# Patient Record
Sex: FEMALE | Race: WHITE | NOT HISPANIC OR LATINO | Employment: PART TIME | URBAN - METROPOLITAN AREA
[De-identification: names, ages, dates, MRNs, and addresses within clinical notes are randomized per-mention and may not be internally consistent; named-entity substitution may affect disease eponyms.]

---

## 2017-01-11 ENCOUNTER — TRANSCRIBE ORDERS (OUTPATIENT)
Dept: ADMINISTRATIVE | Facility: HOSPITAL | Age: 37
End: 2017-01-11

## 2017-01-11 ENCOUNTER — ALLSCRIPTS OFFICE VISIT (OUTPATIENT)
Dept: OTHER | Facility: OTHER | Age: 37
End: 2017-01-11

## 2017-01-11 DIAGNOSIS — R10.31 ABDOMINAL PAIN, RIGHT LOWER QUADRANT: Primary | ICD-10-CM

## 2017-01-11 DIAGNOSIS — R10.31 RIGHT LOWER QUADRANT PAIN: ICD-10-CM

## 2017-01-11 LAB
BILIRUB UR QL STRIP: NORMAL
CLARITY UR: NORMAL
COLOR UR: YELLOW
GLUCOSE (HISTORICAL): NORMAL
HGB UR QL STRIP.AUTO: NORMAL
KETONES UR STRIP-MCNC: NORMAL MG/DL
LEUKOCYTE ESTERASE UR QL STRIP: NORMAL
NITRITE UR QL STRIP: NORMAL
PH UR STRIP.AUTO: 6.5 [PH]
PROT UR STRIP-MCNC: NORMAL MG/DL
SP GR UR STRIP.AUTO: 1
UROBILINOGEN UR QL STRIP.AUTO: 0.2

## 2017-01-13 ENCOUNTER — HOSPITAL ENCOUNTER (OUTPATIENT)
Dept: CT IMAGING | Facility: HOSPITAL | Age: 37
Discharge: HOME | End: 2017-01-13
Payer: COMMERCIAL

## 2017-01-13 DIAGNOSIS — R10.31 ABDOMINAL PAIN, RIGHT LOWER QUADRANT: ICD-10-CM

## 2017-01-13 PROCEDURE — 74177 CT ABD & PELVIS W/CONTRAST: CPT

## 2017-01-13 RX ADMIN — IOHEXOL 100 ML: 350 INJECTION, SOLUTION INTRAVENOUS at 18:44

## 2017-01-13 RX ADMIN — IOHEXOL 50 ML: 240 INJECTION, SOLUTION INTRATHECAL; INTRAVASCULAR; INTRAVENOUS; ORAL at 18:44

## 2017-01-18 ENCOUNTER — ALLSCRIPTS OFFICE VISIT (OUTPATIENT)
Dept: OTHER | Facility: OTHER | Age: 37
End: 2017-01-18

## 2017-03-20 ENCOUNTER — TRANSCRIBE ORDERS (OUTPATIENT)
Dept: URGENT CARE | Facility: CLINIC | Age: 37
End: 2017-03-20

## 2017-03-20 ENCOUNTER — HOSPITAL ENCOUNTER (OUTPATIENT)
Dept: RADIOLOGY | Facility: CLINIC | Age: 37
Discharge: HOME | End: 2017-03-20
Payer: COMMERCIAL

## 2017-03-20 DIAGNOSIS — M79.671 PAIN OF RIGHT FOOT: ICD-10-CM

## 2017-03-20 PROCEDURE — 73630 X-RAY EXAM OF FOOT: CPT

## 2017-04-26 ENCOUNTER — ALLSCRIPTS OFFICE VISIT (OUTPATIENT)
Dept: OTHER | Facility: OTHER | Age: 37
End: 2017-04-26

## 2017-05-11 ENCOUNTER — APPOINTMENT (OUTPATIENT)
Dept: URGENT CARE | Facility: CLINIC | Age: 37
End: 2017-05-11
Payer: COMMERCIAL

## 2017-05-11 ENCOUNTER — HOSPITAL ENCOUNTER (OUTPATIENT)
Dept: RADIOLOGY | Facility: CLINIC | Age: 37
Discharge: HOME | End: 2017-05-11
Admitting: EMERGENCY MEDICINE
Payer: COMMERCIAL

## 2017-05-11 DIAGNOSIS — M79.643 PAIN OF HAND: ICD-10-CM

## 2017-05-11 DIAGNOSIS — R25.2 CRAMP AND SPASM: ICD-10-CM

## 2017-05-11 DIAGNOSIS — R29.898 OTHER SYMPTOMS AND SIGNS INVOLVING THE MUSCULOSKELETAL SYSTEM: ICD-10-CM

## 2017-05-11 DIAGNOSIS — S69.91XA UNSPECIFIED INJURY OF RIGHT WRIST, HAND AND FINGER(S), INITIAL ENCOUNTER: ICD-10-CM

## 2017-05-11 DIAGNOSIS — M54.9 DORSALGIA: ICD-10-CM

## 2017-05-11 DIAGNOSIS — J01.00 ACUTE MAXILLARY SINUSITIS: ICD-10-CM

## 2017-05-11 PROCEDURE — 73090 X-RAY EXAM OF FOREARM: CPT

## 2017-05-11 PROCEDURE — 99214 OFFICE O/P EST MOD 30 MIN: CPT

## 2017-05-11 PROCEDURE — S9088 SERVICES PROVIDED IN URGENT: HCPCS

## 2017-05-11 PROCEDURE — 29125 APPL SHORT ARM SPLINT STATIC: CPT

## 2017-05-11 PROCEDURE — 73130 X-RAY EXAM OF HAND: CPT

## 2017-05-18 ENCOUNTER — APPOINTMENT (OUTPATIENT)
Dept: LAB | Facility: CLINIC | Age: 37
End: 2017-05-18
Payer: COMMERCIAL

## 2017-05-18 ENCOUNTER — TRANSCRIBE ORDERS (OUTPATIENT)
Dept: LAB | Facility: CLINIC | Age: 37
End: 2017-05-18

## 2017-05-18 ENCOUNTER — ALLSCRIPTS OFFICE VISIT (OUTPATIENT)
Dept: OTHER | Facility: OTHER | Age: 37
End: 2017-05-18

## 2017-05-18 DIAGNOSIS — M54.9 DORSALGIA: ICD-10-CM

## 2017-05-18 DIAGNOSIS — R25.2 CRAMP AND SPASM: ICD-10-CM

## 2017-05-18 DIAGNOSIS — M79.643 PAIN OF HAND: ICD-10-CM

## 2017-05-18 DIAGNOSIS — J01.00 ACUTE MAXILLARY SINUSITIS: ICD-10-CM

## 2017-05-18 LAB
ALBUMIN SERPL BCP-MCNC: 4 G/DL (ref 3.5–5)
ALP SERPL-CCNC: 81 U/L (ref 46–116)
ALT SERPL W P-5'-P-CCNC: 22 U/L (ref 12–78)
ANION GAP SERPL CALCULATED.3IONS-SCNC: 7 MMOL/L (ref 4–13)
AST SERPL W P-5'-P-CCNC: 13 U/L (ref 5–45)
BILIRUB SERPL-MCNC: 0.38 MG/DL (ref 0.2–1)
BUN SERPL-MCNC: 18 MG/DL (ref 5–25)
CALCIUM SERPL-MCNC: 9.3 MG/DL (ref 8.3–10.1)
CHLORIDE SERPL-SCNC: 106 MMOL/L (ref 100–108)
CO2 SERPL-SCNC: 27 MMOL/L (ref 21–32)
CREAT SERPL-MCNC: 0.68 MG/DL (ref 0.6–1.3)
CRP SERPL QL: <3 MG/L
ERYTHROCYTE [SEDIMENTATION RATE] IN BLOOD: 34 MM/HOUR (ref 0–20)
GFR SERPL CREATININE-BSD FRML MDRD: >60 ML/MIN/1.73SQ M
GLUCOSE SERPL-MCNC: 85 MG/DL (ref 65–140)
POTASSIUM SERPL-SCNC: 4.2 MMOL/L (ref 3.5–5.3)
PROT SERPL-MCNC: 7.5 G/DL (ref 6.4–8.2)
SODIUM SERPL-SCNC: 140 MMOL/L (ref 136–145)

## 2017-05-18 PROCEDURE — 80053 COMPREHEN METABOLIC PANEL: CPT

## 2017-05-18 PROCEDURE — 86225 DNA ANTIBODY NATIVE: CPT

## 2017-05-18 PROCEDURE — 86618 LYME DISEASE ANTIBODY: CPT

## 2017-05-18 PROCEDURE — 86038 ANTINUCLEAR ANTIBODIES: CPT

## 2017-05-18 PROCEDURE — 86430 RHEUMATOID FACTOR TEST QUAL: CPT

## 2017-05-18 PROCEDURE — 85652 RBC SED RATE AUTOMATED: CPT

## 2017-05-18 PROCEDURE — 36415 COLL VENOUS BLD VENIPUNCTURE: CPT

## 2017-05-18 PROCEDURE — 86140 C-REACTIVE PROTEIN: CPT

## 2017-05-19 LAB
B BURGDOR IGG SER IA-ACNC: 0.08
B BURGDOR IGM SER IA-ACNC: 0.21
DSDNA AB SER-ACNC: <1 IU/ML (ref 0–9)
RHEUMATOID FACT SER QL LA: NEGATIVE
RYE IGE QN: NEGATIVE

## 2017-05-23 ENCOUNTER — ALLSCRIPTS OFFICE VISIT (OUTPATIENT)
Dept: OTHER | Facility: OTHER | Age: 37
End: 2017-05-23

## 2017-05-23 ENCOUNTER — TRANSCRIBE ORDERS (OUTPATIENT)
Dept: ADMINISTRATIVE | Facility: HOSPITAL | Age: 37
End: 2017-05-23

## 2017-05-23 DIAGNOSIS — R29.898 OTHER SYMPTOMS REFERABLE TO JOINT, SITE UNSPECIFIED: Primary | ICD-10-CM

## 2017-05-27 ENCOUNTER — HOSPITAL ENCOUNTER (OUTPATIENT)
Dept: MRI IMAGING | Facility: HOSPITAL | Age: 37
Discharge: HOME | End: 2017-05-27
Payer: COMMERCIAL

## 2017-05-27 DIAGNOSIS — R29.898 OTHER SYMPTOMS REFERABLE TO JOINT, SITE UNSPECIFIED: ICD-10-CM

## 2017-05-27 PROCEDURE — 70553 MRI BRAIN STEM W/O & W/DYE: CPT

## 2017-05-27 PROCEDURE — A9585 GADOBUTROL INJECTION: HCPCS | Performed by: RADIOLOGY

## 2017-05-27 RX ADMIN — GADOBUTROL 9 ML: 604.72 INJECTION INTRAVENOUS at 14:15

## 2017-05-31 ENCOUNTER — ALLSCRIPTS OFFICE VISIT (OUTPATIENT)
Dept: OTHER | Facility: OTHER | Age: 37
End: 2017-05-31

## 2017-06-13 ENCOUNTER — TRANSCRIBE ORDERS (OUTPATIENT)
Dept: ADMINISTRATIVE | Facility: HOSPITAL | Age: 37
End: 2017-06-13

## 2017-06-13 ENCOUNTER — ALLSCRIPTS OFFICE VISIT (OUTPATIENT)
Dept: OTHER | Facility: OTHER | Age: 37
End: 2017-06-13

## 2017-06-13 DIAGNOSIS — R25.2 CRAMP AND SPASM: ICD-10-CM

## 2017-06-13 DIAGNOSIS — R20.2 PARESTHESIA OF SKIN: ICD-10-CM

## 2017-06-13 DIAGNOSIS — R20.2 PARESTHESIA: Primary | ICD-10-CM

## 2017-06-14 ENCOUNTER — APPOINTMENT (OUTPATIENT)
Dept: LAB | Facility: CLINIC | Age: 37
End: 2017-06-14
Payer: COMMERCIAL

## 2017-06-14 ENCOUNTER — TRANSCRIBE ORDERS (OUTPATIENT)
Dept: URGENT CARE | Facility: CLINIC | Age: 37
End: 2017-06-14

## 2017-06-14 ENCOUNTER — APPOINTMENT (OUTPATIENT)
Dept: RADIOLOGY | Facility: CLINIC | Age: 37
End: 2017-06-14
Payer: COMMERCIAL

## 2017-06-14 DIAGNOSIS — R20.2 PARESTHESIA OF SKIN: ICD-10-CM

## 2017-06-14 DIAGNOSIS — R25.2 CRAMP AND SPASM: ICD-10-CM

## 2017-06-14 LAB
25(OH)D3 SERPL-MCNC: 25.6 NG/ML (ref 30–100)
CK SERPL-CCNC: 76 U/L (ref 26–192)
FOLATE SERPL-MCNC: 19.2 NG/ML (ref 3.1–17.5)
VIT B12 SERPL-MCNC: 596 PG/ML (ref 100–900)

## 2017-06-14 PROCEDURE — 82746 ASSAY OF FOLIC ACID SERUM: CPT

## 2017-06-14 PROCEDURE — 82607 VITAMIN B-12: CPT

## 2017-06-14 PROCEDURE — 83519 RIA NONANTIBODY: CPT

## 2017-06-14 PROCEDURE — 72110 X-RAY EXAM L-2 SPINE 4/>VWS: CPT

## 2017-06-14 PROCEDURE — 72050 X-RAY EXAM NECK SPINE 4/5VWS: CPT

## 2017-06-14 PROCEDURE — 84238 ASSAY NONENDOCRINE RECEPTOR: CPT

## 2017-06-14 PROCEDURE — 84165 PROTEIN E-PHORESIS SERUM: CPT

## 2017-06-14 PROCEDURE — 36415 COLL VENOUS BLD VENIPUNCTURE: CPT

## 2017-06-14 PROCEDURE — 82550 ASSAY OF CK (CPK): CPT

## 2017-06-14 PROCEDURE — 82306 VITAMIN D 25 HYDROXY: CPT

## 2017-06-16 LAB
ACHR BIND AB SER-SCNC: <0.03 NMOL/L (ref 0–0.24)
ALBUMIN SERPL ELPH-MCNC: 4.13 G/DL (ref 3.5–5)
ALBUMIN SERPL ELPH-MCNC: 58.2 % (ref 52–65)
ALPHA1 GLOB SERPL ELPH-MCNC: 0.32 G/DL (ref 0.1–0.4)
ALPHA1 GLOB SERPL ELPH-MCNC: 4.5 % (ref 2.5–5)
ALPHA2 GLOB SERPL ELPH-MCNC: 0.8 G/DL (ref 0.4–1.2)
ALPHA2 GLOB SERPL ELPH-MCNC: 11.2 % (ref 7–13)
BETA GLOB ABNORMAL SERPL ELPH-MCNC: 0.43 G/DL (ref 0.4–0.8)
BETA1 GLOB SERPL ELPH-MCNC: 6.1 % (ref 5–13)
BETA2 GLOB SERPL ELPH-MCNC: 5.4 % (ref 2–8)
BETA2+GAMMA GLOB SERPL ELPH-MCNC: 0.38 G/DL (ref 0.2–0.5)
GAMMA GLOB ABNORMAL SERPL ELPH-MCNC: 1.04 G/DL (ref 0.5–1.6)
GAMMA GLOB SERPL ELPH-MCNC: 14.6 % (ref 12–22)
IGG/ALB SER: 1.39 {RATIO} (ref 1.1–1.8)
PROT PATTERN SERPL ELPH-IMP: NORMAL
PROT SERPL-MCNC: 7.1 G/DL (ref 6.4–8.2)

## 2017-06-20 LAB — ACHR BLOCK AB/ACHR TOTAL SFR SER: 18 % (ref 0–25)

## 2017-06-30 ENCOUNTER — GENERIC CONVERSION - ENCOUNTER (OUTPATIENT)
Dept: OTHER | Facility: OTHER | Age: 37
End: 2017-06-30

## 2017-07-06 ENCOUNTER — ALLSCRIPTS OFFICE VISIT (OUTPATIENT)
Dept: OTHER | Facility: OTHER | Age: 37
End: 2017-07-06

## 2017-07-06 ENCOUNTER — GENERIC CONVERSION - ENCOUNTER (OUTPATIENT)
Dept: OTHER | Facility: OTHER | Age: 37
End: 2017-07-06

## 2017-07-06 LAB — ACHR MOD AB/ACHR TOTAL SFR SER: <12 % (ref 0–20)

## 2017-07-13 ENCOUNTER — TRANSCRIBE ORDERS (OUTPATIENT)
Dept: SLEEP CENTER | Facility: CLINIC | Age: 37
End: 2017-07-13

## 2017-07-14 ENCOUNTER — ALLSCRIPTS OFFICE VISIT (OUTPATIENT)
Dept: OTHER | Facility: OTHER | Age: 37
End: 2017-07-14

## 2017-09-19 ENCOUNTER — ALLSCRIPTS OFFICE VISIT (OUTPATIENT)
Dept: OTHER | Facility: OTHER | Age: 37
End: 2017-09-19

## 2017-09-19 ENCOUNTER — TRANSCRIBE ORDERS (OUTPATIENT)
Dept: ADMINISTRATIVE | Facility: HOSPITAL | Age: 37
End: 2017-09-19

## 2017-09-19 DIAGNOSIS — R20.2 PARESTHESIA OF SKIN: ICD-10-CM

## 2017-09-19 DIAGNOSIS — R20.2 PARESTHESIA: Primary | ICD-10-CM

## 2017-09-19 DIAGNOSIS — S46.912A STRAIN OF UNSPECIFIED MUSCLE, FASCIA AND TENDON AT SHOULDER AND UPPER ARM LEVEL, LEFT ARM, INITIAL ENCOUNTER: ICD-10-CM

## 2017-09-19 DIAGNOSIS — E55.9 VITAMIN D DEFICIENCY: ICD-10-CM

## 2017-09-19 DIAGNOSIS — R73.9 HYPERGLYCEMIA: ICD-10-CM

## 2017-09-20 ENCOUNTER — TRANSCRIBE ORDERS (OUTPATIENT)
Dept: LAB | Facility: CLINIC | Age: 37
End: 2017-09-20

## 2017-09-20 ENCOUNTER — APPOINTMENT (OUTPATIENT)
Dept: LAB | Facility: CLINIC | Age: 37
End: 2017-09-20
Payer: COMMERCIAL

## 2017-09-20 DIAGNOSIS — E55.9 UNSPECIFIED VITAMIN D DEFICIENCY: ICD-10-CM

## 2017-09-20 DIAGNOSIS — E55.9 UNSPECIFIED VITAMIN D DEFICIENCY: Primary | ICD-10-CM

## 2017-09-20 LAB
TSH SERPL DL<=0.05 MIU/L-ACNC: 0.83 UIU/ML (ref 0.36–3.74)
VIT B12 SERPL-MCNC: 751 PG/ML (ref 100–900)

## 2017-09-20 PROCEDURE — 82652 VIT D 1 25-DIHYDROXY: CPT

## 2017-09-20 PROCEDURE — 84443 ASSAY THYROID STIM HORMONE: CPT

## 2017-09-20 PROCEDURE — 82607 VITAMIN B-12: CPT

## 2017-09-20 PROCEDURE — 36415 COLL VENOUS BLD VENIPUNCTURE: CPT

## 2017-09-22 LAB — 1,25(OH)2D3 SERPL-MCNC: 68.9 PG/ML (ref 19.9–79.3)

## 2017-10-06 ENCOUNTER — HOSPITAL ENCOUNTER (OUTPATIENT)
Dept: NEUROLOGY | Facility: CLINIC | Age: 37
Discharge: HOME | End: 2017-10-06
Payer: COMMERCIAL

## 2017-10-06 DIAGNOSIS — R20.2 PARESTHESIA: ICD-10-CM

## 2017-10-06 PROCEDURE — 95886 MUSC TEST DONE W/N TEST COMP: CPT

## 2017-10-06 PROCEDURE — 95912 NRV CNDJ TEST 11-12 STUDIES: CPT

## 2017-10-18 ENCOUNTER — OFFICE VISIT (OUTPATIENT)
Dept: URGENT CARE | Facility: CLINIC | Age: 37
End: 2017-10-18
Payer: COMMERCIAL

## 2017-10-18 PROCEDURE — S9088 SERVICES PROVIDED IN URGENT: HCPCS

## 2017-10-18 PROCEDURE — 99213 OFFICE O/P EST LOW 20 MIN: CPT

## 2017-10-19 ENCOUNTER — ALLSCRIPTS OFFICE VISIT (OUTPATIENT)
Dept: OTHER | Facility: OTHER | Age: 37
End: 2017-10-19

## 2017-10-20 ENCOUNTER — APPOINTMENT (OUTPATIENT)
Dept: RADIOLOGY | Facility: CLINIC | Age: 37
End: 2017-10-20
Payer: COMMERCIAL

## 2017-10-20 ENCOUNTER — APPOINTMENT (OUTPATIENT)
Dept: LAB | Facility: CLINIC | Age: 37
End: 2017-10-20
Payer: COMMERCIAL

## 2017-10-20 ENCOUNTER — TRANSCRIBE ORDERS (OUTPATIENT)
Dept: LAB | Facility: CLINIC | Age: 37
End: 2017-10-20

## 2017-10-20 DIAGNOSIS — S46.912A STRAIN OF UNSPECIFIED MUSCLE, FASCIA AND TENDON AT SHOULDER AND UPPER ARM LEVEL, LEFT ARM, INITIAL ENCOUNTER: ICD-10-CM

## 2017-10-20 DIAGNOSIS — R73.9 HYPERGLYCEMIA: ICD-10-CM

## 2017-10-20 LAB
EST. AVERAGE GLUCOSE BLD GHB EST-MCNC: 114 MG/DL
GLUCOSE P FAST SERPL-MCNC: 110 MG/DL (ref 65–99)
HBA1C MFR BLD: 5.6 % (ref 4.2–6.3)

## 2017-10-20 PROCEDURE — 84681 ASSAY OF C-PEPTIDE: CPT

## 2017-10-20 PROCEDURE — 36415 COLL VENOUS BLD VENIPUNCTURE: CPT

## 2017-10-20 PROCEDURE — 83036 HEMOGLOBIN GLYCOSYLATED A1C: CPT

## 2017-10-20 PROCEDURE — 82947 ASSAY GLUCOSE BLOOD QUANT: CPT

## 2017-10-20 PROCEDURE — 73030 X-RAY EXAM OF SHOULDER: CPT

## 2017-10-20 NOTE — PROGRESS NOTES
Assessment  1  Hyperglycemia (790 29) (R73 9)   2  Left shoulder strain (840 9) (X80 563Z)    Plan  Left shoulder strain    · * XR SHOULDER 2+ VIEW LEFT; Status:Active; Requested MAX:[id number];     Discussion/Summary  The patient was counseled regarding diagnostic results,-- instructions for management,-- risk factor reductions,-- prognosis,-- patient and family education,-- impressions,-- risks and benefits of treatment options,-- importance of compliance with treatment  The treatment plan was reviewed with the patient/guardian  The patient/guardian understands and agrees with the treatment plan      Chief Complaint  concerns with blood sugar      History of Present Illness  HPI: pt complains of hyperglycemia, she is checking sugars at home fasting and seeing an average of 115-130, pt has no dx of diabetes, pt is watching her diet and exercising and her weight is going down      Review of Systems    Constitutional: no fever-- and-- no chills  Cardiovascular: no chest pain-- and-- no palpitations  Genitourinary: negative for nocturia or polyuria  Other Symptoms: pt denies polydipsia  Active Problems  1  Acute back pain (724 5) (M54 9)   2  Acute maxillary sinusitis (461 0) (J01 00)   3  Ankle pain (719 47) (M25 579)   4  Benign mole (216 9) (D22 9)   5  Bipolar disorder, unspecified (296 80) (F31 9)   6  Blurred vision (368 8) (H53 8)   7  Classic migraine (346 00) (G43 109)   8  Contusion of right ankle, initial encounter (924 21) (S90 01XA)   9  COPD (chronic obstructive pulmonary disease) (496) (J44 9)   10  Dizziness (780 4) (R42)   11  Generalized anxiety disorder (300 02) (F41 1)   12  Hypercholesterolemia (272 0) (E78 00)   13  Injury of right hand, initial encounter (959 4) (S69 91XA)   14  Left shoulder strain (840 9) (S46 912A)   15  Leg cramps (729 82) (R25 2)   16  Leg pain (729 5) (M79 606)   17  Leukocytosis, unspecified type (288 60) (D72 829)   18  Lower back pain (724 2) (M54 5)   19  Lung nodule (793 11) (R91 1)   20  Lymphadenitis, acute (683) (L04 9)   21  Major depression (296 20) (F32 9)   22  Need for influenza vaccination (V04 81) (Z23)   23  Pain, hand (729 5) (M79 643)   24  Paresthesia (782 0) (R20 2)   25  Pelvic pain in female (625 9) (R10 2)   26  Right foot pain (729 5) (M79 671)   27  Right lower quadrant abdominal pain (789 03) (R10 31)   28  Shingles (053 9) (B02 9)   29  Shortness of breath (786 05) (R06 02)   30  Skin rash (782 1) (R21)   31  Sprain of right hand, initial encounter (842 10) (S63 91XA)   32  Sprain of right wrist, initial encounter (842 00) (S63 501A)   33  Unspecified ovarian cyst, unspecified side (620 2) (N83 209)   34  Upper limb weakness (729 89) (R29 898)   35  Vitamin D deficiency (268 9) (E55 9)   36  Weakness of both lower extremities (729 89) (R29 898)   37  Yeast infection of the vagina (112 1) (B37 3)    Past Medical History  1  History of endometriosis (V13 29) (Z87 42)   2  History of hyperlipidemia (V12 29) (Z86 39)   3  History of low back pain (V13 59) (Z87 39)   4  History of urinary frequency (V13 09) (Z87 898)   5  History of Prolapsed bladder   6  History of Urinary urgency (788 63) (R39 15)    Family History  Mother    1  Family history of chronic obstructive pulmonary disease (V17 6) (Z82 5)   2  Family history of rheumatic heart disease (V17 49) (Z82 49)   3  Family history of type 2 diabetes mellitus (V18 0) (Z83 3)  Father    4  Family history of    5  Family history of lung cancer (V16 1) (Z80 1)  Maternal Grandmother    6  Family history of    7  Family history of cerebrovascular accident (CVA) (V17 1) (Z82 3)   8  Family history of type 2 diabetes mellitus (V18 0) (Z83 3)  Paternal Grandmother    5  Family history of type 2 diabetes mellitus (V18 0) (Z83 3)  Maternal Grandfather    10  Family history of Esophageal abnormality  Paternal Grandfather    6   Family history of type 2 diabetes mellitus (V18 0) (Z83 3)  Family History Reviewed: The family history was reviewed and updated today  Social History   · Disabled   · Former smoker (T06 54) (E70 263)   ·    · One child   · Social alcohol use (Z78 9)  The social history was reviewed and is unchanged  Surgical History  1  History of Bladder Surgery   2  History of Hysterectomy   3  History of Ovarian Cystectomy   4  History of Tonsillectomy With Adenoidectomy    Current Meds   1  Cyclobenzaprine HCl - 10 MG Oral Tablet; TAKE 1 TABLET 3 TIMES DAILY AS NEEDED; Therapy: 58YSQ9108 to ([address])  Requested for: 49KAB8980; Last   Rx:18Oct2017 Ordered   2  Daily Multivitamin TABS; Therapy: (Recorded:16Wkh8347) to Recorded   3  LaMICtal 200 MG Oral Tablet; TAKE 1 TABLET TWICE DAILY; Therapy: (Recorded:13Jun2017) to Recorded   4  LORazepam 0 5 MG Oral Tablet; TAKE 1 TABLET EVERY 12 HOURS AS NEEDED; Therapy: (Recorded:13Jun2017) to Recorded   5  PredniSONE 10 MG Oral Tablet; 3 tabs BID X 2 days, 2 Tabs BID X 2 days, 1 Tab BID   X 2 Days, then 1 Tab daily X 2 days; Therapy: 17ZJU7740 to (Last Rx:18Oct2017)  Requested for: 20AMV3817 Ordered   6  Simvastatin 40 MG Oral Tablet; take 1 tablet by mouth once daily; Therapy: 42FKY9575 to (Evaluate:16Nov2017)  Requested for: 21Nov2016; Last   Rx:21Nov2016 Ordered   7  Vitamin D 2000 UNIT Oral Capsule; Therapy: (Recorded:12Jij7125) to Recorded    The medication list was reviewed and updated today  Allergies  1  No Known Drug Allergies    Vitals   Recorded: 16FFV3528 02:14PM   Temperature 98 5 F, Tympanic   Heart Rate 87   Systolic 537   Diastolic 78   Height 5 ft 5 in   Weight 199 lb    BMI Calculated 33 12   BSA Calculated 1 97   O2 Saturation 94     Physical Exam    Constitutional   General appearance: No acute distress, well appearing and well nourished  well developed,-- appears healthy,-- well nourished,-- overweight-- and-- well hydrated     Pulmonary   Respiratory effort: No increased work of breathing or signs of respiratory distress  Respiratory rate: normal  Assessment of respiratory effort revealed normal rhythm and effort  Auscultation of lungs: Clear to auscultation  no rales or crackles were heard bilaterally  no rhonchi  no friction rub  no wheezing  Cardiovascular   Auscultation of heart: Normal rate and rhythm, normal S1 and S2, without murmurs  The heart rate was normal  The rhythm was regular  Heart sounds: normal S1-- and-- normal S2  no murmurs were heard  Abdomen   Abdomen: Non-tender, no masses  Liver and spleen: No hepatomegaly or splenomegaly  Skin   Skin and subcutaneous tissue: Normal without rashes or lesions      Psychiatric   Mood and affect: Normal          Future Appointments    Date/Time Provider Specialty Site   01/23/2018 11:30 AM Flaco Echeverria MD Neurology [facility] [address] [facility]     Signatures   Electronically signed by : Renetta Monte DO; Oct 19 2017  2:33PM EST                       (Author)

## 2017-10-21 LAB — C PEPTIDE SERPL-MCNC: 6.3 NG/ML (ref 1.1–4.4)

## 2017-10-21 NOTE — PROGRESS NOTES
Assessment  1  Left shoulder strain (840 9) (B55 800T)    Plan  Left shoulder strain    · Cyclobenzaprine HCl - 10 MG Oral Tablet; TAKE 1 TABLET 3 TIMES DAILY AS  NEEDED   · PredniSONE 10 MG Oral Tablet; 3 tabs BID X 2 days, 2 Tabs BID X 2 days, 1 Tab  BID X 2 Days, then 1 Tab daily X 2 days    Discussion/Summary  Discussion Summary:   Pain seems muscular in nature  We'll try a muscle relaxer and prednisone 60 physical, down  If not improving over the next week follow-up with PCP  advised to use heat or ice to help with symptoms  Medication Side Effects Reviewed: Possible side effects of new medications were reviewed with the patient/guardian today  Understands and agrees with treatment plan: The treatment plan was reviewed with the patient/guardian  The patient/guardian understands and agrees with the treatment plan   Follow Up Instructions: Follow Up with your Primary Care Provider in 7 days  If your symptoms worsen, go to the nearest [place] [address] Emergency Department  Chief Complaint  1  Shoulder Pain  Chief Complaint Free Text Note Form: Pt c/o left shoulder pain for a week  History of Present Illness  HPI: 42year old female here with left shoulder pain for the past week  Patient felt a sudden pull and sharp pain a week ago after trying to remove her jacket  It had improved a little until this morning  Woke up today with increased pain in the posterior aspect of her shoulder  Hospital Based Practices Required Assessment:   Abuse And Domestic Violence Screen    Yes, the patient is safe at home  -- The patient states no one is hurting them  Depression And Suicide Screen  No, the patient has not had thoughts of hurting themself  No, the patient has not felt depressed in the past 7 days  Prefered Language is  [place]  Primary Language is  English  Shoulder Pain: CITLALLI SALEH presents with complaints of shoulder pain     Associated symptoms include decreased range of motion, but-- no swelling,-- no clicking,-- no shoulder bruising,-- no instability,-- no redness,-- no warmth,-- no numbness in the arm,-- no weakness in the arm,-- no pain in the arm,-- no paresthesias,-- no pain in the neck,-- no chest pain,-- no pain in other joints,-- no fever,-- no localized rash-- and-- no generalized rash  Review of Systems  Focused-Female:   Constitutional: No fever, no chills, feels well, no tiredness, no recent weight gain or loss  ENT: no ear ache, no loss of hearing, no nosebleeds or nasal discharge, no sore throat or hoarseness  Musculoskeletal: as noted in HPI  Neurological: no complaints of headache, no confusion, no numbness or tingling, no dizziness or fainting  ROS Reviewed:   ROS reviewed  Active Problems  1  Acute back pain (724 5) (M54 9)   2  Acute maxillary sinusitis (461 0) (J01 00)   3  Ankle pain (719 47) (M25 579)   4  Benign mole (216 9) (D22 9)   5  Bipolar disorder, unspecified (296 80) (F31 9)   6  Blurred vision (368 8) (H53 8)   7  Classic migraine (346 00) (G43 109)   8  Contusion of right ankle, initial encounter (924 21) (S90 01XA)   9  COPD (chronic obstructive pulmonary disease) (496) (J44 9)   10  Dizziness (780 4) (R42)   11  Generalized anxiety disorder (300 02) (F41 1)   12  Hypercholesterolemia (272 0) (E78 00)   13  Injury of right hand, initial encounter (959 4) (S69 91XA)   14  Leg cramps (729 82) (R25 2)   15  Leg pain (729 5) (M79 606)   16  Leukocytosis, unspecified type (288 60) (D72 829)   17  Lower back pain (724 2) (M54 5)   18  Lung nodule (793 11) (R91 1)   19  Lymphadenitis, acute (683) (L04 9)   20  Major depression (296 20) (F32 9)   21  Need for influenza vaccination (V04 81) (Z23)   22  Pain, hand (729 5) (M79 643)   23  Paresthesia (782 0) (R20 2)   24  Pelvic pain in female (625 9) (R10 2)   25  Right foot pain (729 5) (M79 671)   26  Right lower quadrant abdominal pain (789 03) (R10 31)   27  Shingles (053 9) (B02 9)   28   Shortness of breath (786 05) (R06 02)   29  Skin rash (782 1) (R21)   30  Sprain of right hand, initial encounter (842 10) (S63 91XA)   31  Sprain of right wrist, initial encounter (842 00) (S63 501A)   32  Unspecified ovarian cyst, unspecified side (620 2) (N83 209)   33  Upper limb weakness (729 89) (R29 898)   34  Vitamin D deficiency (268 9) (E55 9)   35  Weakness of both lower extremities (729 89) (R29 898)   36  Yeast infection of the vagina (112 1) (B37 3)    Past Medical History  1  History of endometriosis (V13 29) (Z87 42)   2  History of hyperlipidemia (V12 29) (Z86 39)   3  History of low back pain (V13 59) (Z87 39)   4  History of urinary frequency (V13 09) (Z87 898)   5  History of Prolapsed bladder   6  History of Urinary urgency (788 63) (R39 15)  Active Problems And Past Medical History Reviewed: The active problems and past medical history were reviewed and updated today  Family History  Mother    1  Family history of chronic obstructive pulmonary disease (V17 6) (Z82 5)   2  Family history of rheumatic heart disease (V17 49) (Z82 49)   3  Family history of type 2 diabetes mellitus (V18 0) (Z83 3)  Father    4  Family history of    5  Family history of lung cancer (V16 1) (Z80 1)  Maternal Grandmother    6  Family history of    7  Family history of cerebrovascular accident (CVA) (V17 1) (Z82 3)   8  Family history of type 2 diabetes mellitus (V18 0) (Z83 3)  Paternal Grandmother    5  Family history of type 2 diabetes mellitus (V18 0) (Z83 3)  Maternal Grandfather    10  Family history of Esophageal abnormality  Paternal Grandfather    6  Family history of type 2 diabetes mellitus (V18 0) (Z83 3)  Family History Reviewed: The family history was reviewed and updated today  Social History   · Disabled   · Former smoker (W08 92) (T10 736)   ·    · One child   · Social alcohol use (Z78 9)  Social History Reviewed: The social history was reviewed and updated today   The social history was reviewed and is unchanged  Surgical History  1  History of Bladder Surgery   2  History of Hysterectomy   3  History of Ovarian Cystectomy   4  History of Tonsillectomy With Adenoidectomy  Surgical History Reviewed: The surgical history was reviewed and updated today  Current Meds   1  Daily Multivitamin TABS; Therapy: (Recorded:25Skn3942) to Recorded   2  LaMICtal 200 MG Oral Tablet; TAKE 1 TABLET TWICE DAILY; Therapy: (Recorded:13Jun2017) to Recorded   3  LORazepam 0 5 MG Oral Tablet; TAKE 1 TABLET EVERY 12 HOURS AS NEEDED; Therapy: (Recorded:13Jun2017) to Recorded   4  Simvastatin 40 MG Oral Tablet; take 1 tablet by mouth once daily; Therapy: 21DHP6754 to (Evaluate:16Nov2017)  Requested for: 21Nov2016; Last   Rx:21Nov2016 Ordered   5  Vitamin D 2000 UNIT Oral Capsule; Therapy: (Recorded:26Odo6215) to Recorded  Medication List Reviewed: The medication list was reviewed and updated today  Allergies  1  No Known Drug Allergies    Vitals  Signs   Recorded: 05NXF3523 10:37AM   Temperature: 97 3 F  Heart Rate: 69  Respiration: 18  Systolic: 515  Diastolic: 81  Height: 5 ft 5 in  Weight: 200 lb 13 41 oz  BMI Calculated: 33 42  BSA Calculated: 1 98  O2 Saturation: 98    Physical Exam    Constitutional   General appearance: No acute distress, well appearing and well nourished  Musculoskeletal   Gait and station: Normal     Inspection/palpation of joints, bones, and muscles: Abnormal  -- Able to fully abduct and adduct left shoulder  Decreased ROM with extension of left shoulder and pain to palpation over left scapula  No pain over superior or anterior aspect of shoulder  No deformity noted        Future Appointments    Date/Time Provider Specialty Site   01/23/2018 11:30 AM Hodan Goldman MD Neurology [address]     Signatures   Electronically signed by : Hoda Marroquin, [facility]; Oct 18 2017 10:53AM EST                       (Author)    Electronically signed by : SENG Lamb ; Oct 19 2017  7:52PM EST                       (Co-author)

## 2017-10-27 NOTE — PROGRESS NOTES
Assessment  1  Paresthesia (782 0) (R20 2)   2  Leg cramps (729 82) (R25 2)   3  Vitamin D deficiency (268 9) (E55 9)    Plan  Classic migraine, Dizziness, Leg cramps, Paresthesia    · (LC) T4 and TSH; Status:Active; Requested HRI:[id number];    Perform:[facility]; Due:88Azy7725; Ordered; For:Classic migraine, Dizziness, Leg cramps, Paresthesia; Ordered By:Lois Lopez;  Paresthesia    · Gabapentin 300 MG Oral Capsule; take 1 capsule at bedtime   Rx By: Linda Dumont; Dispense: 30 Days ; #:30 Capsule; Refill: 0;For: Paresthesia; BON = N; Verified Transmission to [address] ; Last Updated By: System, [employer]; 9/19/2017 11:36:43 AM   · (1) VITAMIN B12; Status:Active; Requested IOY:[id number];    Perform:[facility]; Due:19Sep2018; Ordered; Aishwarya Rock; Ordered By:Amita Lopez;   · EMG TWO EXTREMITIES WITH OR W/O RELATED PARASPINAL AREAS; Status:Active; Requested RQQ:[id number];    Perform:[facility]; Order Comments:Eval for neuropathy, myopathy or radiculopathy ( cramps and paresthesias); Due:44Umc7109; Last Updated By: Nicole Wild; 9/19/2017 11:43:56 AM;Ordered; Aishwarya Feathers; Ordered By:Catarina Issa;  TWO : RLE  ONE : RUE   · Follow-up visit in 3 months Evaluation and Treatment  Follow-up  Status: Complete   Done: 95YHL8941   Ordered; For: Paresthesia; Ordered By: Linda Dumont Performed:  Due: 57TUM9020; Last Updated By: Nicole Wild; 9/19/2017 11:44:11 AM  Vitamin D deficiency    · (1) VITAMIN D 25-HYDROXY; Status:Active; Requested JAQ:[id number];    Perform:[facility]; Due:34Xau0120; Ordered; For:Vitamin D deficiency; Ordered By:Lois Lopez; Discussion/Summary  Discussion Summary:   Patient is a 77-year-old lady complaining of paresthesias, muscle cramps and stiffness/weakness throughout the body  Patient had an MRI of the brain that was unremarkable and there was no evidence of any demyelinating disease   Blood work obtained by PCP was unremarkable so far  Neurological examination today in the office appears to be unremarkable  Recommend obtaining EMG of one upper and lower extremity preferably the right side to evaluate for any myopathy or radiculopathy or neuropathy  additional blood work with low vitD, on replacement all other labs normal, pt has not had B12, Thyroid testing, pt with FH of DM, HgA1c not coveredx-ray of the cervical and lumbar spine without any significant arthritis or disc disease  For the cramps patient has been instructed to take daily MultivitaminHeadaches appear to be very tolerable and patient has been instructed to maintain headache diary for headache and avoid headache triggers  will try Gabapentin 300mg HS  pt to let psych know of medicaionIn regards to bipolar disorder patient is following up with psychiatrist    Counseling Documentation With Imm: The patient was counseled regarding diagnostic results,-- instructions for management,-- risk factor reductions,-- prognosis,-- impressions,-- risks and benefits of treatment options,-- importance of compliance with treatment  total time of encounter was 25 minutes-- and-- >50% minutes was spent counseling  Patient's Capacity to Self-Care: Patient is able to Self-Care  Medication SE Review and Pt Understands Tx: Possible side effects of new medications were reviewed with the patient/guardian today  The treatment plan was reviewed with the patient/guardian  The patient/guardian understands and agrees with the treatment plan   Patient Guardian understands agrees: The treatment plan was reviewed with the patient/guardian  The patient/guardian understands and agrees with the treatment plan   Headache [facility]: There are no changes in medication management  Patient education was completed today and we also discussed precautions for rebound headaches  --    When patient has a moderate to severe headache, they should seek rest, initiate relaxation and apply cold compresses to the head     Also recommended to the patient :  1  Maintain regular sleep schedule -- 2  Limit over the counter medications  (No more than 3 times a week)  -- 3  Maintain headache diary  -- 4  Limit caffeine to 1-2 cups a day or less  -- 5  Avoid dietary trigger  (list given to the patient and reviewed with them)  -- 6  Patient is to have regular frequent meals to prevent headache onset  Chief Complaint  Chief Complaint Free Text Note Form: Patient presents for neurology follow-up for paresthesia      History of Present Illness  HPI: Patient is a 68-year-old lady coming in for follow up in regards to paresthesias and cramps  has h/o hypercholesterolemia and bipolar disorder and depression and states that she has symptoms in the last 2 months and she initially notices she had cramps in both lower extremities and right hand  It was really bad per the patient  She describes cramps as tightness  She also has tingling in both the arms as well and her PCP ordered MRI brain which was normal  She also feels like bugs crawling sensation for few months  She feels her had is constantly cramping ( however I could not see any cramp) and she describes it as stiffness  She tried Magnesium and it did not help    has occasional migraine like headaches but they are tolerable  SHe did not have one in few months  Excedrin migraine helps her  Headache is all over the head, pounding headache, lasts few hours  Headache is worse with lights and sounds  Excedrin migraine helps her  She does not want any preventive medication as they are tolerable  less than 3, ESR 34, rheumatoid factor negative, PK screen negative, Lyme negative, ESR 7,MRI brain with and without contrast on 5/27/17 showed no acute intracranial abnormality or pathologic enhancement, no significant white matter changes  X-ray of the hand on 5/11/17 showed no acute osseous abnormality  X-ray of the forearm right showed no acute osseous abnormality   X-ray foot on the right showed no acute osseous abnormality, prominent plantar calcaneal spur  history: 9/19/17this she continues to describe ongoing paresthesias  Primarily, she describes numbness and tingling occurring in her thighs bilateral with radiation into her lower extremities  She notes some on growing cramping sensations particularly in her hands  She denies any interim falls, nor does she have difficulty with fine dexterity  She did try some magnesium which was ineffective  Her headaches at this time are fairly well controlled, 1 present, she will utilize Excedrin with benefit    She denies any new, or focal weakness, no complaints of significant cervical or low back pain, bowel and bladder are intact  It was suggested she undergo any EMG however this was not carried through  She did undergo further testing to include;labs; Vitamin D = 25 6, CPK = 76, vitamin B12 = 596, folate = 19 2, PEP normal, acetylcholine receptor antibody within normal limitsx-ray lumbar spine was normal,, x-ray cervical spine with reversal of the normal cervical lordosis apex at C5  fracture or traumatic malalignment noted  Review of Systems  Neurological ROS:   Constitutional: no fever, no chills, no recent weight gain, no recent weight loss, no complaints of feeling tired, no changes in appetite  HEENT:  no sinus problems, not feeling congested, no blurred vision, no dryness of the eyes, no eye pain, no hearing loss, no tinnitus, no mouth sores, no sore throat, no hoarseness, no dysphagia, no masses, no bleeding  Cardiovascular:  no chest pain or pressure, no palpitations present, the heart rate was not rapid or irregular, no swelling in the arms or legs, no poor circulation  Respiratory:  no unusual or persistant cough, no shortness of breath with or without exertion  Gastrointestinal:  no nausea, no vomiting, no diarrhea, no abdominal pain, no changes in bowel habits, no melena, no loss of bowel control     Genitourinary:  no incontinence, no feelings of urinary urgency, no increase in frequency, no urinary hesitancy, no dysuria, no hematuria  Musculoskeletal:  no arthralgias, no myalgias, no immobility or loss of function, no head/neck/back pain, no pain while walking  Integumentary  no masses, no rash, no skin lesions, no livedo reticularis  Psychiatric: anxiety  Endocrine  no unusual weight loss or gain, no excessive urination, no excessive thirst, no hair loss or gain, no hot or cold intolerance, no menstrual period change or irregularity, no loss of sexual ability or drive, no erection difficulty, no nipple discharge  Hematologic/Lymphatic:  no unusual bleeding, no tendency for easy bruising, no clotting skin or lumps  Neurological General: waking up at night  Neurological Mental Status:  no confusion, no mood swings, no alteration or loss of consciousness, no difficulty expressing/understanding speech, no memory problems  Neurological Cranial Nerves:  no blurry or double vision, no loss of vision, no face drooping, no facial numbness or weakness, no taste or smell loss/changes, no hearing loss or ringing, no vertigo or dizziness, no dysphagia, no slurred speech  Neurological Motor findings include: cramping(pre/post exercise)  Neurological Coordination:  no unsteadiness, no vertigo or dizziness, no clumsiness, no problems reaching for objects  Neurological Sensory: numbness-- and-- tingling  Neurological Gait:  no difficulty walking, not falling to one side, no sensation of being pushed, has not had falls  Active Problems  1  Acute back pain (724 5) (M54 9)   2  Acute maxillary sinusitis (461 0) (J01 00)   3  Ankle pain (719 47) (M25 579)   4  Benign mole (216 9) (D22 9)   5  Bipolar disorder, unspecified (296 80) (F31 9)   6  Blurred vision (368 8) (H53 8)   7  Classic migraine (346 00) (G43 109)   8  Contusion of right ankle, initial encounter (924 21) (S90 01XA)   9   COPD (chronic obstructive pulmonary disease) (496) (J44 9)   10  Dizziness (780 4) (R42)   11  Generalized anxiety disorder (300 02) (F41 1)   12  Hypercholesterolemia (272 0) (E78 00)   13  Injury of right hand, initial encounter (959 4) (S69 91XA)   14  Leg cramps (729 82) (R25 2)   15  Leg pain (729 5) (M79 606)   16  Leukocytosis, unspecified type (288 60) (D72 829)   17  Lower back pain (724 2) (M54 5)   18  Lung nodule (793 11) (R91 1)   19  Lymphadenitis, acute (683) (L04 9)   20  Major depression (296 20) (F32 9)   21  Need for influenza vaccination (V04 81) (Z23)   22  Pain, hand (729 5) (M79 643)   23  Paresthesia (782 0) (R20 2)   24  Pelvic pain in female (625 9) (R10 2)   25  Right foot pain (729 5) (M79 671)   26  Right lower quadrant abdominal pain (789 03) (R10 31)   27  Shingles (053 9) (B02 9)   28  Shortness of breath (786 05) (R06 02)   29  Skin rash (782 1) (R21)   30  Sprain of right hand, initial encounter (842 10) (S63 91XA)   31  Sprain of right wrist, initial encounter (842 00) (S63 501A)   32  Unspecified ovarian cyst, unspecified side (620 2) (N83 209)   33  Upper limb weakness (729 89) (R29 898)   34  Weakness of both lower extremities (729 89) (R29 898)   35  Yeast infection of the vagina (112 1) (B37 3)    Past Medical History  1  History of endometriosis (V13 29) (Z87 42)   2  History of hyperlipidemia (V12 29) (Z86 39)   3  History of low back pain (V13 59) (Z87 39)   4  History of urinary frequency (V13 09) (Z87 898)   5  History of Prolapsed bladder   6  History of Urinary urgency (788 63) (R39 15)    Surgical History  1  History of Bladder Surgery   2  History of Hysterectomy   3  History of Ovarian Cystectomy   4  History of Tonsillectomy With Adenoidectomy    Family History  Mother    1  Family history of chronic obstructive pulmonary disease (V17 6) (Z82 5)   2  Family history of rheumatic heart disease (V17 49) (Z82 49)   3  Family history of type 2 diabetes mellitus (V18 0) (Z83 3)  Father    4   Family history of  5  Family history of lung cancer (V16 1) (Z80 1)  Maternal Grandmother    6  Family history of    7  Family history of cerebrovascular accident (CVA) (V17 1) (Z82 3)   8  Family history of type 2 diabetes mellitus (V18 0) (Z83 3)  Paternal Grandmother    5  Family history of type 2 diabetes mellitus (V18 0) (Z83 3)  Maternal Grandfather    10  Family history of Esophageal abnormality  Paternal Grandfather    6  Family history of type 2 diabetes mellitus (V18 0) (Z83 3)    Social History   · Disabled   · Former smoker (K60 58) (R28 164)   ·    · One child   · Social alcohol use (Z78 9)    Current Meds   1  BuPROPion HCl - 100 MG Oral Tablet; TAKE 1 TABLET DAILY; Therapy: (Recorded:2017) to Recorded   2  Daily Multivitamin TABS; Therapy: (Recorded:26Zai1784) to Recorded   3  LaMICtal 200 MG Oral Tablet; TAKE 1 TABLET TWICE DAILY; Therapy: (Recorded:2017) to Recorded   4  LORazepam 0 5 MG Oral Tablet; TAKE 1 TABLET EVERY 12 HOURS AS NEEDED; Therapy: (Recorded:2017) to Recorded   5  Simvastatin 40 MG Oral Tablet; take 1 tablet by mouth once daily; Therapy: 68MDF4294 to (Evaluate:2017)  Requested for: 2016; Last   Rx:2016 Ordered   6  Vitamin D 2000 UNIT Oral Capsule; Therapy: (Recorded:81Vgb3302) to Recorded    Allergies  1  No Known Drug Allergies    Vitals  Signs   Recorded: 2017 10:45AM   Heart Rate: 77  Respiration: 16  Systolic: 369  Diastolic: 72  Height: 5 ft 5 in  Weight: 201 lb   BMI Calculated: 33 45  BSA Calculated: 1 98  O2 Saturation: 97    Physical Exam    Constitutional   General appearance: No acute distress, well appearing and well nourished  Eyes   Ophthalmoscopic examination: Vision is grossly normal  Gross visual field testing by confrontation shows no abnormalities  EOMI in both eyes  Conjunctivae clear  Eyelids normal palpebral fissures equal  Orbits exhibit normal position  No discharge from the eyes  PERRL      Musculoskeletal Gait and station: Normal gait, stance and balance  Muscle strength: Normal strength throughout  Muscle tone: No atrophy, abnormal movements, flaccidity, cogwheeling or spasticity  Neurologic   Orientation to person, place, and time: Normal     Attention span and concentration: Normal thought process and attention span  Language: Names objects, able to repeat phrases and speaks spontaneously  2nd cranial nerve: Normal     3rd, 4th, and 6th cranial nerves: Normal     5th cranial nerve: Normal     7th cranial nerve: Normal     8th cranial nerve: Normal     11th cranial nerve: Normal     Sensation: Normal  -- (Mild decreased distal vibratory perception lower extremities maximal strike 8 seconds on the right, 12 seconds on the left)   Reflexes: Normal     Coordination: Normal     Mood and affect: Normal        Results/Data  Diagnostic Studies Reviewed: I personally reviewed the films/images/results in the office today  My interpretation follows  (1) ACETYLCHOLINE RECEPTOR ANTIBODY 14Jun2017 09:35AM Nicolasa Simmonds Order Number: [id number]     Test Name Result Flag Reference   ACTLCHOL RCP AB <0 03 nmol/L  0 00 - 0 24   Negative:   0 00 - 0 24                                 Borderline: 0 25 - 0 40                                 Positive:        > 0 40    Performed at:  [address]  [id number]  : Alba Goldberg MD, Phone:  [phone]     (1) [place], [place] [id number] 09:35AM Marilyn OVERTON Order Number: [id number]     Test Name Result Flag Reference   [facility] BLOCKING ABS, SERUM 18 %  0 - 25   Negative:      0 - 25                                 Borderline:   26 - 30                                 Positive:         >30  Results for this test are for research purposes  only by the assay's   The performance  characteristics of this product have not been  established    Results should not be used as a  diagnostic procedure without confirmation of the  diagnosis by another medically established  diagnostic product or procedure  Performed at:  [address]  [id number]  : Jeanette Cohen MD, Phone:  [phone]     (1) [address], MODULATING [id number] 09:35AM Gregorio Cheadle Order Number: [id number]     Test Name Result Flag Reference   ACHR MODULATING AB <12 %  0 - 20   Negative:          <21                                Equivocal:     21 - 25                                Positive:          >25   The assay is linear between values of 12 and 64  Those <12 and >64 are reported as such  No single   value for ACR-modulating antibody should be used as   a sole basis for diagnosis or response to therapy  Performed at:  [address]  [id number]  : Jeanette Cohen MD, Phone:  [phone]     (1) CK (CPK) 87KGY8583 09:35AM [facility]     Test Name Result Flag Reference   CK (CPK) 76 U/L       (1) VITAMIN B12 76CPQ2717 09:35AM Zenia Mustache     Test Name Result Flag Reference   VITAMIN B12 596 pg/mL  100-900     (1) VITAMIN D 25-HYDROXY 51LLW0523 09:35AM Catarina Arteaga     Test Name Result Flag Reference   VIT D 25-HYDROX 25 6 ng/mL L 30 0-100 0   This assay is a certified procedure of the CDC Vitamin D Standardization Certification Program (VDSCP)     Deficiency <20ng/ml   Insufficiency 20-30ng/ml   Sufficient  ng/ml     *Patients undergoing fluorescein dye angiography may retain small amounts of fluorescein in the body for 48-72 hours post procedure  Samples containing fluorescein can produce falsely elevated Vitamin D values  If the patient had this procedure, a specimen should be resubmitted post fluorescein clearance       (1) PROTEIN ELECTRO, SERUM 14Jun2017 09:35AM Gregorio Cheadle Order Number: [id number]     Test Name Result Flag Reference   A/G RATIO 1 39  1 10-1 80   Albumin 58 2 %  52 0-65 0   Albumin Conc  4 13 g/dl  3 50-5 00   Alpha 1 Conc  0 32 g/dL  0 10-0 40   ALPHA 1 4 5 %  2 5-5 0   Alpha 2 Conc  0 80 g/dL  0 40-1 20   ALPHA 2 11 2 %  7 0-13 0   Beta 1 Conc  0 43 g/dL  0 40-0 80   BETA-1 6 1 %  5 0-13 0   Beta 2 Conc 0 38 g/dL  0 20-0 50   BETA-2 5 4 %  2 0-8 0   Gamma Conc 1 04 g/dL  0 50-1 60   GAMMA GLOBULIN 14 6 %  12 0-22 0   Interpretation      The serum total protein, albumin and electrophoresis are within normal limits  No monoclonal bands noted  Reviewed by: Abi Rosado DO ([id number]) **Electronic Signature**   TOTAL PROTEIN  7 1 g/dL  6 4-8 2     (1) FOLATE [id number] 09:35AM HalieCatarina Nevarez     Test Name Result Flag Reference   FOLATE 19 2 ng/mL H 3 1-17 5     * XR SPINE CERVICAL COMPLETE 4 OR 5 VW NON INJURY 14Jun2017 09:33AM [employer] Order Number: [id number]     Test Name Result Flag Reference   XR SPINE CERVICAL COMPLETE 4 OR 5 VW (Report)     CERVICAL SPINE     INDICATION: Numbness and tingling with weakness and cramps in arms and legs  Low back pain  COMPARISON: None     VIEWS: 5     IMAGES: 6     FINDINGS:     There is a reversal the normal cervical lordosis with its apex C5  Minimal anterolisthesis C2-3 and C3-4  The intervertebral disc spaces are preserved  The neural foramina are patent  The prevertebral soft tissues are within normal limits  The lung apices are intact  IMPRESSION:     Reversal the normal cervical lordosis apex C5  No fracture or traumatic malalignment  Workstation performed: Gabriela Roblesh by:   Sam 6, DO   6/15/17     * XR SPINE LUMBAR MINIMUM 4 VIEWS NON INJURY 14Jun2017 09:33AM [employer] Order Number: [id number]     Test Name Result Flag Reference   XR SPINE LUMBAR MINIMUM 4 VIEWS (Report)     LUMBAR SPINE     INDICATION: for 2 month pt has had numbness and tingling , weakness and cramps in her arms and legs   some low back pain     COMPARISON: Abdomen and pelvic CT from 1/13/2017  VIEWS: AP, lateral, bilateral oblique and coned down projections     IMAGES: 5     FINDINGS:     Alignment is unremarkable  There is no radiographic evidence of acute fracture or destructive osseous lesion  No significant lumbar degenerative change noted  Visualized soft tissues appear unremarkable  IMPRESSION:     Normal examination  Workstation performed: [id number]     Signed by:   Nelia Suárez MD   6/15/17     Attending Note  Collaborating Physician Note: Collaborating Note: I agree with the Advanced Practitioner note        Future Appointments    Date/Time Provider Specialty Site   01/23/2018 11:30 AM Yany Qureshi MD Neurology [place] [address]     Signatures   Electronically signed by : Dino Roth; Oct  1 2017  6:52PM EST                       (Author)    Electronically signed by : Margi Vázquez MD; Oct  2 2017  8:19AM EST                       (Author)

## 2017-10-30 ENCOUNTER — GENERIC CONVERSION - ENCOUNTER (OUTPATIENT)
Dept: OTHER | Facility: OTHER | Age: 37
End: 2017-10-30

## 2017-11-15 ENCOUNTER — GENERIC CONVERSION - ENCOUNTER (OUTPATIENT)
Dept: OTHER | Facility: OTHER | Age: 37
End: 2017-11-15

## 2017-11-28 ENCOUNTER — ALLSCRIPTS OFFICE VISIT (OUTPATIENT)
Dept: OTHER | Facility: OTHER | Age: 37
End: 2017-11-28

## 2017-11-28 DIAGNOSIS — M75.42 IMPINGEMENT SYNDROME OF LEFT SHOULDER: ICD-10-CM

## 2017-11-29 NOTE — PROGRESS NOTES
Assessment   1  Impingement syndrome of left shoulder (726 2) (P60 42)    Plan   Acute maxillary sinusitis    · Stop: Amoxicillin-Pot Clavulanate 875-125 MG Oral Tablet  Impingement syndrome of left shoulder    · Administered: Betamethasone Sod Phos & Acet 6 (3-3) MG/ML Injection Suspension   · Follow-up visit in 6 weeks Evaluation and Treatment  Follow-up  Status: Hold For -    Scheduling  Requested for: 57OPA5776   · *1 - [facility]  *  Status: Active  Requested for: 26GXL3590  () Care Summary provided  : Yes    Discussion/Summary      Left shoulder pain due to impingement and possible cuff tear  Discussed treatment with the patient  Left subacromial bursa injected with steroid  Patient will start a physical therapy program  Follow up in 6 weeks  If the patient is no better we will obtain an MRI  Chief Complaint   1  Shoulder Problem  Left shoulder pain 6 weeks      History of Present Illness   42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 with left shoulder pain for the last 6 weeks  Initially happened when she was trying to get reach for something in the back of the car  Since then pain present in the left shoulder  Aggravated by overhead activities  Decreased with rest  Now interferes with activities daily living like care wearing a seatbelt and any activities behind her back  Gradually getting worse  Patient presents for evaluation with a radiograph  Ibuprofen and anti-inflammatories her made little difference to her symptoms      Review of Systems        Constitutional: No fever, no chills, feels well, no tiredness, no recent weight gain or loss  Eyes: No complaints of eyesight problems, no red eyes  ENT: no loss of hearing, no nosebleeds, no sore throat  Cardiovascular: No complaints of chest pain, no palpitations, no leg claudication or lower extremity edema  Respiratory: no compliants of shortness of breath, no wheezing, no cough  Gastrointestinal: no complaints of abdominal pain, no constipation, no nausea or diarrhea, no vomiting, no bloody stools  Genitourinary: no complaints of dysuria, no incontinence  Musculoskeletal: as noted in HPI  Integumentary: no complaints of skin rash or lesion, no itching or dry skin, no skin wounds  Neurological: no complaints of headache, no confusion, no numbness or tingling, no dizziness  Endocrine: No complaints of muscle weakness, no feelings of weakness, no frequent urination, no excessive thirst       Psychiatric: No suicidal thoughts, no anxiety, no feelings of depression  Active Problems   1  Acute back pain (724 5) (M54 9)  2  Acute maxillary sinusitis (461 0) (J01 00)  3  Ankle pain (719 47) (M25 579)  4  Benign mole (216 9) (D22 9)  5  Bipolar disorder, unspecified (296 80) (F31 9)  6  Blurred vision (368 8) (H53 8)  7  Classic migraine (346 00) (G43 109)  8  Contusion of right ankle, initial encounter (924 21) (S90 01XA)  9  COPD (chronic obstructive pulmonary disease) (496) (J44 9)  10  Dizziness (780 4) (R42)  11  Generalized anxiety disorder (300 02) (F41 1)  12  Hypercholesterolemia (272 0) (E78 00)  13  Hyperglycemia (790 29) (R73 9)  14  Injury of right hand, initial encounter (959 4) (S69 91XA)  15  Left shoulder strain (840 9) (S46 912A)  16  Leg cramps (729 82) (R25 2)  17  Leg pain (729 5) (M79 606)  18  Leukocytosis, unspecified type (288 60) (D72 829)  19  Lower back pain (724 2) (M54 5)  20  Lung nodule (793 11) (R91 1)  21  Lymphadenitis, acute (683) (L04 9)  22  Major depression (296 20) (F32 9)  23  Need for influenza vaccination (V04 81) (Z23)  24  Pain, hand (729 5) (M79 643)  25  Paresthesia (782 0) (R20 2)  26  Pelvic pain in female (625 9) (R10 2)  27  Right foot pain (729 5) (M79 671)  28  Right lower quadrant abdominal pain (789 03) (R10 31)  29  Shingles (053 9) (B02 9)  30  Shortness of breath (786 05) (R06 02)  31   Skin rash (782 1) (R21)  32  Sore throat (462) (J02 9)  33  Sprain of right hand, initial encounter (842 10) (S63 91XA)  34  Sprain of right wrist, initial encounter (842 00) (S63 501A)  35  Unspecified ovarian cyst, unspecified side (620 2) (N83 209)  36  Upper limb weakness (729 89) (R29 898)  37  Vitamin D deficiency (268 9) (E55 9)  38  Weakness of both lower extremities (729 89) (R29 898)  39   Yeast infection of the vagina (112 1) (B37 3)    Past Medical History    · History of endometriosis (V13 29) (Z87 42)   · History of hyperlipidemia (V12 29) (Z86 39)   · History of low back pain (V13 59) (Z87 39)   · History of urinary frequency (V13 09) (D49 385)   · History of Prolapsed bladder   · History of Urinary urgency (788 63) (R39 15)    Surgical History    · History of Bladder Surgery   · History of Hernia Repair   · History of Hysterectomy   · History of Hysterectomy (V88 01)   · History of Ovarian Cystectomy   · History of Tonsillectomy    Family History   Mother    · Family history of chronic obstructive pulmonary disease (V17 6) (Z82 5)   · Family history of rheumatic heart disease (V17 49) (Z82 49)   · Family history of type 2 diabetes mellitus (V18 0) (Z83 3)  Father    · Family history of    · Family history of lung cancer (V16 1) (Z80 1)  Maternal Grandmother    · Family history of    · Family history of cerebrovascular accident (CVA) (V17 1) (Z82 3)   · Family history of type 2 diabetes mellitus (V18 0) (Z83 3)  Paternal Grandmother    · Family history of type 2 diabetes mellitus (V18 0) (Z83 3)  Maternal Grandfather    · Family history of Esophageal abnormality  Paternal Grandfather    · Family history of type 2 diabetes mellitus (V18 0) (Z83 3)  Family History    · Family history of Depression (311) (F32 9)   · Family history of Esophageal carcinoma (150 9) (C15 9)   · Family history of Lung cancer (162 9) (C34 90)   · Family history of Stomach cancer (151 9) (C16 9)    Social History    · Consumes alcohol occasionally (V49 89) (Z78 9)   · Disabled   · Drinks coffee   · Former smoker (A40 61) (W58 679)   ·    · Never a smoker   · One child   · Social alcohol use (Z78 9)    Current Meds   1  Amoxicillin-Pot Clavulanate 875-125 MG Oral Tablet; TAKE 1 TABLET EVERY 12     HOURS DAILY; Therapy: 29DEL7766 to (Evaluate:25Nov2017)  Requested for: 44HDC8780; Last     Rx:15Nov2017 Ordered  2  BuPROPion HCl - 100 MG Oral Tablet; TAKE 1 TABLET DAILY Recorded  3  CeleXA 10 MG Oral Tablet (Citalopram Hydrobromide); TAKE 1 TABLET DAILY; Therapy: (Recorded:30Oct2017) to Recorded  4  Daily Multivitamin TABS; Therapy: (Recorded:03Czs9077) to Recorded  5  LaMICtal 200 MG Oral Tablet (LamoTRIgine); TAKE 1 TABLET TWICE DAILY; Therapy: (Recorded:13Jun2017) to Recorded  6  LORazepam 0 5 MG Oral Tablet; TAKE 1 TABLET EVERY 12 HOURS AS NEEDED; Therapy: (Recorded:13Jun2017) to Recorded  7  Simvastatin 40 MG Oral Tablet (Zocor); take 1 tablet by mouth once daily; Therapy: 21WZO2041 to (Evaluate:16Nov2017)  Requested for: 21Nov2016; Last     Rx:21Nov2016 Ordered  8  Vitamin D 2000 UNIT Oral Capsule; Therapy: (Recorded:06Rsh4881) to Recorded    Allergies   1  No Known Drug Allergies    Vitals    Recorded: 08YSD4223 08:25AM   Heart Rate 82   Respiration 16   Systolic 890   Diastolic 78   Height 5 ft 6 in   Weight 200 lb 0 64 oz   BMI Calculated 32 29   BSA Calculated 2 01     Physical Exam      ROM: Full except as noted: Motor: Normal except as noted: 4/5 abduction  Special Tests: Negative except positive Painful Arc-- and-- positive Leung test  Left Shoulder Appearance[de-identified] Normal except  Tenderness: None except the greater tuberosity        Constitutional - General appearance: Normal       Musculoskeletal - Gait and station: Normal -- Digits and nails: Normal -- Muscle strength/tone: Normal       Cardiovascular - Pulses: Normal -- Examination of extremities for edema and/or varicosities: Normal       Skin - Skin and subcutaneous tissue: Normal       Neurologic - Sensation: Normal       Psychiatric - Orientation to person, place, and time: Normal -- Mood and affect: Normal       Eyes      Conjunctiva and lids: Normal        Pupils and irises: Normal        Procedure      Procedure: of the left subacromial bursa  Risk, benefits and alternatives were discussed with the patient  Verbal consent was obtained prior to the procedure  Betadine was used to prep the area  ethyl chloride spray was used as a topical anesthetic  A 22-gauge was used to inject 4cc of 1% Lidocaine-- and-- 1mL of 4 mg/mL dexamethasone  A bandage was applied  the patient tolerated the procedure well  Complications: none        Future Appointments      Date/Time Provider Specialty Site   01/23/2018 11:30 AM Safia Nowak MD [facility] [id number]     Signatures    Electronically signed by : LADY Sahu ; Dec 26 2017  5:00PM EST                       (Author)

## 2017-12-13 ENCOUNTER — GENERIC CONVERSION - ENCOUNTER (OUTPATIENT)
Dept: FAMILY MEDICINE CLINIC | Facility: CLINIC | Age: 37
End: 2017-12-13

## 2017-12-13 ENCOUNTER — APPOINTMENT (OUTPATIENT)
Dept: PHYSICAL THERAPY | Facility: CLINIC | Age: 37
End: 2017-12-13
Payer: COMMERCIAL

## 2017-12-13 PROCEDURE — 97014 ELECTRIC STIMULATION THERAPY: CPT

## 2017-12-13 PROCEDURE — G8990 OTHER PT/OT CURRENT STATUS: HCPCS

## 2017-12-13 PROCEDURE — 97110 THERAPEUTIC EXERCISES: CPT

## 2017-12-13 PROCEDURE — G8991 OTHER PT/OT GOAL STATUS: HCPCS

## 2017-12-13 PROCEDURE — 97162 PT EVAL MOD COMPLEX 30 MIN: CPT

## 2017-12-18 ENCOUNTER — APPOINTMENT (OUTPATIENT)
Dept: PHYSICAL THERAPY | Facility: CLINIC | Age: 37
End: 2017-12-18
Payer: COMMERCIAL

## 2017-12-18 PROCEDURE — 97110 THERAPEUTIC EXERCISES: CPT

## 2017-12-18 PROCEDURE — 97140 MANUAL THERAPY 1/> REGIONS: CPT

## 2017-12-18 PROCEDURE — 97014 ELECTRIC STIMULATION THERAPY: CPT

## 2017-12-19 ENCOUNTER — TRANSCRIBE ORDERS (OUTPATIENT)
Dept: ADMINISTRATIVE | Facility: HOSPITAL | Age: 37
End: 2017-12-19

## 2017-12-19 DIAGNOSIS — M75.42 IMPINGEMENT SYNDROME OF LEFT SHOULDER: Primary | ICD-10-CM

## 2017-12-19 DIAGNOSIS — S43.439A SUPERIOR GLENOID LABRUM LESION OF SHOULDER, UNSPECIFIED LATERALITY, INITIAL ENCOUNTER: Primary | ICD-10-CM

## 2017-12-20 ENCOUNTER — APPOINTMENT (OUTPATIENT)
Dept: PHYSICAL THERAPY | Facility: CLINIC | Age: 37
End: 2017-12-20
Payer: COMMERCIAL

## 2017-12-20 PROCEDURE — 97014 ELECTRIC STIMULATION THERAPY: CPT

## 2017-12-20 PROCEDURE — 97140 MANUAL THERAPY 1/> REGIONS: CPT

## 2017-12-20 PROCEDURE — 97110 THERAPEUTIC EXERCISES: CPT

## 2017-12-26 ENCOUNTER — GENERIC CONVERSION - ENCOUNTER (OUTPATIENT)
Dept: OTHER | Facility: OTHER | Age: 37
End: 2017-12-26

## 2017-12-26 ENCOUNTER — HOSPITAL ENCOUNTER (OUTPATIENT)
Dept: MRI IMAGING | Facility: HOSPITAL | Age: 37
Discharge: HOME | End: 2017-12-26
Attending: ORTHOPAEDIC SURGERY
Payer: COMMERCIAL

## 2017-12-26 DIAGNOSIS — M75.42 IMPINGEMENT SYNDROME OF LEFT SHOULDER: ICD-10-CM

## 2017-12-26 PROCEDURE — 73221 MRI JOINT UPR EXTREM W/O DYE: CPT

## 2017-12-29 ENCOUNTER — ALLSCRIPTS OFFICE VISIT (OUTPATIENT)
Dept: OTHER | Facility: OTHER | Age: 37
End: 2017-12-29

## 2017-12-29 DIAGNOSIS — M75.42 IMPINGEMENT SYNDROME OF LEFT SHOULDER: ICD-10-CM

## 2018-01-04 ENCOUNTER — APPOINTMENT (OUTPATIENT)
Dept: PHYSICAL THERAPY | Facility: CLINIC | Age: 38
End: 2018-01-04
Payer: COMMERCIAL

## 2018-01-04 PROCEDURE — 97014 ELECTRIC STIMULATION THERAPY: CPT

## 2018-01-04 PROCEDURE — 97110 THERAPEUTIC EXERCISES: CPT

## 2018-01-04 PROCEDURE — 97140 MANUAL THERAPY 1/> REGIONS: CPT

## 2018-01-09 ENCOUNTER — APPOINTMENT (OUTPATIENT)
Dept: PHYSICAL THERAPY | Facility: CLINIC | Age: 38
End: 2018-01-09
Payer: COMMERCIAL

## 2018-01-09 PROCEDURE — 97014 ELECTRIC STIMULATION THERAPY: CPT

## 2018-01-09 PROCEDURE — 97140 MANUAL THERAPY 1/> REGIONS: CPT

## 2018-01-09 PROCEDURE — 97110 THERAPEUTIC EXERCISES: CPT

## 2018-01-11 ENCOUNTER — APPOINTMENT (OUTPATIENT)
Dept: PHYSICAL THERAPY | Facility: CLINIC | Age: 38
End: 2018-01-11
Payer: COMMERCIAL

## 2018-01-11 ENCOUNTER — GENERIC CONVERSION - ENCOUNTER (OUTPATIENT)
Dept: FAMILY MEDICINE CLINIC | Facility: CLINIC | Age: 38
End: 2018-01-11

## 2018-01-11 PROCEDURE — 97110 THERAPEUTIC EXERCISES: CPT

## 2018-01-11 PROCEDURE — G8991 OTHER PT/OT GOAL STATUS: HCPCS

## 2018-01-11 PROCEDURE — 97112 NEUROMUSCULAR REEDUCATION: CPT

## 2018-01-11 PROCEDURE — 97140 MANUAL THERAPY 1/> REGIONS: CPT

## 2018-01-11 PROCEDURE — G8990 OTHER PT/OT CURRENT STATUS: HCPCS

## 2018-01-12 VITALS
HEIGHT: 65 IN | DIASTOLIC BLOOD PRESSURE: 70 MMHG | TEMPERATURE: 97.8 F | SYSTOLIC BLOOD PRESSURE: 110 MMHG | WEIGHT: 202.5 LBS | BODY MASS INDEX: 33.74 KG/M2

## 2018-01-13 VITALS
DIASTOLIC BLOOD PRESSURE: 68 MMHG | OXYGEN SATURATION: 99 % | HEART RATE: 77 BPM | WEIGHT: 205 LBS | RESPIRATION RATE: 16 BRPM | HEIGHT: 65 IN | SYSTOLIC BLOOD PRESSURE: 110 MMHG | BODY MASS INDEX: 34.16 KG/M2 | TEMPERATURE: 98.1 F

## 2018-01-13 VITALS
HEIGHT: 65 IN | SYSTOLIC BLOOD PRESSURE: 110 MMHG | BODY MASS INDEX: 34.99 KG/M2 | WEIGHT: 210 LBS | DIASTOLIC BLOOD PRESSURE: 70 MMHG

## 2018-01-13 VITALS
DIASTOLIC BLOOD PRESSURE: 70 MMHG | TEMPERATURE: 98.7 F | SYSTOLIC BLOOD PRESSURE: 102 MMHG | WEIGHT: 203 LBS | HEIGHT: 65 IN | BODY MASS INDEX: 33.82 KG/M2

## 2018-01-13 VITALS
HEART RATE: 77 BPM | WEIGHT: 207 LBS | RESPIRATION RATE: 18 BRPM | HEIGHT: 65 IN | BODY MASS INDEX: 34.49 KG/M2 | SYSTOLIC BLOOD PRESSURE: 121 MMHG | DIASTOLIC BLOOD PRESSURE: 76 MMHG

## 2018-01-13 VITALS
HEART RATE: 68 BPM | TEMPERATURE: 98.4 F | RESPIRATION RATE: 18 BRPM | SYSTOLIC BLOOD PRESSURE: 110 MMHG | HEIGHT: 65 IN | BODY MASS INDEX: 33.99 KG/M2 | DIASTOLIC BLOOD PRESSURE: 70 MMHG | WEIGHT: 204 LBS

## 2018-01-13 VITALS
HEIGHT: 65 IN | BODY MASS INDEX: 35.32 KG/M2 | DIASTOLIC BLOOD PRESSURE: 70 MMHG | SYSTOLIC BLOOD PRESSURE: 112 MMHG | WEIGHT: 212 LBS

## 2018-01-13 VITALS
WEIGHT: 212 LBS | DIASTOLIC BLOOD PRESSURE: 84 MMHG | HEIGHT: 65 IN | SYSTOLIC BLOOD PRESSURE: 128 MMHG | BODY MASS INDEX: 35.32 KG/M2

## 2018-01-14 VITALS
HEIGHT: 66 IN | WEIGHT: 200.04 LBS | RESPIRATION RATE: 16 BRPM | BODY MASS INDEX: 32.15 KG/M2 | DIASTOLIC BLOOD PRESSURE: 78 MMHG | SYSTOLIC BLOOD PRESSURE: 118 MMHG | HEART RATE: 82 BPM

## 2018-01-14 VITALS
HEIGHT: 65 IN | SYSTOLIC BLOOD PRESSURE: 142 MMHG | WEIGHT: 210 LBS | DIASTOLIC BLOOD PRESSURE: 80 MMHG | BODY MASS INDEX: 34.99 KG/M2 | TEMPERATURE: 98.1 F

## 2018-01-14 VITALS
TEMPERATURE: 98.5 F | OXYGEN SATURATION: 94 % | BODY MASS INDEX: 33.15 KG/M2 | DIASTOLIC BLOOD PRESSURE: 78 MMHG | SYSTOLIC BLOOD PRESSURE: 120 MMHG | HEIGHT: 65 IN | WEIGHT: 199 LBS | HEART RATE: 87 BPM

## 2018-01-15 VITALS
HEART RATE: 77 BPM | DIASTOLIC BLOOD PRESSURE: 72 MMHG | HEIGHT: 65 IN | WEIGHT: 201 LBS | BODY MASS INDEX: 33.49 KG/M2 | SYSTOLIC BLOOD PRESSURE: 104 MMHG | RESPIRATION RATE: 16 BRPM | OXYGEN SATURATION: 97 %

## 2018-01-16 ENCOUNTER — APPOINTMENT (OUTPATIENT)
Dept: PHYSICAL THERAPY | Facility: CLINIC | Age: 38
End: 2018-01-16
Payer: COMMERCIAL

## 2018-01-16 NOTE — RESULT NOTES
PFT Results v2:   Diagnosis/Reason For Study: Shortness of breath   Referring Provider: Dr Rachid Garcia   Spirometry: Forced vital capacity: 3 60L and 95% Predicted Values  Forced expiratory volume in one second: 2 91L and 93% Predicted Value  FEV1/FVC ratio is 97% Predicted Values  Post Bronchodilator Spirometry: Forced vital capacity : 3 57L and 94% Predicted Values  Forced expiratory volume in one second : 3 04L and 97% Predicted Value  FEV1/FVC ratio is 102% Predicted Values  Lung Volumes: Total lung capacity : 5 05L and 98% Predicted Values  RV: 96% Predicted Values  RV/T% Predicted Values  DLCO:   DLCO 89% Predicted Values  PFT Interpretation:   Patient had a full lung function testing with spirometry lung volumes and DLCO  Patient gave a good effort  Results meet the ATS standards for acceptability and repeat ability  There is no obstructive or restrictive ventilatory limitation  The lung volumes and DLCO are normal   Clinical correlation is required        Future Appointments    Date/Time Provider Specialty Site   2016 09:30 AM Freddie Alvarez, [address] [facility] [address]      Electronically signed by : LADY Jamil ; May  6 2016  6:38PM EST                       (Author)

## 2018-01-18 ENCOUNTER — APPOINTMENT (OUTPATIENT)
Dept: PHYSICAL THERAPY | Facility: CLINIC | Age: 38
End: 2018-01-18
Payer: COMMERCIAL

## 2018-01-18 PROCEDURE — 97110 THERAPEUTIC EXERCISES: CPT

## 2018-01-18 PROCEDURE — 97140 MANUAL THERAPY 1/> REGIONS: CPT

## 2018-01-22 VITALS
HEIGHT: 65 IN | HEART RATE: 85 BPM | DIASTOLIC BLOOD PRESSURE: 78 MMHG | SYSTOLIC BLOOD PRESSURE: 110 MMHG | OXYGEN SATURATION: 98 % | WEIGHT: 200.6 LBS | BODY MASS INDEX: 33.42 KG/M2 | TEMPERATURE: 98.6 F

## 2018-01-22 VITALS
DIASTOLIC BLOOD PRESSURE: 80 MMHG | HEIGHT: 65 IN | HEART RATE: 89 BPM | BODY MASS INDEX: 33.32 KG/M2 | SYSTOLIC BLOOD PRESSURE: 102 MMHG | WEIGHT: 200 LBS | OXYGEN SATURATION: 98 % | TEMPERATURE: 98.7 F

## 2018-01-22 VITALS
BODY MASS INDEX: 32.15 KG/M2 | RESPIRATION RATE: 14 BRPM | SYSTOLIC BLOOD PRESSURE: 122 MMHG | HEIGHT: 66 IN | HEART RATE: 81 BPM | DIASTOLIC BLOOD PRESSURE: 81 MMHG | WEIGHT: 200.06 LBS

## 2018-01-23 ENCOUNTER — TRANSCRIBE ORDERS (OUTPATIENT)
Dept: ADMINISTRATIVE | Facility: HOSPITAL | Age: 38
End: 2018-01-23

## 2018-01-23 ENCOUNTER — APPOINTMENT (OUTPATIENT)
Dept: PHYSICAL THERAPY | Facility: CLINIC | Age: 38
End: 2018-01-23
Payer: COMMERCIAL

## 2018-01-23 ENCOUNTER — ALLSCRIPTS OFFICE VISIT (OUTPATIENT)
Dept: OTHER | Facility: OTHER | Age: 38
End: 2018-01-23

## 2018-01-23 DIAGNOSIS — M54.2 CERVICODYNIA: Primary | ICD-10-CM

## 2018-01-23 PROCEDURE — 97110 THERAPEUTIC EXERCISES: CPT

## 2018-01-23 PROCEDURE — 97140 MANUAL THERAPY 1/> REGIONS: CPT

## 2018-01-24 NOTE — PROGRESS NOTES
Assessment    1  Leg cramps (729 82) (R25 2)   2  Muscle cramps (729 82) (R25 2)   3  Chronic neck pain (723 1,338 29) (M54 2,G89 29)    Plan  Chronic neck pain    · * MRI CERVICAL SPINE WO CONTRAST; Status:Need Information - Financial  Authorization; Requested [facility]:[id number]; Perform:[facility]; CCR:[id number];FLFZLQC; For:Chronic neck pain; Ordered By:Catarina Issa;  Leg cramps    · Gabapentin 300 MG Oral Capsule; Take one tablet at bed time  If needed increase  it to two or three times daily   Rx By: Bridger Acosta; Dispense: 0 Days ; #:90 Capsule; Refill: 3; For: Leg cramps; BON = N; Verified Transmission to [place] [address] ; Last Updated By: [employer]; 1/23/2018 12:04:57 PM   · Follow-up visit in 4 Months Evaluation and Treatment  Follow-up  Status: Hold For -  Scheduling  Requested for: 80JAL7952   Ordered; For: Leg cramps; Ordered By: Bridger Acosta Performed:  Due: 20ZCA4626    Discussion/Summary  Discussion Summary:   Patient is a 69-year-old lady complaining of paresthesias, muscle cramps and stiffness/weakness throughout the body  Patient states that her symptoms are unchanged from prior visits  Neurological examination is normal  Workup including MRI brain, EMG were unremarkable  X-ray of the cervical spine shows loss of lordosis  X-ray of the lumbar spine was unremarkable  The patient states that she had tried multivitamins and tonic water but did not help for the cramps  She is currently going to physical therapy for left shoulder pain  Patient denies any headaches at this time  Recommend obtaining MRI of the cervical spine as the patient has neck pain radiating to the left shoulder and abnormal x-ray of the cervical spine  The patient going for physical therapy for her neck and left shoulder and has done up to 4-5 weeks of therapy so far discontinuing it    Patient recommended to continue with multivitamins daily and try gabapentin 300 mg twice daily or up to 3 times daily for cramps  Patient is following up with orthopedics in regards to shoulder pain  Counseling Documentation With Imm: The patient was counseled regarding instructions for management, patient and family education  total time of encounter was 25 minutes and 15 minutes was spent counseling  Greater than 50% of the time was spent in counseling and coordination of care  Chief Complaint  Chief Complaint Free Text Note Form: Patient presents for f/u for Paresthesia  History of Present Illness  HPI: Patient is a 42-year-old lady coming in for follow-up in regards to paresthesias and cramps  Patient was initially evaluated in June 2017 by me and followed up by Janette Dublin Kit in our practice in September 2017  Today's history, 01/23/2018:  Patient states that she still has cramps in her hands and thighs  She feels it is still the same but did not help  She tried multivitamins and is taking it but did not help  Patient was started on gabapentin 300 mg at bedtime in the last visit but did not help so she stopped it  She has some neck pain, mostly 6/10 in intensity, heat helps and she is doing PT, radiates to the left shoulder  She has some tingling and numbness in her hands as well  She had left shoulder pain, saw orthopedics and had MRI shoulder that was normal  She is going to PT and was told she might have it from her neck  X-ray of her neck in June showed some loss of lordosis in the neck  HbA1c in October 2017 was 5 6, vitamin B12 751, TSH 0 827, vitamin-D 68  9  Ach receptor antibodies were normal  CPK 76  MRI of the left shoulder without contrast on 12/26/2017 was normal  The MRI brain was unremarkable  EMG performed in our lab on 10/06/2017 of right upper and lower extremity was normal in the was no evidence of peripheral neuropathy or myopathy or right cervical or lumbar radiculopathy or entrapment neuropathy       Brief history from initial consultation in June 2017:  Patient has h/o hypercholesterolemia and bipolar disorder and depression and states that she has symptoms in the last 2 months and she initially notices she had cramps in both lower extremities and right hand  It was really bad per the patient  She describes cramps as tightness  She also has tingling in both the arms as well and her PCP ordered MRI brain which was normal  She also feels like bugs crawling sensation for few months  She feels her had is constantly cramping ( however I could not see any cramp) and she describes it as stiffness  She tried Magnesium and it did not help  She has occasional migraine like headaches but they are tolerable  SHe did not have one in few months  Excedrin migraine helps her  Headache is all over the head, pounding headache, lasts few hours  Headache is worse with lights and sounds  Excedrin migraine helps her  She does not want any preventive medication as they are tolerable  CRP less than 3, ESR 34, rheumatoid factor negative, PK screen negative, Lyme negative, ESR 7, MRI brain with and without contrast on 5/27/17 showed no acute intracranial abnormality or pathologic enhancement, no significant white matter changes  X-ray of the hand on 5/11/17 showed no acute osseous abnormality  X-ray of the forearm right showed no acute osseous abnormality  X-ray foot on the right showed no acute osseous abnormality, prominent plantar calcaneal spur  Review of Systems  Neurological ROS:   Constitutional: no fever, no chills, no recent weight gain, no recent weight loss, no complaints of feeling tired, no changes in appetite  HEENT:  no sinus problems, not feeling congested, no blurred vision, no dryness of the eyes, no eye pain, no hearing loss, no tinnitus, no mouth sores, no sore throat, no hoarseness, no dysphagia, no masses, no bleeding     Cardiovascular:  no chest pain or pressure, no palpitations present, the heart rate was not rapid or irregular, no swelling in the arms or legs, no poor circulation  Respiratory:  no unusual or persistant cough, no shortness of breath with or without exertion  Gastrointestinal:  no nausea, no vomiting, no diarrhea, no abdominal pain, no changes in bowel habits, no melena, no loss of bowel control  Genitourinary:  no incontinence, no feelings of urinary urgency, no increase in frequency, no urinary hesitancy, no dysuria, no hematuria  Musculoskeletal:  no arthralgias, no myalgias, no immobility or loss of function, no head/neck/back pain, no pain while walking  Integumentary  no masses, no rash, no skin lesions, no livedo reticularis  Psychiatric:  no anxiety, no depression, no mood swings, no psychiatric hospitalizations, no sleep problems  Endocrine  no unusual weight loss or gain, no excessive urination, no excessive thirst, no hair loss or gain, no hot or cold intolerance, no menstrual period change or irregularity, no loss of sexual ability or drive, no erection difficulty, no nipple discharge  Hematologic/Lymphatic:  no unusual bleeding, no tendency for easy bruising, no clotting skin or lumps  Neurological General:  no headache, no nausea or vomiting, no lightheadedness, no convulsions, no blackouts, no syncope, no trauma, no photopsia, no increased sleepiness, no trouble falling asleep, no snoring, no awakening at night  Neurological Mental Status:  no confusion, no mood swings, no alteration or loss of consciousness, no difficulty expressing/understanding speech, no memory problems  Neurological Cranial Nerves:  no blurry or double vision, no loss of vision, no face drooping, no facial numbness or weakness, no taste or smell loss/changes, no hearing loss or ringing, no vertigo or dizziness, no dysphagia, no slurred speech  Neurological Motor findings include: cramping(pre/post exercise)  Neurological Coordination:  no unsteadiness, no vertigo or dizziness, no clumsiness, no problems reaching for objects     Neurological Sensory: numbness and tingling  Neurological Gait:  no difficulty walking, not falling to one side, no sensation of being pushed, has not had falls  ROS Reviewed:   ROS reviewed  Active Problems    1  Acute back pain (724 5) (M54 9)   2  Acute maxillary sinusitis (461 0) (J01 00)   3  Ankle pain (719 47) (M25 579)   4  Benign mole (216 9) (D22 9)   5  Bipolar disorder, unspecified (296 80) (F31 9)   6  Blurred vision (368 8) (H53 8)   7  Classic migraine (346 00) (G43 109)   8  Contusion of right ankle, initial encounter (924 21) (S90 01XA)   9  COPD (chronic obstructive pulmonary disease) (496) (J44 9)   10  Dizziness (780 4) (R42)   11  Generalized anxiety disorder (300 02) (F41 1)   12  Hypercholesterolemia (272 0) (E78 00)   13  Hyperglycemia (790 29) (R73 9)   14  Impingement syndrome of left shoulder (726 2) (M75 42)   15  Injury of right hand, initial encounter (959 4) (S69 91XA)   16  Left shoulder strain (840 9) (S46 912A)   17  Leg cramps (729 82) (R25 2)   18  Leg pain (729 5) (M79 606)   19  Leukocytosis, unspecified type (288 60) (D72 829)   20  Lower back pain (724 2) (M54 5)   21  Lung nodule (793 11) (R91 1)   22  Lymphadenitis, acute (683) (L04 9)   23  Major depression (296 20) (F32 9)   24  Need for influenza vaccination (V04 81) (Z23)   25  Pain, hand (729 5) (M79 643)   26  Paresthesia (782 0) (R20 2)   27  Pelvic pain in female (625 9) (R10 2)   28  Right foot pain (729 5) (M79 671)   29  Right lower quadrant abdominal pain (789 03) (R10 31)   30  Shingles (053 9) (B02 9)   31  Shortness of breath (786 05) (R06 02)   32  Skin rash (782 1) (R21)   33  Sore throat (462) (J02 9)   34  Sprain of right hand, initial encounter (842 10) (S63 91XA)   35  Sprain of right wrist, initial encounter (842 00) (S63 501A)   36  Unspecified ovarian cyst, unspecified side (620 2) (N83 209)   37  Upper limb weakness (729 89) (R29 898)   38  Vitamin D deficiency (268 9) (E55 9)   39   Weakness of both lower extremities (729 89) (R29 898)   40  Yeast infection of the vagina (112 1) (B37 3)    Past Medical History    1  History of endometriosis (V13 29) (Z87 42)   2  History of hyperlipidemia (V12 29) (Z86 39)   3  History of low back pain (V13 59) (Z87 39)   4  History of urinary frequency (V13 09) (Z87 898)   5  History of Prolapsed bladder   6  History of Urinary urgency (788 63) (R39 15)  Active Problems And Past Medical History Reviewed: The active problems and past medical history were reviewed and updated today  Surgical History    1  History of Bladder Surgery   2  History of Hernia Repair   3  History of Hysterectomy (V88 01)   4  History of Hysterectomy   5  History of Ovarian Cystectomy   6  History of Tonsillectomy  Surgical History Reviewed: The surgical history was reviewed and updated today  Family History  Mother    1  Family history of chronic obstructive pulmonary disease (V17 6) (Z82 5)   2  Family history of rheumatic heart disease (V17 49) (Z82 49)   3  Family history of type 2 diabetes mellitus (V18 0) (Z83 3)  Father    4  Family history of    5  Family history of lung cancer (V16 1) (Z80 1)  Maternal Grandmother    6  Family history of    7  Family history of cerebrovascular accident (CVA) (V17 1) (Z82 3)   8  Family history of type 2 diabetes mellitus (V18 0) (Z83 3)  Paternal Grandmother    5  Family history of type 2 diabetes mellitus (V18 0) (Z83 3)  Maternal Grandfather    10  Family history of Esophageal abnormality  Paternal Grandfather    6  Family history of type 2 diabetes mellitus (V18 0) (Z83 3)  Family History    12  Family history of Depression (311) (F32 9)   15  Family history of Esophageal carcinoma (150 9) (C15 9)   14  Family history of Lung cancer (162 9) (C34 90)   15  Family history of Stomach cancer (151 9) (C16 9)  Family History Reviewed: The family history was reviewed and updated today         Social History    · Consumes alcohol occasionally (V49 89) (Z78 9)   · Disabled   · Drinks coffee   · Former smoker (N12 74) (L78 280)   ·    · Never a smoker   · One child   · Social alcohol use (Z78 9)  Social History Reviewed: The social history was reviewed and updated today  Current Meds   1  BuPROPion HCl - 100 MG Oral Tablet; TAKE 1 TABLET DAILY Recorded   2  CeleXA 10 MG Oral Tablet; TAKE 1 TABLET DAILY; Therapy: (Recorded:30Oct2017) to Recorded   3  Daily Multivitamin TABS; Therapy: (Recorded:30Kak4930) to Recorded   4  LaMICtal 200 MG Oral Tablet; TAKE 1 TABLET TWICE DAILY; Therapy: (Recorded:13Jun2017) to Recorded   5  LORazepam 0 5 MG Oral Tablet; TAKE 1 TABLET EVERY 12 HOURS AS NEEDED; Therapy: (Recorded:13Jun2017) to Recorded   6  Simvastatin 40 MG Oral Tablet; take 1 tablet by mouth once daily; Therapy: 45KOZ4326 to (Flory Garcia)  Requested for: 41KEQ6267; Last   Rx:28Ovm5330 Ordered   7  Vitamin D 2000 UNIT Oral Capsule; Therapy: (Recorded:77Ahx8217) to Recorded  Medication List Reviewed: The medication list was reviewed and updated today  Allergies    1  No Known Drug Allergies    Vitals  Signs   Recorded: 16WCU0300 11:44AM   Heart Rate: 86  Systolic: 835, LUE, Sitting  Diastolic: 60, LUE, Sitting  Height: 5 ft 6 in  Weight: 202 lb 8 oz  BMI Calculated: 32 68  BSA Calculated: 2 01  O2 Saturation: 96    Physical Exam      General examination:  Patient is awake and alert  Eyes: Conjunctiva and sclera are clear  HEENT: External examination is normal  Neck: Supple  Lungs: Clear to auscultation bilaterally  CVS: S1, S2 heard  Abdomen: Soft, nontender  Extremities: No clubbing, edema, cyanosis  Skin: No rashes  Neurological examination:   Mental status: Patient is awake, alert, oriented to time place and person  Attention, concentration, fund of knowledge is intact  Language: No evidence of aphasia or dysarthria  Memory: Repetition 3/3 and recall 3/3      Cranial nerves: Pupils equal, reacting to light and accommodation  Extraocular movements intact  Visual fields are full  Fundi are difficult to visualize bilaterally  Facial sensation is intact  No facial weakness is noted  Finger rub test is intact bilaterally  Tongue and uvula are in midline  Gag is intact  Shoulder shrug is 5/5 bilaterally  Motor examination: Tone is normal  No atrophy noted  Strength is 5/5 throughout  Sensory examination: Light touch is intact  Pinprick, temperature are intact  Vibration is intact  Proprioception is intact  Deep tendon reflexes: 2 throughout  Plantars are downgoing bilaterally  Coordination: Finger-nose test and finger tapping intact bilaterally  Heel-to-shin test is intact bilaterally  Gait: Patient has a normal base and stride  Results/Data  * MRI SHOULDER LEFT WO CONTRAST 03LWQ5289 11:02AM Sherial Alberto     Test Name Result Flag Reference   MRI SHOULDER LEFT WO CONTRAST (Report)     MRI LEFT SHOULDER     INDICATION: M75 42: Impingement syndrome of left shoulder  History taken directly from the electronic ordering system  Patient has left shoulder pain and limited range of motion  COMPARISON: Left shoulder plain films from 10/20/2017  TECHNIQUE:  The following MR sequences were obtained of the left shoulder: Localizer, axial GRE/PD fat sat, oblique coronal T2 fat sat, oblique sagittal T1/T2 fat sat  Images were acquired on a 1 5 Lainey unit  Gadolinium was not used  FINDINGS:     SUBCUTANEOUS TISSUES: Normal     JOINT EFFUSION: None  ACROMION PROCESS: Normal      ROTATOR CUFF: Intact  SUBACROMIAL/SUBDELTOID BURSA: Normal       LONG HEAD OF BICEPS TENDON: Normal      GLENOID LABRUM: Intact  GLENOHUMERAL JOINT: Intact  ACROMIOCLAVICULAR JOINT: Normal      BONE MARROW SIGNAL: Normal        IMPRESSION:     Normal MRI of the left shoulder         Workstation performed: [id number]     Signed by:   Franco Fleming MD   12/26/17     (1) C-PEPTIDE 56Nin2552 07:52AM Louann Denisse Mondragon Order Number: [id number]     Test Name Result Flag Reference   C PEPTIDE 6 3 ng/mL H 1 1 - 4 4   C-Peptide reference interval is for fasting patients  Performed at:  [address] [place] [address]  [id number]  : Vivienne Seals MD, Phone:  [phone]     (1) GLUCOSE,  FASTING [id number] 07:52AM Arthuro Jason Order Number: [id number]     Test Name Result Flag Reference   GLUCOSE FASTING 110 mg/dL H 65-99   Specimen collection should occur prior to Sulfasalazine administration due to the potential for falsely depressed results  Specimen collection should occur prior to Sulfapyridine administration due to the potential for falsely elevated results  (1) HEMOGLOBIN A1C 20Oct2017 07:52AM Arthpaz Gomez Order Number: [id number]     Test Name Result Flag Reference   HEMOGLOBIN A1C 5 6 %  4 2-6 3   EST  AVG  GLUCOSE 114 mg/dl       * XR SHOULDER 2+ VIEW LEFT 20Oct2017 07:41AM Arthpaz Gomez Order Number: [id number]     Test Name Result Flag Reference   XR SHOULDER 2+ VW LEFT (Report)     LEFT SHOULDER     INDICATION: Left shoulder pain  COMPARISON: None     VIEWS: 3     IMAGES: 3     FINDINGS:     There is no acute fracture or dislocation  No degenerative changes  No lytic or blastic lesions are seen  Soft tissues are unremarkable  IMPRESSION:     No acute osseous abnormality  Workstation performed: [id number]     Signed by:   Pauline Boyd MD   10/22/17     (1) VITAMIN B12 20Sep2017 10:37AM Verlee Snellen     Test Name Result Flag Reference   VITAMIN B12 751 pg/mL  100-900     (1) TSH WITH FT4 REFLEX 20Sep2017 10:37AM Verlee Snellen     Test Name Result Flag Reference   TSH 0 827 uIU/mL  0 358-3 740   Patients undergoing fluorescein dye angiography may retain small amounts of fluorescein in the body for 48-72 hours post procedure   Samples containing fluorescein can produce falsely depressed TSH values  If the patient had this procedure,a specimen should be resubmitted post fluorescein clearance  The recommended reference ranges for TSH during pregnancy are as follows:  First trimester 0 1 to 2 5 uIU/mL  Second trimester  0 2 to 3 0 uIU/mL  Third trimester 0 3 to 3 0 uIU/m     (1) VITAMIN D 125-DIHYDROXY (CALCITRIOL) 20Sep2017 10:37AM Anitha Balling     Test Name Result Flag Reference   LDMX876-SVPUDNH 68 9 pg/mL  19 9 - 79 3   Performed at:  [address]  [id number]  : Yeimy Jimenez MD, Phone:  [phone]     (1) ACETYLCHOLINE RECEPTOR ANTIBODY [id number] 09:35AM Richerd Pepito Order Number: [id number]     Test Name Result Flag Reference   ACTLCHOL RCP AB <0 03 nmol/L  0 00 - 0 24   Negative:   0 00 - 0 24                                 Borderline: 0 25 - 0 40                                 Positive:        > 0 40    Performed at:  [address]  [id number]  : Yeimy Jimenez MD, Phone:  [phone]     (1) [place] [id number] 09:35AM Richerd Pepito Order Number: [id number]     Test Name Result Flag Reference   [facility] BLOCKING ABS, SERUM 18 %  0 - 25   Negative:      0 - 25                                 Borderline:   26 - 30                                 Positive:         >30  Results for this test are for research purposes  only by the assay's   The performance  characteristics of this product have not been  established  Results should not be used as a  diagnostic procedure without confirmation of the  diagnosis by another medically established  diagnostic product or procedure      Performed at:  [address]  [id number]  : Yeimy Jimenez MD, Phone:  [phone]     (1) SERENITY VEE [id number] 09:35AM Richerd Pepito Order Number: [id number]     Test Name Result Flag Reference   ACHR MODULATING AB <12 %  0 - 20   Negative:          <21                                Equivocal:     21 - 25                                Positive:          >25   The assay is linear between values of 12 and 64  Those <12 and >64 are reported as such  No single   value for ACR-modulating antibody should be used as   a sole basis for diagnosis or response to therapy  Performed at:  [address]  [id number]  : Silvano Cho MD, Phone:  [phone]     (1) CK (CPK) 29KUA3620 09:35AM Alleghany Helms     Test Name Result Flag Reference   CK (CPK) 76 U/L       (1) VITAMIN B12 20FYT9768 09:35AM Alleghany Helms     Test Name Result Flag Reference   VITAMIN B12 596 pg/mL  100-900     (1) VITAMIN D 25-HYDROXY 89IZV8405 09:35AM Catarina Rabago     Test Name Result Flag Reference   VIT D 25-HYDROX 25 6 ng/mL L 30 0-100 0   This assay is a certified procedure of the CDC Vitamin D Standardization Certification Program (VDSCP)     Deficiency <20ng/ml   Insufficiency 20-30ng/ml   Sufficient  ng/ml     *Patients undergoing fluorescein dye angiography may retain small amounts of fluorescein in the body for 48-72 hours post procedure  Samples containing fluorescein can produce falsely elevated Vitamin D values  If the patient had this procedure, a specimen should be resubmitted post fluorescein clearance  (1) PROTEIN ELECTRO, SERUM 14Jun2017 09:35AM Melissa Uribe Order Number: [id number]     Test Name Result Flag Reference   A/G RATIO 1 39  1 10-1 80   Albumin 58 2 %  52 0-65 0   Albumin Conc  4 13 g/dl  3 50-5 00   Alpha 1 Conc  0 32 g/dL  0 10-0 40   ALPHA 1 4 5 %  2 5-5 0   Alpha 2 Conc  0 80 g/dL  0 40-1 20   ALPHA 2 11 2 %  7 0-13 0   Beta 1 Conc   0 43 g/dL  0 40-0 80   BETA-1 6 1 %  5 0-13 0   Beta 2 Conc 0 38 g/dL  0 20-0 50   BETA-2 5 4 %  2 0-8 0   Gamma Conc 1 04 g/dL  0 50-1 60   GAMMA GLOBULIN 14 6 %  12 0-22 0   Interpretation      The serum total protein, albumin and electrophoresis are within normal limits  No monoclonal bands noted  Reviewed by: Gilles Fregoso DO ([id number]) **Electronic Signature**   TOTAL PROTEIN  7 1 g/dL  6 4-8 2     (1) FOLATE [id number] 09:35AM Catarina Granados     Test Name Result Flag Reference   FOLATE 19 2 ng/mL H 3 1-17 5     * XR SPINE CERVICAL COMPLETE 4 OR 5 VW NON INJURY 14Jun2017 09:33AM Charmayne Gaudy Order Number: [id number]     Test Name Result Flag Reference   XR SPINE CERVICAL COMPLETE 4 OR 5 VW (Report)     CERVICAL SPINE     INDICATION: Numbness and tingling with weakness and cramps in arms and legs  Low back pain  COMPARISON: None     VIEWS: 5     IMAGES: 6     FINDINGS:     There is a reversal the normal cervical lordosis with its apex C5  Minimal anterolisthesis C2-3 and C3-4  The intervertebral disc spaces are preserved  The neural foramina are patent  The prevertebral soft tissues are within normal limits  The lung apices are intact  IMPRESSION:     Reversal the normal cervical lordosis apex C5  No fracture or traumatic malalignment  Workstation performed: Juan Peppers by:   Nirav Campos DO   6/15/17     * XR SPINE LUMBAR MINIMUM 4 VIEWS NON INJURY 14Jun2017 09:33AM Charmayne Gaudy Order Number: [id number]     Test Name Result Flag Reference   XR SPINE LUMBAR MINIMUM 4 VIEWS (Report)     LUMBAR SPINE     INDICATION: for 2 month pt has had numbness and tingling , weakness and cramps in her arms and legs  some low back pain     COMPARISON: Abdomen and pelvic CT from 1/13/2017  VIEWS: AP, lateral, bilateral oblique and coned down projections     IMAGES: 5     FINDINGS:     Alignment is unremarkable  There is no radiographic evidence of acute fracture or destructive osseous lesion  No significant lumbar degenerative change noted       Visualized soft tissues appear unremarkable  IMPRESSION:     Normal examination  Workstation performed: [id number]     Signed by:   Carina Cevallos MD   6/15/17     * MRI BRAIN MS WO AND W CONTRAST [id number] 01:02PM Jarett De Los Santos     Test Name Result Flag Reference   MRI BRAIN MS WO AND W CONTRAST (Report)     This is a summary report  The complete report is available in the patient's medical record  If you cannot access the medical record, please contact the sending organization for a detailed fax or copy  MRI BRAIN WITH AND WITHOUT CONTRAST     INDICATION: R29 898: Other symptoms and signs involving the musculoskeletal system  History taken directly from the electronic ordering system  COMPARISON: CT head performed on 2/8/2016     TECHNIQUE: Sagittal T1, axial T2, axial FLAIR, axial T1  [place], Sagittal FLAIR CUBE   Axial and sagittal [id number]            IV Contrast: 9 mL of Gadobutrol injection (SINGLE-DOSE)      IMAGE QUALITY: Diagnostic  FINDINGS:     BRAIN PARENCHYMA:    There are no areas of restricted diffusion  No midline shift, mass effect, or extra-axial collection is identified  No areas of gradient susceptibility to suggest blood product deposition  No substantial white matter changes are seen  Cerebral volume is within normal limits for age  VENTRICLES: The ventricles are normal in size and contour  VASCULATURE: Major arterial and dural venous flow voids are preserved by spin echo criteria  ORBITS: Normal      PARANASAL SINUSES: Trace polypoid mucosal thickening in the right maxillary sinus  EXTRACRANIAL SOFT TISSUES: Normal      SELLA AND PITUITARY GLAND: Hypothalamic and pituitary region are grossly normal       CALVARIUM AND SKULL BASE: Craniocervical junction is within normal limits  Marrow signal is within normal limits without signs of an infiltrative process  IMPRESSION:     No acute intracranial abnormality or pathologic enhancement       No significant white matter changes  Workstation performed: [id number]     Signed by:   Mauro Sinclair MD   5/30/17     (1) SED RATE [id number] 11:17AM [facility] Order Number: [id number]     Test Name Result Flag Reference   SED RATE 34 mm/hour H 0-20     (1) LYME ANTIBODY PROFILE W/REFLEX TO WESTERN BLOT [id number] 11:17AM [facility] Order Number: [id number]     Test Name Result Flag Reference   LYME IGG 0 08  0 00-0 79   NEGATIVE(0 00-0 79)-Absence of detectable Borrelia IgG Antibodies  A negative result does not exclude the possibility of Borrelia infection  If early Lyme disease is suspected,a second sample should be collected & tested 4 weeks after initial testing  LYME IGM 0 21  0 00-0 79   NEGATIVE (0 00-0 79)-Absence of detectable Borrelia IgM antibodies  A negative result does not exclude the possibility of Borrelia infection   If early lyme disease is suspected, a second sample should be collected & tested 4 weeks after initial testing      (1) RHEUMATOID FACTOR SCREEN [id number] 11:17AM [facility] Order Number: [id number]     Test Name Result Flag Reference   RHEUMATOID FACTOR Negative  Negative     (1) C-REACTIVE PROTEIN [id number] 11:17AM [facility] Order Number: [id number]     Test Name Result Flag Reference   C-REACT PROTEIN <3 0 mg/L  <3 0     (1) [id number] [facility]: [id number]     Test Name Result Flag Reference   ANTI DNA DOUBLE STRANDED <1 IU/mL  0 - 9   Negative      <5                                     Equivocal  5 - 9                                     Positive      >9    Performed at:  [address] [place] [address]  [id number]  : Silke Bosch MD, Phone:  [phone]     (1) PK SCREEN W/REFLEX TO TITER/PATTERN [id number] 11:17AM [facility] Order Number: [id number]     Test Name Result Flag Reference   PK SCREEN  Negative  Negative     (1) COMPREHENSIVE METABOLIC PANEL [id number] 19:03[facility] Order Number: [id number]     Test Name Result Flag Reference   GLUCOSE,RANDM 85 mg/dL     If the patient is fasting, the ADA then defines impaired fasting glucose as > 100 mg/dL and diabetes as > or equal to 123 mg/dL  SODIUM 140 mmol/L  136-145   POTASSIUM 4 2 mmol/L  3 5-5 3   CHLORIDE 106 mmol/L  100-108   CARBON DIOXIDE 27 mmol/L  21-32   ANION GAP (CALC) 7 mmol/L  4-13   BLOOD UREA NITROGEN 18 mg/dL  5-25   CREATININE 0 68 mg/dL  0 60-1 30   Standardized to IDMS reference method   CALCIUM 9 3 mg/dL  8 3-10 1   BILI, TOTAL 0 38 mg/dL  0 20-1 00   ALK PHOSPHATAS 81 U/L     ALT (SGPT) 22 U/L  12-78   AST(SGOT) 13 U/L  5-45   ALBUMIN 4 0 g/dL  3 5-5 0   TOTAL PROTEIN 7 5 g/dL  6 4-8 2   eGFR Non-African American      >60 0 ml/min/1 73sq [facility] Disease Education Program recommendations are as follows:  GFR calculation is accurate only with a steady state creatinine  Chronic Kidney disease less than 60 ml/min/1 73 sq  meters  Kidney failure less than 15 ml/min/1 73 sq  meters  * XR HAND 3+ VIEW RIGHT [id number] 01:00PM [facility] Order Number: [id number]     Test Name Result Flag Reference   XR HAND 3+ VW RIGHT (Report)     RIGHT HAND     INDICATION: Right hand pain  COMPARISON: 6/14/2016  VIEWS: 3     IMAGES: 3     For the purposes of institution wide universal language the following terms will apply: (thumb=1st digit/finger, index finger=2nd digit/finger, long finger=3rd digit/finger, ring=4th digit/finger and small finger=5th digit/finger)     FINDINGS:     There is no acute fracture or dislocation  No degenerative changes  No lytic or blastic lesions are seen  Soft tissues are unremarkable  IMPRESSION:     No acute osseous abnormality       Findings concur with the preliminary report by the referring clinician already in PACS and/or our electronic record EPIC  Workstation performed: [id number]     Signed by:   Bisi Goldberg MD   5/11/17     Future Appointments    Date/Time Provider Specialty Site   02/28/2018 10:40 AM LADY Ponce   Orthopedic [facility]     Signatures   Electronically signed by : Amalia Walker MD; Jan 23 2018 12:10PM EST                       (Author)

## 2018-01-25 ENCOUNTER — OFFICE VISIT (OUTPATIENT)
Dept: PHYSICAL THERAPY | Facility: CLINIC | Age: 38
End: 2018-01-25
Payer: COMMERCIAL

## 2018-01-25 DIAGNOSIS — M75.42 IMPINGEMENT SYNDROME OF LEFT SHOULDER: Primary | ICD-10-CM

## 2018-01-25 PROCEDURE — 97140 MANUAL THERAPY 1/> REGIONS: CPT | Performed by: PHYSICAL THERAPIST

## 2018-01-25 PROCEDURE — 97014 ELECTRIC STIMULATION THERAPY: CPT | Performed by: PHYSICAL THERAPIST

## 2018-01-25 PROCEDURE — 97110 THERAPEUTIC EXERCISES: CPT | Performed by: PHYSICAL THERAPIST

## 2018-01-25 NOTE — PROGRESS NOTES
Daily Note     Today's date: 2018  Patient name: Jeanne Penn  : 1980  MRN: [id number]  Referring provider: Lindsay Acevedo MD  Dx:   Encounter Diagnosis   Name Primary? 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      Objective: See treatment diary below  Speciality Daily Treatment Diary  Re-eval Date: 2/10/18    Date 18       Visit Count 5       FOTO    due    Pain In 4/10       Pain Out 3/10       Time In/Out 2950-6761               MANUAL 15 mins                 Manual Therapy:   PROM to left GHJ all planes, Grade III mobs Inferior and posterior glides, STM to left UT and deltoid  Pendulums            UT stretch 4x30"           Levator scap stretch 4x30"           Pec stretch (45-) 2x30"           Scap retraction 5" x 10           Gentle shoulder isometrics            Bicep curls  5#          Tricep extension  Yellow          MTP/LTP Yellow  2 x 10           Tband IR/ER Yellow  2 x 10           SL ER 5#  1x10           Prone shoulder flex, abd, ext 0#  2 x 10 ea           Bent over row  0#          Arm raises flex/scap                  Assessment: Tolerated treatment well  Patient exhibited good technqiue with therapeutic exercises, but has ongoing s/s of impingement at end range  In need of ongoing PT to maximize return to PLOF  Plan: Continue per plan of care

## 2018-01-30 ENCOUNTER — OFFICE VISIT (OUTPATIENT)
Dept: PHYSICAL THERAPY | Facility: CLINIC | Age: 38
End: 2018-01-30
Payer: COMMERCIAL

## 2018-01-30 DIAGNOSIS — M75.42 IMPINGEMENT SYNDROME OF LEFT SHOULDER: Primary | ICD-10-CM

## 2018-01-30 PROCEDURE — 97014 ELECTRIC STIMULATION THERAPY: CPT | Performed by: PHYSICAL THERAPIST

## 2018-01-30 PROCEDURE — 97110 THERAPEUTIC EXERCISES: CPT | Performed by: PHYSICAL THERAPIST

## 2018-01-30 PROCEDURE — 97140 MANUAL THERAPY 1/> REGIONS: CPT | Performed by: PHYSICAL THERAPIST

## 2018-01-30 NOTE — PROGRESS NOTES
Daily Note     Today's date: 2018  Patient name: Favian Haile  : 1980  MRN: [id number]  Referring provider: Crystal Carnes MD  Dx:   Encounter Diagnosis   Name Primary?  Impingement syndrome of left shoulder Yes        Subjective: Patient reports doing well, still gets pinch in shoulder with reaching back to unhook bra  Objective: See treatment diary below  Speciality Daily Treatment Diary  Re-eval Date: 2/10/18    Date 18      Visit Count 5 6      FOTO    due    Pain In 4/10 2/10      Pain Out 3/10 0/10      Time In/Out [phone]              MANUAL 15 mins 15 mins                Manual Therapy:   PROM to left GHJ all planes, Grade III mobs Inferior and posterior glides, STM to left UT and deltoid, gentle SOR  Pendulums  D/C          UT stretch 4x30" D/C          Levator scap stretch 4x30" D/C          Pec stretch (45-) 2x30" 2x30"          Scap retraction 5" x 10 D/C          Bicep curls  5# 2x10          Tricep extension  5#  10x          MTP/LTP Yellow  2 x 10 Orange  2 x 10          Tband IR/ER Yellow  2 x 10 Orange  2 x 10          SL ER 5#  1x10 5#  1x10          Prone shoulder flex, abd, ext 0#  2 x 10 ea 0#  2 x 10 ea          Bent over row  0#  2 x 10 ea          Arm raises flex/scap   0#                 Assessment: Patient tolerated session well  She does have ongoing limitations in IR ROM and ER strength  She does require increased cues for postural correction  Plan: Continue per plan of care

## 2018-02-01 ENCOUNTER — OFFICE VISIT (OUTPATIENT)
Dept: PHYSICAL THERAPY | Facility: CLINIC | Age: 38
End: 2018-02-01
Payer: COMMERCIAL

## 2018-02-01 DIAGNOSIS — M75.42 IMPINGEMENT SYNDROME OF LEFT SHOULDER: Primary | ICD-10-CM

## 2018-02-01 PROCEDURE — 97140 MANUAL THERAPY 1/> REGIONS: CPT

## 2018-02-01 PROCEDURE — 97110 THERAPEUTIC EXERCISES: CPT

## 2018-02-01 NOTE — PROGRESS NOTES
Daily Note     Today's date: 2018  Patient name: Andrey Mahoney  : 1980  MRN: [id number]  Referring provider: Zoltan England MD  Dx:   Encounter Diagnosis   Name Primary?  Impingement syndrome of left shoulder Yes     Re-eval Date: 2/10/18    Subjective: Patient reports overall feeling better, getting more movement with L shoulder  I can hook my bra, still pinches, but able to do it now  Objective: See treatment diary below      Date 18     Visit Count 5 6 7     FOTO    due    Pain In 4/10 2/10 3/10     Pain Out 3/10 0/10 1/10     Time In/Out 3784-1337 2060-1117 [phone]             MANUAL 15 mins 15 mins 15 min       Manual Therapy:   PROM to left GHJ all planes  Grade III mobs Inferior and posterior glides  STM to left UT and deltoid  gentle SOR -NP    Pendulums  D/C      UT stretch 4x30" D/C      Levator scap stretch 4x30" D/C      Pec stretch (45-) 2x30" 2x30" 2x30"     Scap retraction 5" x 10 D/C      Bicep curls  5# 2x10 5# 3x10     Tricep extension  5#  10x 1# 2x10     MTP/LTP Yellow  2 x 10 Orange  2 x 10 Orange 2x10     Tband IR/ER Yellow  2 x 10 Orange  2 x 10 Converse 2x10     SL ER 5#  1x10 5#  1x10 5# 1x10     Prone shoulder flex, abd, ext 0#  2 x 10 ea 0#  2 x 10 ea 1# 2x10   L only     Bent over row  0#  2 x 10 ea 5# 2x10 ea     Arm raises flex/scap   0# 2x10         Modalities:  MH to L Ut/scap seated 10' end rx session  (no adverse affects noted post rx)    Assessment: Patient tolerated session well, denied any increase sx's/pain left shoulder  Demon WNL L shoulder flex/scap/ER with end range tightness into IR  Displays soft tissue restrictions in L pec,UT,scalenes, levator, rhomboid mm with palpation  Good tolerance indicated with MT completed this date with reduced pain/mm tightness indicated  Plan: Continue per plan of care

## 2018-02-06 ENCOUNTER — HOSPITAL ENCOUNTER (OUTPATIENT)
Dept: MRI IMAGING | Facility: HOSPITAL | Age: 38
Discharge: HOME | End: 2018-02-06
Attending: PSYCHIATRY & NEUROLOGY

## 2018-02-06 ENCOUNTER — OFFICE VISIT (OUTPATIENT)
Dept: PHYSICAL THERAPY | Facility: CLINIC | Age: 38
End: 2018-02-06
Payer: COMMERCIAL

## 2018-02-06 DIAGNOSIS — M54.2 CERVICODYNIA: ICD-10-CM

## 2018-02-06 DIAGNOSIS — M75.42 IMPINGEMENT SYNDROME OF LEFT SHOULDER: Primary | ICD-10-CM

## 2018-02-06 PROCEDURE — 97110 THERAPEUTIC EXERCISES: CPT

## 2018-02-06 PROCEDURE — 97140 MANUAL THERAPY 1/> REGIONS: CPT

## 2018-02-06 NOTE — PROGRESS NOTES
Daily Note     Today's date: 2018  Patient name: Stefan Lamas  : 1980  MRN: [id number]  Referring provider: Real Alvarenga MD  Dx:   Encounter Diagnosis   Name Primary? 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, increase left shoulder pain      Objective: See treatment diary below    MANUAL 15 mins 15 mins 15 min 15 min      Manual Therapy:   PROM to left GHJ all planes  Grade III mobs Inferior and posterior glides  STM to left UT and deltoid  gentle SOR -NP    Exercise Diary        Pendulums  D/C      UT stretch 4x30" D/C      Levator scap stretch 4x30" D/C      Pec stretch (45-) 2x30" 2x30" 2x30" 2x 30"    Scap retraction 5" x 10 D/C      Bicep curls  5# 2x10 5# 3x10 5# 3x10    Tricep extension  5#  10x 1# 2x10 Yellow 2x10  TB tricep pull down    MTP/LTP Yellow  2 x 10 Orange  2 x 10 Orange 2x10 Orange 3x10    Tband IR/ER Yellow  2 x 10 Orange  2 x 10 Orange 2x10 Orange 3x10    SL ER 5#  1x10 5#  1x10 5# 1x10 5# 2x10    Prone shoulder flex, abd, ext 0#  2 x 10 ea 0#  2 x 10 ea 1# 2x10   L only Resume    Bent over row  0#  2 x 10 ea 5# 2x10 ea 5# 2x10    Arm raises flex/scap   0# 2x10   1# 2x10      Modalities 15 mins 15 mins 15 min 10 min      MH to L Ut/scap seated 10' end rx session  (no adverse affects noted post rx)    Assessment: Tolerated treatment well, denied any increase pain L shoulder  Noted improvement with PROM L shoulder and reduced mm tightness/restrictions  Patient demonstrated fatigue post treatment  Plan: Continue per plan of care

## 2018-02-08 ENCOUNTER — APPOINTMENT (OUTPATIENT)
Dept: PHYSICAL THERAPY | Facility: CLINIC | Age: 38
End: 2018-02-08
Payer: COMMERCIAL

## 2018-02-13 ENCOUNTER — OFFICE VISIT (OUTPATIENT)
Dept: PHYSICAL THERAPY | Facility: CLINIC | Age: 38
End: 2018-02-13
Payer: COMMERCIAL

## 2018-02-13 DIAGNOSIS — M75.42 IMPINGEMENT SYNDROME OF LEFT SHOULDER: Primary | ICD-10-CM

## 2018-02-13 PROCEDURE — 97140 MANUAL THERAPY 1/> REGIONS: CPT

## 2018-02-13 PROCEDURE — 97110 THERAPEUTIC EXERCISES: CPT

## 2018-02-13 NOTE — PROGRESS NOTES
Daily Note     Today's date: 2018  Patient name: Grayson Weinstein  : 1980  MRN: [id number]  Referring provider: Sharmaine Guevara MD  Dx:   Encounter Diagnosis   Name Primary?  Impingement syndrome of left shoulder Yes                  Precautions    Re-eval Date: 18     Date 18   Visit Count 5 6 7 8 9   FOTO    completed    Pain In 4/10 2/10 3/10 5/10 5/10   Pain Out 3/10 0/10 1/10 3/10 3/10   Time In/Out 8787-2789 [phone] [phone]     Subjective: I felt like I slept on my left shoulder wrong, increase pain today  Objective: See treatment diary below    MANUAL 15 mins 15 mins 15 min 15 min 15     Manual Therapy:   PROM to left GHJ all planes - motion check  Grade III mobs Inferior and posterior glides - np  STM to left Ut, scap mm and deltoid  gentle SOR -NP    Exercise Diary        Pendulums  D/C      UT stretch 4x30" D/C      Levator scap stretch 4x30" D/C      Pec stretch (45-) 2x30" 2x30" 2x30" 2x 30" NP   Scap retraction 5" x 10 D/C   NP   Bicep curls  5# 2x10 5# 3x10 5# 3x10 5# 3x10   Tricep extension  5#  10x 1# 2x10 Yellow 2x10  TB tricep pull down Orange 3x10   MTP/LTP Yellow  2 x 10 Orange  2 x 10 Orange 2x10 Orange 3x10 Orange 3x10 ea   Tband IR/ER Yellow  2 x 10 Orange  2 x 10 Orange 2x10 Orange 3x10 Sawyer 3x10 ea   SL ER 5#  1x10 5#  1x10 5# 1x10 5# 2x10 5# 2x10   Prone shoulder flex, abd, ext 0#  2 x 10 ea 0#  2 x 10 ea 1# 2x10   L only Resume 0# 1x10, 5"   Bent over row  0#  2 x 10 ea 5# 2x10 ea 5# 2x10 5# 2x10   Arm raises flex/scap   0# 2x10   1# 2x10 1# 2x10   Pullies (warm up)     3' flex/scap ea     Modalities 15 mins 15 mins 15 min 10 min      MH to L Ut/scap seated 10' end rx session  (no adverse affects noted post rx)    Assessment: Tolerated treatment well, denied any increase pain left shoulder  Pt demon mm spasms L  Rhomboid mm and mm tightness L UT region  Pt indicated decrease pain after Mt/modalities    ROM L Shoulder WNL  Patient would benefit from continued PT      Plan: Continue per plan of care

## 2018-02-15 ENCOUNTER — OFFICE VISIT (OUTPATIENT)
Dept: PHYSICAL THERAPY | Facility: CLINIC | Age: 38
End: 2018-02-15
Payer: COMMERCIAL

## 2018-02-15 DIAGNOSIS — M75.42 IMPINGEMENT SYNDROME OF LEFT SHOULDER: Primary | ICD-10-CM

## 2018-02-15 PROCEDURE — 97110 THERAPEUTIC EXERCISES: CPT | Performed by: PHYSICAL THERAPIST

## 2018-02-15 PROCEDURE — 97014 ELECTRIC STIMULATION THERAPY: CPT | Performed by: PHYSICAL THERAPIST

## 2018-02-15 PROCEDURE — G8991 OTHER PT/OT GOAL STATUS: HCPCS | Performed by: PHYSICAL THERAPIST

## 2018-02-15 PROCEDURE — 97140 MANUAL THERAPY 1/> REGIONS: CPT | Performed by: PHYSICAL THERAPIST

## 2018-02-15 PROCEDURE — 97164 PT RE-EVAL EST PLAN CARE: CPT | Performed by: PHYSICAL THERAPIST

## 2018-02-15 PROCEDURE — G8990 OTHER PT/OT CURRENT STATUS: HCPCS | Performed by: PHYSICAL THERAPIST

## 2018-02-15 NOTE — PROGRESS NOTES
PT Re-Evaluation     Today's date: 2/15/2018  Patient name: Jessie Sam  : 1980  MRN: [id number]  Referring provider: Eri Flores MD  Dx:   Encounter Diagnosis   Name Primary?  Impingement syndrome of left shoulder Yes                  Assessment  Impairments: abnormal muscle firing, abnormal or restricted ROM, activity intolerance, impaired physical strength, pain with function and scapular dyskinesis    Assessment details: Jessie Sam is a 34 y o  female presenting to outpatient physical therapy with diagnosis of Impingement syndrome of left shoulder  Patient has had an MRI and reports normal findings as well as EMG  She did see a neurologist and was recommended an MRI of her cervical spine  She has made progress with ROM and strength  She does have difficulty lifting from waist to shoulder height with any resistance  Notes with improved lifting/carrying if item is able to be held waist or lower  Patient presents with pain, reduced range of motion, reduced strength, reduced postural awareness, and reduced functional activity tolerance  Patient to benefit from skilled outpatient physical therapy to reduce pain, maximize pain free range of motion, increase strength and stability, and improve functional mobility/functional activity in order to maximize return to prior level of function with reduced limitations  Thank you for your referral     Understanding of Dx/Px/POC: good   Prognosis: good    Goals  In 4 weeks, patient will    Demonstrate full and non-painful Range of Motion overhead - partially met  Scap strength to at least 3+/5 bilaterally tested prone - met  Demonstrate independent HEP - met  Demonstrate increased GHJ strength to at least by at least 1/2 MMT - met  Full and painfree Range of Motion all planes - partially met    In 6 weeks, patient will      Demonstrate full return to Instrumental activities of daily living unrestricted - partially met  Demonstrate full return to activities of daily living unrestricted, specifically dressing, washing hair, etc - met  Reduce deficits as noted by improved outcome measures - partially met  Decreased pain at rest and with activity - met    Plan  Patient would benefit from: skilled PT  Planned modality interventions: ultrasound and unattended electrical stimulation  Planned therapy interventions: manual therapy, Bhatia taping, neuromuscular re-education, patient education, therapeutic exercise and home exercise program  Frequency: 2x week  Duration in weeks: 4  Treatment plan discussed with: patient        Subjective Evaluation    History of Present Illness  Mechanism of injury: See redoc for last re-assessment  Presently, patient with ongoing left neck/UT/upper shoulder pain  Radiating pain into left LE decreased, does get numbness/tingling  Unable to sleep on left side at this time, fitful sleep at times  Sleeping approximately 3-4 hours per night (typically 5 is normal)  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)  Not a recurrent problem Quality of life: good    Pain  Current pain ratin  At best pain ratin  At worst pain ratin  Location: left UT and GHJ  Quality: dull ache, pressure and tight (Knife like when she tries to lift )  Relieving factors: change in position, heat, ice, medications and relaxation  Aggravating factors: lifting  Progression: improved      Diagnostic Tests  MRI studies: normal  EMG: normal  Treatments  Current treatment: medication and physical therapy  Patient Goals  Patient goals for therapy: decreased pain, increased motion, increased strength, independence with ADLs/IADLs, return to sport/leisure activities and return to work          Objective     Postural Observations  Seated posture: fair  Standing posture: fair        Observations   Left Shoulder   Positive for spasms  Right Shoulder  Positive for spasms       Tenderness     Left Shoulder   Tenderness in the biceps tendon (proximal), lateral scapula and medial scapula  Right Shoulder  Tenderness in the medial scapula  Cervical/Thoracic Screen   Cervical range of motion within normal limits with the following exceptions: AROM  Flexion - 32 degrees  Extn - 39 degrees  R SB - 31 degrees  L SB - 30 degrees    Neurological Testing     Sensation     Shoulder   Left Shoulder   Intact: light touch, hot/cold discrimination and proprioception    Right Shoulder   Intact: light touch, hot/cold discrimination and proprioception    Additional Neurological Details  Notes intermittent cramping in hands and thighs  Muscle cramps that are sometimes difficult to relieve  Active Range of Motion   Left Shoulder   Flexion: 140 degrees   Extension: WFL  Abduction: 103 degrees with pain  Adduction: WFL  External rotation BTH: C7   Internal rotation BTB: sacrum with pain  Horizontal abduction: WFL  Horizontal adduction: WFL    Scapular Mobility   Left Shoulder   Scapular mobility: fair    Strength/Myotome Testing     Right Shoulder     Planes of Motion   Flexion: 4   Extension: 4+   Abduction: 4-   Adduction: 4   External rotation at 0°: 4-   Internal rotation at 0°: 4   Horizontal abduction: 3+   Horizontal adduction: 3+     General Comments     Shoulder Comments   Elbow flexion and extension: 4+/5    Precautions:  Neck and GHJ discomfort    Re-eval Date: 3/14/18     Date 2/15/18       Visit Count 10       FOTO completed       Pain In See RE       Pain Out See RE       Time In/Out 9-10:15         Subjective: See RE    Objective: See treatment diary below    MANUAL 15 mins         Manual Therapy:   PROM to left GHJ all planes - motion check  Grade III mobs Inferior and posterior glides  STM to left Ut, scap mm and deltoid - NP  gentle SOR   PROM with gentle overpressure UTs    Exercise Diary        Pec stretch (45-) 2x30"       Bicep curls 5#       Tricep extension Orange  2 x 10       MTP/LTP Orange  2 x 10       Tband IR/ER Orange  2 x 10       SL ER 5#  NP       Prone shoulder flex, abd, ext 0#  NP       Bent over row 0#  NP       Arm raises flex/scap 1#  NP       Pullies (warm up) 3 mins ea         Modalities 15 mins         IFC to left GHJ and upper trap region seated with UE supported on pillow  CP to GHJ and MH to upper trap    (no adverse affects noted post rx)

## 2018-02-17 ENCOUNTER — HOSPITAL ENCOUNTER (OUTPATIENT)
Dept: RADIOLOGY | Facility: HOSPITAL | Age: 38
Discharge: HOME | End: 2018-02-17
Attending: PSYCHIATRY & NEUROLOGY
Payer: COMMERCIAL

## 2018-02-17 PROCEDURE — 72141 MRI NECK SPINE W/O DYE: CPT

## 2018-02-19 ENCOUNTER — APPOINTMENT (OUTPATIENT)
Dept: PHYSICAL THERAPY | Facility: CLINIC | Age: 38
End: 2018-02-19
Payer: COMMERCIAL

## 2018-02-21 ENCOUNTER — OFFICE VISIT (OUTPATIENT)
Dept: PHYSICAL THERAPY | Facility: CLINIC | Age: 38
End: 2018-02-21
Payer: COMMERCIAL

## 2018-02-21 DIAGNOSIS — M75.42 IMPINGEMENT SYNDROME OF LEFT SHOULDER: Primary | ICD-10-CM

## 2018-02-21 PROCEDURE — 97014 ELECTRIC STIMULATION THERAPY: CPT

## 2018-02-21 PROCEDURE — 97110 THERAPEUTIC EXERCISES: CPT

## 2018-02-21 PROCEDURE — 97140 MANUAL THERAPY 1/> REGIONS: CPT

## 2018-02-21 NOTE — PROGRESS NOTES
Daily Note     Today's date: 2018  Patient name: Nikolas Shaw  : 1980  MRN: [id number]  Referring provider: Sherren Rede, MD  Dx:   Encounter Diagnosis     ICD-10-CM    1  Impingement syndrome of left shoulder M75 [id number]                 Precautions:  Neck and GHJ discomfort  Re-eval Date: 3/14/18     Date 2/15/18 2/21/18      Visit Count 11 12      FOTO completed       Pain In See RE 10      Pain Out See RE       Time In/Out 9-10:15 8-9:15        Subjective: I saw my results of my xray that was done a while ago and concerned about the findings  I may call dr to discuss my results as they relate to sx's BL arms  I have numbness and cramping R>L  Neurologist aware  Pain in my Left shoulder only  Objective: See treatment diary below    MANUAL 15 mins 15 min        Manual Therapy:   PROM to left GHJ all planes - IR only this date  Grade III mobs Inferior and posterior glides - NP  STM to left Ut, scap mm and deltoid - NP  gentle SOR - resume  PROM with gentle overpressure Uts- NP  GISTM #4 to L scap/UT/RTC mm, sweeping/fanning to pt tolerance - prone     Exercise Diary        Pec stretch (45-) 2x30" Pt review in supine all planes      Bicep curls 5# resume      Tricep extension Orange  2 x 10       MTP/LTP Orange  2 x 10 Blue  2x10, 5"      Tband IR/ER Orange  2 x 10 Grey  2x10      SL ER 5#  NP 5# 2x10, 5"      Prone shoulder flex, abd, ext 0#  NP resume      Bent over row 0#  NP resume      Arm raises flex/scap 1#  NP resume      Pullies (warm up) 3 mins ea       Nustep  L3 10'      ZIAT with cervical retractions w/TB  Red 2x10, 5"      ZITA  3x 30" Review/DC HEP NV       Modalities 15 mins 15'        IFC & MH to left GHJ and upper trap region seated with UE supported on pillow  (no adverse affects noted post rx)    Assessment: Tolerated treatment well, denied any increase pain with exercise  Demonstrates soft tissue restrictions/tenderness over L UT/RTC/scapula/pectoral mm    Reduced pain/mm tightness after MT completed today, resume MT to CS NV  Patient would benefit from continued PT to reduce soft tissue impairments, improve scap/RTC strengthen to maximize overall LOF  Plan: Continue per plan of care

## 2018-02-22 ENCOUNTER — OFFICE VISIT (OUTPATIENT)
Dept: PHYSICAL THERAPY | Facility: CLINIC | Age: 38
End: 2018-02-22
Payer: COMMERCIAL

## 2018-02-22 DIAGNOSIS — M75.42 IMPINGEMENT SYNDROME OF LEFT SHOULDER: Primary | ICD-10-CM

## 2018-02-22 PROCEDURE — 97110 THERAPEUTIC EXERCISES: CPT

## 2018-02-22 PROCEDURE — 97140 MANUAL THERAPY 1/> REGIONS: CPT

## 2018-02-22 PROCEDURE — 97014 ELECTRIC STIMULATION THERAPY: CPT

## 2018-02-22 NOTE — PROGRESS NOTES
Daily Note     Today's date: 2018  Patient name: Andrey Mahoney  : 1980  MRN: [id number]  Referring provider: Zoltan England MD  Dx:   Encounter Diagnosis     ICD-10-CM    1  Impingement syndrome of left shoulder M75 42                 Precautions:  Neck and GHJ discomfort  Re-eval Date: 3/14/18     Date 2/15/18 2/21/18 2/22/18     Visit Count 11 12 13     FOTO completed       Pain In See RE 1/10 4/10     Pain Out See RE  2/10     Time In/Out 9-10:15 8-9:15 5-       Subjective: I am sore in my left shoulder today  Objective: See treatment diary below    MANUAL 15 mins 15 min 15 min       Manual Therapy:   PROM to left GHJ all planes - IR only this date  Grade III mobs Inferior and posterior glides - NP  STM to left Ut, scap mm and deltoid - NP  gentle SOR   PROM with gentle overpressure Uts  GISTM #4 to BL UT mm/ CS gentle sweeping/fanning to pt tolerance - seated  BL Scalenes STM to pt tolerance    Exercise Diary        Pec stretch (45-) 2x30" Pt review in supine all planes      Bicep curls 5# resume      Tricep extension Orange  2 x 10       MTP/LTP Orange  2 x 10 Blue  2x10, 5" Orange  2x10, 5"     Tband IR/ER Orange  2 x 10 Grey  2x10 Orange  2x10     SL ER 5#  NP 5# 2x10, 5" 3# 2x10,5"     Prone shoulder flex, abd, ext 0#  NP resume      Bent over row 0#  NP resume      Arm raises flex/scap 1#  NP resume      Pullies (warm up) 3 mins ea       Nustep  L3 10' L3 10'     ZITA with cervical retractions w/TB  Red 2x10, 5" Red 2x10,5"     ZITA  3x 30" Review/DC HEP NV     Cervical isometric w/ball @ wall   10x 5" flex only       Modalities 15 mins 15' 15'       IFC & MH to Cervical /t-spine in supine roll under knees -end rx session  (no adverse affects noted post rx)    Assessment: Tolerated treatment fairly well, with pt indicating > difficulty with SL ER activity  Demonstrates soft tissue restriction and hypersensitivity BL scalenes mm    PROM CS WFL in all planes  Conts with mm tightness BL Ut/scap and left pec mm  Good tolerance indicated with MT today with increase vasomotor response with GISTM  Patient would benefit from continued PT to reduce impairments as tolerated      Plan: Continue per plan of care

## 2018-02-27 ENCOUNTER — OFFICE VISIT (OUTPATIENT)
Dept: PHYSICAL THERAPY | Facility: CLINIC | Age: 38
End: 2018-02-27
Payer: COMMERCIAL

## 2018-02-27 DIAGNOSIS — M75.42 IMPINGEMENT SYNDROME OF LEFT SHOULDER: Primary | ICD-10-CM

## 2018-02-27 PROCEDURE — 97140 MANUAL THERAPY 1/> REGIONS: CPT

## 2018-02-27 PROCEDURE — 97014 ELECTRIC STIMULATION THERAPY: CPT

## 2018-02-27 PROCEDURE — 97110 THERAPEUTIC EXERCISES: CPT

## 2018-02-27 NOTE — PROGRESS NOTES
Daily Note     Today's date: 2018  Patient name: Grayson Weinstein  : 1980  MRN: [id number]  Referring provider: Sharmaine Guevara MD  Dx:   Encounter Diagnosis     ICD-10-CM    1  Impingement syndrome of left shoulder M75 42                 Precautions:  Neck and GHJ discomfort    Re-eval Date: 3/14/18     Date 2/15/18 2/21/18 2/22/18 2/27/18    Visit Count 11 12 13 14    FOTO completed       Pain In See RE 1/10 4/10 4/10    Pain Out See RE  2/10 1/10    Time In/Out 9-10:15 8-9:15 4-453 3-002      Subjective: I am sore today in my L side of my neck today and not my shoulder unless I go to reach behind my back  RTD 18    Objective: See treatment diary below    MANUAL 15 mins 15 min 15 min 15      Manual Therapy:   PROM to left GHJ all planes - IR only this date - NP  Grade III mobs Inferior and posterior glides - NP  STM to left Ut, scap mm and deltoid   gentle SOR - NP  PROM with gentle overpressure Uts - Pt self BL Ut/scalenes stretch after GISTM 2x30" ea  GISTM #4 to BL UT mm/ CS gentle sweeping/fanning to pt tolerance - seated  BL Scalenes STM to pt tolerance    Exercise Diary        UBE    10' alt ea dir @1'    Pec stretch (2282-919) 2x30" Pt review in supine all planes      Bicep curls 5# resume      Tricep extension Orange  2 x 10       MTP/LTP Orange  2 x 10 Blue  2x10, 5" Orange  2x10, 5" Orange  2x10, 5"    Tband IR/ER Orange  2 x 10 Grey  2x10 Orange  2x10 Blue  3x10    SL ER 5#  NP 5# 2x10, 5" 3# 2x10,5" BL ER Green  2x10, 5"    Prone shoulder flex, abd, ext 0#  NP resume      Bent over row 0#  NP resume      Arm raises flex/scap 1#  NP resume      Pullies (warm up) 3 mins ea       Nustep  L3 10' L3 10' No available    ZITA with cervical retractions w/TB  Red 2x10, 5" Red 2x10,5" Green 2x10, 5"    ZITA  3x 30" Review/DC HEP NV     Cervical isometric w/ball @ wall   10x 5" flex only 20x 5" flex only      Modalities 15 mins 15' 15'       IFC & MH to Cervical /t-spine in supine roll under knees -end rx session  (no adverse affects noted post rx)    Assessment: Tolerated treatment well, denied any increase Cervical pain  Pt demonstrates soft tissue restrictions BL UT/CS mm, L scalenes region noted with palpation  Good tolerance with MT/GISTM completed this date with reduce pain /mm rightness repoted  Patient would benefit from continued PT to maximize overall function L shoulder / reduce soft tissue impairments as able    Plan: Continue per plan of care    RTD 2/28/18

## 2018-02-28 ENCOUNTER — OFFICE VISIT (OUTPATIENT)
Dept: OBGYN CLINIC | Facility: CLINIC | Age: 38
End: 2018-02-28
Payer: COMMERCIAL

## 2018-02-28 VITALS
HEART RATE: 76 BPM | DIASTOLIC BLOOD PRESSURE: 78 MMHG | BODY MASS INDEX: 33.27 KG/M2 | SYSTOLIC BLOOD PRESSURE: 115 MMHG | WEIGHT: 207 LBS | HEIGHT: 66 IN

## 2018-02-28 DIAGNOSIS — M47.812 SPONDYLOSIS OF CERVICAL REGION WITHOUT MYELOPATHY OR RADICULOPATHY: ICD-10-CM

## 2018-02-28 DIAGNOSIS — M25.512 ACUTE PAIN OF LEFT SHOULDER: Primary | ICD-10-CM

## 2018-02-28 PROCEDURE — 99213 OFFICE O/P EST LOW 20 MIN: CPT | Performed by: ORTHOPAEDIC SURGERY

## 2018-02-28 RX ORDER — ERGOCALCIFEROL (VITAMIN D2) 10 MCG
TABLET ORAL
COMMUNITY

## 2018-02-28 RX ORDER — CITALOPRAM 10 MG/1
20 TABLET ORAL DAILY
COMMUNITY
End: 2019-01-03

## 2018-02-28 RX ORDER — GABAPENTIN 300 MG/1
CAPSULE ORAL
COMMUNITY
Start: 2018-01-23 | End: 2018-03-13

## 2018-02-28 RX ORDER — LAMOTRIGINE 200 MG/1
1 TABLET ORAL 2 TIMES DAILY
COMMUNITY

## 2018-02-28 RX ORDER — MULTIVIT-MIN/IRON/FOLIC ACID/K 18-600-40
CAPSULE ORAL
COMMUNITY
End: 2019-01-03

## 2018-02-28 RX ORDER — LORAZEPAM 0.5 MG/1
1 TABLET ORAL
COMMUNITY

## 2018-02-28 RX ORDER — BUPROPION HYDROCHLORIDE 100 MG/1
1 TABLET ORAL DAILY
COMMUNITY
End: 2018-08-08

## 2018-02-28 NOTE — PATIENT INSTRUCTIONS
We will continue some shoulder therapy and also include some cervical spine therapy  Patient to continue follow-up appointments with neurologist as we do not treat spine in this office  Recheck in 2 months to note her progress  If not improved would contemplate arthroscopic evaluation of the left shoulder  May use anti-inflammatories or Tylenol as needed for pain along with icing to left shoulder  Continue gabapentin

## 2018-02-28 NOTE — PROGRESS NOTES
Assessment:       1  Acute pain of left shoulder    2  Spondylosis of cervical region without myelopathy or radiculopathy          Plan: We will continue some shoulder therapy and also include some cervical spine therapy  Patient to continue follow-up appointments with neurologist as we do not treat spine in this office  Recheck in 2 months to note her progress  If not improved would contemplate arthroscopic evaluation of the left shoulder  May use anti-inflammatories or Tylenol as needed for pain along with icing to left shoulder  Continue gabapentin  Chief Complaint:  Improving left shoulder pain    HPI  Jessie Sam is a 34 y o  female with complaint of left shoulder pain  She has been going to physical therapy  There is some thought by therapy that this may be cervical spine related as well  She also saw a neurologist who ordered cervical x-rays and MRI which notes noncompressive degenerative changes and loss of lordotic curvature of the cervical spine  She occasionally has some numbness and tingling in her forearm  She states the main shoulder motion she has trouble with is going behind her back  Otherwise she feels her shoulder motion is improved and less painful and is not want to proceed with arthroscopic evaluation at this point time  Past Medical History:   Diagnosis Date    Anxiety     Bipolar disorder ([facility] [address] )     Hyperlipidemia      Past Surgical History:   Procedure Laterality Date    HYSTERECTOMY      TONSILLECTOMY       No Known Allergies    Current Outpatient Prescriptions:     gabapentin (NEURONTIN) 300 mg capsule, Take by mouth, Disp: , Rfl:     simvastatin (ZOCOR) 40 mg tablet, Take 40 mg by mouth daily at bedtime  , Disp: , Rfl:     buPROPion (WELLBUTRIN) 100 mg tablet, Take 1 tablet by mouth daily, Disp: , Rfl:     Cholecalciferol (VITAMIN D) 2000 units CAPS, Take by mouth, Disp: , Rfl:     citalopram (CELEXA) 10 mg tablet, Take 1 tablet by mouth daily, Disp: , Rfl:    lamoTRIgine (LAMICTAL) 200 MG tablet, Take 1 tablet by mouth 2 (two) times a day, Disp: , Rfl:     lithium carbonate 300 mg capsule, Take 300 mg by mouth daily at bedtime  , Disp: , Rfl:     LORazepam (ATIVAN) 0 5 mg tablet, Take 1 tablet by mouth every 12 (twelve) hours as needed, Disp: , Rfl:     Multiple Vitamin (DAILY VALUE MULTIVITAMIN) TABS, Take by mouth, Disp: , Rfl:     Social History     Occupational History    Not on file  Social History Main Topics    Smoking status: Former Smoker     Packs/day: 1 50     Types: Cigarettes    Smokeless tobacco: Never Used    Alcohol use No    Drug use: No    Sexual activity: Not on file       Review of Systems  As per HPI only  Objective:  Blood pressure 115/78, pulse 76, height 5' 6" (1 676 m), weight 93 9 kg (207 lb)  Body mass index is 33 41 kg/m²  Physical Exam   Constitutional: She is oriented to person, place, and time  She appears well-developed and well-nourished  No distress  HENT:   Head: Normocephalic  Eyes: Conjunctivae are normal  Right eye exhibits no discharge  Left eye exhibits no discharge  No scleral icterus  Neck: Normal range of motion  Neck supple  Outwardly  Slight loss of lordotic curvature   Cardiovascular: Normal rate  Pulmonary/Chest: Effort normal    Neurological: She is alert and oriented to person, place, and time  Skin: Skin is warm and dry  No rash noted  She is not diaphoretic  No erythema  No pallor  Psychiatric: She has a normal mood and affect  Her behavior is normal  Judgment and thought content normal      Ortho Exam  Left shoulders without any cutaneous lesions  She has negative impingement sign  Negative apprehension sign  Negative cross chest impingement sign  Forward flexion to approximately 160°  Abduction to approximately 160°  External rotation 85 degrees with abduction to 90°, Equal  strength   Internal rotation to level of L5 with mild 4/5 weakness with subscap lift-off test     I have personally reviewed pertinent films in PACS and my interpretation is Agreement with the radiologist with normal MRI evaluation of the left shoulder and very minimal changes cervical spine  Cleaster Speak, PA-C  Portions of the record may have been created with voice recognition software   Occasional wrong word or "sound a like" substitutions may have occurred due to the inherent limitations of voice recognition software

## 2018-03-01 ENCOUNTER — OFFICE VISIT (OUTPATIENT)
Dept: PHYSICAL THERAPY | Facility: CLINIC | Age: 38
End: 2018-03-01
Payer: COMMERCIAL

## 2018-03-01 DIAGNOSIS — M75.42 IMPINGEMENT SYNDROME OF LEFT SHOULDER: Primary | ICD-10-CM

## 2018-03-01 DIAGNOSIS — M47.812 CERVICAL SPONDYLOSIS WITHOUT MYELOPATHY: ICD-10-CM

## 2018-03-01 PROCEDURE — 97110 THERAPEUTIC EXERCISES: CPT | Performed by: PHYSICAL THERAPIST

## 2018-03-01 PROCEDURE — 97014 ELECTRIC STIMULATION THERAPY: CPT | Performed by: PHYSICAL THERAPIST

## 2018-03-01 PROCEDURE — 97140 MANUAL THERAPY 1/> REGIONS: CPT | Performed by: PHYSICAL THERAPIST

## 2018-03-06 ENCOUNTER — OFFICE VISIT (OUTPATIENT)
Dept: PHYSICAL THERAPY | Facility: CLINIC | Age: 38
End: 2018-03-06
Payer: COMMERCIAL

## 2018-03-06 DIAGNOSIS — M75.42 IMPINGEMENT SYNDROME OF LEFT SHOULDER: Primary | ICD-10-CM

## 2018-03-06 PROCEDURE — 97110 THERAPEUTIC EXERCISES: CPT

## 2018-03-06 PROCEDURE — 97140 MANUAL THERAPY 1/> REGIONS: CPT

## 2018-03-06 PROCEDURE — 97014 ELECTRIC STIMULATION THERAPY: CPT

## 2018-03-06 NOTE — PROGRESS NOTES
Daily Note     Today's date: 3/6/2018  Patient name: Jame Hicks  : 1980  MRN: [id number]  Referring provider: Dahiana Sierra MD  Dx:   Encounter Diagnosis     ICD-10-CM    1  Impingement syndrome of left shoulder M75 42                     Precautions:  Neck and GHJ discomfort  Re-eval Date: 3/14/18     Date 3/6/18       Visit Count 16       FOTO completed       Pain In 2/10       Pain Out 1/10       Time In/Out 750-915         Subjective:  My left shoulder is stiff and my neck is achy on both sides    Objective: See treatment diary below    MANUAL 15 mins         Manual Therapy:   PROM to left GHJ all planes - IR and ER only  Grade III mobs Inferior and posterior glides - NP  STM to left Ut, scap mm and deltoid - NP  Gentle SOR with AP mobs to C-spine in supine  PROM with gentle overpressure UTs  GISTM #4 to BL UT mm/ CS gentle sweeping/fanning to pt tolerance - seated   BL Scalenes STM to pt tolerance     Exercise Diary        Cardio Nustep L3 11'       Pec stretch (45-) DC HEP       Bicep curls 5# 3x10       Tricep extension Orange 3x10       MTP/LTP Orange 3x10,5"       Tband IR/ER Orange 3x10,5"       SL ER resume       Prone shoulder flex, abd, ext resume       Bent over row resume       Arm raises flex/scap 2# 2x10       ZITA with cervical retractions w/TB Red 2x10,5"  Against wall       ZITA        Cervical isometric w/ball @ wall Flexion only  2x10,5"       Ball @ 90* all planes 30x ea dir         Modalities 15 mins         IFC & MH to Cervical and left GHJ in semi-reclined, roll under knees -end rx session  No adverse affects noted post rx  Assessment: Tolerated treatment well, denied any increase pain in CS/L shoulder  Demonstrates mm tightness and spasms BL CS/UT mm today  Pt indicated good tolerance with MT/GISTM with reduced mm tightness/discomfort  Pt indicates mm fatigue with exercises   Patient would benefit from continued PT to improve shoulder / RTC strengthening/stability and reduce soft impairments as able      Plan: Continue per plan of care

## 2018-03-08 ENCOUNTER — APPOINTMENT (OUTPATIENT)
Dept: PHYSICAL THERAPY | Facility: CLINIC | Age: 38
End: 2018-03-08
Payer: COMMERCIAL

## 2018-03-13 ENCOUNTER — APPOINTMENT (OUTPATIENT)
Dept: RADIOLOGY | Facility: CLINIC | Age: 38
End: 2018-03-13
Payer: COMMERCIAL

## 2018-03-13 ENCOUNTER — EVALUATION (OUTPATIENT)
Dept: PHYSICAL THERAPY | Facility: CLINIC | Age: 38
End: 2018-03-13
Payer: COMMERCIAL

## 2018-03-13 ENCOUNTER — OFFICE VISIT (OUTPATIENT)
Dept: URGENT CARE | Facility: CLINIC | Age: 38
End: 2018-03-13
Payer: COMMERCIAL

## 2018-03-13 VITALS
RESPIRATION RATE: 18 BRPM | HEART RATE: 74 BPM | TEMPERATURE: 97.3 F | OXYGEN SATURATION: 98 % | SYSTOLIC BLOOD PRESSURE: 128 MMHG | DIASTOLIC BLOOD PRESSURE: 72 MMHG

## 2018-03-13 DIAGNOSIS — M75.42 IMPINGEMENT SYNDROME OF LEFT SHOULDER: Primary | ICD-10-CM

## 2018-03-13 DIAGNOSIS — S63.601A SPRAIN OF RIGHT THUMB, UNSPECIFIED SITE OF FINGER, INITIAL ENCOUNTER: ICD-10-CM

## 2018-03-13 DIAGNOSIS — M79.644 PAIN OF RIGHT THUMB: Primary | ICD-10-CM

## 2018-03-13 DIAGNOSIS — M79.644 PAIN OF RIGHT THUMB: ICD-10-CM

## 2018-03-13 PROCEDURE — 97140 MANUAL THERAPY 1/> REGIONS: CPT

## 2018-03-13 PROCEDURE — 97014 ELECTRIC STIMULATION THERAPY: CPT

## 2018-03-13 PROCEDURE — S9088 SERVICES PROVIDED IN URGENT: HCPCS | Performed by: NURSE PRACTITIONER

## 2018-03-13 PROCEDURE — G8985 CARRY GOAL STATUS: HCPCS | Performed by: PHYSICAL THERAPIST

## 2018-03-13 PROCEDURE — 73140 X-RAY EXAM OF FINGER(S): CPT

## 2018-03-13 PROCEDURE — 97164 PT RE-EVAL EST PLAN CARE: CPT | Performed by: PHYSICAL THERAPIST

## 2018-03-13 PROCEDURE — 99213 OFFICE O/P EST LOW 20 MIN: CPT | Performed by: NURSE PRACTITIONER

## 2018-03-13 PROCEDURE — G8984 CARRY CURRENT STATUS: HCPCS | Performed by: PHYSICAL THERAPIST

## 2018-03-13 PROCEDURE — 97110 THERAPEUTIC EXERCISES: CPT

## 2018-03-13 RX ORDER — IBUPROFEN 800 MG/1
800 TABLET ORAL EVERY 8 HOURS PRN
Qty: 21 TABLET | Refills: 0 | Status: SHIPPED | OUTPATIENT
Start: 2018-03-13 | End: 2018-03-23 | Stop reason: ALTCHOICE

## 2018-03-13 NOTE — PROGRESS NOTES
[facility] [facility]        NAME: Marty Pineda is a 34 y o  female  : 1980    MRN: [id number]  DATE: 2018  TIME: 10:34 AM    Assessment and Plan   Pain of right thumb [M79 644]  1  Pain of right thumb  XR thumb right first digit-min 2v         Patient Instructions       Follow up with PCP in 3-5 days  Proceed to  ER if symptoms worsen  Chief Complaint     Chief Complaint   Patient presents with    Thumb Injury     Pt c/o right thumb pain after getting it caught in a car door yesterday  History of Present Illness       47-year-old female at urgent care with chief complaint of right thumb swelling pain limited movement since showed inguinal cord or yesterday has not used any ice or Motrin reports no improvement in symptoms        Review of Systems   Review of Systems   Constitutional: Negative  HENT: Negative  Eyes: Negative  Respiratory: Negative  Cardiovascular: Negative  Gastrointestinal: Negative  Endocrine: Negative  Genitourinary: Negative  Musculoskeletal: Positive for joint swelling (right tumb pain )  Allergic/Immunologic: Negative  Neurological: Negative  Hematological: Negative  Psychiatric/Behavioral: Negative  Current Medications       Current Outpatient Prescriptions:     buPROPion (WELLBUTRIN) 100 mg tablet, Take 1 tablet by mouth daily, Disp: , Rfl:     Cholecalciferol (VITAMIN D) 2000 units CAPS, Take by mouth, Disp: , Rfl:     citalopram (CELEXA) 10 mg tablet, Take 1 tablet by mouth daily, Disp: , Rfl:     lamoTRIgine (LAMICTAL) 200 MG tablet, Take 1 tablet by mouth 2 (two) times a day, Disp: , Rfl:     LORazepam (ATIVAN) 0 5 mg tablet, Take 1 tablet by mouth every 12 (twelve) hours as needed, Disp: , Rfl:     Multiple Vitamin (DAILY VALUE MULTIVITAMIN) TABS, Take by mouth, Disp: , Rfl:     simvastatin (ZOCOR) 40 mg tablet, Take 40 mg by mouth daily at bedtime  , Disp: , Rfl:     Current Allergies     Allergies as of 03/13/2018    (No Known Allergies)            The following portions of the patient's history were reviewed and updated as appropriate: allergies, current medications, past family history, past medical history, past social history, past surgical history and problem list      Past Medical History:   Diagnosis Date    Anxiety     Bipolar disorder ([address] )     Hyperlipidemia        Past Surgical History:   Procedure Laterality Date    HYSTERECTOMY      TONSILLECTOMY         Family History   Problem Relation Age of Onset    Diabetes Mother     Heart disease Mother     Neuropathy Mother     Cancer Father          Medications have been verified  Objective   /72   Pulse 74   Temp (!) 97 3 °F (36 3 °C)   Resp 18   SpO2 98%        Physical Exam     Physical Exam   Constitutional: She is oriented to person, place, and time  Vital signs are normal  She appears well-developed and well-nourished  She is active and cooperative  HENT:   Head: Normocephalic and atraumatic  Right Ear: Hearing normal    Left Ear: Hearing normal    Nose: Nose normal    Mouth/Throat: Uvula is midline, oropharynx is clear and moist and mucous membranes are normal    Eyes: EOM are normal  Pupils are equal, round, and reactive to light  Neck: Normal range of motion  Cardiovascular: Normal rate, regular rhythm and normal heart sounds  Pulmonary/Chest: Effort normal and breath sounds normal    Musculoskeletal:        Right hand: She exhibits decreased range of motion  Decreased strength noted  She exhibits finger abduction  Hands:  Lymphadenopathy:     She has no cervical adenopathy  Neurological: She is alert and oriented to person, place, and time  Skin: Ecchymosis noted  Psychiatric: She has a normal mood and affect   Her speech is normal and behavior is normal  Judgment and thought content normal  Cognition and memory are normal

## 2018-03-13 NOTE — PROGRESS NOTES
Daily Note     Today's date: 3/13/2018  Patient name: Stefan Lamas  : 1980  MRN: [id number]  Referring provider: Real Alvarenga MD  Dx: No diagnosis found  Precautions:  Neck and GHJ discomfort  Re-eval Date: 4/10/18    Date 3/6/18 3/13      Visit Count 16 17      FOTO completed       Pain In 2/10 1/10      Pain Out 1/10 0/10      Time In/Out [phone]        Subjective:  My left shoulder isnt too bad today  I feel therapy is helping  I keep watching my posture throughout the day  Do not RTD for another 2 months  Objective: See treatment diary below    MANUAL 15 mins 15 min        Manual Therapy:   PROM to left GHJ all planes - IR and ER only - NP  Grade III mobs Inferior and posterior glides - NP  STM to left Ut, scap mm and deltoid - NP  Gentle SOR with AP mobs to C-spine in supine - NP  PROM with gentle overpressure UTs  GISTM #4 to BL UT mm/ CS gentle sweeping/fanning to pt tolerance - seated   BL Scalenes STM to pt tolerance - NP    Exercise Diary        Cardio Nustep L3 11' UBE 10' alt      Pec stretch (45-) DC HEP       Bicep curls 5# 3x10 6# 3x10      Tricep extension Orange 3x10 Orange 3x10      MTP/LTP Orange 3x10,5" Orange 3x10,5"  W/ AH  mini squats  Semi tandem      Tband IR/ER Orange 3x10,5" Orange 3x10,5" IR only      SL ER resume BL ER Green  3x10 w/cerv retrac      Prone shoulder flex, abd, ext resume resume      Bent over row resume resume      Arm raises flex/scap 2# 2x10 2# 2x15 Flex only with cerv retrac      ZITA with cervical retractions w/TB Red 2x10,5"  Against wall       ZITA        Cervical isometric w/ball @ wall Flexion only  2x10,5" Flexion only with scap AROM against wall      Ball @ 90* all planes 30x ea dir Review @ home        Modalities 15 mins 15'        IFC & MH to Cervical and left GHJ in semi-reclined, roll under knees -end rx session  No adverse affects noted post rx  Assessment: Tolerated treatment well, denied any increase pain  Patient would benefit from continued PT, see RA completed for additional Findings      Plan: Continue per plan of care   Trial Cervical traction NV

## 2018-03-13 NOTE — PATIENT INSTRUCTIONS
Finger Sprain, Ambulatory Care   GENERAL INFORMATION:   A finger sprain  happens when ligaments in your finger or thumb are stretched or torn  Ligaments are the tough tissues that connect bones  Ligaments allow your hands to grasp and pinch  Common symptoms include the following:   · Bruising or changes in skin color    · Pain and stiffness     · Swelling and tenderness  Seek immediate care for the following symptoms:   · Bluish or pale skin on your injured finger    · Increased pain, even after taking pain medicine    · New or increased trouble moving and using your finger or thumb  Treatment for a finger sprain  may include medicine to decrease pain  Care for a finger sprain:   · Rest  your finger for at least 48 hours  Avoid activities that cause pain  Return to normal activities as directed  · Apply ice  on your finger for 15 to 20 minutes every hour or as directed  Use an ice pack, or put crushed ice in a plastic bag  Cover it with a towel  Ice helps prevent tissue damage and decreases swelling and pain  · Compression  helps support your finger as it heals  Your injured finger may be taped to the finger beside it  Severe sprains may be treated with a splint  Ask how long you must wear the splint or tape, and how to apply them  · Elevate  your finger above the level of your heart as often as you can  This will help decrease swelling and pain  Prop your hand on pillows or blankets to keep it elevated comfortably  · Exercise your finger  to help decrease stiffness, swelling, and pain  You may be given gentle exercises to begin in a few days  Exercises also help improve finger movement  Check with your healthcare provider before you return to your normal activities or sports  Follow up with your healthcare provider as directed:  Write down your questions so you remember to ask them during your visits  CARE AGREEMENT:   You have the right to help plan your care   Learn about your health condition and how it may be treated  Discuss treatment options with your caregivers to decide what care you want to receive  You always have the right to refuse treatment  The above information is an  only  It is not intended as medical advice for individual conditions or treatments  Talk to your doctor, nurse or pharmacist before following any medical regimen to see if it is safe and effective for you  © 2014 [address] is for End User's use only and may not be sold, redistributed or otherwise used for commercial purposes  All illustrations and images included in CareNotes® are the copyrighted property of [employer]  or [employer]

## 2018-03-14 NOTE — PROGRESS NOTES
PT Re-Evaluation     Today's date: 3/13/2018  Patient name: Jeanne Penn  : 1980  MRN: [id number]  Referring provider: Lindsay Acevedo MD  Dx:   Encounter Diagnosis     ICD-10-CM    1  Impingement syndrome of left shoulder M75 42        Assessment  Impairments: abnormal muscle tone, abnormal or restricted ROM, impaired physical strength, pain with function and scapular dyskinesis    Assessment details: Jeanne Penn is a 34 y o  female presenting to outpatient physical therapy with diagnosis of Impingement syndrome of left shoulder  Patient presents with reduced pain, reduced range of motion, especially into IR  Reduced strength proximal > distal, scapular weakness  Reduced, but improving  postural awareness and reduced functional activity tolerance  Patient to benefit from skilled outpatient physical therapy to reduce pain, maximize pain free range of motion, increase strength and stability, and improve functional mobility/functional activity in order to maximize return to prior level of function with reduced limitations  Thank you for your referral     Understanding of Dx/Px/POC: good   Prognosis: good  Prognosis details: Cervical dysfunction    Goals  Goals  In 4 weeks, patient will    Demonstrate full and non-painful Range of Motion overhead -  met  Scap strength to at least 3+/5 bilaterally tested prone - met  Demonstrate independent HEP - met  Demonstrate increased GHJ strength to at least by at least 1/2 MMT - met  Full and painfree Range of Motion all planes - met    In 6 weeks, patient will      Demonstrate full return to Instrumental activities of daily living unrestricted - partially met  Demonstrate full return to activities of daily living unrestricted, specifically dressing, washing hair, etc - met  Reduce deficits as noted by improved outcome measures - partially met  Decreased pain at rest and with activity - met    Plan  Patient would benefit from: skilled PT  Planned modality interventions: TENS and ultrasound  Planned therapy interventions: manual therapy, Bhatia taping, neuromuscular re-education, therapeutic exercise and home exercise program  Frequency: 2x week  Duration in weeks: 4  Treatment plan discussed with: patient  Plan details: Trial of cervical mechanical traction  Trial of TENS for home use        Subjective Evaluation    History of Present Illness  Mechanism of injury: See last re-evaluation from 2/15/18  Update:  Patient has seen neurology regarding neck/shoulder pain  Had MRI, possible referral to spine and pain management  Quality of life: good    Pain  Current pain ratin  At best pain ratin  At worst pain ratin  Location: Neck and left GHJ  Quality: burning, pressure, sharp and radiating  Relieving factors: heat, ice, relaxation, medications and change in position          Objective     Special Questions  Positive for disturbed sleep and headaches  Negative for night pain, dizziness, faints, nausea/motion sickness, tinnitus, trouble swallowing, difficulty breathing, shortness of breath, respiratory pain and visual change    Postural Observations  Seated posture: fair  Standing posture: fair    Additional Postural Observation Details  Forward head, rounded shoulders  Does note increased awareness and self correction  Palpation   Left   Hypertonic in the cervical paraspinals, teres major and teres minor  Muscle spasm in the cervical paraspinals, levator scapulae, lower trapezius, middle trapezius, pectoralis major, pectoralis minor, teres major, teres minor, triceps and upper trapezius  Right   Hypertonic in the levator scapulae, teres major and teres minor  Additional Palpation Details  Improved from [facility], however, ongoing restrictions in pecs, triceps, teres group      Neurological Testing     Sensation   Cervical/Thoracic   Left   Intact: light touch    Right   Intact: light touch    Additional Neurological Details  Intermittent paresthesia, numbness/tingling into left and right UE as well as intermittently into LEs      Active Range of Motion   Cervical/Thoracic Spine   Cervical    Flexion: 50 degrees   Extension: 40 degrees   Left lateral flexion: 40 degrees   Right lateral flexion: 40 degrees   Left rotation: WFL  Right rotation: WFL  Left Shoulder   Flexion: WFL  Extension: WFL  Abduction: WFL  Adduction: WFL  External rotation BTH: C7   Internal rotation BTB: L2 with pain    Scapular Mobility   Left Shoulder   Scapular mobility: fair    Strength/Myotome Testing   Cervical Spine   Neck extension: 4-  Neck flexion: 4    Left   Neck lateral flexion (C3): 4    Right etr  Neck lateral flexion (C3): 4    Left Shoulder     Planes of Motion   Flexion: 4   Extension: 4+   Abduction: 4   Adduction: 4+   External rotation at 0°: 4   Internal rotation at 0°: 4+     Isolated Muscles   Upper trapezius: 4     Right Shoulder     Planes of Motion   Flexion: 4+   Extension: 4+   Abduction: 4+   Adduction: 4+   External rotation at 0°: 4+   Internal rotation at 0°: 4+     Isolated Muscles   Upper trapezius: 4     Left Elbow   Flexion: 4+  Extension: 4+    Right Elbow   Flexion: 5  Extension: 5      Flowsheet Rows    Flowsheet Row Most Recent Value   PT/OT G-Codes   FOTO information reviewed  Yes   Assessment Type  Re-evaluation   G code set  Carrying, Moving & Handling Objects   Carrying, Moving and Handling Objects Current Status ()  CJ   Carrying, Moving and Handling Objects Goal Status ()  [place]

## 2018-03-15 ENCOUNTER — TELEPHONE (OUTPATIENT)
Dept: OBGYN CLINIC | Facility: HOSPITAL | Age: 38
End: 2018-03-15

## 2018-03-15 ENCOUNTER — OFFICE VISIT (OUTPATIENT)
Dept: PHYSICAL THERAPY | Facility: CLINIC | Age: 38
End: 2018-03-15
Payer: COMMERCIAL

## 2018-03-15 DIAGNOSIS — M75.42 IMPINGEMENT SYNDROME OF LEFT SHOULDER: Primary | ICD-10-CM

## 2018-03-15 PROCEDURE — 97140 MANUAL THERAPY 1/> REGIONS: CPT

## 2018-03-15 PROCEDURE — 97012 MECHANICAL TRACTION THERAPY: CPT

## 2018-03-15 PROCEDURE — 97110 THERAPEUTIC EXERCISES: CPT

## 2018-03-15 PROCEDURE — 97014 ELECTRIC STIMULATION THERAPY: CPT

## 2018-03-15 NOTE — PROGRESS NOTES
Daily Note     Today's date: 3/15/2018  Patient name: Jessie Sam  : 1980  MRN: [id number]  Referring provider: Eri Flores MD  Dx: No diagnosis found  Precautions:  Neck and GHJ discomfort  Re-eval Date: 4/10/18    Date 3/6/18 3/13 3/15     Visit Count 16 17 18     FOTO completed       Pain In 2/10 1/10 1/10     Pain Out 1/10 0/10 0/10     Time In/Out [phone]        Subjective:  Overall I am feeling better discomfort/not really painful today left shoulder    Objective: See treatment diary below    MANUAL 15 mins 15 min 15 min       Manual Therapy:   PROM to left GHJ all planes - IR and ER only - NP  Grade III mobs Inferior and posterior glides - NP  STM to left Ut, scap mm and deltoid - NP  Gentle SOR with AP mobs to C-spine in supine - NP  PROM with gentle overpressure UTs  GISTM #4&5 to BL Trapezius mm/ CS gentle sweeping/fanning to pt tolerance - seated   BL Scalenes STM to pt tolerance - NP    Exercise Diary        Cardio Nustep L3 11' UBE 10' alt UBE 10' alt     Pec stretch (45-) DC HEP       Bicep curls 5# 3x10 6# 3x10 NP     Tricep extension Orange 3x10 Orange 3x10 Orange 3x10     MTP/LTP Orange 3x10,5" Orange 3x10,5"  W/ AH  mini squats  Semi tandem Orange 3x10" w/ AH mini squats semi tandem     Tband IR/ER Orange 3x10,5" Orange 3x10,5" IR only Orange 30x 5" ea dir     SL ER resume BL ER Green  3x10 w/cerv retrac NP     Prone shoulder flex, abd, ext resume resume      Bent over row resume resume      Arm raises flex/scap 2# 2x10 2# 2x15 Flex only with cerv retrac      ZITA with cervical retractions w/TB Red 2x10,5"  Against wall       ZITA        Cervical isometric w/ball @ wall Flexion only  2x10,5" Flexion only with scap AROM against wall      Ball @ 90* all planes 30x ea dir Review @ home      BF with cerv retrac   2x10, 5"       Modalities 15 mins 15' 15'       IFC & MH to Cervical and left GHJ in semi-reclined, roll under knees -end rx session    No adverse affects noted post rx  Cervical Traction : 10' 15#, static 10*, 30% rope    Assessment: Tolerated treatment well with decrease pain indicated  Good tolerance with initial trial of cervical traction, cont upcoming to reduce cervical discomfort  Patient would benefit from continued PT      Plan: Continue per plan of care

## 2018-03-15 NOTE — TELEPHONE ENCOUNTER
Call from patient   Call back # [phone]  Dr Artemio Yanez     Patient is requesting a tens unit, would like a script sent to [facility]

## 2018-03-16 DIAGNOSIS — M54.2 NECK PAIN ON LEFT SIDE: Primary | ICD-10-CM

## 2018-03-19 ENCOUNTER — TRANSCRIBE ORDERS (OUTPATIENT)
Dept: OBGYN CLINIC | Facility: CLINIC | Age: 38
End: 2018-03-19

## 2018-03-19 DIAGNOSIS — M54.2 NECK PAIN ON LEFT SIDE: ICD-10-CM

## 2018-03-20 ENCOUNTER — OFFICE VISIT (OUTPATIENT)
Dept: PHYSICAL THERAPY | Facility: CLINIC | Age: 38
End: 2018-03-20
Payer: COMMERCIAL

## 2018-03-20 DIAGNOSIS — M54.2 ACUTE NECK PAIN: Primary | ICD-10-CM

## 2018-03-20 DIAGNOSIS — M75.42 IMPINGEMENT SYNDROME OF LEFT SHOULDER: Primary | ICD-10-CM

## 2018-03-20 PROCEDURE — 97110 THERAPEUTIC EXERCISES: CPT

## 2018-03-20 PROCEDURE — 97140 MANUAL THERAPY 1/> REGIONS: CPT

## 2018-03-20 NOTE — PROGRESS NOTES
Daily Note     Today's date: 3/20/2018  Patient name: Andrey Mahoney  : 1980  MRN: [id number]  Referring provider: Zoltan England MD  Dx: No diagnosis found  Precautions:  Neck and GHJ discomfort    Re-eval Date: 4/10/18    Date 3/6/18 3/13 3/15 3/20    Visit Count 16 17 18 19    FOTO completed       Pain In 2/10 1/10 1/10 3/10    Pain Out 1/10 0/10 0/10 1/10    Time In/Out [phone]        Subjective:  My left arm is bothering me, when I go to reach back  Increase achy pain outside of the L arm    Objective: See treatment diary below    MANUAL 15 mins 15 min 15 min 15 min      Manual Therapy:   PROM to left GHJ all planes - IR and ER only   Grade III mobs Inferior and posterior glides - NP  STM to left Ut, scap mm and deltoid   Gentle SOR with AP mobs to C-spine in supine - NP  PROM with gentle overpressure UTs  GISTM #4&5 to L (only) Trapezius mm/ CS / RTC gentle sweeping/fanning to pt tolerance - seated   BL Scalenes STM to pt tolerance - NP    Exercise Diary        Cardio Nustep L3 11' UBE 10' alt UBE 10' alt 10' alt    Pec stretch (45-) DC HEP       Bicep curls 5# 3x10 6# 3x10 NP NP    Tricep extension Orange 3x10 Orange 3x10 Orange 3x10 Orange 3x10    MTP/LTP Orange 3x10,5" Orange 3x10,5"  W/ AH  mini squats  Semi tandem Orange 3x10" w/ AH mini squats semi tandem Orange 3x10" w/ AH mini squats semi tandem    Tband IR/ER Orange 3x10,5" Orange 3x10,5" IR only Orange 30x 5" ea dir Orange 30x 5" ea dir    SL ER resume BL ER Green  3x10 w/cerv retrac NP NP    Prone shoulder flex, abd, ext resume resume      Bent over row resume resume      Arm raises flex/scap 2# 2x10 2# 2x15 Flex only with cerv retrac  2# 2x15 ea dir with cerv retrac    ZITA with cervical retractions w/TB Red 2x10,5"  Against wall   RESUME    ZITA        Cervical isometric w/ball @ wall Flexion only  2x10,5" Flexion only with scap AROM against wall      Ball @ 90* all planes 30x ea dir Review @ home  30x ea    BF with cerv retrac   2x10, 5" resume      Modalities 15 mins 15' 15' 15'      IFC & MH to Cervical and left GHJ in semi-reclined, roll under knees -end rx session  No adverse affects noted post rx  Cervical Traction : 10' 15#, static 10*, 30% rope - resume      Assessment: Tolerated treatment well, denied increase L shoulder / UE pain/sx's  Pt demonstrated limited IR PROM with flex/scap/IR WNL  Noted improvement with overall strengthening LUE/RTC  Patient exhibited good technique with therapeutic exercises with minimum cuing      Plan: Continue per plan of care    Progress as tolerated, monitor L RTC and resume Cervical traction per PT/MW

## 2018-03-21 PROCEDURE — 97014 ELECTRIC STIMULATION THERAPY: CPT

## 2018-03-21 NOTE — PROGRESS NOTES
Daily Note     Today's date: 3/22/2018  Patient name: Shane Llanos  : 1980  MRN: [id number]  Referring provider: Su Oneal MD  Dx:   Encounter Diagnosis     ICD-10-CM    1  Impingement syndrome of left shoulder M75 42        Precautions:  Neck and GHJ discomfort  Re-eval Date: 4/10/18    Date 3/6/18 3/13 3/15 3/20 3/22   Visit Count 16 17 18 19 20   FOTO completed       Pain In 2/10 1/10 1/10 3/10 5/10   Pain Out 1/10 0/10 0/10 1/10 2/10     Subjective: I woke up with increase pain under my left shoulder blade  Don't know what caused increase pain/sx's       Objective: See treatment diary below    Assessment: Tolerated treatment well with TE modified with > focus on MT with elevated pain L scapula  Noted good tolerance with MT / GISTM with reduce pain reported  Patient exhibited good technique with therapeutic exercises, resume full program as dayne  Reduce cervical tension with Tx today    Plan: Continue per plan of care   Progress as dayne    MANUAL 15 mins 15 min 15 min 15 min 20 min     Manual Therapy:   PROM to left GHJ all planes - IR and ER only   Grade III mobs Inferior and posterior glides - NP  STM to left Ut, scap mm and deltoid   Gentle SOR with AP mobs to C-spine in supine - NP  PROM with gentle overpressure UTs  GISTM #4&5 to L (only) Trapezius mm/ CS / RTC gentle sweeping/fanning to pt tolerance - seated / prone  BL Scalenes STM to pt tolerance    Exercise Diary        Cardio Nustep L3 11' UBE 10' alt UBE 10' alt 10' alt 10' alt   Pec stretch (45-) DC HEP       Bicep curls 5# 3x10 6# 3x10 NP NP resume   Tricep extension Orange 3x10 Orange 3x10 Orange 3x10 Orange 3x10 resume   MTP/LTP Orange 3x10,5" Orange 3x10,5"  W/ AH  mini squats  Semi tandem Orange 3x10" w/ AH mini squats semi tandem Orange 3x10" w/ AH mini squats semi tandem Orange 3x10 ea   Tband IR/ER Orange 3x10,5" Orange 3x10,5" IR only Orange 30x 5" ea dir Orange 30x 5" ea dir resume   SL ER resume BL ER Green  3x10 w/cerv retrac NP NP BL ER  Green 2x10   Prone shoulder flex, abd, ext resume resume      Bent over row resume resume      Arm raises flex/scap 2# 2x10 2# 2x15 Flex only with cerv retrac  2# 2x15 ea dir with cerv retrac resume   ZITA with cervical retractions w/TB Red 2x10,5"  Against wall   RESUME    ZITA        Cervical isometric w/ball @ wall Flexion only  2x10,5" Flexion only with scap AROM against wall      Ball @ 90* all planes 30x ea dir Review @ home  30x ea    BF with cerv retrac   2x10, 5" resume      Modalities        IFC and MHP     10'   Cervical Traction     10'     IFC & MH to Cervical and left GHJ in semi-reclined, roll under knees -end rx session  No adverse affects noted post rx      Cervical Traction:  20#, static 10*, 30% rope speed

## 2018-03-22 ENCOUNTER — OFFICE VISIT (OUTPATIENT)
Dept: PHYSICAL THERAPY | Facility: CLINIC | Age: 38
End: 2018-03-22
Payer: COMMERCIAL

## 2018-03-22 DIAGNOSIS — M75.42 IMPINGEMENT SYNDROME OF LEFT SHOULDER: Primary | ICD-10-CM

## 2018-03-22 PROCEDURE — 97140 MANUAL THERAPY 1/> REGIONS: CPT

## 2018-03-22 PROCEDURE — 97110 THERAPEUTIC EXERCISES: CPT

## 2018-03-22 PROCEDURE — 97012 MECHANICAL TRACTION THERAPY: CPT

## 2018-03-22 PROCEDURE — 97014 ELECTRIC STIMULATION THERAPY: CPT

## 2018-03-23 ENCOUNTER — OFFICE VISIT (OUTPATIENT)
Dept: FAMILY MEDICINE CLINIC | Facility: CLINIC | Age: 38
End: 2018-03-23
Payer: COMMERCIAL

## 2018-03-23 VITALS
OXYGEN SATURATION: 90 % | SYSTOLIC BLOOD PRESSURE: 112 MMHG | HEART RATE: 97 BPM | WEIGHT: 208 LBS | DIASTOLIC BLOOD PRESSURE: 64 MMHG | HEIGHT: 66 IN | BODY MASS INDEX: 33.43 KG/M2 | TEMPERATURE: 97.9 F

## 2018-03-23 DIAGNOSIS — N34.2 INFECTIVE URETHRITIS: Primary | ICD-10-CM

## 2018-03-23 LAB
SL AMB  POCT GLUCOSE, UA: ABNORMAL
SL AMB LEUKOCYTE ESTERASE,UA: ABNORMAL
SL AMB POCT BILIRUBIN,UA: ABNORMAL
SL AMB POCT BLOOD,UA: ABNORMAL
SL AMB POCT CLARITY,UA: ABNORMAL
SL AMB POCT COLOR,UA: ABNORMAL
SL AMB POCT KETONES,UA: ABNORMAL
SL AMB POCT NITRITE,UA: ABNORMAL
SL AMB POCT PH,UA: 8
SL AMB POCT SPECIFIC GRAVITY,UA: 1.01
SL AMB POCT URINE PROTEIN: ABNORMAL
SL AMB POCT UROBILINOGEN: 0.2

## 2018-03-23 PROCEDURE — 81002 URINALYSIS NONAUTO W/O SCOPE: CPT | Performed by: FAMILY MEDICINE

## 2018-03-23 PROCEDURE — 99213 OFFICE O/P EST LOW 20 MIN: CPT | Performed by: FAMILY MEDICINE

## 2018-03-23 RX ORDER — GABAPENTIN 300 MG/1
100 CAPSULE ORAL 3 TIMES DAILY
COMMUNITY
End: 2018-10-09 | Stop reason: SDUPTHER

## 2018-03-23 RX ORDER — SULFAMETHOXAZOLE AND TRIMETHOPRIM 800; 160 MG/1; MG/1
1 TABLET ORAL EVERY 12 HOURS SCHEDULED
Qty: 10 TABLET | Refills: 0 | Status: SHIPPED | OUTPATIENT
Start: 2018-03-23 | End: 2018-03-28

## 2018-03-23 NOTE — PROGRESS NOTES
Assessment/Plan:    No problem-specific Assessment & Plan notes found for this encounter  Diagnoses and all orders for this visit:    Infective urethritis  -     sulfamethoxazole-trimethoprim (BACTRIM DS) 800-160 mg per tablet; Take 1 tablet by mouth every 12 (twelve) hours for 5 days    Other orders  -     gabapentin (NEURONTIN) 300 mg capsule; Take 100 mg by mouth 3 (three) times a day 1-3 caps          Subjective:      Patient ID: Cristy Homans is a 34 y o  female  Urinary Tract Infection    This is a new problem  The current episode started in the past 7 days  The problem occurs every urination  The problem has been unchanged  The quality of the pain is described as burning  The pain is mild  There has been no fever  Pertinent negatives include no chills, nausea or vomiting  She has tried nothing for the symptoms  The following portions of the patient's history were reviewed and updated as appropriate: allergies, current medications, past family history, past medical history, past social history, past surgical history and problem list     Review of Systems   Constitutional: Negative for chills and fever  Gastrointestinal: Negative for abdominal pain, nausea and vomiting  Objective:      /64 (BP Location: Right arm, Patient Position: Sitting, Cuff Size: Large)   Pulse 97   Temp 97 9 °F (36 6 °C) (Tympanic)   Ht 5' 6" (1 676 m)   Wt 94 3 kg (208 lb)   SpO2 90%   BMI 33 57 kg/m²          Physical Exam   Constitutional: She is oriented to person, place, and time  She appears well-developed and well-nourished  No distress  Eyes: No scleral icterus  Neck: Normal range of motion  Neck supple  Cardiovascular: Normal rate, regular rhythm and normal heart sounds  No murmur heard  Pulmonary/Chest: Effort normal and breath sounds normal  No respiratory distress  She has no wheezes  She has no rales  Abdominal: Soft   Bowel sounds are normal  She exhibits no distension and no mass  There is no tenderness  There is no rebound and no guarding  Lymphadenopathy:     She has no cervical adenopathy  Neurological: She is alert and oriented to person, place, and time  Skin: Skin is warm and dry  No rash noted  She is not diaphoretic  No erythema  No pallor  Psychiatric: She has a normal mood and affect  Her behavior is normal  Judgment and thought content normal    Nursing note and vitals reviewed

## 2018-03-26 ENCOUNTER — APPOINTMENT (OUTPATIENT)
Dept: PHYSICAL THERAPY | Facility: CLINIC | Age: 38
End: 2018-03-26
Payer: COMMERCIAL

## 2018-03-27 ENCOUNTER — OFFICE VISIT (OUTPATIENT)
Dept: PHYSICAL THERAPY | Facility: CLINIC | Age: 38
End: 2018-03-27
Payer: COMMERCIAL

## 2018-03-27 DIAGNOSIS — M75.42 IMPINGEMENT SYNDROME OF LEFT SHOULDER: Primary | ICD-10-CM

## 2018-03-27 PROCEDURE — 97140 MANUAL THERAPY 1/> REGIONS: CPT

## 2018-03-27 PROCEDURE — 97110 THERAPEUTIC EXERCISES: CPT

## 2018-03-27 PROCEDURE — 64550 HB APPLY NEUROSTIMULATOR: CPT

## 2018-03-27 PROCEDURE — 97012 MECHANICAL TRACTION THERAPY: CPT

## 2018-03-27 NOTE — PROGRESS NOTES
Daily Note     Today's date: 3/27/2018  Patient name: Jeanne Penn  : 1980  MRN: [id number]  Referring provider: Lindsay Acevedo MD  Dx: No diagnosis found  Precautions:  Neck and GHJ discomfort  Re-eval Date: 4/10/18    Date 3/27       Visit Count 21       FOTO Complete       Pain In 2/10       Pain Out 0/10         Subjective: My neck is worse than my shoulder  Reaching behind my back is better but still have pain as 3-4/10  RTD May? See neurologist in April ? Objective: See treatment diary below      Assessment: Tolerated treatment well with minimum discomfort in L shoulder with RTC TB  Good tolerance indicated with Tx and MT completed this date with reduce pain indicated  Patient would benefit from continued PT to improve RTC strength/stabil and reduce soft tissue impairments as able  Plan: Continue per plan of care         MANUAL 15 mins         Manual Therapy:   PROM to left GHJ all planes - IR and ER only   Grade III mobs Inferior and posterior glides - NP  STM to left Ut, scap mm and deltoid   Gentle SOR with AP mobs to C-spine in supine - NP  PROM with gentle overpressure UTs  GISTM #4&5 to L (only) Trapezius mm/ CS / RTC gentle sweeping/fanning to pt tolerance - seated  BL Scalenes STM to pt tolerance    Exercise Diary        Cardio Nustep L3 10'       Pec stretch (45-) DC HEP       Bicep curls 5# 3x10       Tricep extension Orange 3x10       MTP/LTP Orange 3x10,5"       Tband IR/ER Orange 3x10,5"       SL ER resume       Prone shoulder flex, abd, ext resume       Bent over row resume       Arm raises flex/scap 2# 3x10       ZITA with cervical retractions w/TB Green 3x10,5"  Against wall       ZITA        Cervical isometric w/ball @ wall Flexion only  2x10,5"       Ball @ 90* all planes 30x ea dir  Red wt ball       BF with cerv retrac Upcoming         Modalities        MHP  15'       Cervical Traction 15'         [facility] Home TENS to Cervical and left GHJ in semi-reclined, roll under knees -end rx session  No adverse affects noted post rx      Cervical Traction:  20#, static 10*, 30% rope speed    *pt educated proper setup and application of home TENS as pt provided pt with Rx for Tens  For L shoulder and c-spine 10'

## 2018-03-28 ENCOUNTER — APPOINTMENT (OUTPATIENT)
Dept: PHYSICAL THERAPY | Facility: CLINIC | Age: 38
End: 2018-03-28
Payer: COMMERCIAL

## 2018-03-29 ENCOUNTER — OFFICE VISIT (OUTPATIENT)
Dept: PHYSICAL THERAPY | Facility: CLINIC | Age: 38
End: 2018-03-29
Payer: COMMERCIAL

## 2018-03-29 DIAGNOSIS — M75.42 IMPINGEMENT SYNDROME OF LEFT SHOULDER: Primary | ICD-10-CM

## 2018-03-29 PROCEDURE — 97012 MECHANICAL TRACTION THERAPY: CPT

## 2018-03-29 PROCEDURE — 97110 THERAPEUTIC EXERCISES: CPT

## 2018-03-29 PROCEDURE — 97140 MANUAL THERAPY 1/> REGIONS: CPT

## 2018-03-29 NOTE — PROGRESS NOTES
Daily Note     Today's date: 3/29/2018  Patient name: Nikolas Shaw  : 1980  MRN: [id number]  Referring provider: Sherren Rede, MD  Dx:   Encounter Diagnosis     ICD-10-CM    1  Impingement syndrome of left shoulder M75 42        Start Time: 0800  Stop Time: 935  Total time in clinic (min): 95 minutes  Precautions:  Neck and GHJ discomfort  Re-eval Date: 4/10/18    Date 3/27 3/28      Visit Count 21 22      FOTO Complete       Pain In 2/10 2/10      Pain Out 0/10 0/10        Subjective: "I am ok  It's popping and cracking "      Objective: See treatment diary below      Assessment: Tolerated treatment well  Patient strength deficits and fatigue noted with scapular strengthening at 90*  Plan: Continue per plan of care  Progress treatment as tolerated        MANUAL 15 mins 15'        Manual Therapy:   PROM to left GHJ all planes - IR and ER only   Grade III mobs Inferior and posterior glides - NP  STM to left Ut, scap mm and deltoid   Gentle SOR with AP mobs to C-spine in supine - NP  PROM with gentle overpressure UTs  GISTM #4&5 to L (only) Trapezius mm/ CS / RTC gentle sweeping/fanning to pt tolerance - seated  BL Scalenes STM to pt tolerance    Exercise Diary        Cardio Nustep L3 10' Nustep L3 10'      Pec stretch (45-) DC HEP       Bicep curls 5# 3x10       Tricep extension Orange 3x10 Orange 3x10      MTP/LTP Orange 3x10,5" Orange 3x10      Tband IR/ER Orange 3x10,5" Orange 3x10      SL ER resume       Prone shoulder flex, abd, ext resume       Bent over row resume       Arm raises flex/scap 2# 3x10 2# 3x10      ZITA with cervical retractions w/TB Green 3x10,5"  Against wall Green 3x10,5"  Against wall      ZITA        Cervical isometric w/ball @ wall Flexion only  2x10,5" Flexion only  2x10,5"      Ball @ 90* all planes 30x ea dir  Red wt ball 30x ea dir  Red wt ball      BF with cerv retrac Upcoming         Modalities        MHP  15' 15'      Cervical Traction 15' 15'        [facility] TENS to Cervical and left GHJ in semi-reclined, roll under knees -end rx session  No adverse affects noted post rx      Cervical Traction:  20#, static 10*, 30% rope speed    *pt educated proper setup and application of home TENS as pt provided pt with Rx for Tens  For L shoulder and c-spine 10'

## 2018-03-30 ENCOUNTER — APPOINTMENT (OUTPATIENT)
Dept: PHYSICAL THERAPY | Facility: CLINIC | Age: 38
End: 2018-03-30
Payer: COMMERCIAL

## 2018-04-03 ENCOUNTER — OFFICE VISIT (OUTPATIENT)
Dept: PHYSICAL THERAPY | Facility: CLINIC | Age: 38
End: 2018-04-03
Payer: COMMERCIAL

## 2018-04-03 DIAGNOSIS — M75.42 IMPINGEMENT SYNDROME OF LEFT SHOULDER: Primary | ICD-10-CM

## 2018-04-03 PROCEDURE — 97110 THERAPEUTIC EXERCISES: CPT

## 2018-04-03 PROCEDURE — 97140 MANUAL THERAPY 1/> REGIONS: CPT

## 2018-04-03 NOTE — PROGRESS NOTES
Daily Note     Today's date: 4/3/2018  Patient name: Liz Jimenez  : 1980  MRN: [id number]  Referring provider: Lisa Pugh MD  Dx:   Encounter Diagnosis     ICD-10-CM    1  Impingement syndrome of left shoulder M75 42                 Precautions:  Neck and GHJ discomfort  Re-eval Date: 4/10/18    Date 3/27 3/28 4/3     Visit Count 21 22 23     FOTO Complete       Pain In 2/10 2/10 3/10     Pain Out 0/10 0/10 1/10       Subjective: My back of neck is hurting worse than my shoulder  Objective: See treatment diary below      Assessment: Tolerated treatment well denied any increase pain L shoulder  Demonstrates tenderness with palp suboccipital region and posterior/lateral Cspine soft tissue restrictions  Noted mm tightness in BL trap/lat  Muscles with pt review rhomboid seated self stretch  Patient exhibited good technique with therapeutic exercises with minimum cues  Plan: Continue per plan of care       MANUAL 15 mins 15' 25'       Manual Therapy:   PROM to left GHJ all planes - IR and ER only   Grade III mobs Inferior and posterior glides - NP  STM to left Ut, scap mm and deltoid   Gentle SOR with AP mobs to C-spine in supine  PROM with gentle overpressure UTs  GISTM #4&5 to L (only) Trapezius mm/ CS / RTC gentle sweeping/fanning to pt tolerance - seated  BL Scalenes STM to pt tolerance    Exercise Diary        Cardio Nustep L3 10' Nustep L3 10' UBE 10'     Pec stretch (45-) DC HEP       Bicep curls 5# 3x10       Tricep extension Orange 3x10 Orange 3x10 Orange 3x10     MTP/LTP Orange 3x10,5" Orange 3x10 Orange 3x10     Tband IR/ER Orange 3x10,5" Orange 3x10 [place] 3x10     SL ER resume       Prone shoulder flex, abd, ext resume   resume    Bent over row resume   resume    Arm raises flex/scap 2# 3x10 2# 3x10 2# 3x10     ZITA with cervical retractions w/TB Green 3x10,5"  Against wall Green 3x10,5"  Against wall Green 3x10,5"  Against wall     ZITA    Resume    Cervical isometric w/ball @ wall Flexion only  2x10,5" Flexion only  2x10,5" Flexion only  2x10,5"     Ball @ 90* all planes 30x ea dir  Red wt ball 30x ea dir  Red wt ball 30x ea dir  Red wt ball     BF with cerv retrac Upcoming         Modalities        MHP  15' 15' 15'     Cervical Traction 15' 15' Not available       IFC [facility] Home TENS to Cervical and left GHJ in semi-reclined, roll under knees -end rx session  No adverse affects noted post rx      Cervical Traction:  20#, static 10*, 30% rope speed- nP    *pt educated proper setup and application of home TENS as pt provided pt with Rx for Tens  For L shoulder and c-spine 10'

## 2018-04-05 ENCOUNTER — OFFICE VISIT (OUTPATIENT)
Dept: PHYSICAL THERAPY | Facility: CLINIC | Age: 38
End: 2018-04-05
Payer: COMMERCIAL

## 2018-04-05 DIAGNOSIS — M75.42 IMPINGEMENT SYNDROME OF LEFT SHOULDER: Primary | ICD-10-CM

## 2018-04-05 PROCEDURE — 97012 MECHANICAL TRACTION THERAPY: CPT

## 2018-04-05 PROCEDURE — 97110 THERAPEUTIC EXERCISES: CPT

## 2018-04-05 PROCEDURE — 97140 MANUAL THERAPY 1/> REGIONS: CPT

## 2018-04-05 NOTE — PROGRESS NOTES
Daily Note     Today's date: 2018  Patient name: Liz Jimenez  : 1980  MRN: [id number]  Referring provider: Lisa Pugh MD  Dx:   Encounter Diagnosis     ICD-10-CM    1  Impingement syndrome of left shoulder M75 42                 Precautions:  Neck and GHJ discomfort  Re-eval Date: 4/10/18    Date 3/27 3/28 4/3 4/5    Visit Count 21 22 23 24    FOTO Complete       Pain In 2/10 2/10 3/10 4/10    Pain Out 0/10 0/10 1/10 1/10      Subjective: My left UT mm is sore today    Objective: See treatment diary below    Assessment: Tolerated treatment fairly well with > difficulty / minimum increase discomfort with ER exercises  Noted BL C3-5 tenderness with palpation  Good tolerance indicated with MT/GISTM today  Pt compliant with carryover with HEP  Patient exhibited good technique with therapeutic exercises , requires minimum cuing  Cervical ROM WNL all planes    Plan: Continue per plan of care       MANUAL 15 mins 15' 25' 15'      Manual Therapy:   PROM to left GHJ all planes - IR and ER only -resume  Grade III mobs Inferior and posterior glides - NP  STM to left Ut, scap mm and deltoid   Gentle SOR with AP mobs to C-spine in supine - np  PROM with gentle overpressure Uts - Pt self stretch 3x30" ea  GISTM # 5 to L (only) Trapezius mm/ CS / RTC gentle sweeping/fanning to pt tolerance - seated  BL Scalenes STM to pt tolerance    Exercise Diary        Cardio Nustep L3 10' Nustep L3 10' UBE 10' Pulleys 5'  Nustep L3 5'    Pec stretch (25-) DC HEP       Bicep curls 5# 3x10       Tricep extension Orange 3x10 Orange 3x10 Orange 3x10 resume    MTP/LTP Orange 3x10,5" Orange 3x10 Orange 3x10 Janny  10# 3x10, 5" ea    Tband IR/ER Orange 3x10,5" Orange 3x10 Orange 3x10 ER 6# 3x10, 5"  IR 8# 3x10, 5"    SL ER resume       Prone shoulder flex, abd, ext resume   Resume NV    Bent over row resume   Resume NV    Arm raises flex/scap 2# 3x10 2# 3x10 2# 3x10 2# 3x10    ZITA with cervical retractions w/TB Camacho Rubina 3x10,5"  Against wall Green 3x10,5"  Against wall Green 3x10,5"  Against wall Green  2x10, 5"  Against wall    ZITA    Resume NV    Cervical isometric w/ball @ wall Flexion only  2x10,5" Flexion only  2x10,5" Flexion only  2x10,5"     Ball @ 90* all planes 30x ea dir  Red wt ball 30x ea dir  Red wt ball 30x ea dir  Red wt ball     BF with cerv retrac Upcoming         Modalities        MHP  15' 15' 15' 15'    Cervical Traction 15' 15' Not available 15'      IFC [employer] Home TENS to Cervical and left GHJ in semi-reclined, roll under knees -end rx session  No adverse affects noted post rx      Cervical Traction:  20#, static 10*, 30% rope speed

## 2018-04-08 LAB
ALBUMIN SERPL BCP-MCNC: 4.7 G/DL (ref 3.5–5.7)
ALP SERPL-CCNC: 61 IU/L (ref 40–150)
ALT SERPL W P-5'-P-CCNC: 13 IU/L (ref 0–50)
ANION GAP SERPL CALCULATED.3IONS-SCNC: 12 MM/L
APTT PPP: 30.2 SEC (ref 24.4–37.6)
AST SERPL W P-5'-P-CCNC: 14 U/L (ref 7–26)
BACTERIA UR QL AUTO: ABNORMAL
BASOPHILS # BLD AUTO: 0 X3/UL (ref 0–0.3)
BASOPHILS # BLD AUTO: 0.5 % (ref 0–2)
BILIRUB SERPL-MCNC: 0.2 MG/DL (ref 0.3–1)
BILIRUB UR QL STRIP: NEGATIVE
BUN SERPL-MCNC: 12 MG/DL (ref 7–25)
CALCIUM SERPL-MCNC: 10.4 MG/DL (ref 8.6–10.5)
CHLORIDE SERPL-SCNC: 103 MM/L (ref 98–107)
CLARITY UR: ABNORMAL
CO2 SERPL-SCNC: 28 MM/L (ref 21–31)
COLOR UR: YELLOW
CREAT SERPL-MCNC: 0.73 MG/DL (ref 0.6–1.2)
DEPRECATED RDW RBC AUTO: 13.1 % (ref 11.5–14.5)
EGFR (HISTORICAL): > 60 GFR
EGFR AFRICAN AMERICAN (HISTORICAL): > 60 GFR
EOSINOPHIL # BLD AUTO: 0.1 X3/UL (ref 0–0.5)
EOSINOPHIL NFR BLD AUTO: 1.1 % (ref 0–5)
GLUCOSE (HISTORICAL): 86 MG/DL (ref 65–99)
GLUCOSE UR STRIP-MCNC: NEGATIVE MG/DL
HCT VFR BLD AUTO: 35.5 % (ref 37–47)
HGB BLD-MCNC: 11.9 G/DL (ref 12–16)
HGB UR QL STRIP.AUTO: ABNORMAL
INR PPP: 0.93 (ref 0.9–1.5)
KETONES UR STRIP-MCNC: NEGATIVE MG/DL
LEUKOCYTE ESTERASE UR QL STRIP: ABNORMAL
LIPASE SERPL-CCNC: 32 U/L (ref 11–82)
LYMPHOCYTES # BLD AUTO: 3.8 X3/UL (ref 1.2–4.2)
LYMPHOCYTES NFR BLD AUTO: 41.4 % (ref 20.5–51.1)
MCH RBC QN AUTO: 30.4 PG (ref 26–34)
MCHC RBC AUTO-ENTMCNC: 33.6 G/DL (ref 31–36)
MCV RBC AUTO: 90.3 FL (ref 81–99)
MONOCYTES # BLD AUTO: 0.5 X3/UL (ref 0–1)
MONOCYTES NFR BLD AUTO: 5 % (ref 1.7–12)
MUCUS THREADS (HISTORICAL): ABNORMAL /HPF
NEUTROPHILS # BLD AUTO: 4.8 X3/UL (ref 1.4–6.5)
NEUTS SEG NFR BLD AUTO: 52 % (ref 42.2–75.2)
NITRITE UR QL STRIP: NEGATIVE
NON-SQ EPI CELLS URNS QL MICRO: ABNORMAL /HPF
OSMOLALITY, SERUM (HISTORICAL): 277 MOSM (ref 262–291)
PH UR STRIP.AUTO: 6.5 [PH] (ref 4.5–8)
PLATELET # BLD AUTO: 345 X3/UL (ref 130–400)
PMV BLD AUTO: 7 FL (ref 8.6–11.7)
POTASSIUM SERPL-SCNC: 4 MM/L (ref 3.5–5.5)
PREGNANCY TEST URINE (HISTORICAL): NEGATIVE
PROT UR STRIP-MCNC: NEGATIVE MG/DL
PROTHROMBIN TIME (HISTORICAL): 10.8 SEC (ref 10.1–12.9)
RBC # BLD AUTO: 3.93 X6/UL (ref 3.9–5.2)
RBC #/AREA URNS AUTO: ABNORMAL /HPF
SODIUM SERPL-SCNC: 139 MM/L (ref 134–143)
SP GR UR STRIP.AUTO: 1.01 (ref 1–1.03)
SP GR UR STRIP.AUTO: 1.01 (ref 1–1.03)
TOTAL PROTEIN (HISTORICAL): 7.2 G/DL (ref 6.4–8.9)
UROBILINOGEN UR QL STRIP.AUTO: 0.2 EU/DL (ref 0.2–8)
WBC # BLD AUTO: 9.3 X3/UL (ref 4.8–10.8)
WBC #/AREA URNS AUTO: ABNORMAL /HPF

## 2018-04-09 ENCOUNTER — OFFICE VISIT (OUTPATIENT)
Dept: FAMILY MEDICINE CLINIC | Facility: CLINIC | Age: 38
End: 2018-04-09
Payer: COMMERCIAL

## 2018-04-09 VITALS
HEIGHT: 66 IN | BODY MASS INDEX: 33.75 KG/M2 | DIASTOLIC BLOOD PRESSURE: 78 MMHG | SYSTOLIC BLOOD PRESSURE: 110 MMHG | TEMPERATURE: 98.6 F | HEART RATE: 69 BPM | WEIGHT: 210 LBS | OXYGEN SATURATION: 99 %

## 2018-04-09 DIAGNOSIS — R10.11 RIGHT UPPER QUADRANT ABDOMINAL PAIN: Primary | ICD-10-CM

## 2018-04-09 DIAGNOSIS — K82.4 GALLBLADDER POLYP: ICD-10-CM

## 2018-04-09 DIAGNOSIS — N39.0 URINARY TRACT INFECTION WITHOUT HEMATURIA, SITE UNSPECIFIED: ICD-10-CM

## 2018-04-09 PROCEDURE — 99214 OFFICE O/P EST MOD 30 MIN: CPT | Performed by: FAMILY MEDICINE

## 2018-04-09 RX ORDER — SULFAMETHOXAZOLE AND TRIMETHOPRIM 800; 160 MG/1; MG/1
1 TABLET ORAL EVERY 12 HOURS SCHEDULED
Qty: 10 TABLET | Refills: 0 | Status: SHIPPED | OUTPATIENT
Start: 2018-04-09 | End: 2018-04-14

## 2018-04-09 RX ORDER — HYDROCODONE BITARTRATE AND ACETAMINOPHEN 5; 325 MG/1; MG/1
1 TABLET ORAL EVERY 6 HOURS PRN
Qty: 20 TABLET | Refills: 0 | Status: SHIPPED | OUTPATIENT
Start: 2018-04-09 | End: 2018-04-25 | Stop reason: SDUPTHER

## 2018-04-09 NOTE — PROGRESS NOTES
Assessment/Plan:    No problem-specific Assessment & Plan notes found for this encounter  Diagnoses and all orders for this visit:    Right upper quadrant abdominal pain  -     Ambulatory referral to General Surgery; Future  -     HYDROcodone-acetaminophen (NORCO) 5-325 mg per tablet; Take 1 tablet by mouth every 6 (six) hours as needed for pain Max Daily Amount: 4 tablets    Gallbladder polyp  -     Cancel: Ambulatory referral to General Surgery; Future  -     Ambulatory referral to General Surgery; Future    Urinary tract infection without hematuria, site unspecified  -     sulfamethoxazole-trimethoprim (BACTRIM DS) 800-160 mg per tablet; Take 1 tablet by mouth every 12 (twelve) hours for 5 days          Subjective:      Patient ID: Nirmala Hodges is a 34 y o  female  ER follow up for RUQ pain, pt had an ultrasound and a CT which found a gallbladder polyp, pt still has RUQ pain and was referred to a colorectal surgeon, pt was not given anything for pain, the pt has not been eating secondary to nausea, pt was also dx with a UTI and         The following portions of the patient's history were reviewed and updated as appropriate: allergies, current medications, past family history, past medical history, past social history, past surgical history and problem list     Review of Systems   Constitutional: Negative for chills and fever  Respiratory: Negative for shortness of breath and wheezing  Cardiovascular: Negative for chest pain and palpitations  Gastrointestinal: Negative for abdominal distention, blood in stool, constipation and diarrhea  Objective:      /78 (BP Location: Right arm, Patient Position: Sitting, Cuff Size: Large)   Pulse 69   Temp 98 6 °F (37 °C) (Tympanic)   Ht 5' 6" (1 676 m)   Wt 95 3 kg (210 lb)   SpO2 99%   BMI 33 89 kg/m²          Physical Exam   Constitutional: She is oriented to person, place, and time  She appears well-developed and well-nourished   No distress  HENT:   Head: Normocephalic and atraumatic  Eyes: No scleral icterus  Neck: Normal range of motion  Neck supple  Cardiovascular: Normal rate, regular rhythm and normal heart sounds  No murmur heard  Pulmonary/Chest: Effort normal and breath sounds normal  No respiratory distress  She has no wheezes  She has no rales  Abdominal: Soft  Bowel sounds are normal  She exhibits no distension and no mass  There is no tenderness  There is no rebound and no guarding  Lymphadenopathy:     She has no cervical adenopathy  Neurological: She is alert and oriented to person, place, and time  She exhibits normal muscle tone  Skin: Skin is warm and dry  No rash noted  She is not diaphoretic  No erythema  No pallor  Psychiatric: She has a normal mood and affect  Her behavior is normal  Judgment and thought content normal    Nursing note and vitals reviewed

## 2018-04-10 ENCOUNTER — EVALUATION (OUTPATIENT)
Dept: PHYSICAL THERAPY | Facility: CLINIC | Age: 38
End: 2018-04-10
Payer: COMMERCIAL

## 2018-04-10 DIAGNOSIS — M75.42 IMPINGEMENT SYNDROME OF LEFT SHOULDER: Primary | ICD-10-CM

## 2018-04-10 PROCEDURE — G8981 BODY POS CURRENT STATUS: HCPCS | Performed by: PHYSICAL THERAPIST

## 2018-04-10 PROCEDURE — 97164 PT RE-EVAL EST PLAN CARE: CPT | Performed by: PHYSICAL THERAPIST

## 2018-04-10 PROCEDURE — G8982 BODY POS GOAL STATUS: HCPCS | Performed by: PHYSICAL THERAPIST

## 2018-04-10 PROCEDURE — 97140 MANUAL THERAPY 1/> REGIONS: CPT | Performed by: PHYSICAL THERAPIST

## 2018-04-10 PROCEDURE — 97110 THERAPEUTIC EXERCISES: CPT | Performed by: PHYSICAL THERAPIST

## 2018-04-10 NOTE — PROGRESS NOTES
PT Re-Evaluation     Today's date: 4/10/2018  Patient name: Philly Saldivar  : 1980  MRN: [id number]  Referring provider: Russell Turcios MD  Dx:   Encounter Diagnosis     ICD-10-CM    1  Impingement syndrome of left shoulder M75 42        Assessment  Impairments: abnormal muscle tone, abnormal or restricted ROM, impaired physical strength, pain with function and scapular dyskinesis    Assessment details: Philly Saldivar is a 34 y o  female presenting to outpatient physical therapy with diagnosis of Impingement syndrome of left shoulder  Patient presents with reduced pain, reduced range of motion  IR ROM has improved, now able to reach to thoracic spine  Reduced strength proximal > distal, scapular weakness  Postural awareness improved with more self correction  She does have ongoing difficulty with and reduced functional activity tolerance  Patient is to follow up with pain management, neurology, and ortho in upcoming weeks  Likely discharge to [facility] in 1-2 weeks  Thank you for your referral     Understanding of Dx/Px/POC: good   Prognosis: good  Prognosis details: Cervical dysfunction    Goals  Goals  In 4 weeks, patient will    Demonstrate full and non-painful Range of Motion overhead -  met  Scap strength to at least 3+/5 bilaterally tested prone - met  Demonstrate independent HEP - met  Demonstrate increased GHJ strength to at least by at least 1/2 MMT - met  Full and painfree Range of Motion all planes - met    In 6 weeks, patient will      Demonstrate full return to Instrumental activities of daily living unrestricted - partially met, ongoing difficulty lifting coal buckets  Demonstrate full return to activities of daily living unrestricted, specifically dressing, washing hair, etc - met  Reduce deficits as noted by improved outcome measures - partially met  Decreased pain at rest and with activity - met    Plan  Patient would benefit from: skilled PT  Planned modality interventions: TENS and ultrasound  Planned therapy interventions: manual therapy, Bhatia taping, neuromuscular re-education, therapeutic exercise and home exercise program  Frequency: 2x week  Duration in weeks: 4  Treatment plan discussed with: patient  Plan details: Patient has home TENS unit and uses regularly        Subjective Evaluation    History of Present Illness  Mechanism of injury: Update:  Patient reports she has not seen pain management at this point yet  Ortho PA referred to neurology  Shoulder is feeling better, neck still a problem  Stiff in a m  Numbness/tingling in hands 2-3 times per month  Not as bad as it was  Ortho - Late April  Neuro - May  Pending schedule with pain/spine as per ortho PA recommendation  Quality of life: good    Pain  Current pain ratin  At best pain ratin  At worst pain ratin (Max pain occurring less frequently)  Location: Neck and left GHJ  Quality: burning, pressure, sharp and radiating  Relieving factors: heat, ice, relaxation, medications and change in position  Aggravating factors: lifting          Objective     Special Questions  Positive for disturbed sleep and headaches  Negative for night pain, dizziness, faints, nausea/motion sickness, tinnitus, trouble swallowing, difficulty breathing, shortness of breath, respiratory pain and visual change    Postural Observations  Seated posture: fair  Standing posture: fair    Additional Postural Observation Details  Forward head, rounded shoulders  Does note increased awareness and self correction  Palpation   Left   Hypertonic in the cervical paraspinals, teres major and teres minor  Muscle spasm in the cervical paraspinals, levator scapulae, lower trapezius, middle trapezius, pectoralis major, pectoralis minor, rhomboids, teres major, teres minor, triceps and upper trapezius  Tenderness of the latissimus, scalenes and suboccipitals  Right   Hypertonic in the levator scapulae, teres major and teres minor  Additional Palpation Details  Improved from [facility], however, ongoing restrictions in pecs, triceps, teres group  Neurological Testing     Sensation   Cervical/Thoracic   Left   Intact: light touch    Right   Intact: light touch    Additional Neurological Details  Intermittent paresthesia, numbness/tingling into left and right UE as well as intermittently into LEs  Active Range of Motion   Cervical/Thoracic Spine   Cervical    Flexion: 60 degrees   Extension: 40 degrees   Left lateral flexion: 45 degrees   Right lateral flexion: 45 degrees   Left rotation: WFL  Right rotation: WFL  Left Shoulder   Flexion: WFL  Extension: WFL  Abduction: WFL  Adduction: WFL  External rotation BTH: C7   Internal rotation BTB: L2 with pain    Scapular Mobility   Left Shoulder   Scapular mobility: fair    Joint Play Hypomobile: C3, C4 and C5 Pain: C5     Strength/Myotome Testing   Cervical Spine   Neck extension: 4-  Neck flexion: 4    Left   Neck lateral flexion (C3): 4    Right etr  Neck lateral flexion (C3): 4    Left Shoulder     Planes of Motion   Flexion: 4   Extension: 4+   Abduction: 4   Adduction: 4+   External rotation at 0°: 4   Internal rotation at 0°: 4+     Isolated Muscles   Upper trapezius: 4     Right Shoulder     Planes of Motion   Flexion: 4+   Extension: 4+   Abduction: 4+   Adduction: 4+   External rotation at 0°: 4+   Internal rotation at 0°: 4+     Isolated Muscles   Upper trapezius: 4     Left Elbow   Flexion: 4+  Extension: 4+    Right Elbow   Flexion: 5  Extension: 5    Left Wrist/Hand   Wrist extension: 4+  Wrist flexion: 4+  Radial deviation: 4+  Ulnar deviation: 4+    Right Wrist/Hand   Wrist extension: 4+  Wrist flexion: 4+  Radial deviation: 4+  Ulnar deviation: 4+    Precautions:  Neck and GHJ discomfort    Re-eval Date: 5/9/18    Date 3/27 3/28 4/3 4/5 4/10/18   Visit Count 21 22 23 24 25   FOTO Complete    RE completed   Pain In 2/10 2/10 3/10 4/10 See RE   Pain Out 0/10 0/10 1/10 1/10 See RE MANUAL 15 mins 15' 25' 15' 15'     Manual Therapy: PTA/CV 4/10/18  PROM to left GHJ all planes - IR and ER only -resume  Grade III mobs Inferior and posterior glides - NP  STM to left Ut, scap mm and deltoid - np  Gentle SOR with AP mobs to C-spine in supine - SOR only  PROM with gentle overpressure Uts - Pt self stretch 3x30" ea - np  GISTM # 5 to L (only) Trapezius mm/ CS / RTC gentle sweeping/fanning to pt tolerance - seated - np  BL Scalenes STM to pt tolerance - np  Manual Distraction to pt tolerance  STM to BL Cspine paraspinals    Exercise Diary        Cardio Nustep L3 10' Nustep L3 10' UBE 10' Pulleys 5'  Nustep L3 5' UBE 5 mins x 2   Pec stretch (45-) DC HEP       Bicep curls 5# 3x10       Tricep extension Orange 3x10 Orange 3x10 Orange 3x10 resume Orange 3x10   MTP/LTP Orange 3x10,5" Orange 3x10 Orange 3x10 Janny  10# 3x10, 5" ea Janny  10# 3x10, 5" ea   Tband IR/ER Orange 3x10,5" Orange 3x10 Orange 3x10 ER 6# 3x10, 5"  IR 8# 3x10, 5" ER 6# 3x10, 5"  IR 8# 3x10, 5"   SL ER resume       Prone shoulder flex, abd, ext resume   Resume NV    Bent over row resume   Resume NV    Arm raises flex/scap 2# 3x10 2# 3x10 2# 3x10 2# 3x10 2# 3x10   ZITA with cervical retractions w/TB Green 3x10,5"  Against wall Green 3x10,5"  Against wall Green 3x10,5"  Against wall Green  2x10, 5"  Against wall Green  2x10, 5"  Against wall   ZITA    Resume NV    Cervical isometric w/ball @ wall Flexion only  2x10,5" Flexion only  2x10,5" Flexion only  2x10,5"     Ball @ 90* all planes 30x ea dir  Red wt ball 30x ea dir  Red wt ball 30x ea dir  Red wt ball     BF with cerv retrac Upcoming         Modalities        MHP  15' 15' 15' 15' 15   Cervical Traction 15' 15' Not available 15' 15     IFC [employer] Home TENS to Cervical and left GHJ in semi-reclined, roll under knees -end rx session  No adverse affects noted post rx

## 2018-04-11 ENCOUNTER — OFFICE VISIT (OUTPATIENT)
Dept: SURGERY | Facility: HOSPITAL | Age: 38
End: 2018-04-11
Payer: COMMERCIAL

## 2018-04-11 VITALS
WEIGHT: 207 LBS | HEART RATE: 78 BPM | DIASTOLIC BLOOD PRESSURE: 78 MMHG | BODY MASS INDEX: 33.27 KG/M2 | SYSTOLIC BLOOD PRESSURE: 135 MMHG | HEIGHT: 66 IN | TEMPERATURE: 98 F

## 2018-04-11 DIAGNOSIS — K82.4 GALLBLADDER POLYP: Primary | ICD-10-CM

## 2018-04-11 PROCEDURE — 99204 OFFICE O/P NEW MOD 45 MIN: CPT | Performed by: SURGERY

## 2018-04-11 NOTE — LETTER
April 15, 2018     Maria G Amor, [address] [address] [place] [facility] [address]    Patient: Corey Davenport   YOB: 1980   Date of Visit: 4/11/2018       Dear Dr Vidal Oneal:    Thank you for referring Corey Davenport to me for evaluation  Below are my notes for this consultation  If you have questions, please do not hesitate to call me  I look forward to following your patient along with you  Sincerely,        Kindra Jay DO        CC: No Recipients  Kindra Jay DO  4/15/2018 10:36 PM  Sign at close encounter  Assessment/Plan:    Gallbladder polyp  Gallbladder polyp better and measuring 0 5 cm in likely symptomatic and because of the patient's right upper quadrant pain  - preop consent for laparoscopic cholecystectomy  The procedure self including all associated risks and benefits were discussed the patient  Patient verbalized understands risks is willing to proceed  In addition the patient aware that while gallbladder is likely the source of this right upper quadrant pain that there is a possibility that with gallbladder may not completely solve her problem  Patient is understanding of this is would proceed  Consent was signed  - no additional preop workup is needed at this time  Diagnoses and all orders for this visit:    Gallbladder polyp          Subjective:      Patient ID: Corey Davenport is a 34 y o  female  61-year-old female who presents to the office today discuss intermittent right upper quadrant pain  Patient seen by her PCP and also recently and also hospital for intermittent right upper quadrant pain associated with eating  Also noted to have nausea nausea  Pain is mostly right upper quadrant does tender radiates around towards her back  Denies any as reflux symptoms  She has been eating very little due to the fear of worsening pain and nausea  Patient underwent CT scan which was unremarkable  and ultrasound which showed a 0 5 cm gallbladder polyp    No fevers or chills  No changes in bowel bladder habits  No chest pain shortness of breath  The following portions of the patient's history were reviewed and updated as appropriate:   She  has a past medical history of Anxiety; Bipolar disorder ([facility] [address] ); and Hyperlipidemia    She   Patient Active Problem List    Diagnosis Date Noted    Gallbladder polyp 04/15/2018    Chronic neck pain 01/23/2018    Muscle cramps 01/23/2018    Impingement syndrome of left shoulder 11/28/2017    Sore throat 11/15/2017    Hyperglycemia 10/19/2017    Left shoulder strain 10/18/2017    Vitamin D deficiency 09/19/2017    Yeast infection of the vagina 07/20/2017    Skin rash 07/14/2017    Shingles 07/06/2017    Benign mole 05/31/2017    Upper limb weakness 05/23/2017    Weakness of both lower extremities 05/23/2017    Injury of right hand 05/11/2017    Sprain of right hand 05/11/2017    Acute back pain 04/26/2017    Leg cramps 04/26/2017    Right foot pain 03/20/2017    Other ovarian cyst, right side 01/27/2017    Pelvic pain in female 01/18/2017    Right lower quadrant abdominal pain 01/11/2017    Contact with and suspected exposure to communicable disease 12/01/2016    Vaginal discharge 12/01/2016    Ankle pain 11/16/2016    Contusion of right ankle 11/16/2016    Acute maxillary sinusitis 09/23/2016    Sprain of right wrist 06/14/2016    Lymphadenitis, acute 06/10/2016    Shortness of breath 05/05/2016    Blurred vision 04/11/2016    Paresthesia 04/11/2016    Bipolar disorder ([facility] [address] ) 03/31/2016    Classic migraine 03/31/2016    Dizziness 03/31/2016    COPD (chronic obstructive pulmonary disease) ([facility]) 03/31/2016    Generalized anxiety disorder 03/31/2016    Hypercholesterolemia 03/31/2016    Leg pain 03/31/2016    Leukocytosis 03/31/2016    Lower back pain 03/31/2016    Lung nodule 03/31/2016    Major depression 03/31/2016    Pain, hand 03/31/2016    Unspecified ovarian cyst, unspecified side 03/31/2016    Encounter for gynecological examination with abnormal finding 07/05/2015    Breast lump on right side at 10 o'clock position 05/19/2015    Tobacco use disorder 05/14/2013    Hyperlipidemia 02/14/2011     She  has a past surgical history that includes Hysterectomy and Tonsillectomy  Her family history includes Cancer in her father; Diabetes in her mother; Heart disease in her mother; Neuropathy in her mother  She  reports that she has quit smoking  Her smoking use included Cigarettes  She smoked 1 50 packs per day  She has never used smokeless tobacco  She reports that she does not drink alcohol or use drugs  Current Outpatient Prescriptions   Medication Sig Dispense Refill    buPROPion (WELLBUTRIN) 100 mg tablet Take 1 tablet by mouth daily      Cholecalciferol (VITAMIN D) 2000 units CAPS Take by mouth      citalopram (CELEXA) 10 mg tablet Take 1 tablet by mouth daily      gabapentin (NEURONTIN) 300 mg capsule Take 100 mg by mouth 3 (three) times a day 1-3 caps      HYDROcodone-acetaminophen (NORCO) 5-325 mg per tablet Take 1 tablet by mouth every 6 (six) hours as needed for pain Max Daily Amount: 4 tablets 20 tablet 0    lamoTRIgine (LAMICTAL) 200 MG tablet Take 1 tablet by mouth 2 (two) times a day      LORazepam (ATIVAN) 0 5 mg tablet Take 1 tablet by mouth daily at bedtime        Multiple Vitamin (DAILY VALUE MULTIVITAMIN) TABS Take by mouth      Nerve Stimulator (STANDARD TENS) ISABELLA by Does not apply route 3 (three) times a day as needed (pain) 1 Device 0    simvastatin (ZOCOR) 40 mg tablet Take 40 mg by mouth daily at bedtime  No current facility-administered medications for this visit  She has No Known Allergies       Review of Systems      A 10 point review of systems was conducted, all negative except as noted above HPI      Objective:      /78   Pulse 78   Temp 98 °F (36 7 °C)   Ht 5' 6" (1 676 m)   Wt 93 9 kg (207 lb)   BMI 33 41 kg/m²           Physical Exam   Constitutional: She is oriented to person, place, and time  She appears well-developed and well-nourished  No distress  HENT:   Head: Normocephalic and atraumatic  Eyes: No scleral icterus  Neck: Normal range of motion  No tracheal deviation present  Cardiovascular: Normal rate, regular rhythm and intact distal pulses  Exam reveals friction rub  Exam reveals no gallop  No murmur heard  Pulmonary/Chest: Effort normal and breath sounds normal  No respiratory distress  She has no wheezes  She has no rales  She exhibits no tenderness  Abdominal: She exhibits no distension and no mass  There is tenderness (Right upper quadrant)  There is no rebound and no guarding  Musculoskeletal: Normal range of motion  She exhibits no edema or deformity  Lymphadenopathy:     She has no cervical adenopathy  Neurological: She is oriented to person, place, and time  No cranial nerve deficit  Skin: Skin is warm  No rash noted  She is not diaphoretic  No erythema  No pallor  Psychiatric: She has a normal mood and affect  Her behavior is normal    Vitals reviewed

## 2018-04-12 ENCOUNTER — OFFICE VISIT (OUTPATIENT)
Dept: PHYSICAL THERAPY | Facility: CLINIC | Age: 38
End: 2018-04-12
Payer: COMMERCIAL

## 2018-04-15 PROBLEM — B37.31 YEAST INFECTION OF THE VAGINA: Status: ACTIVE | Noted: 2017-07-20

## 2018-04-15 PROBLEM — R10.31 RIGHT LOWER QUADRANT ABDOMINAL PAIN: Status: ACTIVE | Noted: 2017-01-11

## 2018-04-15 PROBLEM — S46.912A LEFT SHOULDER STRAIN: Status: ACTIVE | Noted: 2017-10-18

## 2018-04-15 PROBLEM — S63.91XA SPRAIN OF RIGHT HAND: Status: ACTIVE | Noted: 2017-05-11

## 2018-04-15 PROBLEM — B02.9 SHINGLES: Status: ACTIVE | Noted: 2017-07-06

## 2018-04-15 PROBLEM — M79.671 RIGHT FOOT PAIN: Status: ACTIVE | Noted: 2017-03-20

## 2018-04-15 PROBLEM — N83.291 OTHER OVARIAN CYST, RIGHT SIDE: Status: ACTIVE | Noted: 2017-01-27

## 2018-04-15 PROBLEM — R10.2 PELVIC PAIN IN FEMALE: Status: ACTIVE | Noted: 2017-01-18

## 2018-04-15 PROBLEM — D22.9 BENIGN MOLE: Status: ACTIVE | Noted: 2017-05-31

## 2018-04-15 PROBLEM — M75.42 IMPINGEMENT SYNDROME OF LEFT SHOULDER: Status: ACTIVE | Noted: 2017-11-28

## 2018-04-15 PROBLEM — M54.2 CHRONIC NECK PAIN: Status: ACTIVE | Noted: 2018-01-23

## 2018-04-15 PROBLEM — S69.91XA INJURY OF RIGHT HAND: Status: ACTIVE | Noted: 2017-05-11

## 2018-04-15 PROBLEM — R25.2 MUSCLE CRAMPS: Status: ACTIVE | Noted: 2018-01-23

## 2018-04-15 PROBLEM — R25.2 LEG CRAMPS: Status: ACTIVE | Noted: 2017-04-26

## 2018-04-15 PROBLEM — R73.9 HYPERGLYCEMIA: Status: ACTIVE | Noted: 2017-10-19

## 2018-04-15 PROBLEM — B37.3 YEAST INFECTION OF THE VAGINA: Status: ACTIVE | Noted: 2017-07-20

## 2018-04-15 PROBLEM — G89.29 CHRONIC NECK PAIN: Status: ACTIVE | Noted: 2018-01-23

## 2018-04-15 PROBLEM — R21 SKIN RASH: Status: ACTIVE | Noted: 2017-07-14

## 2018-04-15 PROBLEM — M54.9 ACUTE BACK PAIN: Status: ACTIVE | Noted: 2017-04-26

## 2018-04-15 PROBLEM — J02.9 SORE THROAT: Status: ACTIVE | Noted: 2017-11-15

## 2018-04-15 PROBLEM — K82.4 GALLBLADDER POLYP: Status: ACTIVE | Noted: 2018-04-15

## 2018-04-15 PROBLEM — E55.9 VITAMIN D DEFICIENCY: Status: ACTIVE | Noted: 2017-09-19

## 2018-04-15 PROBLEM — R29.898 UPPER LIMB WEAKNESS: Status: ACTIVE | Noted: 2017-05-23

## 2018-04-15 PROBLEM — R29.898 WEAKNESS OF BOTH LOWER EXTREMITIES: Status: ACTIVE | Noted: 2017-05-23

## 2018-04-15 RX ORDER — SODIUM CHLORIDE, SODIUM LACTATE, POTASSIUM CHLORIDE, CALCIUM CHLORIDE 600; 310; 30; 20 MG/100ML; MG/100ML; MG/100ML; MG/100ML
125 INJECTION, SOLUTION INTRAVENOUS CONTINUOUS
Status: CANCELLED | OUTPATIENT
Start: 2018-04-30

## 2018-04-16 NOTE — H&P
Gallbladder consult     Assessment/Plan:    Gallbladder polyp  Gallbladder polyp better and measuring 0 5 cm in likely symptomatic and because of the patient's right upper quadrant pain  - preop consent for laparoscopic cholecystectomy  The procedure self including all associated risks and benefits were discussed the patient  Patient verbalized understands risks is willing to proceed  In addition the patient aware that while gallbladder is likely the source of this right upper quadrant pain that there is a possibility that with gallbladder may not completely solve her problem  Patient is understanding of this is would proceed  Consent was signed  - no additional preop workup is needed at this time  Diagnoses and all orders for this visit:    Gallbladder polyp          Subjective:      Patient ID: Jovana Andrews is a 34 y o  female  43-year-old female who presents to the office today discuss intermittent right upper quadrant pain  Patient seen by her PCP and also recently and also hospital for intermittent right upper quadrant pain associated with eating  Also noted to have nausea nausea  Pain is mostly right upper quadrant does tender radiates around towards her back  Denies any as reflux symptoms  She has been eating very little due to the fear of worsening pain and nausea  Patient underwent CT scan which was unremarkable  and ultrasound which showed a 0 5 cm gallbladder polyp  No fevers or chills  No changes in bowel bladder habits  No chest pain shortness of breath  The following portions of the patient's history were reviewed and updated as appropriate:   She  has a past medical history of Anxiety; Bipolar disorder ([address] ); and Hyperlipidemia    She   Patient Active Problem List    Diagnosis Date Noted    Gallbladder polyp 04/15/2018    Chronic neck pain 01/23/2018    Muscle cramps 01/23/2018    Impingement syndrome of left shoulder 11/28/2017    Sore throat 11/15/2017    Hyperglycemia 10/19/2017    Left shoulder strain 10/18/2017    Vitamin D deficiency 09/19/2017    Yeast infection of the vagina 07/20/2017    Skin rash 07/14/2017    Shingles 07/06/2017    Benign mole 05/31/2017    Upper limb weakness 05/23/2017    Weakness of both lower extremities 05/23/2017    Injury of right hand 05/11/2017    Sprain of right hand 05/11/2017    Acute back pain 04/26/2017    Leg cramps 04/26/2017    Right foot pain 03/20/2017    Other ovarian cyst, right side 01/27/2017    Pelvic pain in female 01/18/2017    Right lower quadrant abdominal pain 01/11/2017    Contact with and suspected exposure to communicable disease 12/01/2016    Vaginal discharge 12/01/2016    Ankle pain 11/16/2016    Contusion of right ankle 11/16/2016    Acute maxillary sinusitis 09/23/2016    Sprain of right wrist 06/14/2016    Lymphadenitis, acute 06/10/2016    Shortness of breath 05/05/2016    Blurred vision 04/11/2016    Paresthesia 04/11/2016    Bipolar disorder ([address] ) 03/31/2016    Classic migraine 03/31/2016    Dizziness 03/31/2016    COPD [facility]chronic obstructive [facility] disease) [facility] 03/31/2016    Generalized anxiety disorder 03/31/2016    Hypercholesterolemia 03/31/2016    Leg pain 03/31/2016    Leukocytosis 03/31/2016    Lower back pain 03/31/2016    Lung nodule 03/31/2016    Major depression 03/31/2016    Pain, hand 03/31/2016    Unspecified ovarian cyst, unspecified side 03/31/2016    Encounter for gynecological examination with abnormal finding 07/05/2015    Breast lump on right side at 10 o'clock position 05/19/2015    Tobacco use disorder 05/14/2013    Hyperlipidemia 02/14/2011     She  has a past surgical history that includes Hysterectomy and Tonsillectomy  Her family history includes Cancer in her father; Diabetes in her mother; Heart disease in her mother; Neuropathy in her mother  She  reports that she has quit smoking  Her smoking use included Cigarettes   She smoked 1 50 packs per day  She has never used smokeless tobacco  She reports that she does not drink alcohol or use drugs  Current Outpatient Prescriptions   Medication Sig Dispense Refill    buPROPion (WELLBUTRIN) 100 mg tablet Take 1 tablet by mouth daily      Cholecalciferol (VITAMIN D) 2000 units CAPS Take by mouth      citalopram (CELEXA) 10 mg tablet Take 1 tablet by mouth daily      gabapentin (NEURONTIN) 300 mg capsule Take 100 mg by mouth 3 (three) times a day 1-3 caps      HYDROcodone-acetaminophen (NORCO) 5-325 mg per tablet Take 1 tablet by mouth every 6 (six) hours as needed for pain Max Daily Amount: 4 tablets 20 tablet 0    lamoTRIgine (LAMICTAL) 200 MG tablet Take 1 tablet by mouth 2 (two) times a day      LORazepam (ATIVAN) 0 5 mg tablet Take 1 tablet by mouth daily at bedtime        Multiple Vitamin (DAILY VALUE MULTIVITAMIN) TABS Take by mouth      Nerve Stimulator (STANDARD TENS) ISABELLA by Does not apply route 3 (three) times a day as needed (pain) 1 Device 0    simvastatin (ZOCOR) 40 mg tablet Take 40 mg by mouth daily at bedtime  No current facility-administered medications for this visit  She has No Known Allergies       Review of Systems      A 10 point review of systems was conducted, all negative except as noted above HPI  Objective:      /78   Pulse 78   Temp 98 °F (36 7 °C)   Ht 5' 6" (1 676 m)   Wt 93 9 kg (207 lb)   BMI 33 41 kg/m²           Physical Exam   Constitutional: She is oriented to person, place, and time  She appears well-developed and well-nourished  No distress  HENT:   Head: Normocephalic and atraumatic  Eyes: No scleral icterus  Neck: Normal range of motion  No tracheal deviation present  Cardiovascular: Normal rate, regular rhythm and intact distal pulses  Exam reveals friction rub  Exam reveals no gallop  No murmur heard  Pulmonary/Chest: Effort normal and breath sounds normal  No respiratory distress  She has no wheezes  She has no rales  She exhibits no tenderness  Abdominal: She exhibits no distension and no mass  There is tenderness (Right upper quadrant)  There is no rebound and no guarding  Musculoskeletal: Normal range of motion  She exhibits no edema or deformity  Lymphadenopathy:     She has no cervical adenopathy  Neurological: She is oriented to person, place, and time  No cranial nerve deficit  Skin: Skin is warm  No rash noted  She is not diaphoretic  No erythema  No pallor  Psychiatric: She has a normal mood and affect  Her behavior is normal    Vitals reviewed

## 2018-04-16 NOTE — ASSESSMENT & PLAN NOTE
Gallbladder polyp better and measuring 0 5 cm in likely symptomatic and because of the patient's right upper quadrant pain  - preop consent for laparoscopic cholecystectomy  The procedure self including all associated risks and benefits were discussed the patient  Patient verbalized understands risks is willing to proceed  In addition the patient aware that while gallbladder is likely the source of this right upper quadrant pain that there is a possibility that with gallbladder may not completely solve her problem  Patient is understanding of this is would proceed  Consent was signed  - no additional preop workup is needed at this time

## 2018-04-16 NOTE — PROGRESS NOTES
Assessment/Plan:    Gallbladder polyp  Gallbladder polyp better and measuring 0 5 cm in likely symptomatic and because of the patient's right upper quadrant pain  - preop consent for laparoscopic cholecystectomy  The procedure self including all associated risks and benefits were discussed the patient  Patient verbalized understands risks is willing to proceed  In addition the patient aware that while gallbladder is likely the source of this right upper quadrant pain that there is a possibility that with gallbladder may not completely solve her problem  Patient is understanding of this is would proceed  Consent was signed  - no additional preop workup is needed at this time  Diagnoses and all orders for this visit:    Gallbladder polyp          Subjective:      Patient ID: Osvaldo Sanchez is a 34 y o  female  59-year-old female who presents to the office today discuss intermittent right upper quadrant pain  Patient seen by her PCP and also recently and also hospital for intermittent right upper quadrant pain associated with eating  Also noted to have nausea nausea  Pain is mostly right upper quadrant does tender radiates around towards her back  Denies any as reflux symptoms  She has been eating very little due to the fear of worsening pain and nausea  Patient underwent CT scan which was unremarkable  and ultrasound which showed a 0 5 cm gallbladder polyp  No fevers or chills  No changes in bowel bladder habits  No chest pain shortness of breath  The following portions of the patient's history were reviewed and updated as appropriate:   She  has a past medical history of Anxiety; Bipolar disorder ([address] ); and Hyperlipidemia    She   Patient Active Problem List    Diagnosis Date Noted    Gallbladder polyp 04/15/2018    Chronic neck pain 01/23/2018    Muscle cramps 01/23/2018    Impingement syndrome of left shoulder 11/28/2017    Sore throat 11/15/2017    Hyperglycemia 10/19/2017    Left shoulder strain 10/18/2017    Vitamin D deficiency 09/19/2017    Yeast infection of the vagina 07/20/2017    Skin rash 07/14/2017    Shingles 07/06/2017    Benign mole 05/31/2017    Upper limb weakness 05/23/2017    Weakness of both lower extremities 05/23/2017    Injury of right hand 05/11/2017    Sprain of right hand 05/11/2017    Acute back pain 04/26/2017    Leg cramps 04/26/2017    Right foot pain 03/20/2017    Other ovarian cyst, right side 01/27/2017    Pelvic pain in female 01/18/2017    Right lower quadrant abdominal pain 01/11/2017    Contact with and suspected exposure to communicable disease 12/01/2016    Vaginal discharge 12/01/2016    Ankle pain 11/16/2016    Contusion of right ankle 11/16/2016    Acute maxillary sinusitis 09/23/2016    Sprain of right wrist 06/14/2016    Lymphadenitis, acute 06/10/2016    Shortness of breath 05/05/2016    Blurred vision 04/11/2016    Paresthesia 04/11/2016    Bipolar disorder ([facility] [address] ) 03/31/2016    Classic migraine 03/31/2016    Dizziness 03/31/2016    COPD (chronic obstructive pulmonary disease) ([facility]) 03/31/2016    Generalized anxiety disorder 03/31/2016    Hypercholesterolemia 03/31/2016    Leg pain 03/31/2016    Leukocytosis 03/31/2016    Lower back pain 03/31/2016    Lung nodule 03/31/2016    Major depression 03/31/2016    Pain, hand 03/31/2016    Unspecified ovarian cyst, unspecified side 03/31/2016    Encounter for gynecological examination with abnormal finding 07/05/2015    Breast lump on right side at 10 o'clock position 05/19/2015    Tobacco use disorder 05/14/2013    Hyperlipidemia 02/14/2011     She  has a past surgical history that includes Hysterectomy and Tonsillectomy  Her family history includes Cancer in her father; Diabetes in her mother; Heart disease in her mother; Neuropathy in her mother  She  reports that she has quit smoking  Her smoking use included Cigarettes  She smoked 1 50 packs per day   She has never used smokeless tobacco  She reports that she does not drink alcohol or use drugs  Current Outpatient Prescriptions   Medication Sig Dispense Refill    buPROPion (WELLBUTRIN) 100 mg tablet Take 1 tablet by mouth daily      Cholecalciferol (VITAMIN D) 2000 units CAPS Take by mouth      citalopram (CELEXA) 10 mg tablet Take 1 tablet by mouth daily      gabapentin (NEURONTIN) 300 mg capsule Take 100 mg by mouth 3 (three) times a day 1-3 caps      HYDROcodone-acetaminophen (NORCO) 5-325 mg per tablet Take 1 tablet by mouth every 6 (six) hours as needed for pain Max Daily Amount: 4 tablets 20 tablet 0    lamoTRIgine (LAMICTAL) 200 MG tablet Take 1 tablet by mouth 2 (two) times a day      LORazepam (ATIVAN) 0 5 mg tablet Take 1 tablet by mouth daily at bedtime        Multiple Vitamin (DAILY VALUE MULTIVITAMIN) TABS Take by mouth      Nerve Stimulator (STANDARD TENS) ISABELLA by Does not apply route 3 (three) times a day as needed (pain) 1 Device 0    simvastatin (ZOCOR) 40 mg tablet Take 40 mg by mouth daily at bedtime  No current facility-administered medications for this visit  She has No Known Allergies       Review of Systems      A 10 point review of systems was conducted, all negative except as noted above HPI  Objective:      /78   Pulse 78   Temp 98 °F (36 7 °C)   Ht 5' 6" (1 676 m)   Wt 93 9 kg (207 lb)   BMI 33 41 kg/m²          Physical Exam   Constitutional: She is oriented to person, place, and time  She appears well-developed and well-nourished  No distress  HENT:   Head: Normocephalic and atraumatic  Eyes: No scleral icterus  Neck: Normal range of motion  No tracheal deviation present  Cardiovascular: Normal rate, regular rhythm and intact distal pulses  Exam reveals friction rub  Exam reveals no gallop  No murmur heard  Pulmonary/Chest: Effort normal and breath sounds normal  No respiratory distress  She has no wheezes  She has no rales   She exhibits no tenderness  Abdominal: She exhibits no distension and no mass  There is tenderness (Right upper quadrant)  There is no rebound and no guarding  Musculoskeletal: Normal range of motion  She exhibits no edema or deformity  Lymphadenopathy:     She has no cervical adenopathy  Neurological: She is oriented to person, place, and time  No cranial nerve deficit  Skin: Skin is warm  No rash noted  She is not diaphoretic  No erythema  No pallor  Psychiatric: She has a normal mood and affect  Her behavior is normal    Vitals reviewed

## 2018-04-25 DIAGNOSIS — R10.11 RIGHT UPPER QUADRANT ABDOMINAL PAIN: ICD-10-CM

## 2018-04-25 NOTE — TELEPHONE ENCOUNTER
Pt called reporting that she has an appt for surgery 5/4/18 for cholecystectomy is asking if can have a refill of [place] as she is out of medication

## 2018-04-27 RX ORDER — HYDROCODONE BITARTRATE AND ACETAMINOPHEN 5; 325 MG/1; MG/1
1 TABLET ORAL EVERY 6 HOURS PRN
Qty: 20 TABLET | Refills: 0 | Status: SHIPPED | OUTPATIENT
Start: 2018-04-27 | End: 2018-05-17 | Stop reason: ALTCHOICE

## 2018-04-27 NOTE — PRE-PROCEDURE INSTRUCTIONS
Pre-Surgery Instructions:   Medication Instructions    buPROPion (WELLBUTRIN) 100 mg tablet Instructed patient per Anesthesia Guidelines   Cholecalciferol (VITAMIN D) 2000 units CAPS Instructed patient per Anesthesia Guidelines   citalopram (CELEXA) 10 mg tablet Instructed patient per Anesthesia Guidelines   gabapentin (NEURONTIN) 300 mg capsule Instructed patient per Anesthesia Guidelines   lamoTRIgine (LAMICTAL) 200 MG tablet Instructed patient per Anesthesia Guidelines   LORazepam (ATIVAN) 0 5 mg tablet Instructed patient per Anesthesia Guidelines   Multiple Vitamin (DAILY VALUE MULTIVITAMIN) TABS Instructed patient per Anesthesia Guidelines   simvastatin (ZOCOR) 40 mg tablet Instructed patient per Anesthesia Guidelines

## 2018-04-29 ENCOUNTER — ANESTHESIA EVENT (OUTPATIENT)
Dept: PERIOP | Facility: HOSPITAL | Age: 38
End: 2018-04-29
Payer: COMMERCIAL

## 2018-04-30 ENCOUNTER — ANESTHESIA (OUTPATIENT)
Dept: PERIOP | Facility: HOSPITAL | Age: 38
End: 2018-04-30
Payer: COMMERCIAL

## 2018-04-30 ENCOUNTER — HOSPITAL ENCOUNTER (OUTPATIENT)
Facility: HOSPITAL | Age: 38
Setting detail: OUTPATIENT SURGERY
Discharge: HOME | End: 2018-04-30
Attending: SURGERY | Admitting: SURGERY
Payer: COMMERCIAL

## 2018-04-30 VITALS
DIASTOLIC BLOOD PRESSURE: 65 MMHG | TEMPERATURE: 97.2 F | OXYGEN SATURATION: 95 % | SYSTOLIC BLOOD PRESSURE: 108 MMHG | RESPIRATION RATE: 18 BRPM | HEIGHT: 66 IN | BODY MASS INDEX: 33.27 KG/M2 | HEART RATE: 76 BPM | WEIGHT: 207 LBS

## 2018-04-30 DIAGNOSIS — K82.4 GALLBLADDER POLYP: ICD-10-CM

## 2018-04-30 PROCEDURE — 47562 LAPAROSCOPIC CHOLECYSTECTOMY: CPT | Performed by: SURGERY

## 2018-04-30 PROCEDURE — 88304 TISSUE EXAM BY PATHOLOGIST: CPT | Performed by: PATHOLOGY

## 2018-04-30 PROCEDURE — 47562 LAPAROSCOPIC CHOLECYSTECTOMY: CPT

## 2018-04-30 RX ORDER — HYDROCODONE BITARTRATE AND ACETAMINOPHEN 5; 325 MG/1; MG/1
2 TABLET ORAL EVERY 6 HOURS PRN
Status: DISCONTINUED | OUTPATIENT
Start: 2018-04-30 | End: 2018-04-30 | Stop reason: HOSPADM

## 2018-04-30 RX ORDER — MIDAZOLAM HYDROCHLORIDE 1 MG/ML
INJECTION INTRAMUSCULAR; INTRAVENOUS AS NEEDED
Status: DISCONTINUED | OUTPATIENT
Start: 2018-04-30 | End: 2018-04-30 | Stop reason: SURG

## 2018-04-30 RX ORDER — SODIUM CHLORIDE, SODIUM LACTATE, POTASSIUM CHLORIDE, CALCIUM CHLORIDE 600; 310; 30; 20 MG/100ML; MG/100ML; MG/100ML; MG/100ML
125 INJECTION, SOLUTION INTRAVENOUS CONTINUOUS
Status: DISCONTINUED | OUTPATIENT
Start: 2018-04-30 | End: 2018-04-30 | Stop reason: HOSPADM

## 2018-04-30 RX ORDER — SODIUM CHLORIDE, SODIUM LACTATE, POTASSIUM CHLORIDE, CALCIUM CHLORIDE 600; 310; 30; 20 MG/100ML; MG/100ML; MG/100ML; MG/100ML
125 INJECTION, SOLUTION INTRAVENOUS CONTINUOUS
Status: DISCONTINUED | OUTPATIENT
Start: 2018-04-30 | End: 2018-04-30

## 2018-04-30 RX ORDER — ONDANSETRON 2 MG/ML
4 INJECTION INTRAMUSCULAR; INTRAVENOUS EVERY 8 HOURS PRN
Status: DISCONTINUED | OUTPATIENT
Start: 2018-04-30 | End: 2018-04-30 | Stop reason: HOSPADM

## 2018-04-30 RX ORDER — FENTANYL CITRATE 50 UG/ML
INJECTION, SOLUTION INTRAMUSCULAR; INTRAVENOUS AS NEEDED
Status: DISCONTINUED | OUTPATIENT
Start: 2018-04-30 | End: 2018-04-30 | Stop reason: SURG

## 2018-04-30 RX ORDER — GLYCOPYRROLATE 0.2 MG/ML
INJECTION INTRAMUSCULAR; INTRAVENOUS AS NEEDED
Status: DISCONTINUED | OUTPATIENT
Start: 2018-04-30 | End: 2018-04-30 | Stop reason: SURG

## 2018-04-30 RX ORDER — ONDANSETRON 2 MG/ML
4 INJECTION INTRAMUSCULAR; INTRAVENOUS ONCE AS NEEDED
Status: DISCONTINUED | OUTPATIENT
Start: 2018-04-30 | End: 2018-04-30 | Stop reason: HOSPADM

## 2018-04-30 RX ORDER — BUPIVACAINE HYDROCHLORIDE 5 MG/ML
INJECTION, SOLUTION PERINEURAL AS NEEDED
Status: DISCONTINUED | OUTPATIENT
Start: 2018-04-30 | End: 2018-04-30 | Stop reason: HOSPADM

## 2018-04-30 RX ORDER — MORPHINE SULFATE 4 MG/ML
2 INJECTION, SOLUTION INTRAMUSCULAR; INTRAVENOUS EVERY 4 HOURS PRN
Status: DISCONTINUED | OUTPATIENT
Start: 2018-04-30 | End: 2018-04-30 | Stop reason: HOSPADM

## 2018-04-30 RX ORDER — PROPOFOL 10 MG/ML
INJECTION, EMULSION INTRAVENOUS AS NEEDED
Status: DISCONTINUED | OUTPATIENT
Start: 2018-04-30 | End: 2018-04-30 | Stop reason: SURG

## 2018-04-30 RX ORDER — ROCURONIUM BROMIDE 10 MG/ML
INJECTION, SOLUTION INTRAVENOUS AS NEEDED
Status: DISCONTINUED | OUTPATIENT
Start: 2018-04-30 | End: 2018-04-30 | Stop reason: SURG

## 2018-04-30 RX ORDER — FENTANYL CITRATE/PF 50 MCG/ML
25 SYRINGE (ML) INJECTION
Status: DISCONTINUED | OUTPATIENT
Start: 2018-04-30 | End: 2018-04-30 | Stop reason: HOSPADM

## 2018-04-30 RX ORDER — ONDANSETRON 2 MG/ML
INJECTION INTRAMUSCULAR; INTRAVENOUS AS NEEDED
Status: DISCONTINUED | OUTPATIENT
Start: 2018-04-30 | End: 2018-04-30 | Stop reason: SURG

## 2018-04-30 RX ORDER — LIDOCAINE HYDROCHLORIDE 10 MG/ML
INJECTION, SOLUTION INFILTRATION; PERINEURAL AS NEEDED
Status: DISCONTINUED | OUTPATIENT
Start: 2018-04-30 | End: 2018-04-30 | Stop reason: SURG

## 2018-04-30 RX ADMIN — NEOSTIGMINE METHYLSULFATE 3 MG: 1 INJECTION, SOLUTION INTRAMUSCULAR; INTRAVENOUS; SUBCUTANEOUS at 08:30

## 2018-04-30 RX ADMIN — CEFAZOLIN SODIUM 2000 MG: 2 SOLUTION INTRAVENOUS at 07:35

## 2018-04-30 RX ADMIN — PROPOFOL 200 MG: 10 INJECTION, EMULSION INTRAVENOUS at 07:39

## 2018-04-30 RX ADMIN — GLYCOPYRROLATE 0.2 MG: 0.2 INJECTION, SOLUTION INTRAMUSCULAR; INTRAVENOUS at 08:07

## 2018-04-30 RX ADMIN — FENTANYL CITRATE 50 MCG: 50 INJECTION, SOLUTION INTRAMUSCULAR; INTRAVENOUS at 08:37

## 2018-04-30 RX ADMIN — DEXAMETHASONE SODIUM PHOSPHATE 10 MG: 10 INJECTION INTRAMUSCULAR; INTRAVENOUS at 07:54

## 2018-04-30 RX ADMIN — ROCURONIUM BROMIDE 30 MG: 10 INJECTION INTRAVENOUS at 07:40

## 2018-04-30 RX ADMIN — LIDOCAINE HYDROCHLORIDE 50 MG: 10 INJECTION, SOLUTION INFILTRATION; PERINEURAL at 07:39

## 2018-04-30 RX ADMIN — ROCURONIUM BROMIDE 10 MG: 10 INJECTION INTRAVENOUS at 08:21

## 2018-04-30 RX ADMIN — SODIUM CHLORIDE, SODIUM LACTATE, POTASSIUM CHLORIDE, AND CALCIUM CHLORIDE 125 ML/HR: .6; .31; .03; .02 INJECTION, SOLUTION INTRAVENOUS at 08:48

## 2018-04-30 RX ADMIN — ONDANSETRON HYDROCHLORIDE 4 MG: 2 INJECTION, SOLUTION INTRAVENOUS at 08:30

## 2018-04-30 RX ADMIN — GLYCOPYRROLATE 0.4 MG: 0.2 INJECTION, SOLUTION INTRAMUSCULAR; INTRAVENOUS at 08:30

## 2018-04-30 RX ADMIN — FENTANYL CITRATE 50 MCG: 50 INJECTION, SOLUTION INTRAMUSCULAR; INTRAVENOUS at 08:46

## 2018-04-30 RX ADMIN — FENTANYL CITRATE 100 MCG: 50 INJECTION, SOLUTION INTRAMUSCULAR; INTRAVENOUS at 07:39

## 2018-04-30 RX ADMIN — SODIUM CHLORIDE, SODIUM LACTATE, POTASSIUM CHLORIDE, AND CALCIUM CHLORIDE 125 ML/HR: .6; .31; .03; .02 INJECTION, SOLUTION INTRAVENOUS at 06:50

## 2018-04-30 RX ADMIN — MIDAZOLAM HYDROCHLORIDE 2 MG: 1 INJECTION, SOLUTION INTRAMUSCULAR; INTRAVENOUS at 07:35

## 2018-04-30 NOTE — ANESTHESIA POSTPROCEDURE EVALUATION
Post-Op Assessment Note      CV Status:  Stable    Mental Status:  Somnolent    Hydration Status:  Stable    PONV Controlled:  None    Airway Patency:  Patent    Post Op Vitals Reviewed: Yes          Staff: CRNA           BP   132/79   Temp   97 3   Pulse  76   Resp   16   SpO2   100

## 2018-04-30 NOTE — INTERIM OP NOTE
CHOLECYSTECTOMY LAPAROSCOPIC  Postoperative Note  PATIENT NAME: Philly Saldivar  : 1980  MRN: [id number]  [facility] OR ROOM 02    Surgery Date: 2018    Preop Diagnosis:  Gallbladder polyp [K82 4]    Post-Op Diagnosis Codes:     * Gallbladder polyp [K82 4]    Procedure(s) (LRB):  CHOLECYSTECTOMY LAPAROSCOPIC (N/A)    Surgeon(s) and Role:      * Mae Karimi DO - Primary     * Aylin Castillo PA-C - Assisting    Specimens:  ID Type Source Tests Collected by Time Destination   1 :  Tissue Gallbladder TISSUE EXAM Mae Karimi DO 2018 [id number]        Estimated Blood Loss:   Minimal    Anesthesia Type:   General     Findings:    Distended gallbladder with surrounding adhesions  Complications:   None    SIGNATURE: Mae Karimi DO   DATE: 2018   TIME: 8:36 AM

## 2018-04-30 NOTE — OP NOTE
OPERATIVE REPORT  PATIENT NAME: Jovana Andrews    :  1980  MRN: [id number]  Pt Location: [facility] OR ROOM 02    SURGERY DATE: 2018    Surgeon(s) and Role: * Terrance Francisco DO - Primary     * Rosita Waldron PA-C - Assisting    Preop Diagnosis:  Gallbladder polyp [K82 4]    Post-Op Diagnosis Codes:     * Gallbladder polyp [K82 4]    Procedure(s) (LRB):  CHOLECYSTECTOMY LAPAROSCOPIC (N/A)    Specimen(s):  ID Type Source Tests Collected by Time Destination   1 :  Tissue Gallbladder TISSUE EXAM Terrance FranciscoDO 2018 [id number]        Estimated Blood Loss:   Minimal    Drains:       Anesthesia Type:   General    Operative Indications:  Gallbladder polyp [K82 4]      Operative Findings:  Distended gallbladder    Complications:   None    Procedure and Technique:  The patient was brought to the operating arena and placed in supine position  All regular monitoring devices were connected  The patient underwent general anesthesia with endotracheal intubation without complication  The patient received perioperative antibiotics  The patient received subcutaneous heparin in addition to bilateral lower extremity sequential compression devices for DVT prophylaxis  A timeout was performed prior to incision to ensure correct patient position, procedure, and site  A vertical supraumbilical incision was made using a 15 blade scalpel  Incision was deepened through the deep dermis and simultaneous fat using electrocautery  Hemostasis was obtained  Using S retractors dissection was completed down to the fascia  The fascia was visualized grasped with Viji was elevated and incised  2 stay sutures of 0 Vicryl were then placed  A finger was inserted and the peritoneum palpated and using finger fracture technique the peritoneum was incised  The underside of the peritoneum was palpated and there was no evidence of any bowel or adhesions in the vicinity area  A Solis trocar was then placed under direct vision   The abdomen was insufflated with carbon dioxide to a pressure of 12-14 mmHg  The patient tolerated insufflation well  A laparoscope was then entered and there was no evidence of any trauma from the initial trocar placement  3 additional trochars were then placed in the following positions: An 5 mm trocar was placed in the epigastrium, 2 additional 5 mm trochars were placed on the right costal margin  All trochars were inserted under direct vision and there is no evidence of any injury  The abdomen was inspected and there no abnormality   The patient was then placed in reverse trendelenberg and right side up position  An atraumatic grasper was placed through the lateral port and the gallbladder was grasped and retracted over the dome of the liver  Any filmy adhesion between the gallbladder and omentum, and/or other nearby organs were taken down with a combination of blunt and sharp technique  Additional atraumatic grasper was then placed in the infundibulum of the gallbladder was grasped and retracted to the right lower quadrant  The peritoneum was then was incised using Bovie electrocautery  The cystic artery and cystic duct were then circumferentially dissected  The cystic artery cystic duct were then doubly clipped and divided close the gallbladder  The gallbladder was then taken off the liver bed using hook electrocautery  Hemostasis was achieved with electrocautery  The gallbladder was in place an Endo Catch bag  The cystic artery was inspected and there is appeared to be no oozing  Cystic duct was also inspected and the clips were intact and appeared to be no leakage of bile  The upper abdominal trochars were then removed under direct vision there is no bleeding from trocar site  The Solis cannula was then removed along with the Endo Catch bag containing the gallbladder and the abdomen was allowed to desufflate  The fascia of the umbillical port was then closed using 0 Vicryl suture   The skin of all trochars were then closed with a 4-0 Monocryl  The patient tolerated procedure well was taken to the post anesthesia care unit in stable condition  All lap, needle, and instrument counts were correct  I was present for the entire procedure and A qualified resident physician was not available, Stella Mayberry PA-C, required for adequate exposure and retraction      Patient Disposition:  PACU     SIGNATURE: Meli Laguerre DO  DATE: April 30, 2018  TIME: 9:30 AM

## 2018-04-30 NOTE — INTERVAL H&P NOTE
H&P reviewed  After examining the patient I find no changes in the patients condition since the H&P had been written  Patient was seen in the emergency department here over 2 weeks ago with right upper quadrant abdominal pain  Last menstrual was at the time of total abdominal hysterectomy  /69   Pulse 77   Temp 98 1 °F (36 7 °C) (Tympanic)   Resp 18   Ht 5' 6" (1 676 m)   Wt 93 9 kg (207 lb)   SpO2 97%   BMI 33 41 kg/m²   Heart regular rate and rhythm  Lungs clear to auscultation  Abdomen positive bowel sounds are heard  Scar noted  Abdomen is soft with mild tenderness to deep palpation in the right upper quadrant with negative Roland sign  No definite masses are palpable  Extremities without calf tenderness or peripheral edema  Was all 4 extremities well  Patient will undergo a laparoscopic cholecystectomy, possible open cholecystectomy, this morning      India Mooney PA-C

## 2018-04-30 NOTE — ANESTHESIA PREPROCEDURE EVALUATION
Review of Systems/Medical History  Patient summary reviewed  Chart reviewed      Cardiovascular  Exercise tolerance: good,  Hyperlipidemia,    Pulmonary  Smoker ex-smoker  , No COPD ,        GI/Hepatic            Endo/Other     GYN       Hematology   Musculoskeletal       Neurology   Psychology   Anxiety, Depression ,              Physical Exam    Airway    Mallampati score: I  TM Distance: >3 FB  Neck ROM: full     Dental   upper dentures,     Cardiovascular      Pulmonary      Other Findings        Anesthesia Plan  ASA Score- 2     Anesthesia Type- general with ASA Monitors  Additional Monitors:   Airway Plan: ETT  Plan Factors-    Induction- intravenous  Postoperative Plan-     Informed Consent- Anesthetic plan and risks discussed with patient

## 2018-04-30 NOTE — DISCHARGE INSTRUCTIONS
Follow-up with Dr Marilyn Coyle in 2 weeks as per point  Regular diet as tolerated  Low intensity activity as tolerated, no heavy lifting, nothing greater than 20 lb for 2 weeks  Dressing may be removed on Wednesday  May shower on Wednesday  No baths or swimming  If he developed fevers, nausea vomiting, worsening abdominal pain, redness or drainage from incisions then call the office or go to the ER

## 2018-04-30 NOTE — H&P (VIEW-ONLY)
Gallbladder consult     Assessment/Plan:    Gallbladder polyp  Gallbladder polyp better and measuring 0 5 cm in likely symptomatic and because of the patient's right upper quadrant pain  - preop consent for laparoscopic cholecystectomy  The procedure self including all associated risks and benefits were discussed the patient  Patient verbalized understands risks is willing to proceed  In addition the patient aware that while gallbladder is likely the source of this right upper quadrant pain that there is a possibility that with gallbladder may not completely solve her problem  Patient is understanding of this is would proceed  Consent was signed  - no additional preop workup is needed at this time  Diagnoses and all orders for this visit:    Gallbladder polyp          Subjective:      Patient ID: Angie Monahan is a 34 y o  female  40-year-old female who presents to the office today discuss intermittent right upper quadrant pain  Patient seen by her PCP and also recently and also hospital for intermittent right upper quadrant pain associated with eating  Also noted to have nausea nausea  Pain is mostly right upper quadrant does tender radiates around towards her back  Denies any as reflux symptoms  She has been eating very little due to the fear of worsening pain and nausea  Patient underwent CT scan which was unremarkable  and ultrasound which showed a 0 5 cm gallbladder polyp  No fevers or chills  No changes in bowel bladder habits  No chest pain shortness of breath  The following portions of the patient's history were reviewed and updated as appropriate:   She  has a past medical history of Anxiety; Bipolar disorder ([address] ); and Hyperlipidemia    She   Patient Active Problem List    Diagnosis Date Noted    Gallbladder polyp 04/15/2018    Chronic neck pain 01/23/2018    Muscle cramps 01/23/2018    Impingement syndrome of left shoulder 11/28/2017    Sore throat 11/15/2017    Hyperglycemia 10/19/2017    Left shoulder strain 10/18/2017    Vitamin D deficiency 09/19/2017    Yeast infection of the vagina 07/20/2017    Skin rash 07/14/2017    Shingles 07/06/2017    Benign mole 05/31/2017    Upper limb weakness 05/23/2017    Weakness of both lower extremities 05/23/2017    Injury of right hand 05/11/2017    Sprain of right hand 05/11/2017    Acute back pain 04/26/2017    Leg cramps 04/26/2017    Right foot pain 03/20/2017    Other ovarian cyst, right side 01/27/2017    Pelvic pain in female 01/18/2017    Right lower quadrant abdominal pain 01/11/2017    Contact with and suspected exposure to communicable disease 12/01/2016    Vaginal discharge 12/01/2016    Ankle pain 11/16/2016    Contusion of right ankle 11/16/2016    Acute maxillary sinusitis 09/23/2016    Sprain of right wrist 06/14/2016    Lymphadenitis, acute 06/10/2016    Shortness of breath 05/05/2016    Blurred vision 04/11/2016    Paresthesia 04/11/2016    Bipolar disorder ([facility] [address] ) 03/31/2016    Classic migraine 03/31/2016    Dizziness 03/31/2016    COPD (chronic obstructive pulmonary disease) ([facility]) 03/31/2016    Generalized anxiety disorder 03/31/2016    Hypercholesterolemia 03/31/2016    Leg pain 03/31/2016    Leukocytosis 03/31/2016    Lower back pain 03/31/2016    Lung nodule 03/31/2016    Major depression 03/31/2016    Pain, hand 03/31/2016    Unspecified ovarian cyst, unspecified side 03/31/2016    Encounter for gynecological examination with abnormal finding 07/05/2015    Breast lump on right side at 10 o'clock position 05/19/2015    Tobacco use disorder 05/14/2013    Hyperlipidemia 02/14/2011     She  has a past surgical history that includes Hysterectomy and Tonsillectomy  Her family history includes Cancer in her father; Diabetes in her mother; Heart disease in her mother; Neuropathy in her mother  She  reports that she has quit smoking  Her smoking use included Cigarettes   She smoked 1 50 packs per day  She has never used smokeless tobacco  She reports that she does not drink alcohol or use drugs  Current Outpatient Prescriptions   Medication Sig Dispense Refill    buPROPion (WELLBUTRIN) 100 mg tablet Take 1 tablet by mouth daily      Cholecalciferol (VITAMIN D) 2000 units CAPS Take by mouth      citalopram (CELEXA) 10 mg tablet Take 1 tablet by mouth daily      gabapentin (NEURONTIN) 300 mg capsule Take 100 mg by mouth 3 (three) times a day 1-3 caps      HYDROcodone-acetaminophen (NORCO) 5-325 mg per tablet Take 1 tablet by mouth every 6 (six) hours as needed for pain Max Daily Amount: 4 tablets 20 tablet 0    lamoTRIgine (LAMICTAL) 200 MG tablet Take 1 tablet by mouth 2 (two) times a day      LORazepam (ATIVAN) 0 5 mg tablet Take 1 tablet by mouth daily at bedtime        Multiple Vitamin (DAILY VALUE MULTIVITAMIN) TABS Take by mouth      Nerve Stimulator (STANDARD TENS) ISABELLA by Does not apply route 3 (three) times a day as needed (pain) 1 Device 0    simvastatin (ZOCOR) 40 mg tablet Take 40 mg by mouth daily at bedtime  No current facility-administered medications for this visit  She has No Known Allergies       Review of Systems      A 10 point review of systems was conducted, all negative except as noted above HPI  Objective:      /78   Pulse 78   Temp 98 °F (36 7 °C)   Ht 5' 6" (1 676 m)   Wt 93 9 kg (207 lb)   BMI 33 41 kg/m²           Physical Exam   Constitutional: She is oriented to person, place, and time  She appears well-developed and well-nourished  No distress  HENT:   Head: Normocephalic and atraumatic  Eyes: No scleral icterus  Neck: Normal range of motion  No tracheal deviation present  Cardiovascular: Normal rate, regular rhythm and intact distal pulses  Exam reveals friction rub  Exam reveals no gallop  No murmur heard  Pulmonary/Chest: Effort normal and breath sounds normal  No respiratory distress  She has no wheezes  She has no rales  She exhibits no tenderness  Abdominal: She exhibits no distension and no mass  There is tenderness (Right upper quadrant)  There is no rebound and no guarding  Musculoskeletal: Normal range of motion  She exhibits no edema or deformity  Lymphadenopathy:     She has no cervical adenopathy  Neurological: She is oriented to person, place, and time  No cranial nerve deficit  Skin: Skin is warm  No rash noted  She is not diaphoretic  No erythema  No pallor  Psychiatric: She has a normal mood and affect  Her behavior is normal    Vitals reviewed

## 2018-05-15 ENCOUNTER — OFFICE VISIT (OUTPATIENT)
Dept: SURGERY | Facility: HOSPITAL | Age: 38
End: 2018-05-15

## 2018-05-15 VITALS
HEIGHT: 66 IN | SYSTOLIC BLOOD PRESSURE: 98 MMHG | WEIGHT: 204 LBS | HEART RATE: 72 BPM | DIASTOLIC BLOOD PRESSURE: 66 MMHG | BODY MASS INDEX: 32.78 KG/M2 | TEMPERATURE: 98.8 F

## 2018-05-15 DIAGNOSIS — Z09 POSTOP CHECK: Primary | ICD-10-CM

## 2018-05-15 DIAGNOSIS — K82.4 GALLBLADDER POLYP: ICD-10-CM

## 2018-05-15 DIAGNOSIS — R10.11 RIGHT UPPER QUADRANT ABDOMINAL PAIN: ICD-10-CM

## 2018-05-15 PROCEDURE — 99024 POSTOP FOLLOW-UP VISIT: CPT | Performed by: SURGERY

## 2018-05-15 NOTE — PROGRESS NOTES
Assessment/Plan:    Gallbladder polyp  Doing well  On exam incisions healing well  Pathology reviewed  - follow with yaneth Kothari Diagnoses and all orders for this visit:    Postop check    Right upper quadrant abdominal pain  -     Ambulatory referral to General Surgery    Gallbladder polyp  -     Ambulatory referral to General Surgery          Subjective:      Patient ID: Belinda Bach is a 34 y o  female  25-year-old female status post laparoscopic cholecystectomy, presents today for follow-up  Overall doing well  Tolerating diet  No fevers or chills  No nausea vomiting  No abdominal pain  Incisions without redness or drainage  The following portions of the patient's history were reviewed and updated as appropriate:   She  has a past medical history of Anxiety; Bipolar disorder ([facility] [address] ); Chronic pain; and Hyperlipidemia    She   Patient Active Problem List    Diagnosis Date Noted    Gallbladder polyp 04/15/2018    Chronic neck pain 01/23/2018    Muscle cramps 01/23/2018    Impingement syndrome of left shoulder 11/28/2017    Sore throat 11/15/2017    Hyperglycemia 10/19/2017    Left shoulder strain 10/18/2017    Vitamin D deficiency 09/19/2017    Yeast infection of the vagina 07/20/2017    Skin rash 07/14/2017    Shingles 07/06/2017    Benign mole 05/31/2017    Upper limb weakness 05/23/2017    Weakness of both lower extremities 05/23/2017    Injury of right hand 05/11/2017    Sprain of right hand 05/11/2017    Acute back pain 04/26/2017    Leg cramps 04/26/2017    Right foot pain 03/20/2017    Other ovarian cyst, right side 01/27/2017    Pelvic pain in female 01/18/2017    Right lower quadrant abdominal pain 01/11/2017    Contact with and suspected exposure to communicable disease 12/01/2016    Vaginal discharge 12/01/2016    Ankle pain 11/16/2016    Contusion of right ankle 11/16/2016    Acute maxillary sinusitis 09/23/2016    Sprain of right wrist 06/14/2016    Lymphadenitis, acute 06/10/2016    Shortness of breath 05/05/2016    Blurred vision 04/11/2016    Paresthesia 04/11/2016    Bipolar disorder ([facility]) 03/31/2016    Classic migraine 03/31/2016    Dizziness 03/31/2016    COPD (chronic obstructive [facility] disease) ([facility]) 03/31/2016    Generalized anxiety disorder 03/31/2016    Hypercholesterolemia 03/31/2016    Leg pain 03/31/2016    Leukocytosis 03/31/2016    Lower back pain 03/31/2016    Lung nodule 03/31/2016    Major depression 03/31/2016    Pain, hand 03/31/2016    Unspecified ovarian cyst, unspecified side 03/31/2016    Encounter for gynecological examination with abnormal finding 07/05/2015    Breast lump on right side at 10 o'clock position 05/19/2015    Tobacco use disorder 05/14/2013    Hyperlipidemia 02/14/2011     She  has a past surgical history that includes Hysterectomy; Tonsillectomy; and pr lap,cholecystectomy (N/A, 4/30/2018)  Her family history includes Cancer in her father; Diabetes in her mother; Heart disease in her mother; Neuropathy in her mother  She  reports that she has quit smoking  Her smoking use included Cigarettes  She smoked 1 50 packs per day  She has never used smokeless tobacco  She reports that she does not drink alcohol or use drugs    Current Outpatient Prescriptions   Medication Sig Dispense Refill    buPROPion (WELLBUTRIN) 100 mg tablet Take 1 tablet by mouth daily      Cholecalciferol (VITAMIN D) 2000 units CAPS Take by mouth      citalopram (CELEXA) 10 mg tablet Take 1 tablet by mouth daily      gabapentin (NEURONTIN) 300 mg capsule Take 100 mg by mouth 3 (three) times a day 1-3 caps      HYDROcodone-acetaminophen (NORCO) 5-325 mg per tablet Take 1 tablet by mouth every 6 (six) hours as needed for pain Earliest Fill Date: 4/27/18 Max Daily Amount: 4 tablets 20 tablet 0    lamoTRIgine (LAMICTAL) 200 MG tablet Take 1 tablet by mouth 2 (two) times a day      LORazepam (ATIVAN) 0 5 mg tablet Take 1 tablet by mouth daily at bedtime        Multiple Vitamin (DAILY VALUE MULTIVITAMIN) TABS Take by mouth      Nerve Stimulator (STANDARD TENS) ISABELLA by Does not apply route 3 (three) times a day as needed (pain) 1 Device 0    simvastatin (ZOCOR) 40 mg tablet Take 40 mg by mouth daily at bedtime  No current facility-administered medications for this visit  She has No Known Allergies       Review of Systems   Constitutional: Negative  Gastrointestinal: Negative  Skin: Negative  Objective:      BP 98/66   Pulse 72   Temp 98 8 °F (37 1 °C)   Ht 5' 6" (1 676 m)   Wt 92 5 kg (204 lb)   BMI 32 93 kg/m²          Physical Exam   Constitutional: She appears well-developed and well-nourished  No distress  Abdominal: Soft  She exhibits no distension and no mass  There is no tenderness  There is no rebound and no guarding  Incisions x4, clean, dry, intact  Skin: She is not diaphoretic  Vitals reviewed

## 2018-05-16 NOTE — PROGRESS NOTES
Patient ID: Tara Felton is a 34 y o  female  Assessment/Plan:  Patient is a 42-year-old lady complaining of paresthesias, muscle cramps and stiffness/weakness throughout the body  Patient states that her symptoms are unchanged from prior visits  Neurological examination is normal  Workup including MRI brain, EMG were unremarkable  X-ray of the cervical spine shows loss of lordosis  X-ray of the lumbar spine was unremarkable  MRI of the cervical spine  With mild noncompressive degenerative changes, not signifiant to be contributing to her radicular complaints    The patient states that she had tried multivitamins, Magnesium,  and tonic water but did not help for the cramps  She was going to physical therapy for left shoulder pain  Patient denies any headaches at this time  Patient recommended to continue with multivitamins daily and try gabapentin 300 mg twice daily or up to 3 times daily  Suggested try OTC Lidoderm patches, if effective will try to get prescription patches covered  Encouraged to use TENs unit more frequently  Pt to decide on return to PT  Following with Ortho for left SCM    No problem-specific Assessment & Plan notes found for this encounter  Diagnoses and all orders for this visit:    Chronic neck pain    Impingement syndrome of left shoulder    Leg cramps    Paresthesia           Subjective:    HPI    Patient is a 42-year-old lady coming in for follow-up in regards to paresthesias and cramps  Patient was initially evaluated in June 2017  Shavonne Strickland Patient has h/o hypercholesterolemia and bipolar disorder and depression She initially noticed she had cramps in both lower extremities and right hand Felt her hand cramping was constant  It was really bad per the patient  She describes cramps as tightness/stiffness    She also has tingling in both the arms as well and her PCP ordered MRI brain which was normal  She also feels like bugs crawling sensation for few months     She tried Magnesium and it did not help  She has occasional migraine like headaches but they are tolerable  SHe did not have one in few months  Excedrin migraine helps her  Headache is all over the head, pounding headache, lasts few hours  Headache is worse with lights and sounds    She does not want any preventive medication as they are tolerable     ----CRP less than 3, ESR 34, rheumatoid factor negative, PK screen negative, Lyme negative, ESR 7  --- MRI brain with and without contrast on 5/27/17 showed no acute intracranial abnormality or pathologic enhancement, no significant white matter changes  ---X-ray of the hand on 5/11/17 showed no acute osseous abnormality  ---X-ray of the forearm right showed no acute osseous abnormality  --- X-ray foot on the right showed no acute osseous abnormality, prominent plantar calcaneal spur  Todays history 5/17/18:  Pt continues with C/O pain in B/L posterior cervical region, notes  Paresthesia of UE She was undergoing PT with some benefit in her cervical but this was put on hold for her recent cholecystecotmy  She notes ongoing cramping of her thighs  She was seen by ortho for her left shoulder,  She was given a TEN's unit which she uses once a day  She has Gabapentin which she only uses at night time  --MRI cervical 2/17/18: Noncompressive degenerative changes of the cervical spine, present to a mild degree  Underwent laproscopic cholecystectomy     Interval history 01/23/2018:  Patient states that she still has cramps in her hands and thighs  She feels it is still the same but did not help  She tried multivitamins and is taking it but did not help  Patient only on gabapentin in past 300mg without benefit    She has some neck pain, mostly 6/10 in intensity, heat helps and she is doing PT, radiates to the left shoulder  She has some tingling and numbness in her hands as well  She had left shoulder pain, saw orthopedics and had MRI shoulder that was normal  her neck   X-ray in June showed some loss of lordosis in the neck     --HbA1c in October 2017 was 5 6, vitamin B12 751, TSH 0 827, vitamin-D 68  9  Ach receptor antibodies were normal  CPK 76  --MRI of the left shoulder without contrast on 12/26/2017 was normal  The --MRI brain was unremarkable   ---EMG performed in our lab on 10/06/2017 of right upper and lower extremity was normal in the was no evidence of peripheral neuropathy or myopathy or right cervical or lumbar radiculopathy or entrapment neuropathy       :      The following portions of the patient's history were reviewed and updated as appropriate: allergies, current medications, past family history, past medical history, past social history, past surgical history and problem list          Objective:    Blood pressure 112/64, pulse 72, resp  rate 18, weight 93 kg (205 lb 2 oz)  Physical Exam   Constitutional: She appears well-developed  HENT:   Head: Normocephalic  Eyes: Pupils are equal, round, and reactive to light  Neck: Normal range of motion  Mild palplitory tenderness  Posterior paraspinal muscle   Cardiovascular: Normal rate and regular rhythm  Pulmonary/Chest: Effort normal    Musculoskeletal: She exhibits no edema  Neurological: She has normal strength and normal reflexes  Gait normal    Psychiatric: She has a normal mood and affect  Her speech is normal and behavior is normal  Judgment and thought content normal        Neurological Exam    Mental Status  The patient is and oriented to person, place, time, and situation  Her speech is normal  Her language is fluent with no aphasia  She has normal attention span and concentration  She follows multi-step commands  She has a normal fund of knowledge  Cranial Nerves  CN I: The patient has not tested  CN III, IV, VI: The patient's pupils are equally round and reactive to light    CN V: The patient has normal facial sensation  CN VII:  The patient has symmetric facial movement  CN VIII:  The patient's hearing is normal   CN XI: The patient's shoulder shrug strength is normal     Motor  The patient has normal muscle bulk throughout  Her overall muscle tone is normal throughout  She has normal movement  Her strength is 5/5 throughout all four extremities  Sensory  The patient's sensation is normal in all four extremities  Reflexes  Deep tendon reflexes are 2+ and symmetric in all four extremities with downgoing toes bilaterally  Gait and Coordination  The patient has normal gait and station  She has normal right finger to nose and normal left finger to nose coordination  ROS:    Review of Systems   Constitutional: Negative  HENT: Negative  Eyes: Negative  Respiratory: Negative  Cardiovascular: Negative  Gastrointestinal: Negative  Endocrine: Negative  Genitourinary: Negative  Musculoskeletal: Positive for arthralgias, neck pain and neck stiffness  Skin: Negative  Allergic/Immunologic: Negative  Neurological: Negative  Hematological: Negative  Psychiatric/Behavioral: Negative

## 2018-05-17 ENCOUNTER — OFFICE VISIT (OUTPATIENT)
Dept: NEUROLOGY | Facility: CLINIC | Age: 38
End: 2018-05-17
Payer: COMMERCIAL

## 2018-05-17 VITALS
WEIGHT: 205.13 LBS | DIASTOLIC BLOOD PRESSURE: 64 MMHG | BODY MASS INDEX: 33.11 KG/M2 | RESPIRATION RATE: 18 BRPM | SYSTOLIC BLOOD PRESSURE: 112 MMHG | HEART RATE: 72 BPM

## 2018-05-17 DIAGNOSIS — R20.2 PARESTHESIA: ICD-10-CM

## 2018-05-17 DIAGNOSIS — G89.29 CHRONIC NECK PAIN: Primary | ICD-10-CM

## 2018-05-17 DIAGNOSIS — R25.2 LEG CRAMPS: ICD-10-CM

## 2018-05-17 DIAGNOSIS — M54.2 CHRONIC NECK PAIN: Primary | ICD-10-CM

## 2018-05-17 DIAGNOSIS — M75.42 IMPINGEMENT SYNDROME OF LEFT SHOULDER: ICD-10-CM

## 2018-05-17 PROCEDURE — 99214 OFFICE O/P EST MOD 30 MIN: CPT | Performed by: NURSE PRACTITIONER

## 2018-05-17 NOTE — PATIENT INSTRUCTIONS
Try OTC Lidoderm patches   Tens unit   PT if needed  Increase Gabapentin  1  Twice a day   If tolerated , increase to 3 times a day

## 2018-05-22 ENCOUNTER — OFFICE VISIT (OUTPATIENT)
Dept: FAMILY MEDICINE CLINIC | Facility: CLINIC | Age: 38
End: 2018-05-22
Payer: COMMERCIAL

## 2018-05-22 VITALS
WEIGHT: 205 LBS | DIASTOLIC BLOOD PRESSURE: 78 MMHG | TEMPERATURE: 97.7 F | HEIGHT: 66 IN | BODY MASS INDEX: 32.95 KG/M2 | HEART RATE: 73 BPM | SYSTOLIC BLOOD PRESSURE: 100 MMHG | OXYGEN SATURATION: 97 %

## 2018-05-22 DIAGNOSIS — I95.89 OTHER SPECIFIED HYPOTENSION: Primary | ICD-10-CM

## 2018-05-22 PROCEDURE — 99213 OFFICE O/P EST LOW 20 MIN: CPT | Performed by: FAMILY MEDICINE

## 2018-05-22 NOTE — PROGRESS NOTES
Assessment/Plan:    No problem-specific Assessment & Plan notes found for this encounter  Diagnoses and all orders for this visit:    Other specified hypotension  Comments:  keep home BP log review here in 2 weeks          Subjective:      Patient ID: Ricci Daniels is a 34 y o  female  Pt complains of 2 weeks of lightheadedness and dizziness and black spots in her vision, it occurs with standing or standing for long periods, pt had a follow up visit for gallbladder surgery and was found to have a low BP of 98 over 66, pt had her gallbladder out on April 30th, pt is checking Bps at home and seeing 100-115 over 60-65, pt has a history of lower Bps but not quite that low        The following portions of the patient's history were reviewed and updated as appropriate: allergies, current medications, past family history, past medical history, past social history, past surgical history and problem list     Review of Systems   Respiratory: Negative for shortness of breath and wheezing  Cardiovascular: Negative for chest pain and palpitations  Neurological: Positive for light-headedness and headaches  Objective:      /78 (BP Location: Left arm, Patient Position: Sitting, Cuff Size: Large)   Pulse 73   Temp 97 7 °F (36 5 °C) (Tympanic)   Ht 5' 6" (1 676 m)   Wt 93 kg (205 lb)   SpO2 97%   BMI 33 09 kg/m²          Physical Exam   Constitutional: She is oriented to person, place, and time  She appears well-developed and well-nourished  No distress  HENT:   Head: Normocephalic and atraumatic  Eyes: Conjunctivae and EOM are normal  Pupils are equal, round, and reactive to light  No scleral icterus  Neck: Normal range of motion  Neck supple  Cardiovascular: Normal rate, regular rhythm and normal heart sounds  No murmur heard  Pulmonary/Chest: Effort normal and breath sounds normal  No respiratory distress  She has no wheezes  She has no rales  Abdominal: Soft   Bowel sounds are normal  She exhibits no distension and no mass  There is no tenderness  There is no rebound and no guarding  Musculoskeletal: She exhibits no edema  Lymphadenopathy:     She has no cervical adenopathy  Neurological: She is alert and oriented to person, place, and time  She exhibits normal muscle tone  Skin: Skin is warm and dry  No rash noted  She is not diaphoretic  No erythema  No pallor  Psychiatric: She has a normal mood and affect  Her behavior is normal  Judgment and thought content normal    Nursing note and vitals reviewed

## 2018-06-11 ENCOUNTER — OFFICE VISIT (OUTPATIENT)
Dept: FAMILY MEDICINE CLINIC | Facility: CLINIC | Age: 38
End: 2018-06-11
Payer: COMMERCIAL

## 2018-06-11 VITALS
DIASTOLIC BLOOD PRESSURE: 70 MMHG | SYSTOLIC BLOOD PRESSURE: 108 MMHG | TEMPERATURE: 97 F | WEIGHT: 206 LBS | BODY MASS INDEX: 33.11 KG/M2 | OXYGEN SATURATION: 97 % | HEART RATE: 89 BPM | HEIGHT: 66 IN

## 2018-06-11 DIAGNOSIS — I95.89 OTHER SPECIFIED HYPOTENSION: Primary | ICD-10-CM

## 2018-06-11 DIAGNOSIS — I95.1 ORTHOSTATIC HYPOTENSION: ICD-10-CM

## 2018-06-11 PROCEDURE — 99213 OFFICE O/P EST LOW 20 MIN: CPT | Performed by: FAMILY MEDICINE

## 2018-06-11 NOTE — PROGRESS NOTES
Assessment/Plan:    No problem-specific Assessment & Plan notes found for this encounter  Diagnoses and all orders for this visit:    Other specified hypotension    Orthostatic hypotension  -     Echo complete congenital; Future  -     VAS carotid complete study; Future          Subjective:      Patient ID: Flaquito Wilder is a 34 y o  female  Follow up for hypotension, pt has been checking Bps at home and seeing an average of 110 over 65, which matches her historical average, pt has been feeling lightheaded especially with activity in the last 3 weeks, pt noted that this occurred while shoveling, doing dishes, standing up quickly, mowing the grass, the pt has never passed out and recovers quickly if she sits down        The following portions of the patient's history were reviewed and updated as appropriate: allergies, current medications, past family history, past medical history, past social history, past surgical history and problem list     Review of Systems   Constitutional: Negative for chills and fever  Respiratory: Negative for shortness of breath and wheezing  Cardiovascular: Negative for chest pain and palpitations  Gastrointestinal: Negative for abdominal pain, nausea and vomiting  Objective:      /70 (BP Location: Right arm, Patient Position: Sitting, Cuff Size: Large)   Pulse 89   Temp (!) 97 °F (36 1 °C) (Tympanic)   Ht 5' 6" (1 676 m)   Wt 93 4 kg (206 lb)   SpO2 97%   BMI 33 25 kg/m²          Physical Exam   Constitutional: She is oriented to person, place, and time  She appears well-developed and well-nourished  No distress  HENT:   Head: Normocephalic and atraumatic  Right Ear: External ear normal    Left Ear: External ear normal    Nose: Nose normal    Mouth/Throat: Oropharynx is clear and moist  No oropharyngeal exudate  Eyes: Conjunctivae and EOM are normal  Pupils are equal, round, and reactive to light  No scleral icterus  Neck: Normal range of motion  Neck supple  Cardiovascular: Normal rate, regular rhythm and normal heart sounds  No murmur heard  Pulmonary/Chest: Effort normal and breath sounds normal  No respiratory distress  She has no wheezes  She has no rales  Abdominal: Soft  Bowel sounds are normal  She exhibits no distension and no mass  There is no tenderness  There is no rebound and no guarding  Musculoskeletal: She exhibits no edema  Lymphadenopathy:     She has no cervical adenopathy  Neurological: She is alert and oriented to person, place, and time  She exhibits normal muscle tone  Skin: Skin is warm and dry  No rash noted  She is not diaphoretic  No erythema  No pallor  Psychiatric: She has a normal mood and affect  Her behavior is normal  Judgment and thought content normal    Nursing note and vitals reviewed

## 2018-07-05 ENCOUNTER — TRANSCRIBE ORDERS (OUTPATIENT)
Dept: ADMINISTRATIVE | Facility: HOSPITAL | Age: 38
End: 2018-07-05

## 2018-07-05 ENCOUNTER — HOSPITAL ENCOUNTER (OUTPATIENT)
Dept: NON INVASIVE DIAGNOSTICS | Facility: HOSPITAL | Age: 38
Discharge: HOME | End: 2018-07-05
Payer: COMMERCIAL

## 2018-07-05 DIAGNOSIS — I10 ORTHOSTATIC HYPERTENSION: ICD-10-CM

## 2018-07-05 DIAGNOSIS — I95.1 ORTHOSTATIC HYPOTENSION: ICD-10-CM

## 2018-07-05 DIAGNOSIS — I95.1 ORTHOSTATIC HYPOTENSION: Primary | ICD-10-CM

## 2018-07-05 PROCEDURE — 93880 EXTRACRANIAL BILAT STUDY: CPT

## 2018-07-05 PROCEDURE — 93306 TTE W/DOPPLER COMPLETE: CPT | Performed by: INTERNAL MEDICINE

## 2018-07-05 PROCEDURE — 93306 TTE W/DOPPLER COMPLETE: CPT

## 2018-07-06 PROCEDURE — 93880 EXTRACRANIAL BILAT STUDY: CPT | Performed by: SURGERY

## 2018-07-19 ENCOUNTER — APPOINTMENT (OUTPATIENT)
Dept: LAB | Facility: CLINIC | Age: 38
End: 2018-07-19
Payer: COMMERCIAL

## 2018-07-19 LAB
25(OH)D3 SERPL-MCNC: 37.7 NG/ML (ref 30–100)
ALBUMIN SERPL BCP-MCNC: 3.8 G/DL (ref 3.5–5)
ALP SERPL-CCNC: 66 U/L (ref 46–116)
ALT SERPL W P-5'-P-CCNC: 19 U/L (ref 12–78)
ANION GAP SERPL CALCULATED.3IONS-SCNC: 4 MMOL/L (ref 4–13)
AST SERPL W P-5'-P-CCNC: 9 U/L (ref 5–45)
BASOPHILS # BLD AUTO: 0.04 THOUSANDS/ΜL (ref 0–0.1)
BASOPHILS NFR BLD AUTO: 0 % (ref 0–1)
BILIRUB SERPL-MCNC: 0.33 MG/DL (ref 0.2–1)
BUN SERPL-MCNC: 8 MG/DL (ref 5–25)
CALCIUM SERPL-MCNC: 9.1 MG/DL (ref 8.3–10.1)
CHLORIDE SERPL-SCNC: 107 MMOL/L (ref 100–108)
CHOLEST SERPL-MCNC: 143 MG/DL (ref 50–200)
CO2 SERPL-SCNC: 28 MMOL/L (ref 21–32)
CREAT SERPL-MCNC: 0.73 MG/DL (ref 0.6–1.3)
EOSINOPHIL # BLD AUTO: 0.14 THOUSAND/ΜL (ref 0–0.61)
EOSINOPHIL NFR BLD AUTO: 1 % (ref 0–6)
ERYTHROCYTE [DISTWIDTH] IN BLOOD BY AUTOMATED COUNT: 13.2 % (ref 11.6–15.1)
GFR SERPL CREATININE-BSD FRML MDRD: 103 ML/MIN/1.73SQ M
GLUCOSE P FAST SERPL-MCNC: 82 MG/DL (ref 65–99)
HCT VFR BLD AUTO: 40.7 % (ref 34.8–46.1)
HDLC SERPL-MCNC: 42 MG/DL (ref 40–60)
HGB BLD-MCNC: 13 G/DL (ref 11.5–15.4)
IMM GRANULOCYTES # BLD AUTO: 0.05 THOUSAND/UL (ref 0–0.2)
IMM GRANULOCYTES NFR BLD AUTO: 0 % (ref 0–2)
LDLC SERPL CALC-MCNC: 69 MG/DL (ref 0–100)
LYMPHOCYTES # BLD AUTO: 4.64 THOUSANDS/ΜL (ref 0.6–4.47)
LYMPHOCYTES NFR BLD AUTO: 39 % (ref 14–44)
MCH RBC QN AUTO: 29.7 PG (ref 26.8–34.3)
MCHC RBC AUTO-ENTMCNC: 31.9 G/DL (ref 31.4–37.4)
MCV RBC AUTO: 93 FL (ref 82–98)
MONOCYTES # BLD AUTO: 0.62 THOUSAND/ΜL (ref 0.17–1.22)
MONOCYTES NFR BLD AUTO: 5 % (ref 4–12)
NEUTROPHILS # BLD AUTO: 6.42 THOUSANDS/ΜL (ref 1.85–7.62)
NEUTS SEG NFR BLD AUTO: 55 % (ref 43–75)
NONHDLC SERPL-MCNC: 101 MG/DL
NRBC BLD AUTO-RTO: 0 /100 WBCS
PLATELET # BLD AUTO: 315 THOUSANDS/UL (ref 149–390)
PMV BLD AUTO: 9.5 FL (ref 8.9–12.7)
POTASSIUM SERPL-SCNC: 4.1 MMOL/L (ref 3.5–5.3)
PROT SERPL-MCNC: 7.6 G/DL (ref 6.4–8.2)
RBC # BLD AUTO: 4.37 MILLION/UL (ref 3.81–5.12)
SODIUM SERPL-SCNC: 139 MMOL/L (ref 136–145)
T4 FREE SERPL-MCNC: 1.03 NG/DL (ref 0.76–1.46)
TRIGL SERPL-MCNC: 158 MG/DL
TSH SERPL DL<=0.05 MIU/L-ACNC: 0.18 UIU/ML (ref 0.36–3.74)
WBC # BLD AUTO: 11.91 THOUSAND/UL (ref 4.31–10.16)

## 2018-07-19 PROCEDURE — 84443 ASSAY THYROID STIM HORMONE: CPT | Performed by: FAMILY MEDICINE

## 2018-07-19 PROCEDURE — 82306 VITAMIN D 25 HYDROXY: CPT | Performed by: FAMILY MEDICINE

## 2018-07-19 PROCEDURE — 80061 LIPID PANEL: CPT | Performed by: FAMILY MEDICINE

## 2018-07-19 PROCEDURE — 85025 COMPLETE CBC W/AUTO DIFF WBC: CPT | Performed by: FAMILY MEDICINE

## 2018-07-19 PROCEDURE — 36415 COLL VENOUS BLD VENIPUNCTURE: CPT | Performed by: FAMILY MEDICINE

## 2018-07-19 PROCEDURE — 80053 COMPREHEN METABOLIC PANEL: CPT | Performed by: FAMILY MEDICINE

## 2018-07-19 PROCEDURE — 84439 ASSAY OF FREE THYROXINE: CPT | Performed by: FAMILY MEDICINE

## 2018-08-08 ENCOUNTER — OFFICE VISIT (OUTPATIENT)
Dept: FAMILY MEDICINE CLINIC | Facility: CLINIC | Age: 38
End: 2018-08-08
Payer: COMMERCIAL

## 2018-08-08 VITALS
HEIGHT: 66 IN | DIASTOLIC BLOOD PRESSURE: 78 MMHG | HEART RATE: 64 BPM | SYSTOLIC BLOOD PRESSURE: 108 MMHG | TEMPERATURE: 98.5 F | BODY MASS INDEX: 31.88 KG/M2 | OXYGEN SATURATION: 98 % | WEIGHT: 198.38 LBS

## 2018-08-08 DIAGNOSIS — F17.200 SMOKING: ICD-10-CM

## 2018-08-08 DIAGNOSIS — E78.1 HYPERTRIGLYCERIDEMIA: ICD-10-CM

## 2018-08-08 DIAGNOSIS — Z00.00 MEDICARE ANNUAL WELLNESS VISIT, SUBSEQUENT: Primary | ICD-10-CM

## 2018-08-08 DIAGNOSIS — E78.00 HYPERCHOLESTEROLEMIA: ICD-10-CM

## 2018-08-08 DIAGNOSIS — R79.89 LOW TSH LEVEL: ICD-10-CM

## 2018-08-08 PROCEDURE — G0439 PPPS, SUBSEQ VISIT: HCPCS | Performed by: FAMILY MEDICINE

## 2018-08-08 PROCEDURE — 3008F BODY MASS INDEX DOCD: CPT | Performed by: FAMILY MEDICINE

## 2018-08-08 PROCEDURE — 99214 OFFICE O/P EST MOD 30 MIN: CPT | Performed by: FAMILY MEDICINE

## 2018-08-08 PROCEDURE — 3725F SCREEN DEPRESSION PERFORMED: CPT | Performed by: FAMILY MEDICINE

## 2018-08-08 RX ORDER — VARENICLINE TARTRATE 25 MG
KIT ORAL
Qty: 53 TABLET | Refills: 0 | Status: SHIPPED | OUTPATIENT
Start: 2018-08-08 | End: 2018-09-14

## 2018-08-08 RX ORDER — BUPROPION HYDROCHLORIDE 150 MG/1
100 TABLET, EXTENDED RELEASE ORAL EVERY MORNING
Refills: 0 | COMMUNITY
Start: 2018-07-19 | End: 2019-06-05 | Stop reason: DRUGHIGH

## 2018-08-08 NOTE — PROGRESS NOTES
Assessment and Plan:    Problem List Items Addressed This Visit     None        Health Maintenance Due   Topic Date Due    HIV SCREENING  1978   [employer] Cam[employer] Medicare Annual Wellness Visit (AWV)  1980    MAMMOGRAM  1978    PNEUMOCOCCAL POLYSACCHARIDE VACCINE AGE 2-64 HIGH RISK  01/17/1980    DTaP,Tdap,and Td Vaccines (1 - Tdap) 01/17/1999    PAP SMEAR  01/17/1999         HPI:  Flaquito Wilder is a 34 y o  female here for her Subsequent Wellness Visit      Patient Active Problem List   Diagnosis    Acute back pain    Acute maxillary sinusitis    Ankle pain    Benign mole    Bipolar disorder (HCC)    Breast lump on right side at 10 o'clock position    Blurred vision    Chronic neck pain    Classic migraine    Contact with and suspected exposure to communicable disease    Contusion of right ankle    Dizziness    Encounter for gynecological examination with abnormal finding    COPD [facility]chronic obstructive pulmonary disease) ([facility])    Generalized anxiety disorder    Hypercholesterolemia    Hyperlipidemia    Hyperglycemia    Impingement syndrome of left shoulder    Injury of right hand    Left shoulder strain    Leg cramps    Leg pain    Leukocytosis    Lower back pain    Lung nodule    Lymphadenitis, acute    Major depression    Muscle cramps    Other ovarian cyst, right side    Pain, hand    Paresthesia    Pelvic pain in female    Right foot pain    Right lower quadrant abdominal pain    Shingles    Shortness of breath    Skin rash    Sprain of right hand    Sprain of right wrist    Sore throat    Tobacco use disorder    Unspecified ovarian cyst, unspecified side    Upper limb weakness    Vaginal discharge    Vitamin D deficiency    Weakness of both lower extremities    Yeast infection of the vagina    Gallbladder polyp     Past Medical History:   Diagnosis Date    Anxiety     Bipolar disorder (HCC)     Chronic pain     Endometriosis     Hyperlipidemia     Urinary frequency     resolved: 6/14/2016    Urinary urgency     resolved: 6/14/2016     Past Surgical History:   Procedure Laterality Date    BLADDER SUSPENSION      HERNIA REPAIR      HYSTERECTOMY      partial    OVARIAN CYST REMOVAL      IL LAP,CHOLECYSTECTOMY N/A 4/30/2018    Procedure: CHOLECYSTECTOMY LAPAROSCOPIC;  Surgeon: Precious Ruiz DO;  Location: [facility];  Service: General    TONSILLECTOMY       Family History   Problem Relation Age of Onset    Diabetes Mother     Heart disease Mother         rheumatic    Neuropathy Mother     COPD Mother     Diabetes type II Mother     Cancer Father     Lung cancer Father     Stroke Maternal Grandmother         CVA    Diabetes type II Maternal Grandmother         mellitus    Other Maternal Grandfather         esophageal abnormality    Diabetes type II Paternal Grandmother         mellitus    Diabetes type II Paternal Grandfather         mellitus    Depression Family     Esophageal cancer Family     Lung cancer Family     Stomach cancer Family      History   Smoking Status    Former Smoker    Packs/day: 1 50    Types: Cigarettes   Smokeless Tobacco    Never Used     Comment: never smoker ( as per allscripts)     History   Alcohol Use No     Comment: rare; consumes alcohol occasionally (as per allscripts); social alcohol use (As per allscripts)      History   Drug Use No       Current Outpatient Prescriptions   Medication Sig Dispense Refill    buPROPion (WELLBUTRIN) 100 mg tablet Take 1 tablet by mouth daily      Cholecalciferol (VITAMIN D) 2000 units CAPS Take by mouth      citalopram (CELEXA) 10 mg tablet Take 1 tablet by mouth daily      gabapentin (NEURONTIN) 300 mg capsule Take 100 mg by mouth 3 (three) times a day 1-3 caps      lamoTRIgine (LAMICTAL) 200 MG tablet Take 1 tablet by mouth 2 (two) times a day      LORazepam (ATIVAN) 0 5 mg tablet Take 1 tablet by mouth daily at bedtime        Multiple Vitamin (DAILY VALUE MULTIVITAMIN) TABS Take by mouth      Nerve Stimulator (STANDARD TENS) ISABELLA by Does not apply route 3 (three) times a day as needed (pain) 1 Device 0    simvastatin (ZOCOR) 40 mg tablet Take 40 mg by mouth daily at bedtime  No current facility-administered medications for this visit        No Known Allergies  Immunization History   Administered Date(s) Administered    Influenza Quadrivalent Preservative Free 3 years and older IM 01/23/2018    Td (adult), Unspecified 03/06/2007       Patient Care Team:  Kel Bhat DO as PCP - General  Geoff Alatorre MD      Medicare Screening Tests and Risk Assessments:  AWV Clinical     ISAR:       Once in a Lifetime Medicare Screening:       Medicare Screening Tests and Risk Assessment:   AAA Risk Assessment    Osteoporosis Risk Assessment    HIV Risk Assessment        Drug and Alcohol Use:   Tobacco use    Tobacco use duration    Tobacco Cessation Readiness    Alcohol use    Alcohol Treatment Readiness   Illicit Drug Use        Diet & Exercise:   Diet   How many servings a day of the following:   Exercise        Cognitive Impairment Screening:   Cognitive Impairment Screening        Functional Ability/Level of Safety:   Hearing    Hearing Impairment Assessment    Current Activities    Help needed with the folllowing:    ADL    Fall Risk   Injury History       Home Safety:   Home Safety Risk Factors       Advanced Directives:   Advanced Directives    Patient's End of Life Decisions        Urinary Incontinence:       Glaucoma:            Provider Screening    No data filed

## 2018-08-08 NOTE — PROGRESS NOTES
Assessment/Plan:    No problem-specific Assessment & Plan notes found for this encounter  Diagnoses and all orders for this visit:    Medicare annual wellness visit, subsequent    Hypercholesterolemia    Hypertriglyceridemia  Comments:  pt counseled on diet and exercise    Low TSH level  Comments:  normal T4 pt is asymptomatic    Smoking  -     varenicline (CHANTIX ALMA) 0 5 MG X 11 & 1 MG X 42 tablet; Take one 0 5mg tab by mouth 1x daily for 3 days, then increase to one 0 5mg tab 2x daily for 3 days, then increase to one 1mg tab 2x daily    Other orders  -     buPROPion (WELLBUTRIN SR) 150 mg 12 hr tablet; Take 150 mg by mouth every morning          Subjective:      Patient ID: Emilee Royal is a 34 y o  female  Pt admits to starting smoking again secondary to stress      Hyperlipidemia   This is a chronic problem  The problem is controlled  Recent lipid tests were reviewed and are normal  Pertinent negatives include no chest pain, myalgias or shortness of breath  Current antihyperlipidemic treatment includes exercise and statins  The current treatment provides moderate improvement of lipids  Compliance problems include adherence to diet  The following portions of the patient's history were reviewed and updated as appropriate: allergies, current medications, past family history, past medical history, past social history, past surgical history and problem list     Review of Systems   Constitutional: Negative for chills, fatigue and fever  HENT: Negative  Eyes: Negative  Respiratory: Negative for shortness of breath and wheezing  Cardiovascular: Negative for chest pain and palpitations  Gastrointestinal: Negative for abdominal pain, blood in stool, constipation, diarrhea, nausea and vomiting  Endocrine: Negative  Negative for cold intolerance and heat intolerance  Genitourinary: Negative for dysuria  Musculoskeletal: Negative for arthralgias and myalgias  Skin: Negative  Allergic/Immunologic: Negative  Neurological: Negative for seizures and syncope  Hematological: Negative for adenopathy  Psychiatric/Behavioral: Negative  Objective:      /78 (BP Location: Left arm, Patient Position: Sitting, Cuff Size: Standard)   Pulse 64   Temp 98 5 °F (36 9 °C) (Tympanic)   Ht 5' 6" (1 676 m)   Wt 90 kg (198 lb 6 oz)   SpO2 98%   BMI 32 02 kg/m²          Physical Exam   Constitutional: She is oriented to person, place, and time  She appears well-developed and well-nourished  No distress  HENT:   Head: Normocephalic and atraumatic  Right Ear: External ear normal    Left Ear: External ear normal    Nose: Nose normal    Mouth/Throat: Oropharynx is clear and moist    Eyes: Conjunctivae and EOM are normal  Pupils are equal, round, and reactive to light  No scleral icterus  Neck: Normal range of motion  Neck supple  Cardiovascular: Normal rate, regular rhythm and normal heart sounds  No murmur heard  Pulmonary/Chest: Effort normal and breath sounds normal  No respiratory distress  She has no wheezes  She has no rales  Abdominal: Soft  Bowel sounds are normal  She exhibits no distension and no mass  There is no tenderness  There is no rebound and no guarding  Musculoskeletal: Normal range of motion  She exhibits no edema  Lymphadenopathy:     She has no cervical adenopathy  Neurological: She is alert and oriented to person, place, and time  She has normal reflexes  She exhibits normal muscle tone  Skin: Skin is warm and dry  No rash noted  She is not diaphoretic  No erythema  No pallor  Psychiatric: She has a normal mood and affect  Her behavior is normal  Judgment and thought content normal    Nursing note and vitals reviewed

## 2018-09-14 DIAGNOSIS — F17.200 SMOKING: ICD-10-CM

## 2018-09-14 RX ORDER — VARENICLINE TARTRATE 1 MG/1
1 TABLET, FILM COATED ORAL 2 TIMES DAILY
Qty: 60 TABLET | Refills: 2 | Status: SHIPPED | OUTPATIENT
Start: 2018-09-14 | End: 2018-10-25 | Stop reason: SDUPTHER

## 2018-10-09 DIAGNOSIS — R20.2 PARESTHESIA: Primary | ICD-10-CM

## 2018-10-09 RX ORDER — GABAPENTIN 300 MG/1
300 CAPSULE ORAL 3 TIMES DAILY
Qty: 90 CAPSULE | Refills: 1 | Status: SHIPPED | OUTPATIENT
Start: 2018-10-09 | End: 2019-02-05 | Stop reason: SDUPTHER

## 2018-10-25 ENCOUNTER — IMMUNIZATION (OUTPATIENT)
Dept: FAMILY MEDICINE CLINIC | Facility: CLINIC | Age: 38
End: 2018-10-25
Payer: COMMERCIAL

## 2018-10-25 DIAGNOSIS — Z23 ENCOUNTER FOR IMMUNIZATION: ICD-10-CM

## 2018-10-25 DIAGNOSIS — F17.200 SMOKING: ICD-10-CM

## 2018-10-25 PROCEDURE — 90686 IIV4 VACC NO PRSV 0.5 ML IM: CPT

## 2018-10-25 PROCEDURE — G0008 ADMIN INFLUENZA VIRUS VAC: HCPCS

## 2018-10-25 RX ORDER — VARENICLINE TARTRATE 1 MG/1
1 TABLET, FILM COATED ORAL 2 TIMES DAILY
Qty: 60 TABLET | Refills: 1 | Status: SHIPPED | OUTPATIENT
Start: 2018-10-25 | End: 2019-01-03

## 2018-11-15 ENCOUNTER — OFFICE VISIT (OUTPATIENT)
Dept: NEUROLOGY | Facility: CLINIC | Age: 38
End: 2018-11-15
Payer: COMMERCIAL

## 2018-11-15 DIAGNOSIS — R20.2 PARESTHESIA: ICD-10-CM

## 2018-11-15 DIAGNOSIS — R25.2 LEG CRAMPS: ICD-10-CM

## 2018-11-15 DIAGNOSIS — E55.9 VITAMIN D DEFICIENCY: ICD-10-CM

## 2018-11-15 DIAGNOSIS — S46.912D STRAIN OF LEFT SHOULDER, SUBSEQUENT ENCOUNTER: ICD-10-CM

## 2018-11-15 DIAGNOSIS — R42 DIZZINESS: ICD-10-CM

## 2018-11-15 DIAGNOSIS — M54.2 CERVICALGIA: Primary | ICD-10-CM

## 2018-11-15 PROBLEM — M79.671 RIGHT FOOT PAIN: Status: RESOLVED | Noted: 2017-03-20 | Resolved: 2018-11-15

## 2018-11-15 PROBLEM — S69.91XA INJURY OF RIGHT HAND: Status: RESOLVED | Noted: 2017-05-11 | Resolved: 2018-11-15

## 2018-11-15 PROBLEM — D22.9 BENIGN MOLE: Status: RESOLVED | Noted: 2017-05-31 | Resolved: 2018-11-15

## 2018-11-15 PROBLEM — S63.91XA SPRAIN OF RIGHT HAND: Status: RESOLVED | Noted: 2017-05-11 | Resolved: 2018-11-15

## 2018-11-15 PROBLEM — B37.31 YEAST INFECTION OF THE VAGINA: Status: RESOLVED | Noted: 2017-07-20 | Resolved: 2018-11-15

## 2018-11-15 PROBLEM — B37.3 YEAST INFECTION OF THE VAGINA: Status: RESOLVED | Noted: 2017-07-20 | Resolved: 2018-11-15

## 2018-11-15 PROBLEM — J02.9 SORE THROAT: Status: RESOLVED | Noted: 2017-11-15 | Resolved: 2018-11-15

## 2018-11-15 PROCEDURE — 99214 OFFICE O/P EST MOD 30 MIN: CPT | Performed by: NURSE PRACTITIONER

## 2018-11-15 RX ORDER — TIZANIDINE HYDROCHLORIDE 2 MG/1
2 CAPSULE, GELATIN COATED ORAL
Qty: 30 CAPSULE | Refills: 1 | Status: SHIPPED | OUTPATIENT
Start: 2018-11-15 | End: 2018-11-26 | Stop reason: SDUPTHER

## 2018-11-15 NOTE — PATIENT INSTRUCTIONS
Continue with Gabapentin   tens   try lidoderm patch  Try Zanaflex 2mg at bedtime   can increase if needed   call office with update   LUKE for low BP   maintain fluids  Consider PMR

## 2018-11-15 NOTE — PROGRESS NOTES
Patient ID: Bryanna Castellon is a 34 y o  female  Assessment/Plan:    Patient is a 78-year-old lady complaining of paresthesias, muscle cramps and stiffness/weakness throughout the body  Patient states that her symptoms are unchanged from prior visits  Neurological examination is normal  Workup including MRI brain, EMG were unremarkable  X-ray of the cervical spine shows loss of lordosis  X-ray of the lumbar spine was unremarkable  MRI of the cervical spine  With mild noncompressive degenerative changes, not significant to be contributing to her radicular complaints    The patient states that she had tried multivitamins, Magnesium,  and tonic water but did not help for the cramps  She was going to physical therapy for left shoulder pain  Patient denies any headaches at this time         Patient recommended to continue with multivitamins daily  gabapentin 300 mg twice daily or up to 3 times daily  Suggested try OTC Lidoderm patches, if effective will try to get prescription patches covered  Encouraged to use TENs unit more frequently  Can try Zanaflex 2 mg at bedtime, patient to call with update  Following with Ortho for left SCM  Encouraged patient to utilize Abdiaziz stockings for hypotension, follow-up with PCP  Discussed potential evaluation with PMR      No problem-specific Assessment & Plan notes found for this encounter  Diagnoses and all orders for this visit:    Cervicalgia  -     TiZANidine (ZANAFLEX) 2 MG capsule; Take 1 capsule (2 mg total) by mouth daily at bedtime    Strain of left shoulder, subsequent encounter    Paresthesia    Leg cramps  -     Compression Stocking    Dizziness  -     Compression Stocking    Vitamin D deficiency           Subjective:    HPI    Patient is a 78-year-old lady coming in for follow-up in regards to paresthesias and cramps  Patient was initially evaluated in June 2017  Janette Wade   Patient has h/o hypercholesterolemia and bipolar disorder and depression She initially noticed she had cramps in both lower extremities and right hand Felt her hand cramping was constant  She describes cramps as tightness/stiffness    She also has tingling in both the arms  MRI brain was normal  She also feels like bugs crawling sensation for few months  She tried Magnesium and it did not help  She has occasional migraine like headaches but they are tolerable  SHe did not have one in few months  Excedrin migraine helps her  Headache is all over the head, pounding headache, lasts few hours  Headache is worse with lights and sounds    She does not want any preventive medication as they are tolerable     ----CRP less than 3, ESR 34, rheumatoid factor negative, PK screen negative, Lyme negative, ESR 7  --- MRI brain with and without contrast on 5/27/17 showed no acute intracranial abnormality or pathologic enhancement, no significant white matter changes  ---X-ray of the hand on 5/11/17 showed no acute osseous abnormality  ---X-ray of the forearm right showed no acute osseous abnormality  --- X-ray foot on the right showed no acute osseous abnormality, prominent plantar calcaneal spur        Todays History; 11/15/18  Today, she continues to describe ongoing neck discomfort, left greater than right  She notes cramping in her hands bilaterally  This seems to be more prominent at night time  On occasional, she has difficulty with her sleep      She is utilizing gabapentin 2-3 tablets daily  She does have a 10s unit      Overall, she feels her symptoms are fairly stable, her headaches are infrequent  Seen by PCP for c/o dizziness and lightheadedness with static positioning  BP noted to be on low side, Asked to maintain BP diary  BP around 110  She was ordered for ECHO and Carotid US    ---ECHOLEFT VENTRICLE:  Systolic function was normal  Ejection fraction was estimated to be 60 %  There were no regional wall motion abnormalities  -- Carotid US: Bilateral There is no evidence of arterial disease throughout the extracranial carotid system  Vertebral artery flow is antegrade  There is no significant subclavian artery disease  -- Labs 7/19/18: T4=1 03, TSH=0 181, Vit D =37 7, CMP normal       history 5/17/18:  Pt continues with C/O pain in B/L posterior cervical region, notes  Paresthesia of UE She was undergoing PT with some benefit in her cervical but this was put on hold for her recent cholecystecotmy  She notes ongoing cramping of her thighs  She was seen by ortho for her left shoulder,  She was given a TEN's unit which she uses once a day  She has Gabapentin which she only uses at night time  --MRI cervical 2/17/18: Noncompressive degenerative changes of the cervical spine, present to a mild degree  Underwent laproscopic cholecystectomy     Interval history 01/23/2018:  Patient states that she still has cramps in her hands and thighs  She feels it is still the same but did not help  She tried multivitamins and is taking it but did not help  Patient only on gabapentin in past 300mg without benefit    She has some neck pain, mostly 6/10 in intensity, heat helps and she is doing PT, radiates to the left shoulder  She has some tingling and numbness in her hands as well  She had left shoulder pain, saw orthopedics and had MRI shoulder that was normal  her neck  X-ray in June showed some loss of lordosis in the neck     --HbA1c in October 2017 was 5 6, vitamin B12 751, TSH 0 827, vitamin-D 68  9  Ach receptor antibodies were normal  CPK 76    --MRI of the left shoulder without contrast on 12/26/2017 was normal  The --MRI brain was unremarkable   ---EMG performed in our lab on 10/06/2017 of right upper and lower extremity was normal in the was no evidence of peripheral neuropathy or myopathy or right cervical or lumbar radiculopathy or entrapment neuropathy           The following portions of the patient's history were reviewed and updated as appropriate: allergies, current medications, past family history, past medical history, past social history, past surgical history and problem list          Objective:    Blood pressure 104/76, resp  rate 18, weight 85 kg (187 lb 8 oz)  Physical Exam   Constitutional: She appears well-developed  HENT:   Head: Normocephalic  Eyes: Pupils are equal, round, and reactive to light  Neck: Normal range of motion  Cardiovascular: Normal rate  Pulmonary/Chest: Effort normal    Musculoskeletal: Normal range of motion  Neurological: She has normal strength and normal reflexes  Coordination normal    Psychiatric: She has a normal mood and affect  Her speech is normal and behavior is normal  Judgment and thought content normal        Neurological Exam  Mental Status  Awake, alert and oriented to person, place and time  Speech is normal  Language is fluent with no aphasia  Cranial Nerves  CN III, IV, VI: Extraocular movements intact bilaterally  Pupils equal round and reactive to light bilaterally  CN V: Facial sensation is normal   CN VII: Full and symmetric facial movement  CN XI: Shoulder shrug strength is normal     Motor  Normal muscle bulk throughout  Normal muscle tone  Strength is 5/5 throughout all four extremities  Sensory  Sensation is intact to light touch, pinprick, vibration and proprioception in all four extremities  Reflexes  Deep tendon reflexes are 2+ and symmetric in all four extremities with downgoing toes bilaterally  Coordination  Finger-to-nose, rapid alternating movements and heel-to-shin normal bilaterally without dysmetria  Gait  Casual gait is normal including stance, stride, and arm swing  ROS:    Review of Systems   Constitutional: Negative  HENT: Negative  Eyes: Negative  Respiratory: Negative  Cardiovascular: Negative  Gastrointestinal: Negative  Endocrine: Negative  Genitourinary: Negative  Musculoskeletal: Positive for back pain and neck pain  Skin: Negative  Allergic/Immunologic: Negative      Neurological: Negative  Hematological: Negative  Psychiatric/Behavioral: The patient is nervous/anxious

## 2018-11-20 ENCOUNTER — TELEPHONE (OUTPATIENT)
Dept: NEUROLOGY | Facility: CLINIC | Age: 38
End: 2018-11-20

## 2018-11-20 DIAGNOSIS — M54.2 CERVICALGIA: ICD-10-CM

## 2018-11-20 NOTE — TELEPHONE ENCOUNTER
Pt states she was recommended to try salonpas a few weeks ago, neck/shoulder pain  This has been helpful  Pt states paola told her she would get her a rx strength type salonpas    Pt also started zanaflex 2mg Qhs; he increased to 2tabs last night per paola recommendation- fyi    Please advise

## 2018-11-25 VITALS
BODY MASS INDEX: 30.26 KG/M2 | RESPIRATION RATE: 18 BRPM | WEIGHT: 187.5 LBS | SYSTOLIC BLOOD PRESSURE: 104 MMHG | DIASTOLIC BLOOD PRESSURE: 76 MMHG

## 2018-11-26 DIAGNOSIS — M54.2 CERVICALGIA: Primary | ICD-10-CM

## 2018-11-26 RX ORDER — LIDOCAINE 50 MG/G
1 PATCH TOPICAL DAILY
Qty: 30 PATCH | Refills: 0 | Status: SHIPPED | OUTPATIENT
Start: 2018-11-26 | End: 2019-01-03

## 2018-11-26 RX ORDER — TIZANIDINE HYDROCHLORIDE 4 MG/1
4 CAPSULE, GELATIN COATED ORAL
Qty: 30 CAPSULE | Refills: 0 | Status: SHIPPED | OUTPATIENT
Start: 2018-11-26 | End: 2018-12-28 | Stop reason: SDUPTHER

## 2018-11-26 NOTE — TELEPHONE ENCOUNTER
Patient made aware rx for zanaflex sent to pharm, she states pharmacy told her lidocaine patches require a PA  PA initiated for lidocaine patches through [facility]  Awaiting determination

## 2018-11-26 NOTE — TELEPHONE ENCOUNTER
Patient made aware rx for lidocaine patches sent to pharm  Patient is requesting a new rx sent to pharm for tizanadine since she's increased her dose to 4 mg at bed and will run out

## 2018-11-27 NOTE — TELEPHONE ENCOUNTER
Magali from [facility] called for additional info re lidocaine PA  Questioning if pt has FDA approved dx of postherpetic neuralgia  Advised her dx not in chart  They will fax determination

## 2018-11-29 DIAGNOSIS — E78.00 HYPERCHOLESTEROLEMIA: Primary | ICD-10-CM

## 2018-11-29 RX ORDER — LIDOCAINE 50 MG/G
OINTMENT TOPICAL AS NEEDED
Qty: 35.44 G | Refills: 0 | Status: SHIPPED | OUTPATIENT
Start: 2018-11-29 | End: 2018-12-28 | Stop reason: SDUPTHER

## 2018-11-29 NOTE — TELEPHONE ENCOUNTER
She can continue to use OTC patches,  We can try to compound a cream but they are also very expensive   Let  Me know if she wants to try topical

## 2018-11-29 NOTE — TELEPHONE ENCOUNTER
Called and advised pt of all of the below  Pt states OTC patches are expensive   Pt is willing to try topical       thanks

## 2018-11-29 NOTE — TELEPHONE ENCOUNTER
I sent in for lidocaine ointment    If that doesn't work, Soo can address when she returns and prescribe something else if indicated

## 2018-11-30 RX ORDER — SIMVASTATIN 40 MG
TABLET ORAL
Qty: 90 TABLET | Refills: 3 | Status: SHIPPED | OUTPATIENT
Start: 2018-11-30 | End: 2019-11-04 | Stop reason: SDUPTHER

## 2018-12-28 DIAGNOSIS — M54.2 CERVICALGIA: ICD-10-CM

## 2018-12-28 RX ORDER — TIZANIDINE HYDROCHLORIDE 4 MG/1
4 CAPSULE, GELATIN COATED ORAL
Qty: 30 CAPSULE | Refills: 0 | Status: SHIPPED | OUTPATIENT
Start: 2018-12-28 | End: 2019-02-03 | Stop reason: SDUPTHER

## 2018-12-28 RX ORDER — LIDOCAINE 50 MG/G
1 PATCH TOPICAL DAILY
Qty: 30 PATCH | Refills: 0 | Status: CANCELLED | OUTPATIENT
Start: 2018-12-28

## 2018-12-28 RX ORDER — LIDOCAINE 50 MG/G
OINTMENT TOPICAL AS NEEDED
Qty: 35.44 G | Refills: 0 | Status: SHIPPED | OUTPATIENT
Start: 2018-12-28 | End: 2019-12-27

## 2018-12-28 NOTE — TELEPHONE ENCOUNTER
From: Juana Salinas  Sent: 12/28/2018 2:38 PM EST  Subject: Medication Renewal Request    Juana Salinas would like a refill of the following medications:     lidocaine (LIDODERM) 5 % [SEEMA Duke]     TiZANidine (ZANAFLEX) 4 MG capsule SEEMA Bartlett]    [address]  [address] [id number]        Medication renewals requested in this message routed separately:     lidocaine (XYLOCAINE) 5 % ointment Christiane Allison PA-C]

## 2019-01-03 ENCOUNTER — OFFICE VISIT (OUTPATIENT)
Dept: FAMILY MEDICINE CLINIC | Facility: CLINIC | Age: 39
End: 2019-01-03
Payer: COMMERCIAL

## 2019-01-03 ENCOUNTER — PATIENT OUTREACH (OUTPATIENT)
Dept: CASE MANAGEMENT | Facility: OTHER | Age: 39
End: 2019-01-03

## 2019-01-03 ENCOUNTER — PATIENT OUTREACH (OUTPATIENT)
Dept: FAMILY MEDICINE CLINIC | Facility: CLINIC | Age: 39
End: 2019-01-03

## 2019-01-03 ENCOUNTER — APPOINTMENT (OUTPATIENT)
Dept: RADIOLOGY | Facility: CLINIC | Age: 39
End: 2019-01-03
Payer: COMMERCIAL

## 2019-01-03 VITALS
BODY MASS INDEX: 30.22 KG/M2 | OXYGEN SATURATION: 99 % | RESPIRATION RATE: 16 BRPM | TEMPERATURE: 98.1 F | HEART RATE: 66 BPM | DIASTOLIC BLOOD PRESSURE: 76 MMHG | WEIGHT: 188 LBS | SYSTOLIC BLOOD PRESSURE: 118 MMHG | HEIGHT: 66 IN

## 2019-01-03 DIAGNOSIS — M25.511 ACUTE PAIN OF RIGHT SHOULDER: Primary | ICD-10-CM

## 2019-01-03 DIAGNOSIS — M25.511 ACUTE PAIN OF RIGHT SHOULDER: ICD-10-CM

## 2019-01-03 DIAGNOSIS — Z71.89 COMPLEX CARE COORDINATION: Primary | ICD-10-CM

## 2019-01-03 PROCEDURE — 73030 X-RAY EXAM OF SHOULDER: CPT

## 2019-01-03 PROCEDURE — 99213 OFFICE O/P EST LOW 20 MIN: CPT | Performed by: FAMILY MEDICINE

## 2019-01-03 RX ORDER — LIDOCAINE 50 MG/G
OINTMENT TOPICAL
COMMUNITY
End: 2019-01-03

## 2019-01-03 RX ORDER — TIZANIDINE HYDROCHLORIDE 4 MG/1
CAPSULE, GELATIN COATED ORAL
COMMUNITY
End: 2019-01-03

## 2019-01-03 RX ORDER — IBUPROFEN 800 MG/1
800 TABLET ORAL EVERY 8 HOURS PRN
Qty: 30 TABLET | Refills: 2 | Status: SHIPPED | OUTPATIENT
Start: 2019-01-03 | End: 2019-04-29

## 2019-01-03 RX ORDER — CHOLECALCIFEROL (VITAMIN D3) 125 MCG
4000 CAPSULE ORAL DAILY
COMMUNITY

## 2019-01-03 RX ORDER — PREDNISONE 10 MG/1
TABLET ORAL
Qty: 30 TABLET | Refills: 0 | Status: SHIPPED | OUTPATIENT
Start: 2019-01-03 | End: 2019-01-15 | Stop reason: ALTCHOICE

## 2019-01-03 RX ORDER — TIZANIDINE HYDROCHLORIDE 2 MG/1
CAPSULE, GELATIN COATED ORAL
COMMUNITY
End: 2019-01-03

## 2019-01-03 RX ORDER — CITALOPRAM 20 MG/1
10 TABLET ORAL EVERY MORNING
Refills: 0 | COMMUNITY
Start: 2019-01-02

## 2019-01-03 NOTE — PROGRESS NOTES
Assessment/Plan:    No problem-specific Assessment & Plan notes found for this encounter  Diagnoses and all orders for this visit:    Acute pain of right shoulder  -     XR shoulder 2+ vw right; Future  -     predniSONE 10 mg tablet; 4 pills for 3 days, then 3 pills for 3 days, then 2 pills for 3 days, then 1 pills for 3 days, then stop  -     ibuprofen (MOTRIN) 800 mg tablet; Take 1 tablet (800 mg total) by mouth every 8 (eight) hours as needed for mild pain or moderate pain    Other orders  -     citalopram (CeleXA) 20 mg tablet; Take 20 mg by mouth every morning  -     Discontinue: TiZANidine (ZANAFLEX) 4 MG capsule; tizanidine 4 mg capsule  -     Discontinue: lidocaine (XYLOCAINE) 5 % ointment; lidocaine 5 % topical ointment  -     Discontinue: TiZANidine (ZANAFLEX) 2 MG capsule; tizanidine 2 mg capsule  -     cholecalciferol (VITAMIN D3) 400 units tablet; Take 400 Units by mouth daily          Subjective:      Patient ID: Bryanna Castellon is a 34 y o  female  Lateral right shoulder pain stabbing in nature, no injury, no known overuse, worse with raising the arm over head      Shoulder Pain    The pain is present in the right shoulder  This is a new problem  The current episode started 1 to 4 weeks ago  The problem has been unchanged  The pain is moderate  Pertinent negatives include no fever  The symptoms are aggravated by cold and heat  She has tried NSAIDS for the symptoms  The treatment provided no relief  The following portions of the patient's history were reviewed and updated as appropriate: allergies, current medications, past family history, past medical history, past social history, past surgical history and problem list     Review of Systems   Constitutional: Negative for chills and fever  Skin: Negative for rash           Objective:      /76   Pulse 66   Temp 98 1 °F (36 7 °C) (Tympanic)   Resp 16   Ht 5' 6" (1 676 m)   Wt 85 3 kg (188 lb)   SpO2 99%   BMI 30 34 kg/m² Physical Exam   Constitutional: She is oriented to person, place, and time  She appears well-developed and well-nourished  No distress  HENT:   Head: Normocephalic and atraumatic  Eyes: Pupils are equal, round, and reactive to light  Conjunctivae and EOM are normal  No scleral icterus  Neck: Normal range of motion  Neck supple  Cardiovascular: Normal rate, regular rhythm and normal heart sounds  No murmur heard  Pulmonary/Chest: Effort normal and breath sounds normal  No respiratory distress  She has no wheezes  She has no rales  Musculoskeletal: She exhibits no edema  Lymphadenopathy:     She has no cervical adenopathy  Neurological: She is alert and oriented to person, place, and time  She exhibits normal muscle tone  Skin: Skin is warm and dry  No rash noted  She is not diaphoretic  No erythema  No pallor  Psychiatric: She has a normal mood and affect  Her behavior is normal  Judgment and thought content normal    Nursing note and vitals reviewed  Right Shoulder Exam   Right shoulder exam is normal     Range of Motion   Active Abduction: normal   Passive Abduction: normal   Extension: normal   Forward Flexion: normal   External Rotation: normal     Muscle Strength   The patient has normal right shoulder strength      Other   Erythema: absent  Scars: absent

## 2019-01-10 ENCOUNTER — OFFICE VISIT (OUTPATIENT)
Dept: FAMILY MEDICINE CLINIC | Facility: CLINIC | Age: 39
End: 2019-01-10
Payer: COMMERCIAL

## 2019-01-10 VITALS
RESPIRATION RATE: 18 BRPM | TEMPERATURE: 98.1 F | OXYGEN SATURATION: 98 % | SYSTOLIC BLOOD PRESSURE: 104 MMHG | HEART RATE: 71 BPM | WEIGHT: 188 LBS | HEIGHT: 66 IN | BODY MASS INDEX: 30.22 KG/M2 | DIASTOLIC BLOOD PRESSURE: 66 MMHG

## 2019-01-10 DIAGNOSIS — M25.511 ACUTE PAIN OF RIGHT SHOULDER: Primary | ICD-10-CM

## 2019-01-10 PROCEDURE — 99213 OFFICE O/P EST LOW 20 MIN: CPT | Performed by: FAMILY MEDICINE

## 2019-01-10 NOTE — PROGRESS NOTES
Physical Medicine & Rehabilitation New Patient Evaluation  Ariel Mortimer Leas 34 y o  female      ASSESSMENT/PLAN:     34year old female with past medical history of depression, bipolar, hyperlipidemia, who presents today as a new patient evaluation of her chronic left-sided neck pain and upper back pain  She reports this is a chronic issue and is nonradiating  On examination patient has palpable tight taut musculature on the left upper neck and upper back muscles with 2 trigger points identified today  Neurologic examination was within normal limits  Plan:  -cervical myofascial pain syndrome:   Outpatient physical therapy referral provided  Recommendation for topical muscle relaxant agents made today  Personalized exercises to stretch her cervical and upper back extensors were personally provided   -return to clinic in 6-8 weeks for follow-up  During that visit also consider trigger point injections       Diagnoses and all orders for this visit:    Cervical myofascial pain syndrome  -     Ambulatory referral to Physical Therapy; Future    Try OTC muscle relaxation agents:  1 - Menthol 8-10% and Camphor  2 - EMU oil  3 - Arnica cream      *I have spent 45 minutes with Patient  today in which greater than 50% of this time was spent in counseling/coordination of care regarding Intructions for management, Patient and family education and Impressions  HPI:   Yvonne Moore 34 y o  female right handed, with  has a past medical history of Anxiety; Bipolar disorder ([address] ); Chronic pain; Endometriosis; Hyperlipidemia; Urinary frequency; and Urinary urgency  Old records were reviewed personally  From Dr Vaughn Jonas assessment:   40-year-old lady complaining of paresthesias, muscle cramps and stiffness/weakness throughout the body  Patient states that her symptoms are unchanged from prior visits  Neurological examination is normal  Workup including MRI brain, EMG were unremarkable   X-ray of the cervical spine shows loss of lordosis  X-ray of the lumbar spine was unremarkable  MRI of the cervical spine  With mild noncompressive degenerative changes, not significant to be contributing to her radicular complaints    The patient states that she had tried multivitamins, Magnesium, and tonic water but did not help for the cramps  She was going to physical therapy for left shoulder pain  Patient denies any headaches at this time  Imaging: I personally reviewed pertinent imaging  X-ray of the cervical spine shows loss of lordosis  SUBJECTIVE:  Patient presents today in the office with chief complaint of chronic neck and upper back pain for about 1-2 years  She is unclear if it's related to trauma but did have a fall while breaking up a fight between her dog and another with a contusion to her head and neck  Patient went to ER and was discharged at [facility] beds at the [facility] without abnormalities on her [place] [facility]  Patient states her pain is located in the left side of her neck and base of shoulder  Constant tightness  Patient sometimes feels intermittent numbness and tingling in her legs and hands  EMG/NCS completed with normal results  Patient feels her symptoms are persistent and not going away  Patient has tried PT and was using traction with some improvement  However her PT was interupped due to a cholescystectomy  Patient was doing this outpatient PT in [facility] pass  Also tried TENs unit 3 times a day  Also trying the lidocaine ointment however does not find this helpful  Also started on Tizanidine with temporary relief but not alleviating pain  Heat is somewhat helpful  Patient denies any positions which make it worse  No falls, or trouble with balance, no bowel or bladder incontinence              Expanded Social History:  Patient lives with spouse with 1 son in a single family home with 0 steps to enter  Driving:  Yes      Function:   Current Level of Function:   Independent with mobility self care     Review of Systems   Constitutional: Negative  HENT: Negative  Eyes: Negative  Respiratory: Negative  Cardiovascular: Negative  Gastrointestinal: Negative  Endocrine: Negative  Genitourinary: Negative  Musculoskeletal: Positive for neck pain and neck stiffness  Shoulder pain    Skin: Negative  Allergic/Immunologic: Negative  Neurological: Positive for light-headedness  Hematological: Negative  Psychiatric/Behavioral: Negative  All other systems reviewed and are negative        OBJECTIVE:   /70 (BP Location: Right arm, Patient Position: Sitting, Cuff Size: Standard)   Pulse 79   Resp 12   Ht 5' 6" (1 676 m)   Wt 86 4 kg (190 lb 6 4 oz)   BMI 30 73 kg/m²      Physical Exam   Constitutional: She appears well-developed and well-nourished  HENT:   Head: Normocephalic and atraumatic  Eyes: Pupils are equal, round, and reactive to light  EOM are normal    Cardiovascular: Normal rate and regular rhythm  Pulmonary/Chest: Breath sounds normal  She has no wheezes  She has no rales  Abdominal: Soft  Bowel sounds are normal  She exhibits no distension  There is no tenderness  Musculoskeletal:   Cervical inspection within normal limits no pain with palpation of spinous processes  Lack of cervical lordosis present  Cervical range of motion is slightly limited in lateral bending towards the right and left as well as rotation towards the left  Palpation of cervical and upper back musculature on the left very tight with several bands felt  2 trigger points identified which when palpated exactly reproduce her symptoms   Skin: Skin is warm  Psychiatric: She has a normal mood and affect  Nursing note and vitals reviewed      Neuro:  Sensation to light touch intact  Reflexes:  2+ and symmetric in the bilateral biceps, brachioradialis, triceps tendon  Negative Cindy's bilaterally  Negative Spurling maneuver  Negative Romberg  Gait:   Within normal limits, patient is able to perform tandem gait without loss of balance  Motor Exam:   Right Left Site Right Left Site   5 5 S Ab:  Shoulder Abductors   HF:  Hip Flexors   5 5 EF:  Elbow Flexors   H Ab:   Hip Abductors   5 5 EE:  Elbow Extensors   KF: Knee Flexors   5 5 WE:  Wrist Extensors   KE:  Knee Extensors   5 5 FF:  Finger Flexors   DR:  Dorsi Flexors     HI:  Hand Intrinsics   EHL: Ext Dale Longus   5 5    PF:  Plantar Flexors         Labs:   Lab Results   Component Value Date    WBC 11 91 (H) 07/19/2018    HGB 13 0 07/19/2018    HCT 40 7 07/19/2018    MCV 93 07/19/2018     07/19/2018     Lab Results   Component Value Date    GLUCOSE 82 12/07/2015    CALCIUM 9 1 07/19/2018     04/08/2018    K 4 1 07/19/2018    CO2 28 07/19/2018     07/19/2018    BUN 8 07/19/2018    CREATININE 0 73 07/19/2018         Past Medical History:   Diagnosis Date    Anxiety     [facility]     Chronic pain     Endometriosis     Hyperlipidemia     Urinary frequency     resolved: 6/14/2016    Urinary urgency     resolved: 6/14/2016       Patient Active Problem List    Diagnosis Date Noted    Cervicalgia 11/15/2018    Gallbladder polyp 04/15/2018    Chronic neck pain 01/23/2018    Muscle cramps 01/23/2018    Impingement syndrome of left shoulder 11/28/2017    Hyperglycemia 10/19/2017    Left shoulder strain 10/18/2017    Vitamin D deficiency 09/19/2017    Skin rash 07/14/2017    Shingles 07/06/2017    Upper limb weakness 05/23/2017    Weakness of both lower extremities 05/23/2017    Acute back pain 04/26/2017    Leg cramps 04/26/2017    Other ovarian cyst, right side 01/27/2017    Pelvic pain in female 01/18/2017    Right lower quadrant abdominal pain 01/11/2017    Contact with and suspected exposure to communicable disease 12/01/2016    Vaginal discharge 12/01/2016    Lymphadenitis, acute 06/10/2016    Shortness of breath 05/05/2016    Paresthesia 04/11/2016    [facility] disorder [facility] 03/31/2016    Classic migraine 03/31/2016    Dizziness 03/31/2016    COPD (chronic obstructive pulmonary disease) (HCC) 03/31/2016    Generalized anxiety disorder 03/31/2016    Hypercholesterolemia 03/31/2016    Leg pain 03/31/2016    Leukocytosis 03/31/2016    Lower back pain 03/31/2016    Lung nodule 03/31/2016    Major depression 03/31/2016    Pain, hand 03/31/2016    Encounter for gynecological examination with abnormal finding 07/05/2015    Breast lump on right side at 10 o'clock position 05/19/2015    Tobacco use disorder 05/14/2013    Hyperlipidemia 02/14/2011       Past Surgical History:   Procedure Laterality Date    BLADDER SUSPENSION      HERNIA REPAIR      HYSTERECTOMY      partial    OVARIAN CYST REMOVAL      [place] LAP,CHOLECYSTECTOMY N/A 4/30/2018    Procedure: CHOLECYSTECTOMY LAPAROSCOPIC;  Surgeon: Gabby Nayak DO;  Location: [facility];  Service: General    TONSILLECTOMY         Family History   Problem Relation Age of Onset    Diabetes Mother     Heart disease Mother         rheumatic    Neuropathy Mother     COPD Mother     Diabetes type II Mother     Cancer Father     Lung cancer Father     Stroke Maternal Grandmother         CVA    Diabetes type II Maternal Grandmother         mellitus    Other Maternal Grandfather         esophageal abnormality    Diabetes type II Paternal Grandmother         mellitus    Diabetes type II Paternal Grandfather         mellitus    Depression Family     Esophageal cancer Family     Lung cancer Family     Stomach cancer Family        Social History     No Known Allergies      Current Outpatient Prescriptions:     buPROPion (WELLBUTRIN SR) 150 mg 12 hr tablet, Take 100 mg by mouth every morning  , Disp: , Rfl: 0    cholecalciferol (VITAMIN D3) 400 units tablet, Take 400 Units by mouth daily, Disp: , Rfl:     citalopram (CeleXA) 20 mg tablet, Take 20 mg by mouth every morning, Disp: , Rfl: 0    gabapentin (NEURONTIN) 300 mg capsule, Take 1 capsule (300 mg total) by mouth 3 (three) times a day 1-3 caps, Disp: 90 capsule, Rfl: 1    ibuprofen (MOTRIN) 800 mg tablet, Take 1 tablet (800 mg total) by mouth every 8 (eight) hours as needed for mild pain or moderate pain, Disp: 30 tablet, Rfl: 2    lamoTRIgine (LAMICTAL) 200 MG tablet, Take 1 tablet by mouth 2 (two) times a day, Disp: , Rfl:     lidocaine (XYLOCAINE) 5 % ointment, Apply topically as needed for mild pain, Disp: 35 44 g, Rfl: 0    LORazepam (ATIVAN) 0 5 mg tablet, Take 1 tablet by mouth daily at bedtime  , Disp: , Rfl:     Multiple Vitamin (DAILY VALUE MULTIVITAMIN) TABS, Take by mouth, Disp: , Rfl:     Nerve Stimulator (STANDARD TENS) ISABELLA, by Does not apply route 3 (three) times a day as needed (pain), Disp: 1 Device, Rfl: 0    predniSONE 10 mg tablet, 4 pills for 3 days, then 3 pills for 3 days, then 2 pills for 3 days, then 1 pills for 3 days, then stop, Disp: 30 tablet, Rfl: 0    simvastatin (ZOCOR) 40 mg tablet, take 1 tablet by mouth once daily, Disp: 90 tablet, Rfl: 3    TiZANidine (ZANAFLEX) 4 MG capsule, Take 1 capsule (4 mg total) by mouth daily at bedtime, Disp: 30 capsule, Rfl: 0

## 2019-01-10 NOTE — PROGRESS NOTES
Assessment/Plan:    No problem-specific Assessment & Plan notes found for this encounter  Diagnoses and all orders for this visit:    Acute pain of right shoulder  Comments:  negative shoulder x-ray  Orders:  -     Ambulatory referral to Orthopedic Surgery; Future          Subjective:      Patient ID: Sushma Juárez is a 34 y o  female  Follow up for right shoulder pain, pt had an x-ray of the right shoulder which was negative, pt tried topical lidocaine, muscle relaxers, heat, ice, ibuprofen none of which helps, pt also tried steroids which did not help        The following portions of the patient's history were reviewed and updated as appropriate: allergies, current medications, past family history, past medical history, past social history, past surgical history and problem list     Review of Systems   Constitutional: Negative for chills and fever  Skin: Negative for rash  Objective:      /66   Pulse 71   Temp 98 1 °F (36 7 °C) (Tympanic)   Resp 18   Ht 5' 6" (1 676 m)   Wt 85 3 kg (188 lb)   SpO2 98%   BMI 30 34 kg/m²          Physical Exam   Constitutional: She is oriented to person, place, and time  She appears well-developed and well-nourished  No distress  HENT:   Head: Normocephalic and atraumatic  Eyes: Pupils are equal, round, and reactive to light  Conjunctivae and EOM are normal  No scleral icterus  Neck: Normal range of motion  Neck supple  Cardiovascular: Normal rate, regular rhythm and normal heart sounds  No murmur heard  Pulmonary/Chest: Effort normal and breath sounds normal  No respiratory distress  She has no wheezes  She has no rales  Musculoskeletal: She exhibits no edema  Lymphadenopathy:     She has no cervical adenopathy  Neurological: She is alert and oriented to person, place, and time  She exhibits normal muscle tone  Skin: Skin is warm and dry  No rash noted  She is not diaphoretic  No erythema  No pallor     Psychiatric: She has a normal mood and affect  Her behavior is normal  Judgment and thought content normal    Nursing note and vitals reviewed  Right Shoulder Exam   Right shoulder exam is normal     Range of Motion   Active Abduction: normal   Passive Abduction: normal   Extension: normal   Forward Flexion: normal   External Rotation: normal   Internal Rotation 0 degrees: normal   Internal Rotation 90 degrees: normal     Muscle Strength   The patient has normal right shoulder strength

## 2019-01-11 ENCOUNTER — OFFICE VISIT (OUTPATIENT)
Dept: NEUROLOGY | Facility: CLINIC | Age: 39
End: 2019-01-11
Payer: COMMERCIAL

## 2019-01-11 VITALS
RESPIRATION RATE: 12 BRPM | HEIGHT: 66 IN | HEART RATE: 79 BPM | WEIGHT: 190.4 LBS | DIASTOLIC BLOOD PRESSURE: 70 MMHG | SYSTOLIC BLOOD PRESSURE: 114 MMHG | BODY MASS INDEX: 30.6 KG/M2

## 2019-01-11 DIAGNOSIS — M79.18 CERVICAL MYOFASCIAL PAIN SYNDROME: Primary | ICD-10-CM

## 2019-01-11 PROCEDURE — 99203 OFFICE O/P NEW LOW 30 MIN: CPT | Performed by: PHYSICAL MEDICINE & REHABILITATION

## 2019-01-15 ENCOUNTER — OFFICE VISIT (OUTPATIENT)
Dept: OBGYN CLINIC | Facility: CLINIC | Age: 39
End: 2019-01-15
Payer: COMMERCIAL

## 2019-01-15 VITALS
HEART RATE: 71 BPM | SYSTOLIC BLOOD PRESSURE: 112 MMHG | BODY MASS INDEX: 30.47 KG/M2 | HEIGHT: 66 IN | WEIGHT: 189.6 LBS | RESPIRATION RATE: 16 BRPM | DIASTOLIC BLOOD PRESSURE: 77 MMHG

## 2019-01-15 DIAGNOSIS — M75.81 ROTATOR CUFF TENDINITIS, RIGHT: Primary | ICD-10-CM

## 2019-01-15 PROCEDURE — 99214 OFFICE O/P EST MOD 30 MIN: CPT | Performed by: FAMILY MEDICINE

## 2019-01-15 PROCEDURE — 20610 DRAIN/INJ JOINT/BURSA W/O US: CPT | Performed by: FAMILY MEDICINE

## 2019-01-15 RX ORDER — LIDOCAINE HYDROCHLORIDE 10 MG/ML
4 INJECTION, SOLUTION INFILTRATION; PERINEURAL
Status: COMPLETED | OUTPATIENT
Start: 2019-01-15 | End: 2019-01-15

## 2019-01-15 RX ORDER — LIDOCAINE HYDROCHLORIDE 10 MG/ML
2 INJECTION, SOLUTION INFILTRATION; PERINEURAL
Status: COMPLETED | OUTPATIENT
Start: 2019-01-15 | End: 2019-01-15

## 2019-01-15 RX ORDER — NAPROXEN 500 MG/1
500 TABLET ORAL 2 TIMES DAILY WITH MEALS
Qty: 30 TABLET | Refills: 0 | Status: SHIPPED | OUTPATIENT
Start: 2019-01-15 | End: 2019-02-22 | Stop reason: SDUPTHER

## 2019-01-15 RX ORDER — TRIAMCINOLONE ACETONIDE 40 MG/ML
40 INJECTION, SUSPENSION INTRA-ARTICULAR; INTRAMUSCULAR
Status: COMPLETED | OUTPATIENT
Start: 2019-01-15 | End: 2019-01-15

## 2019-01-15 RX ADMIN — TRIAMCINOLONE ACETONIDE 40 MG: 40 INJECTION, SUSPENSION INTRA-ARTICULAR; INTRAMUSCULAR at 08:32

## 2019-01-15 RX ADMIN — LIDOCAINE HYDROCHLORIDE 2 ML: 10 INJECTION, SOLUTION INFILTRATION; PERINEURAL at 08:32

## 2019-01-15 RX ADMIN — LIDOCAINE HYDROCHLORIDE 4 ML: 10 INJECTION, SOLUTION INFILTRATION; PERINEURAL at 08:32

## 2019-01-15 NOTE — PROGRESS NOTES
Assessment/Plan:  Assessment/Plan   Diagnoses and all orders for this visit:    Rotator cuff tendinitis, right  -     Ambulatory referral to Orthopedic Surgery  -     Large joint arthrocentesis  -     naproxen (NAPROSYN) 500 mg tablet; Take 1 tablet (500 mg total) by mouth 2 (two) times a day with meals        34year-old right-hand-dominant female with right shoulder pain of more than 1 month duration  Discussed with patient physical exam, radiographs, impression and plan  X-rays are unremarkable for gross osseous abnormality, or any appreciable calcifications  Physical exam is noted for tenderness upon palpation of the anterior lateral aspect of the subacromial area  She has normal range of motion and normal strength  Clinical impression is rotator cuff tendinitis of the right shoulder  I discussed treatment option of corticosteroid injection to which she agreed  I administered mixture of 4 cc 1% lidocaine 1 cc Kenalog to the right subacromial space without complication  She is also to take naproxen 500 mg twice daily with food consistently for 2 weeks  She will follow up in 3 weeks at which point she will be re-evaluated, and if no improved we will consider further imaging  Subjective:   Patient ID: Francis Foster is a 34 y o  female  Chief Complaint   Patient presents with    Right Shoulder - Pain       28-year-old right-hand-dominant female presents for evaluation of right shoulder pain more than 1 month duration  She denies any trauma or inciting event  Pain described as gradual in onset, localized to the anterior aspect of the upper arm/shoulder, achy, constant, nonradiating, fluctuating in intensity, worse with elevating the arm, and improved with return to neutral position  She started taking over-the-counter ibuprofen, using heat and ice, and using her home electrical stimulation unit    This did not provide any relief so she presented to her primary care provider who referred her for imaging studies and prescribed ibuprofen 800 mg  She was also given course of oral steroid which did not help, and she was advised to follow up with orthopedic care  She also reports numbness/tingling of both upper extremities for which she has been followed by neurology and is on gabapentin  Shoulder Pain   This is a new problem  The current episode started more than 1 month ago  The problem occurs constantly  The problem has been gradually worsening  Associated symptoms include arthralgias and numbness  Pertinent negatives include no abdominal pain, chest pain, chills, fever, joint swelling, rash, sore throat or weakness  Exacerbated by: Arm movement  She has tried rest and NSAIDs (Oral steroid, electrical stimulation) for the symptoms  The treatment provided mild relief  The following portions of the patient's history were reviewed and updated as appropriate: She  has a past medical history of Anxiety; Bipolar disorder ([facility] [address] ); Chronic pain; Endometriosis; Hyperlipidemia; Urinary frequency; and Urinary urgency  She  has a past surgical history that includes Hysterectomy; Tonsillectomy; pr lap,cholecystectomy (N/A, 4/30/2018); Bladder suspension; Hernia repair; and Ovarian cyst removal   Her family history includes COPD in her mother; Cancer in her father; Depression in her family; Diabetes in her mother; Diabetes type II in her maternal grandmother, mother, paternal grandfather, and paternal grandmother; Esophageal cancer in her family; Heart disease in her mother; Lung cancer in her family and father; Neuropathy in her mother; Other in her maternal grandfather; Stomach cancer in her family; Stroke in her maternal grandmother  She  reports that she has been smoking Cigarettes  She has been smoking about 1 50 packs per day  She has never used smokeless tobacco  She reports that she drinks alcohol  She reports that she does not use drugs  She has No Known Allergies       Review of Systems Constitutional: Negative for chills and fever  HENT: Negative for sore throat  Eyes: Negative for visual disturbance  Respiratory: Negative for shortness of breath  Cardiovascular: Negative for chest pain  Gastrointestinal: Negative for abdominal pain  Genitourinary: Negative for flank pain  Musculoskeletal: Positive for arthralgias  Negative for joint swelling  Skin: Negative for rash and wound  Neurological: Positive for numbness  Negative for weakness  Hematological: Does not bruise/bleed easily  Psychiatric/Behavioral: Negative for self-injury  Objective:  Vitals:    01/15/19 0810   BP: 112/77   BP Location: Left arm   Patient Position: Sitting   Cuff Size: Large   Pulse: 71   Resp: 16   Weight: 86 kg (189 lb 9 6 oz)   Height: 5' 6" (1 676 m)     Back Exam     Comments:  Cervical  -no tenderness  -normal range of motion      Right Elbow Exam     Tenderness   The patient is experiencing no tenderness  Range of Motion   The patient has normal right elbow ROM  Right Shoulder Exam     Tenderness   Right shoulder tenderness location: Lateral subacromial area  Range of Motion   The patient has normal right shoulder ROM  Internal Rotation 0 degrees: Mid thoracic     Muscle Strength   The patient has normal right shoulder strength  Tests   Hawkin's test: negative    Comments:  Negative belly press  Negative push-off            Physical Exam   Constitutional: She is oriented to person, place, and time  She appears well-developed  No distress  HENT:   Head: Normocephalic  Eyes: Conjunctivae are normal    Neck: No tracheal deviation present  Cardiovascular: Normal rate  Pulmonary/Chest: Effort normal  No respiratory distress  Abdominal: She exhibits no distension  Musculoskeletal: Normal range of motion  She exhibits tenderness  She exhibits no edema or deformity  Neurological: She is alert and oriented to person, place, and time   She exhibits normal muscle tone  Coordination normal    Skin: Skin is warm and dry  No rash noted  No pallor  Psychiatric: She has a normal mood and affect  Her behavior is normal  Judgment and thought content normal    Nursing note and vitals reviewed  I have personally reviewed pertinent films in PACS and my interpretation is Normal x-ray of the right shoulder      Large joint arthrocentesis  Date/Time: 1/15/2019 8:32 AM  Consent given by: patient  Site marked: site marked  Timeout: Immediately prior to procedure a time out was called to verify the correct patient, procedure, equipment, support staff and site/side marked as required   Supporting Documentation  Indications: pain   Procedure Details  Location: shoulder - R subacromial bursa  Preparation: Patient was prepped and draped in the usual sterile fashion  Needle size: 25 G  Ultrasound guidance: no  Approach: anterolateral  Medications administered: 2 mL lidocaine 1 %; 4 mL lidocaine 1 %; 40 mg triamcinolone acetonide 40 mg/mL    Patient tolerance: patient tolerated the procedure well with no immediate complications  Dressing:  Sterile dressing applied

## 2019-01-21 ENCOUNTER — PATIENT OUTREACH (OUTPATIENT)
Dept: FAMILY MEDICINE CLINIC | Facility: CLINIC | Age: 39
End: 2019-01-21

## 2019-01-21 NOTE — PROGRESS NOTES
Outpatient Care Management Note:  Reached out and introduced myself and my role to patient  She does not have time to do assessment now but will call back  Provided contact information

## 2019-01-30 ENCOUNTER — PATIENT OUTREACH (OUTPATIENT)
Dept: FAMILY MEDICINE CLINIC | Facility: CLINIC | Age: 39
End: 2019-01-30

## 2019-01-30 NOTE — PROGRESS NOTES
Outpatient Care Management Note: Message left for patient to please return call  Contact information left on message

## 2019-02-03 DIAGNOSIS — M54.2 CERVICALGIA: ICD-10-CM

## 2019-02-04 RX ORDER — TIZANIDINE HYDROCHLORIDE 4 MG/1
4 CAPSULE, GELATIN COATED ORAL
Qty: 30 CAPSULE | Refills: 0 | Status: SHIPPED | OUTPATIENT
Start: 2019-02-04 | End: 2019-02-26 | Stop reason: SDUPTHER

## 2019-02-04 NOTE — TELEPHONE ENCOUNTER
From: Ade Gregory  Sent: 2/3/2019 12:34 PM EST  Subject: Medication Renewal Request    Rasta JIM   Megha Sanchez would like a refill of the following medications:     TiZANidine (ZANAFLEX) 4 MG capsule Bree Simmons PA-C]   Patient Comment: Ran out no refills    [facility] [address] [id number]  : [id number]        Medication renewals requested in this message routed separately:     naproxen (NAPROSYN) 500 mg tablet [employer], DO]   Patient Comment: No refills

## 2019-02-05 ENCOUNTER — OFFICE VISIT (OUTPATIENT)
Dept: OBGYN CLINIC | Facility: CLINIC | Age: 39
End: 2019-02-05
Payer: COMMERCIAL

## 2019-02-05 VITALS
BODY MASS INDEX: 29.94 KG/M2 | DIASTOLIC BLOOD PRESSURE: 80 MMHG | SYSTOLIC BLOOD PRESSURE: 119 MMHG | WEIGHT: 186.3 LBS | HEIGHT: 66 IN | HEART RATE: 72 BPM | RESPIRATION RATE: 16 BRPM

## 2019-02-05 DIAGNOSIS — M25.511 ACUTE PAIN OF RIGHT SHOULDER: Primary | ICD-10-CM

## 2019-02-05 DIAGNOSIS — R29.898 SHOULDER CLICKING: ICD-10-CM

## 2019-02-05 DIAGNOSIS — R20.2 PARESTHESIA: ICD-10-CM

## 2019-02-05 PROCEDURE — 99213 OFFICE O/P EST LOW 20 MIN: CPT | Performed by: FAMILY MEDICINE

## 2019-02-05 NOTE — TELEPHONE ENCOUNTER
From: Cy Wilder  Sent: 2/5/2019 1:33 PM EST  Subject: Medication Renewal Request    Rasta Haq would like a refill of the following medications:     gabapentin (NEURONTIN) 300 mg capsule SEEMA Rojas]    [facility]: [facility]  : [id number]

## 2019-02-05 NOTE — PROGRESS NOTES
Assessment/Plan:  Assessment/Plan   Diagnoses and all orders for this visit:    Acute pain of right shoulder  -     MRI shoulder right wo contrast; Future    Shoulder clicking  -     MRI shoulder right wo contrast; Future         55-year-old right-hand-dominant female with right shoulder pain more than 2 months duration  Discussed with patient physical exam, impression and plan  Physical exam noted for significant point tenderness of the lateral subacromial area as well as the long head biceps  She has intact range of motion and strength of the shoulder  She is now more than 2 months since onset of symptoms and she has not improved despite prescribed course of ibuprofen 800 mg, course of oral steroid, course naproxen 500 mg twice daily, use of electrical stimulation, and corticosteroid injection  At this time I will refer her for MRI of the right shoulder to evaluate for rotator cuff and subacromial pathology  She will follow up with me after getting this done  Subjective:   Patient ID: Alyssa Berumen is a 37 y o  female  Chief Complaint   Patient presents with    Right Shoulder - Pain, Follow-up       55-year-old right-hand-dominant female following up for right shoulder pain more than 2 months duration  She was last seen 3 weeks ago at which point she was assessed to have rotator cuff tendinitis  She was given subacromial corticosteroid injection and prescribed naproxen 500 mg twice daily for 2 weeks  Today she reports no improvement since her last visit  She still has pain described as localized to the anterior aspect of shoulder, constant, achy and sometimes sharp, radiating distally along the anterolateral aspect the upper arm, worse with movement of the arm, and improved with return to neutral position    Since onset of pain she has been on prescribed ibuprofen 800 mg, prescribed course of oral steroid, went through course of naproxen 500 mg twice daily, and had subacromial corticosteroid injection without any improvement  Shoulder Pain   This is a new problem  The current episode started more than 1 month ago  The problem occurs constantly  The problem has been unchanged  Associated symptoms include arthralgias  Pertinent negatives include no joint swelling, numbness or weakness  Exacerbated by: Arm movement  She has tried rest, NSAIDs and heat (Electrical stimulation, corticosteroid injection, oral steroid) for the symptoms  The treatment provided no relief  Review of Systems   Musculoskeletal: Positive for arthralgias  Negative for joint swelling  Neurological: Negative for weakness and numbness  Objective:  Vitals:    02/05/19 0808   BP: 119/80   BP Location: Left arm   Patient Position: Sitting   Cuff Size: Large   Pulse: 72   Resp: 16   Weight: 84 5 kg (186 lb 4 8 oz)   Height: 5' 6" (1 676 m)     Right Elbow Exam     Tenderness   The patient is experiencing no tenderness  Range of Motion   The patient has normal right elbow ROM  Muscle Strength   The patient has normal right elbow strength  Right Shoulder Exam     Tenderness   The patient is experiencing tenderness in the biceps tendon (Lateral subacromial)  Range of Motion   The patient has normal right shoulder ROM  Right shoulder active abduction: Pain  Right shoulder forward flexion: Pain  Internal Rotation 0 degrees: Mid thoracic     Muscle Strength   The patient has normal right shoulder strength  Tests   Apprehension: negative  Hawkin's test: positive (Mild)    Comments:  Negative belly press  Negative push-off  Equivocal [place]'s            Physical Exam   Constitutional: She is oriented to person, place, and time  She appears well-developed  No distress  HENT:   Head: Normocephalic  Eyes: Conjunctivae are normal    Neck: No tracheal deviation present  Cardiovascular: Normal rate  Pulmonary/Chest: Effort normal  No respiratory distress     Abdominal: She exhibits no distension  Neurological: She is alert and oriented to person, place, and time  Skin: Skin is warm and dry  Psychiatric: She has a normal mood and affect  Her behavior is normal    Nursing note and vitals reviewed

## 2019-02-06 ENCOUNTER — PATIENT OUTREACH (OUTPATIENT)
Dept: FAMILY MEDICINE CLINIC | Facility: CLINIC | Age: 39
End: 2019-02-06

## 2019-02-06 RX ORDER — GABAPENTIN 300 MG/1
300 CAPSULE ORAL 3 TIMES DAILY
Qty: 90 CAPSULE | Refills: 1 | Status: SHIPPED | OUTPATIENT
Start: 2019-02-06 | End: 2019-06-12 | Stop reason: SDUPTHER

## 2019-02-08 ENCOUNTER — PATIENT OUTREACH (OUTPATIENT)
Dept: FAMILY MEDICINE CLINIC | Facility: CLINIC | Age: 39
End: 2019-02-08

## 2019-02-11 ENCOUNTER — EVALUATION (OUTPATIENT)
Dept: PHYSICAL THERAPY | Facility: CLINIC | Age: 39
End: 2019-02-11
Payer: COMMERCIAL

## 2019-02-11 ENCOUNTER — HOSPITAL ENCOUNTER (OUTPATIENT)
Dept: MRI IMAGING | Facility: HOSPITAL | Age: 39
Discharge: HOME | End: 2019-02-11
Payer: COMMERCIAL

## 2019-02-11 DIAGNOSIS — R29.898 SHOULDER CLICKING: ICD-10-CM

## 2019-02-11 DIAGNOSIS — M79.18 DIFFUSE MYOFASCIAL PAIN SYNDROME: Primary | ICD-10-CM

## 2019-02-11 DIAGNOSIS — M25.511 ACUTE PAIN OF RIGHT SHOULDER: ICD-10-CM

## 2019-02-11 PROCEDURE — 97162 PT EVAL MOD COMPLEX 30 MIN: CPT | Performed by: PHYSICAL THERAPIST

## 2019-02-11 PROCEDURE — 97140 MANUAL THERAPY 1/> REGIONS: CPT

## 2019-02-11 PROCEDURE — 73221 MRI JOINT UPR EXTREM W/O DYE: CPT

## 2019-02-11 NOTE — PROGRESS NOTES
PT Evaluation     Today's date: 2019  Patient name: Es Clement  : 1980  MRN: [id number]  Referring provider: Chris Fernandez MD  Dx:   Encounter Diagnosis     ICD-10-CM    1  Diffuse myofascial pain syndrome M79 18        Assessment  Assessment details: Patient is a 37y o  year old female presenting to [facility] s/p diagnosis of myofascial pain  Patient demonstrates decreased strength, endurance, balance, and functional independence  Patient is unable to return to normal household work at this time  She has ongoing pain and weakness resulting in modifications of ADLs and IADLs  She will benefit from interventions to maximize return to PLOF as able  Thank you for this referral and the ability to participate in the care of this patient  Impairments: abnormal coordination, abnormal muscle firing, activity intolerance, impaired physical strength, lacks appropriate home exercise program, pain with function and scapular dyskinesis  Understanding of Dx/Px/POC: good   Prognosis: good    Goals  In 4 weeks, patient will:  1  Demonstrate ability to perform 30 minutes of activity without requiring seated rest break  2   Demonstrate ability to maintain upright balance unsupported by UEs while reaching above shoulder height without resistance to promote postural stability with ADLs  3  Demonstrate ability to lift up to 10 lbs from waist to shoulder unsupported by UEs to promote stability with ADLs  4   Demonstrate increased strength of bilateral UEs to allow for improved transfer quality and stability    In 4-10 weeks, patient will:  1  Demonstrate reduced deficits based on FOTO outcome measures  2    Demonstrate consistent carryover with HEP        Plan  Patient would benefit from: skilled physical therapy  Other planned modality interventions: modalities prn for symptom management  Planned therapy interventions: manual therapy, Bhatia taping, neuromuscular re-education, therapeutic exercise and home exercise program  Frequency: 2x week  Duration in weeks: 4  Treatment plan discussed with: patient        Subjective Evaluation    History of Present Illness  Mechanism of injury: Patient reports she had to stop PT with gall bladder issues  Since that time she has been follow with neurology and orthopedics for ongoing neck and pain in right GHJ  She had an injection that didn't seem to help  She was transitioned to another more specialized neurologist for trigger points injections  She was referred to [facility]  for interventions as well  Recurrent probem    Quality of life: good    Pain  Location: Cervical spine; 3/10 average, 6/10 at worst   Right GHJ: 6/10, 9/10 at worst   Quality: sharp  Relieving factors: support and medications  Aggravating factors: lifting and overhead activity  Progression: no change    Social Support  0  Lives in: [place]  Lives with: spouse and young children    Employment status: not working  Hand dominance: right      Diagnostic Tests  X-ray: normal  Treatments  Previous treatment: physical therapy  Patient Goals  Patient goals for therapy: decreased pain, increased motion, increased strength, independence with ADLs/IADLs and return to sport/leisure activities          Objective     Concurrent Complaints  Positive for disturbed sleep and headaches  Negative for dizziness, faints, nausea/motion sickness, tinnitus, trouble swallowing, difficulty breathing, shortness of breath, respiratory pain, visual change, cardiac problem, kidney problem, gallbladder problem, stomach problem, ulcer, appendix problem, spleen problem, pancreas problem, history of cancer, history of trauma and infection    Static Posture     Head  Forward  Shoulders  Rounded  Tenderness   Cervical Spine   No tenderness in the hyoid bone, thyroid cartilage, sternum, facet joint, spinous process and pedicle       Additional Tenderness Details  TTP right bicipital groove  Trigger point right UT  Trigger point right levator  Bicep tendon thickening with TTP  Bilateral posterior anterior cervical tightness        General Comments:      Shoulder Comments   ROM WFL bilaterally  GHJ flexion 4/5  GHJ ABd 4/5  GHJ IR 4+/5  GHJ ER 4/5  GHJ ADd 4+/5    Elbow Comments   ROM WFL bilaterally  Elbow flex 4+/5  Elbow extn 4/5    Wrist/Hand Comments  ROM WFL bilaterally  Wrist flexion and extn 4+/5  Neuro Exam:     Headaches   Patient reports headaches: Yes  Precautions: Radicular symptoms  Re-eval Date: 3/25/18    Date 2/11/19       Visit Count 1       FOTO See IE       Pain In See IE       Pain Out See IE             Daily Treatment Diary     Manual          45 mins                 SOR in supine to tolerance  Graston IASTM # 5 sweeping/fanning and framing to cervical spine, right UT, bicep        Date        UT stretch        Levator stretch        Cervical AROM         Chin tucks        Scap retraction        Shoulder raises with cervical stabilization        B ER with cervical stabilization        Cervical isometrics         Scap clocks         MTP/LTP        ZITA cervical extension with Tband        Bent over rows        Pec Stretch        Scalene Stretch        Wall push ups                Modalities

## 2019-02-14 ENCOUNTER — OFFICE VISIT (OUTPATIENT)
Dept: PHYSICAL THERAPY | Facility: CLINIC | Age: 39
End: 2019-02-14
Payer: COMMERCIAL

## 2019-02-14 DIAGNOSIS — M79.18 DIFFUSE MYOFASCIAL PAIN SYNDROME: Primary | ICD-10-CM

## 2019-02-14 PROCEDURE — 97535 SELF CARE MNGMENT TRAINING: CPT

## 2019-02-14 PROCEDURE — 97110 THERAPEUTIC EXERCISES: CPT

## 2019-02-14 PROCEDURE — 97140 MANUAL THERAPY 1/> REGIONS: CPT

## 2019-02-14 NOTE — PROGRESS NOTES
Daily Note     Today's date: 2019  Patient name: Alyssa Berumen  : 1980  MRN: [id number]  Referring provider: Mariana Kimball MD  Dx:   Encounter Diagnosis     ICD-10-CM    1  Diffuse myofascial pain syndrome M79 18                   Precautions: Radicular symptoms  Re-eval Date: 3/25/18    Date 19      Visit Count 1 2      FOTO See IE       Pain In See IE 4      Pain Out See IE 3        Subjective: I was a little sore after IE   BL neck pain, L radiates down to elbow and R into the shoulder      Objective: See treatment diary below  HEP issue - CS AROM, UT/Levator self stretch, scap retraction  Educated isometrics upcoming    Assessment: Tolerated treatment fairly well with pt indicating tenderness over anterior delt/pec mm, bicep tendon region  Demonstrates FHRS posture with review of postural awareness   Patient would benefit from continued PT to improve overall CS/Scap stabilization, ROM to maximize return to PLOF/improve functional ADL's      Plan: Continue per plan of care  Manual  45 mins 30 min        SOR in supine to tolerance  Graston IASTM # 5 sweeping/fanning and framing to cervical spine, right UT, bicep        Date        UT stretch  3x30" BL      Levator stretch  3x30" BL      Cervical AROM   15x 5"  To pt tolerance      Chin tucks  15x 5"      Scap retraction  2x10 5"      Shoulder raises with cervical stabilization        B ER with cervical stabilization  Yellow  2x10,5"      Cervical isometrics         Scap clocks         MTP/LTP        ZITA cervical extension with Tband        Bent over rows        Pec Stretch  Doorway  45*  3x30"      Scalene Stretch        Wall push ups                Modalities   10 min        MH to C/T-spine, scap region in supine  CP to anterior R shoulder   No adverse affects noted post modality session

## 2019-02-15 ENCOUNTER — OFFICE VISIT (OUTPATIENT)
Dept: OBGYN CLINIC | Facility: CLINIC | Age: 39
End: 2019-02-15
Payer: COMMERCIAL

## 2019-02-15 VITALS
BODY MASS INDEX: 29.94 KG/M2 | DIASTOLIC BLOOD PRESSURE: 78 MMHG | HEIGHT: 66 IN | SYSTOLIC BLOOD PRESSURE: 114 MMHG | RESPIRATION RATE: 16 BRPM | WEIGHT: 186.3 LBS | HEART RATE: 73 BPM

## 2019-02-15 DIAGNOSIS — M25.811 CLICKING RIGHT SHOULDER: ICD-10-CM

## 2019-02-15 DIAGNOSIS — S43.431D SUPERIOR GLENOID LABRUM LESION OF RIGHT SHOULDER, SUBSEQUENT ENCOUNTER: Primary | ICD-10-CM

## 2019-02-15 PROCEDURE — 99213 OFFICE O/P EST LOW 20 MIN: CPT | Performed by: FAMILY MEDICINE

## 2019-02-15 RX ORDER — NAPROXEN 500 MG/1
500 TABLET ORAL 2 TIMES DAILY WITH MEALS
Qty: 30 TABLET | Refills: 1 | Status: SHIPPED | OUTPATIENT
Start: 2019-02-15 | End: 2019-06-05 | Stop reason: ALTCHOICE

## 2019-02-15 NOTE — PROGRESS NOTES
Assessment/Plan:  Assessment/Plan   Diagnoses and all orders for this visit:    Superior glenoid labrum lesion of right shoulder, subsequent encounter  -     Ambulatory referral to Physical Therapy; Future  -     naproxen (NAPROSYN) 500 mg tablet; Take 1 tablet (500 mg total) by mouth 2 (two) times a day with meals    Clicking right shoulder  -     Ambulatory referral to Physical Therapy; Future      20-year-old right-hand-dominant female with right shoulder pain more than 2 months duration  Discussed with patient MRI results, impression and plan  MRI of the right shoulder is noted for small SLAP tear of the glenoid labrum which extended to biceps anchor  She demonstrates normal range of motion, however there is pain when elevated above shoulder level and palpable clicking  I discussed with patient that since she has a small tear, even though she does have some symptoms we can do trial of conservative management in the form of physical therapy and glenohumeral joint corticosteroid injection to which she agreed  She is to a continue with formal physical therapy and home exercises as directed, take naproxen 500 mg twice daily food, and schedule appointment for us to perform glenohumeral joint injection  She will follow up in 1 week for glenohumeral joint injection  Subjective:   Patient ID: Radha Munoz is a 37 y o  female  Chief Complaint   Patient presents with    Right Shoulder - Follow-up, Pain       20-year-old right-hand-dominant female following up for right shoulder pain more than 2 months duration  She was last seen 10 days ago at which point she was referred for MRI of the right shoulder  Today she reports no change in her symptoms  She says pain described as localized to anterolateral aspect of the shoulder, intermittent, sharp and pinching, nonradiating, worse with elevating the arm, associated clicking, and improved with return to neutral position    She had taken naproxen as directed and also had corticosteroid injection to the subacromial space which provided mild improvement  She has not had any injury since her last visit  Shoulder Pain   This is a new problem  The current episode started more than 1 month ago  The problem occurs intermittently  The problem has been unchanged  Associated symptoms include arthralgias  Pertinent negatives include no joint swelling, numbness or weakness  Exacerbated by: Arm movement  She has tried rest and NSAIDs (Subacromial injection) for the symptoms  The treatment provided mild relief  Review of Systems   Musculoskeletal: Positive for arthralgias  Negative for joint swelling  Neurological: Negative for weakness and numbness  Objective:  Vitals:    02/15/19 0822   BP: 114/78   BP Location: Left arm   Patient Position: Sitting   Cuff Size: Large   Pulse: 73   Resp: 16   Weight: 84 5 kg (186 lb 4 8 oz)   Height: 5' 6" (1 676 m)     Right Elbow Exam     Tenderness   The patient is experiencing no tenderness  Range of Motion   The patient has normal right elbow ROM  Muscle Strength   The patient has normal right elbow strength  Right Shoulder Exam     Range of Motion   The patient has normal right shoulder ROM  Internal rotation 0 degrees: Mid thoracic     Muscle Strength   The patient has normal right shoulder strength  Physical Exam   Constitutional: She is oriented to person, place, and time  She appears well-developed  No distress  HENT:   Head: Normocephalic  Eyes: Conjunctivae are normal    Neck: No tracheal deviation present  Cardiovascular: Normal rate  Pulmonary/Chest: Effort normal  No respiratory distress  Abdominal: She exhibits no distension  Neurological: She is alert and oriented to person, place, and time  Skin: Skin is warm and dry  Psychiatric: She has a normal mood and affect  Her behavior is normal    Nursing note and vitals reviewed        I have personally reviewed pertinent films in PACS and my interpretation is No rotator cuff tear

## 2019-02-19 ENCOUNTER — OFFICE VISIT (OUTPATIENT)
Dept: PHYSICAL THERAPY | Facility: CLINIC | Age: 39
End: 2019-02-19
Payer: COMMERCIAL

## 2019-02-19 DIAGNOSIS — M79.18 DIFFUSE MYOFASCIAL PAIN SYNDROME: Primary | ICD-10-CM

## 2019-02-19 PROCEDURE — 97110 THERAPEUTIC EXERCISES: CPT

## 2019-02-19 PROCEDURE — 97140 MANUAL THERAPY 1/> REGIONS: CPT

## 2019-02-19 NOTE — PROGRESS NOTES
Daily Note     Today's date: 2019  Patient name: Francis Foster  : 1980  MRN: [id number]  Referring provider: Tonya Robison MD  Dx:   Encounter Diagnosis     ICD-10-CM    1  Diffuse myofascial pain syndrome M79 18                 Precautions: Radicular symptoms  Re-eval Date: 3/25/18    Date 19     Visit Count 1 2 3     FOTO See IE       Pain In See IE 4 4     Pain Out See IE 3 3         Subjective: [place] over the weekend, land on my left side increase pain L shoulder/hip as 6/10  Taking mm relax medication ordered by MD      Objective: See treatment diary below      Assessment: Tolerated treatment fairly well with pt encourage to performed TE submax  Pt demonstrates PROM WFL in all planes L shoulder with > restriction into IR  Pt with noted mm tightness/tenderness with palpation R ant delt/pec/ UT mm  Patient would benefit from continued PT      Plan: Continue per plan of care  Manual  45 mins 30 min 30 min       SOR in supine to tolerance  Graston IASTM # 5 sweeping/fanning and framing to cervical spine, right UT, bicep        Date        UT stretch  3x30" BL 4x30" BL     Levator stretch  3x30" BL 4x30" BL     Cervical AROM   15x 5"  To pt tolerance 15x 5"  To pt tolerance     Chin tucks  15x 5" 20x 5"     Scap retraction  2x10 5" 20x 5"     Shoulder raises with cervical stabilization   2x10      B ER with cervical stabilization  Yellow  2x10,5" Yellow  3x10, 5"     Cervical isometrics     *    Scap clocks     *    MTP/LTP    *    ZITA cervical extension with Tband        Bent over rows        Pec Stretch  Doorway  45*  3x30" Doorway  45*  3x30"     Scalene Stretch        Wall push ups                Modalities   10 min 10 min  MH  Cerv/pec/ R shoulder  supine       MH to C/T-spine, scap region in supine  CP to anterior R shoulder   No adverse affects noted post modality session

## 2019-02-20 ENCOUNTER — OFFICE VISIT (OUTPATIENT)
Dept: PHYSICAL THERAPY | Facility: CLINIC | Age: 39
End: 2019-02-20
Payer: COMMERCIAL

## 2019-02-20 DIAGNOSIS — M79.18 DIFFUSE MYOFASCIAL PAIN SYNDROME: Primary | ICD-10-CM

## 2019-02-20 PROCEDURE — 97140 MANUAL THERAPY 1/> REGIONS: CPT

## 2019-02-20 PROCEDURE — 97110 THERAPEUTIC EXERCISES: CPT

## 2019-02-20 NOTE — PROGRESS NOTES
Daily Note     Today's date: 2019  Patient name: Rakel Lira  : 1980  MRN: [id number]  Referring provider: Keeley Wall MD  Dx:   Encounter Diagnosis     ICD-10-CM    1  Diffuse myofascial pain syndrome M79 18                   Precautions: Radicular symptoms  Re-eval Date: 3/25/18    Date 19    Visit Count 1 2 3 4    FOTO See IE       Pain In See IE 4 4 4    Pain Out See IE 3 3 3      Subjective: Increase min R pec mm soreness after last PT session/yesterday  Not bad today      Objective: See treatment diary below    Educated/issued HEP/ Cervical isometrics      Assessment: Tolerated treatment fairly well with progress of TE plan  Pt demonstrates mm tightness/tenderness with light palpation R UT/ant delt/pec mm  Noted TP R scales  Pt educated basic STM to pt tolerance  Patient would benefit from continued PT to reduce soft tissue impairments  Improve ROM/strength/stability Cspine/R shoulder to maximize return to PLOF      Plan: Continue per plan of care  Educated scalenes self stretch NV     Manual  45 mins 30 min 30 min 20 min      SOR in supine to tolerance  Graston IASTM # 5 sweeping/fanning and framing to cervical spine, right UT, bicep        Date        UT stretch  3x30" BL 4x30" BL 3x30" BL    Levator stretch  3x30" BL 4x30" BL 3x30" BL    Cervical AROM   15x 5"  To pt tolerance 15x 5"  To pt tolerance 15x 5"  To pt  tolerance    Chin tucks  15x 5" 20x 5" 20x 5"    Scap retraction  2x10 5" 20x 5" 20x 5"    Shoulder raises with cervical stabilization   2x10  2x10    B ER with cervical stabilization  Yellow  2x10,5" Yellow  3x10, 5" Yellow  3x10, 5"    Cervical isometrics     Educated  /issued    Scap clocks      *   MTP/LTP    Green  2x10  Sub max    ZITA cervical extension with Tband        Bent over rows        Pec Stretch  Doorway  45*  3x30" Doorway  45*  3x30" Doorway  45*  3x30"    Scalene Stretch     *   IR/ER    BL Green  IR only 10x ea    Wall push ups Min 10x wall push ups            Modalities   10 min 10 min  MH  Cerv/pec/ R shoulder  supine 10 min  MH  R UT/pec mm      MH to C/T-spine, scap region in supine  CP to anterior R shoulder   No adverse affects noted post modality session

## 2019-02-21 ENCOUNTER — APPOINTMENT (OUTPATIENT)
Dept: PHYSICAL THERAPY | Facility: CLINIC | Age: 39
End: 2019-02-21
Payer: COMMERCIAL

## 2019-02-22 ENCOUNTER — OFFICE VISIT (OUTPATIENT)
Dept: OBGYN CLINIC | Facility: CLINIC | Age: 39
End: 2019-02-22
Payer: COMMERCIAL

## 2019-02-22 VITALS
SYSTOLIC BLOOD PRESSURE: 112 MMHG | DIASTOLIC BLOOD PRESSURE: 78 MMHG | HEIGHT: 66 IN | RESPIRATION RATE: 16 BRPM | HEART RATE: 80 BPM | BODY MASS INDEX: 29.94 KG/M2 | WEIGHT: 186.3 LBS

## 2019-02-22 DIAGNOSIS — S43.431D SUPERIOR GLENOID LABRUM LESION OF RIGHT SHOULDER, SUBSEQUENT ENCOUNTER: Primary | ICD-10-CM

## 2019-02-22 PROCEDURE — 99213 OFFICE O/P EST LOW 20 MIN: CPT | Performed by: FAMILY MEDICINE

## 2019-02-22 PROCEDURE — 20610 DRAIN/INJ JOINT/BURSA W/O US: CPT | Performed by: FAMILY MEDICINE

## 2019-02-22 RX ORDER — LIDOCAINE HYDROCHLORIDE 10 MG/ML
3 INJECTION, SOLUTION INFILTRATION; PERINEURAL
Status: COMPLETED | OUTPATIENT
Start: 2019-02-22 | End: 2019-02-22

## 2019-02-22 RX ORDER — TRIAMCINOLONE ACETONIDE 40 MG/ML
40 INJECTION, SUSPENSION INTRA-ARTICULAR; INTRAMUSCULAR
Status: COMPLETED | OUTPATIENT
Start: 2019-02-22 | End: 2019-02-22

## 2019-02-22 RX ORDER — LIDOCAINE HYDROCHLORIDE 10 MG/ML
4 INJECTION, SOLUTION INFILTRATION; PERINEURAL
Status: COMPLETED | OUTPATIENT
Start: 2019-02-22 | End: 2019-02-22

## 2019-02-22 RX ADMIN — LIDOCAINE HYDROCHLORIDE 4 ML: 10 INJECTION, SOLUTION INFILTRATION; PERINEURAL at 11:58

## 2019-02-22 RX ADMIN — TRIAMCINOLONE ACETONIDE 40 MG: 40 INJECTION, SUSPENSION INTRA-ARTICULAR; INTRAMUSCULAR at 11:58

## 2019-02-22 RX ADMIN — LIDOCAINE HYDROCHLORIDE 3 ML: 10 INJECTION, SOLUTION INFILTRATION; PERINEURAL at 11:58

## 2019-02-22 NOTE — PROGRESS NOTES
Assessment/Plan:  Assessment/Plan   Diagnoses and all orders for this visit:    Superior glenoid labrum lesion of right shoulder, subsequent encounter  -     Large joint arthrocentesis: R glenohumeral        39-year-old right-hand-dominant female with right shoulder pain of more than 2 months duration  Discussed with patient physical exam, impression and plan  She has normal range of motion and strength of the shoulder  Patient agreed to proceed with glenohumeral joint corticosteroid injection  I administered mixture of 4 cc 1% lidocaine and 1 cc Kenalog to the right glenohumeral joint without complication  She is advised to continue with physical therapy and home exercises as directed  She is also continue with taking naproxen until finished and afterward may take ibuprofen as needed for pain  She will follow up in 5 weeks at which point she will be re-evaluated  Subjective:   Patient ID: Nicolle Pastor is a 37 y o  female  Chief Complaint   Patient presents with    Right Shoulder - Pain, Follow-up, Clicking       [id number] right-hand-dominant female following up for right shoulder pain of more than 2 months duration  She was last seen 1 week ago at which point MRI reviewed with her was noted for small SLAP tear of the labrum  She was prescribed naproxen naproxen, referred to physical therapy, and advised to follow-up for glenohumeral joint corticosteroid injection  She has already started formal physical therapy  She still has pain described as localized to the anterior aspect the shoulder, sharp, intermittent, nonradiating, worse with elevating the arm, and worse with clicking  She reports having fallen on ice since her last visit, however she fell to her left side  Shoulder Pain   This is a new problem  The current episode started more than 1 month ago  The problem occurs intermittently  The problem has been unchanged  Associated symptoms include arthralgias   Pertinent negatives include no joint swelling, numbness or weakness  Exacerbated by: Arm movement  She has tried rest and NSAIDs for the symptoms  The treatment provided mild relief  Review of Systems   Musculoskeletal: Positive for arthralgias  Negative for joint swelling  Neurological: Negative for weakness and numbness  Objective:  Vitals:    02/22/19 1139   BP: 112/78   BP Location: Left arm   Patient Position: Sitting   Cuff Size: Large   Pulse: 80   Resp: 16   Weight: 84 5 kg (186 lb 4 8 oz)   Height: 5' 6" (1 676 m)     Right Shoulder Exam     Tenderness   Right shoulder tenderness location: Lateral subacromial     Range of Motion   The patient has normal right shoulder ROM  Internal rotation 0 degrees: Mid thoracic     Muscle Strength   The patient has normal right shoulder strength  Other   Erythema: absent            Physical Exam   Constitutional: She is oriented to person, place, and time  She appears well-developed  No distress  HENT:   Head: Normocephalic  Eyes: Conjunctivae are normal    Neck: No tracheal deviation present  Cardiovascular: Normal rate  Pulmonary/Chest: Effort normal  No respiratory distress  Abdominal: She exhibits no distension  Neurological: She is alert and oriented to person, place, and time  Skin: Skin is warm and dry  Psychiatric: She has a normal mood and affect  Her behavior is normal    Nursing note and vitals reviewed            Large joint arthrocentesis: R glenohumeral  Date/Time: 2/22/2019 11:58 AM  Consent given by: patient  Site marked: site marked  Timeout: Immediately prior to procedure a time out was called to verify the correct patient, procedure, equipment, support staff and site/side marked as required   Supporting Documentation  Indications: pain   Procedure Details  Location: shoulder - R glenohumeral  Preparation: Patient was prepped and draped in the usual sterile fashion  Needle size: 22 G  Ultrasound guidance: no  Approach: posterior  Medications administered: 3 mL lidocaine 1 %; 4 mL lidocaine 1 %; 40 mg triamcinolone acetonide 40 mg/mL    Patient tolerance: patient tolerated the procedure well with no immediate complications  Dressing:  Sterile dressing applied

## 2019-02-26 ENCOUNTER — OFFICE VISIT (OUTPATIENT)
Dept: PHYSICAL THERAPY | Facility: CLINIC | Age: 39
End: 2019-02-26
Payer: COMMERCIAL

## 2019-02-26 DIAGNOSIS — M79.18 DIFFUSE MYOFASCIAL PAIN SYNDROME: Primary | ICD-10-CM

## 2019-02-26 DIAGNOSIS — M54.2 CERVICALGIA: ICD-10-CM

## 2019-02-26 PROCEDURE — 97110 THERAPEUTIC EXERCISES: CPT

## 2019-02-26 PROCEDURE — 97140 MANUAL THERAPY 1/> REGIONS: CPT

## 2019-02-26 RX ORDER — TIZANIDINE HYDROCHLORIDE 4 MG/1
4 CAPSULE, GELATIN COATED ORAL
Qty: 30 CAPSULE | Refills: 2 | Status: SHIPPED | OUTPATIENT
Start: 2019-02-26 | End: 2019-04-29

## 2019-02-26 NOTE — PROGRESS NOTES
Daily Note     Today's date: 2019  Patient name: Warden Short  : 1980  MRN: [id number]  Referring provider: Shea Mtz MD  Dx:   Encounter Diagnosis     ICD-10-CM    1  Diffuse myofascial pain syndrome M79 18                   Precautions: Radicular symptoms  Re-eval Date: 3/25/18    Date 19   Visit Count 1 2 3 4 5   FOTO See IE       Pain In See IE 4 4 4 1   Pain Out See IE 3 3 3 0     Subjective: I got my 2nd injection in my right shoulder last Friday So far overall reduce pain  Objective: See treatment diary below      Assessment: Tolerated treatment fairly well   Pt able to progress with increase reps/resistance with pt encourage submax  Pt progressing with increase PROM Cervical/Rshoulder SOC Patient would benefit from continued PT to maximize ROM, strength /stability R shoulder, reduce soft tissue impairments      Plan: Continue per plan of care  Manual  45 mins 30 min 30 min 20 min 30 min     SOR in supine to tolerance  Graston IASTM # 5 sweeping/fanning and framing to cervical spine, right UT, pec bicep      PROM Cervical to pt tolerance  PROM R shoulder - motion check with IR stretch    Date        UT stretch  3x30" BL 4x30" BL 3x30" BL 3x30" BL   Levator stretch  3x30" BL 4x30" BL 3x30" BL 3x30" BL   Cervical AROM   15x 5"  To pt tolerance 15x 5"  To pt tolerance 15x 5"  To pt  tolerance 15x 5"  To pt tolerance   Chin tucks  15x 5" 20x 5" 20x 5" Review DC HEP   Scap retraction  2x10 5" 20x 5" 20x 5" Review DC HEP   Shoulder raises with cervical stabilization   2x10  2x10 Yellow  Fwd/lat  15x ea   B ER with cervical stabilization  Yellow  2x10,5" Yellow  3x10, 5" Yellow  3x10, 5" Red  2x`10,5"   Cervical isometrics     Educated  /issued    Scap clocks      Horiz and "X"  Yellow 15x ea dir   MTP/LTP    Green  2x10  Sub max Green  2x10  Sub max   ZITA cervical extension with Tband     Red  15x 5"   Bent over rows     *Upcoming NV   Pec Stretch Doorway  45*  3x30" Doorway  45*  3x30" Doorway  45*  3x30" Doorway  45*  3x30"   Scalene Stretch     3x30"   IR/ER    BL Green  IR only 10x ea R only  Green 15x ea   Wall push ups    Min 10x wall push ups Min 10x wall push ups           Modalities   10 min 10 min  MH  Cerv/pec/ R shoulder  supine 10 min  MH  R UT/pec mm 10 min  MH cervical,  R UT/pec mm     MH to C/T-spine, scap region in supine  CP to anterior R shoulder   No adverse affects noted post modality session

## 2019-02-28 ENCOUNTER — APPOINTMENT (OUTPATIENT)
Dept: PHYSICAL THERAPY | Facility: CLINIC | Age: 39
End: 2019-02-28
Payer: COMMERCIAL

## 2019-03-05 ENCOUNTER — OFFICE VISIT (OUTPATIENT)
Dept: PHYSICAL THERAPY | Facility: CLINIC | Age: 39
End: 2019-03-05
Payer: COMMERCIAL

## 2019-03-05 DIAGNOSIS — M79.18 DIFFUSE MYOFASCIAL PAIN SYNDROME: Primary | ICD-10-CM

## 2019-03-05 PROCEDURE — 97140 MANUAL THERAPY 1/> REGIONS: CPT

## 2019-03-05 PROCEDURE — 97012 MECHANICAL TRACTION THERAPY: CPT

## 2019-03-05 PROCEDURE — 97110 THERAPEUTIC EXERCISES: CPT

## 2019-03-05 NOTE — PROGRESS NOTES
Daily Note     Today's date: 3/5/2019  Patient name: Shi Angel  : 1980  MRN: [id number]  Referring provider: Howard Nielson MD  Dx:   Encounter Diagnosis     ICD-10-CM    1  Diffuse myofascial pain syndrome M79 18                   Precautions: Radicular symptoms  Re-eval Date: 3/25/18    Date 3/5       Visit Count 6       FOTO Completed       Pain In 5       Pain Out 3         Subjective: Overall less pain with performing ADL's  I still have trouble reaching high shelf increase pain/weakness  Have increase pain today with shoveling      Objective: See treatment diary below      Assessment: Tolerated treatment fairly well with pt indicating mm fatigue with progression of TE  Pt demonstrated improved CS PROM  Tenderness R UT/pec/bicep tendon region  Trial with cervical tx today with pt indicating no adverse sx's  Patient would benefit from continued PT      Plan: Continue per plan of care  Manual  15 min  GISTM only         SOR in supine to tolerance  Graston IASTM # 5 sweeping/fanning and framing to cervical spine, right UT, pec bicep      PROM Cervical to pt tolerance  PROM R shoulder - motion check with IR stretch    Date        UBE 90 RPM  6' alt       UT stretch 3x30" BL       Levator stretch 3x30" BL       Cervical AROM  10x 5"  To pt tolerance       Chin tucks        Scap retraction        Shoulder raises with cervical stabilization Red   2x10   Flex/lat       B ER with cervical stabilization Red   2x10, 5"       Cervical isometrics         Scap clocks  Horiz and "X"  Red 15x ea dir       MTP/LTP Green  2x10  Sub max       ZITA cervical extension with Tband Red  15x 5"       Bent over rows Red   2x10 BL 5"       Pec Stretch review       Scalene Stretch review       IR/ER Resume       Wall push ups resume                       Modalities  10 min  MH cervical,  R UT/pec mm       Cervical Tx 15#  Static/15*  10 min  Up/down 3 steps  30 rope         MH to C/T-spine, scap region in supine  CP to anterior R shoulder   No adverse affects noted post modality session

## 2019-03-07 ENCOUNTER — OFFICE VISIT (OUTPATIENT)
Dept: PHYSICAL THERAPY | Facility: CLINIC | Age: 39
End: 2019-03-07
Payer: COMMERCIAL

## 2019-03-07 DIAGNOSIS — M79.18 DIFFUSE MYOFASCIAL PAIN SYNDROME: Primary | ICD-10-CM

## 2019-03-07 PROCEDURE — 97110 THERAPEUTIC EXERCISES: CPT

## 2019-03-07 PROCEDURE — 97035 APP MDLTY 1+ULTRASOUND EA 15: CPT

## 2019-03-07 PROCEDURE — 97140 MANUAL THERAPY 1/> REGIONS: CPT

## 2019-03-07 NOTE — PROGRESS NOTES
Daily Note     Today's date: 3/7/2019  Patient name: Cammie Palomo  : 1980  MRN: [id number]  Referring provider: Radha Gilmore MD  Dx:   Encounter Diagnosis     ICD-10-CM    1  Diffuse myofascial pain syndrome M79 18                   Precautions: Radicular symptoms  Re-eval Date: 3/25/18    Date 3/5 3/7      Visit Count 6 7      FOTO Completed       Pain In 5 5      Pain Out 3 2        Subjective: I have increase radiating pain down my R arm today  Started yesterday  I have also L UT mm discomfort, not sure where that came from      Objective: See treatment diary below      Assessment: Tolerated treatment fairly well with reduce pain indicated after modalities  Tenderness over R deltoid bursa  Pt educated ice/CBAN and encourage carryover with HEP  Resume full POC next visit as tolerated  Patient would benefit from continued PT      Plan: Continue per plan of care  Manual  15 min  GISTM only 15 min  CS PROM only  R shoulder motion check        SOR in supine to tolerance  Graston IASTM # 5 sweeping/fanning and framing to cervical spine, right UT, pec bicep      PROM Cervical to pt tolerance  PROM R shoulder - motion check with IR stretch    Date  Modified x 1 session      UBE 90 RPM  6' alt 90 RPM  6' alt      UT stretch 3x30" BL 3x30" BL      Levator stretch 3x30" BL 3x30" BL      Cervical AROM  10x 5"  To pt tolerance 10x 5"  To pt tolerance      Chin tucks        Scap retraction        Shoulder raises with cervical stabilization Red   2x10   Flex/lat       B ER with cervical stabilization Red   2x10, 5" Red   2x10, 5"      Cervical isometrics         Scap clocks  Horiz and "X"  Red 15x ea dir Hold  2* pain      MTP/LTP Green  2x10  Sub max       ZITA cervical extension with Tband Red  15x 5"       Bent over rows Red   2x10 BL 5"       Pec Stretch review       Scalene Stretch review       IR/ER Resume       Wall push ups resume                R shoulder isometrics 10x 5" x 1 PT session 2* elevated R shoulder pain        Modalities  10 min  MH cervical,  R UT/pec mm MH cerv/L UT  CP right shoulder      Cervical Tx 15#  Static/15*  10 min  Up/down 3 steps  30 rope Held      US  1 MHz  50%  1 0 w/cm2  8 min  R delt bursa        MH to C/T-spine, scap region in supine  CP to anterior R shoulder   No adverse affects noted post modality session

## 2019-03-08 ENCOUNTER — OFFICE VISIT (OUTPATIENT)
Dept: NEUROLOGY | Facility: CLINIC | Age: 39
End: 2019-03-08
Payer: COMMERCIAL

## 2019-03-08 VITALS
BODY MASS INDEX: 31.12 KG/M2 | DIASTOLIC BLOOD PRESSURE: 67 MMHG | HEIGHT: 66 IN | SYSTOLIC BLOOD PRESSURE: 113 MMHG | RESPIRATION RATE: 12 BRPM | HEART RATE: 74 BPM | WEIGHT: 193.6 LBS

## 2019-03-08 DIAGNOSIS — M79.18 CERVICAL MYOFASCIAL PAIN SYNDROME: Primary | ICD-10-CM

## 2019-03-08 PROCEDURE — 99214 OFFICE O/P EST MOD 30 MIN: CPT | Performed by: PHYSICAL MEDICINE & REHABILITATION

## 2019-03-08 NOTE — PROGRESS NOTES
Physical Medicine & Rehabilitation Follow-up Evaluation  Rasta Gonzalez 37 y o  female      ASSESSMENT/PLAN:     34year old female with past medical history of depression, bipolar, hyperlipidemia  Patient presents today for follow-up  She was last seen by me on 1/11/18 for chronic neck pain  Her history and examination, were consistent with cervical myofascial pain affecting the left greater than right neck and upper back musculature  She also displays a mild head tilt towards the left and chin turned towards the right  Imaging done last year does reveal straightening of the normal cervical lordosis, mild bilateral facet arthritis at C3/C4, C2/C3  Today, patient has been participating in outpatient physical therapy which she states has helped her pain reduce about 20 percent  She is also using her TENS unit additionally  She did try the over-the-counter topical creams which were recommended however did not find any relief with those  She then filled her Voltaren Gel prescription and did fine about 5-10 percent relief with use of this  She denies any change in the character of her pain today  We spent 15 minutes face-to-face discussing more invasive treatment plans including trigger point injections, possible Botox to correct her mild cervical dystonia, referral to interventional spine specialist for facet mediated blocks  Interim medical update:  Patient was diagnosed with a right SLAP lesion and just received a intra-articular steroid injection by Orthopedics  She is also doing PT now for the right shoulder  Plan:  -cervical myofascial pain syndrome:  Patient would like to proceed with trigger point injections at the next visit    Continue outpatient PT, home exercise program, use of TENS unit   -return to clinic in 2 weeks for trigger point injection  -TENS unit:  I have provided her with a prescription for larger a electrodes as well as a cervical sleeve       Diagnoses and all orders for this visit:    Cervical myofascial pain syndrome  -     Durable Medical Equipment      I have spent 25 minutes with Patient  today in which greater than 50% of this time was spent in counseling/coordination of care regarding Intructions for management and Impressions  HPI:   Gal Ashton 37 y o  female right handed, with  has a past medical history of Anxiety, Bipolar disorder ([address] ), Chronic pain, Endometriosis, Hyperlipidemia, Urinary frequency, and Urinary urgency  Old records were reviewed personally  Imaging: I personally reviewed pertinent imaging  X-ray of the cervical spine shows loss of lordosis  MRI cervical spine performed February 2018: Straightening of normal cervical lordosis with minor retrolisthesis of C6  C2-C3:  Minor right facet arthrosis  C3-C4:  Minor left facet arthrosis  C4-C5:  Normal   C5-C6:  Minor bulge, slight facet arthrosis  C6-C7:  Minor bulge and tiny central protrusion, slight facet arthrosis, no significant stenosis  From January 2019 visit  Patient presents today in the office with chief complaint of chronic neck and upper back pain for about 1-2 years  She is unclear if it's related to trauma but did have a fall while breaking up a fight between her dog and another with a contusion to her head and neck  Patient went to ER and was discharged at [facility] without abnormalities on her [facility]  Patient states her pain is located in the left side of her neck and base of shoulder  Constant tightness  Patient sometimes feels intermittent numbness and tingling in her legs and hands  EMG/NCS completed with normal results  Patient feels her symptoms are persistent and not going away  Patient has tried PT and was using traction with some improvement  However her PT was interupped due to a cholescystectomy  Patient was doing this outpatient PT in [facility] pass  Also tried TENs unit 3 times a day  Also trying the lidocaine ointment however does not find this helpful      Also started on Tizanidine with temporary relief but not alleviating pain  Heat is somewhat helpful  Patient denies any positions which make it worse  No falls, or trouble with balance, no bowel or bladder incontinence          Expanded Social History:  Patient lives with spouse with 1 son in a single family home with 0 steps to enter  Driving: Yes      Function:   Current Level of Function:   Independent with mobility self care     Review of Systems   Constitutional: Negative  HENT: Negative  Eyes: Negative  Respiratory: Negative  Cardiovascular: Negative  Gastrointestinal: Negative  Endocrine: Negative  Genitourinary: Negative  Musculoskeletal: Positive for neck pain  Muscle pain,    Skin: Negative  Allergic/Immunologic: Negative  Neurological: Negative  Hematological: Negative  Psychiatric/Behavioral: Positive for sleep disturbance  Trouble falling asleep, waking up at night, cramping, tingling    All other systems reviewed and are negative  ROS personally reviewed and updated      OBJECTIVE:   /67 (BP Location: Right arm, Patient Position: Sitting, Cuff Size: Standard)   Pulse 74   Resp 12   Ht 5' 6" (1 676 m)   Wt 87 8 kg (193 lb 9 6 oz)   BMI 31 25 kg/m²      Physical Exam   Constitutional: She appears well-developed and well-nourished  HENT:   Head: Normocephalic and atraumatic  Eyes: Pupils are equal, round, and reactive to light  EOM are normal    Cardiovascular: Normal rate and regular rhythm  Pulmonary/Chest: Breath sounds normal  She has no wheezes  She has no rales  Abdominal: Soft  Bowel sounds are normal  She exhibits no distension  There is no tenderness  Musculoskeletal:   Cervical exam:  Inspection reveals a left-sided head tilt and chin turn towards the right    Slight anterioCollis  Range of motion limited in lateral bending, extension and is provocative of pain  Palpation of musculature demonstrates tight taut musculature felt as bands and knots  Several tender points identified in a few trigger points as well  Skin: Skin is warm  Psychiatric: She has a normal mood and affect  Nursing note and vitals reviewed  Neuro:  Negative Cindy's bilaterally  Negative Spurling maneuver  Negative Romberg  Gait:   Within normal limits, patient is able to perform tandem gait without loss of balance  Motor Exam:   Right Left Site Right Left Site   5 5 S Ab:  Shoulder Abductors   HF:  Hip Flexors   5 5 EF:  Elbow Flexors   H Ab:   Hip Abductors   5 5 EE:  Elbow Extensors   KF: Knee Flexors   5 5 WE:  Wrist Extensors   KE:  Knee Extensors   5 5 FF:  Finger Flexors   DR:  Dorsi Flexors     HI:  Hand Intrinsics   EHL: Ext Dale Longus   5 5    PF:  Plantar Flexors         Labs:   Lab Results   Component Value Date    WBC 11 91 (H) 07/19/2018    HGB 13 0 07/19/2018    HCT 40 7 07/19/2018    MCV 93 07/19/2018     07/19/2018     Lab Results   Component Value Date    GLUCOSE 82 12/07/2015    CALCIUM 9 1 07/19/2018     04/08/2018    K 4 1 07/19/2018    CO2 28 07/19/2018     07/19/2018    BUN 8 07/19/2018    CREATININE 0 73 07/19/2018         Past Medical History:   Diagnosis Date    Anxiety     B[address] disorder [address] [address]     Chronic pain     Endometriosis     Hyperlipidemia     Urinary frequency     resolved: 6/14/2016    Urinary urgency     resolved: 6/14/2016       Patient Active Problem List    Diagnosis Date Noted    Cervicalgia 11/15/2018    Gallbladder polyp 04/15/2018    Chronic neck pain 01/23/2018    Muscle cramps 01/23/2018    Impingement syndrome of left shoulder 11/28/2017    Hyperglycemia 10/19/2017    Left shoulder strain 10/18/2017    Vitamin D deficiency 09/19/2017    Skin rash 07/14/2017    Shingles 07/06/2017    Upper limb weakness 05/23/2017    Weakness of both lower extremities 05/23/2017    Acute back pain 04/26/2017    Leg cramps 04/26/2017    Other ovarian cyst, right side 01/27/2017  Pelvic pain in female 01/18/2017    Right lower quadrant abdominal pain 01/11/2017    Contact with and suspected exposure to communicable disease 12/01/2016    Vaginal discharge 12/01/2016    Lymphadenitis, acute 06/10/2016    Shortness of breath 05/05/2016    Paresthesia 04/11/2016    Bipolar disorder [address] [address] ) 03/31/2016    Classic migraine 03/31/2016    Dizziness 03/31/2016    COPD (chronic obstructive pulmonary disease) ([facility] [address] ) 03/31/2016    Generalized anxiety disorder 03/31/2016    Hypercholesterolemia 03/31/2016    Leg pain 03/31/2016    Leukocytosis 03/31/2016    Lower back pain 03/31/2016    Lung nodule 03/31/2016    Major depression 03/31/2016    Pain, hand 03/31/2016    Encounter for gynecological examination with abnormal finding 07/05/2015    Breast lump on right side at 10 o'clock position 05/19/2015    Tobacco use disorder 05/14/2013    Hyperlipidemia 02/14/2011       Past Surgical History:   Procedure Laterality Date    BLADDER SUSPENSION      HERNIA REPAIR      HYSTERECTOMY      partial    OVARIAN CYST REMOVAL      DC LAP,CHOLECYSTECTOMY N/A 4/30/2018    Procedure: CHOLECYSTECTOMY LAPAROSCOPIC;  Surgeon: Gabby Nayak DO;  Location: [facility];  Service: General    TONSILLECTOMY         Family History   Problem Relation Age of Onset    Diabetes Mother     Heart disease Mother         rheumatic    Neuropathy Mother     COPD Mother     Diabetes type II Mother     Cancer Father     Lung cancer Father     Stroke Maternal Grandmother         CVA    Diabetes type II Maternal Grandmother         mellitus    Other Maternal Grandfather         esophageal abnormality    Diabetes type II Paternal Grandmother         mellitus    Diabetes type II Paternal Grandfather         mellitus    Depression Family     Esophageal cancer Family     Lung cancer Family     Stomach cancer Family        Social History     No Known Allergies      Current Outpatient Medications:    buPROPion (WELLBUTRIN SR) 150 mg 12 hr tablet, Take 100 mg by mouth every morning  , Disp: , Rfl: 0    Cholecalciferol (VITAMIN D3) 2000 units TABS, Take 4,000 Units by mouth daily  , Disp: , Rfl:     citalopram (CeleXA) 20 mg tablet, Take 20 mg by mouth every morning, Disp: , Rfl: 0    gabapentin (NEURONTIN) 300 mg capsule, Take 1 capsule (300 mg total) by mouth 3 (three) times a day 1-3 caps, Disp: 90 capsule, Rfl: 1    lamoTRIgine (LAMICTAL) 200 MG tablet, Take 1 tablet by mouth 2 (two) times a day, Disp: , Rfl:     lidocaine (XYLOCAINE) 5 % ointment, Apply topically as needed for mild pain, Disp: 35 44 g, Rfl: 0    LORazepam (ATIVAN) 0 5 mg tablet, Take 1 tablet by mouth daily at bedtime  , Disp: , Rfl:     Multiple Vitamin (DAILY VALUE MULTIVITAMIN) TABS, Take by mouth, Disp: , Rfl:     naproxen (NAPROSYN) 500 mg tablet, Take 1 tablet (500 mg total) by mouth 2 (two) times a day with meals, Disp: 30 tablet, Rfl: 1    Nerve Stimulator (STANDARD TENS) ISABELLA, by Does not apply route 3 (three) times a day as needed (pain), Disp: 1 Device, Rfl: 0    simvastatin (ZOCOR) 40 mg tablet, take 1 tablet by mouth once daily, Disp: 90 tablet, Rfl: 3    TiZANidine (ZANAFLEX) 4 MG capsule, Take 1 capsule (4 mg total) by mouth daily at bedtime, Disp: 30 capsule, Rfl: 2    ibuprofen (MOTRIN) 800 mg tablet, Take 1 tablet (800 mg total) by mouth every 8 (eight) hours as needed for mild pain or moderate pain (Patient not taking: Reported on 1/15/2019 ), Disp: 30 tablet, Rfl: 2

## 2019-03-12 ENCOUNTER — OFFICE VISIT (OUTPATIENT)
Dept: PHYSICAL THERAPY | Facility: CLINIC | Age: 39
End: 2019-03-12
Payer: COMMERCIAL

## 2019-03-12 DIAGNOSIS — M79.18 DIFFUSE MYOFASCIAL PAIN SYNDROME: Primary | ICD-10-CM

## 2019-03-12 PROCEDURE — 97035 APP MDLTY 1+ULTRASOUND EA 15: CPT

## 2019-03-12 PROCEDURE — 97110 THERAPEUTIC EXERCISES: CPT

## 2019-03-12 PROCEDURE — 97140 MANUAL THERAPY 1/> REGIONS: CPT

## 2019-03-12 NOTE — PROGRESS NOTES
Daily Note     Today's date: 3/12/2019  Patient name: Kye Cramer  : 1980  MRN: [id number]  Referring provider: Annemraie Francisco MD  Dx:   Encounter Diagnosis     ICD-10-CM    1  Diffuse myofascial pain syndrome M79 18                 Precautions: Radicular symptoms  Re-eval Date: 3/25/18    Date 3/5 3/7 3/12     Visit Count 6 7 8     FOTO Completed       Pain In 5 5 4     Pain Out 3 2 2       Subjective: I am still very sore down the front/side of my R arm  I am hoping to get injections upcoming      Objective: See treatment diary below      Assessment: Tolerated treatment fairly well with pt encourage submax  Tenderness over R delt bursa with benefit with ice/CBAN reduce pain  Demon R CS rotation with progression noted with PROM CS/R shoulder  Noted mm tightness RL L UT/Scap mm  With TP R scalene mm  Patient would benefit from continued PT      Plan: Continue per plan of care  Manual  15 min  GISTM only 15 min  CS PROM only  R shoulder motion check 15 min  CS PROM only  R shoulder motion check  SOR       SOR in supine to tolerance  Graston IASTM # 5 sweeping/fanning and framing to cervical spine, right UT, pec bicep      PROM Cervical to pt tolerance  PROM R shoulder - motion check with IR stretch    Date  Modified x 1 session      UBE 90 RPM  6' alt 90 RPM  6' alt 90 RPM  6' alt     UT stretch 3x30" BL 3x30" BL 3x30" BL     Levator stretch 3x30" BL 3x30" BL 3x30" BL     Cervical AROM  10x 5"  To pt tolerance 10x 5"  To pt tolerance 10x 5"  To pt tolerance     Chin tucks        Scap retraction        Shoulder raises with cervical stabilization Red   2x10   Flex/lat  Red   2x10   Flex/lat     B ER with cervical stabilization Red   2x10, 5" Red   2x10, 5" Red   2x10, 5"     Cervical isometrics         Scap clocks  Horiz and "X"  Red 15x ea dir Hold  2* pain Held     MTP/LTP Green  2x10  Sub max  Green  2x10  Sub max     ZITA cervical extension with Tband Red  15x 5"  RTB  2x10, 5"     Bent over rows Red 2x10 BL 5"  Red   2x10 BL 5"     Pec Stretch review       Scalene Stretch review       IR/ER Resume       Wall push ups resume                R shoulder isometrics 10x 5" x 1 PT session 2* elevated R shoulder pain        Modalities  10 min  MH cervical,  R UT/pec mm MH cerv/L UT  CP right shoulder MH cerv/L UT  CP right shoulder     Cervical Tx 15#  Static/15*  10 min  Up/down 3 steps  30 rope Held      US  1 MHz  50%  1 0 w/cm2  8 min  R delt bursa 1 MHz  50%  1 0 w/cm2  8 min  R delt bursa       MH to C/T-spine, scap region in supine  CP to anterior R shoulder   No adverse affects noted post modality session

## 2019-03-14 ENCOUNTER — OFFICE VISIT (OUTPATIENT)
Dept: PHYSICAL THERAPY | Facility: CLINIC | Age: 39
End: 2019-03-14
Payer: COMMERCIAL

## 2019-03-14 DIAGNOSIS — M79.18 DIFFUSE MYOFASCIAL PAIN SYNDROME: Primary | ICD-10-CM

## 2019-03-14 PROCEDURE — 97140 MANUAL THERAPY 1/> REGIONS: CPT

## 2019-03-14 PROCEDURE — 97110 THERAPEUTIC EXERCISES: CPT

## 2019-03-19 ENCOUNTER — OFFICE VISIT (OUTPATIENT)
Dept: PHYSICAL THERAPY | Facility: CLINIC | Age: 39
End: 2019-03-19
Payer: COMMERCIAL

## 2019-03-19 DIAGNOSIS — M79.18 DIFFUSE MYOFASCIAL PAIN SYNDROME: Primary | ICD-10-CM

## 2019-03-19 PROCEDURE — 97110 THERAPEUTIC EXERCISES: CPT

## 2019-03-19 PROCEDURE — 97140 MANUAL THERAPY 1/> REGIONS: CPT

## 2019-03-19 NOTE — PROGRESS NOTES
Daily Note     Today's date: 3/19/2019  Patient name: Austin Holland  : 1980  MRN: [id number]  Referring provider: Mando Solorio MD  Dx:   Encounter Diagnosis     ICD-10-CM    1  Diffuse myofascial pain syndrome M79 18                   Precautions: Radicular symptoms  Re-eval Date: 3/25/18    Date 3/5 3/7 3/12 3/14 3/19   Visit Count 6 7 8 9 10   FOTO Completed       Pain In 5 5 4 5 Neck 5-6   Pain Out 3 2 2 1 2   RTD           Subjective: Since yesterday, I have a "throbbing" pain R anterior shoulder  I was vacuuming and cleaning my son's room yesterday  Dr wants me to wear compression socks,  BP has always been low and have trouble with feeling light headed       Objective: See treatment diary below      Assessment: Tolerated treatment fairly well with monitor of increase sx's/pain R shoulder t/o rx session  Pt encourage sub max with progression of TE as tolerated pain free ROM  Demonstrates mm tightness R CS/UT/pec mm and tenderness with light palpation over Delta bursa  Patient would benefit from continued PT      Plan: Continue per plan of care       Manual  15 min  GISTM only 15 min  CS PROM only  R shoulder motion check 15 min  CS PROM only  R shoulder motion check  SOR 30 min   30 min     SOR in supine to tolerance  Graston IASTM # 5 sweeping/fanning and framing to cervical spine, BL UT, pec, bicep, delta bursa    PROM Cervical to pt tolerance  PROM R shoulder - motion check with IR stretch    Date  Modified x 1 session      UBE 90 RPM  6' alt 90 RPM  6' alt 90 RPM  6' alt 90 RPM  8' min 90 RPM  8 min   UT stretch 3x30" BL 3x30" BL 3x30" BL 3x30" BL 3x30" BL   Levator stretch 3x30" BL 3x30" BL 3x30" BL 3x30" BL 3x30" BL   Cervical AROM  10x 5"  To pt tolerance 10x 5"  To pt tolerance 10x 5"  To pt tolerance 10x 5"  To pt tolerance 10x 5"  To pt tolerance   Chin tucks        Scap retraction        Shoulder raises with cervical stabilization Red   2x10   Flex/lat  Red   2x10   Flex/lat 1#  2x10 Flex/Scap Red flex  2x10  No TB  scap 2x10   B ER with cervical stabilization Red   2x10, 5" Red   2x10, 5" Red   2x10, 5" Green  2x10, 5" Green  2x10, 5"   Cervical isometrics     Review  Encourage sub max    Scap clocks  Horiz and "X"  Red 15x ea dir Hold  2* pain Held Held Held   MTP/LTP Green  2x10  Sub max  Green  2x10  Sub max Green  1x15 ea Green  2x10 ea   ZITA cervical extension with Tband Red  15x 5"  RTB  2x10, 5" NP RTB  2x10, 5"   Bent over rows Red   2x10 BL 5"  Red   2x10 BL 5" NP Red   2x10 BL 5"   Pec Stretch review   1' @ 39*      @ 80*  R only 1' @ 39*      @ 80*  R only   Scalene Stretch review   review Review  DC   IR/ER Resume    Green 2x10 ea   Wall push ups resume    held            R shoulder isometrics 10x 5" x 1 PT session 2* elevated R shoulder pain        Modalities  10 min  MH cervical,  R UT/pec mm MH cerv/L UT  CP right shoulder MH cerv/L UT  CP right shoulder MH BL UT  CP R delt bursa  10 min  Supine/incline MH BL UT  CP R delt bursa  10 min  Supine/incline   Cervical Tx 15#  Static/15*  10 min  Up/down 3 steps  30 rope Held      US  1 MHz  50%  1 0 w/cm2  8 min  R delt bursa 1 MHz  50%  1 0 w/cm2  8 min  R delt bursa Pt def Pt def     MH to C/T-spine, scap region in supine  CP to anterior R shoulder   No adverse affects noted post modality session

## 2019-03-21 ENCOUNTER — EVALUATION (OUTPATIENT)
Dept: PHYSICAL THERAPY | Facility: CLINIC | Age: 39
End: 2019-03-21
Payer: COMMERCIAL

## 2019-03-21 DIAGNOSIS — M79.18 DIFFUSE MYOFASCIAL PAIN SYNDROME: Primary | ICD-10-CM

## 2019-03-21 PROCEDURE — 97164 PT RE-EVAL EST PLAN CARE: CPT | Performed by: PHYSICAL THERAPIST

## 2019-03-21 PROCEDURE — 97110 THERAPEUTIC EXERCISES: CPT | Performed by: PHYSICAL THERAPIST

## 2019-03-21 PROCEDURE — 97140 MANUAL THERAPY 1/> REGIONS: CPT | Performed by: PHYSICAL THERAPIST

## 2019-03-21 NOTE — PROGRESS NOTES
Daily Note     Today's date: 3/21/2019  Patient name: Jennifer Funes  : 1980  MRN: [id number]  Referring provider: Jenny Shah MD  Dx:   Encounter Diagnosis     ICD-10-CM    1  Diffuse myofascial pain syndrome M79 18 PT plan of care cert/re-cert       Start Time: 0800  Stop Time: 0825  Total time in clinic (min): 25 minutes    Precautions: Radicular symptoms  Re-eval Date: 19    Date 3/21       Visit Count 11       FOTO Completed       Pain In 6 neck       Pain Out 2 neck       RTD           Subjective: My neck is really hurting today  Shoulder is better today      Objective: See treatment diary below      Assessment: Tolerated treatment well  Patient would benefit from continued PT progress with established POC  SEE RA for additional findings      Plan: Continue per plan of care         Manual  20 min         SOR in supine to tolerance  Graston IASTM # 5 sweeping/fanning and framing to cervical spine, BL UT, pec, bicep, delta bursa    PROM Cervical to pt tolerance  PROM R shoulder - motion check with IR stretch    Date        UBE 90 RPM  8 min       UT stretch reviewed       Levator stretch review       Cervical AROM  10x 5"  To pt tolerance       Chin tucks DC       Scap retraction DC       Shoulder raises with cervical stabilization Red flex  2x10  No TB  scap 2x10       B ER with cervical stabilization Green  2x10, 5"       Cervical isometrics  DC       Scap clocks  Held       MTP/LTP Green  2x10 ea       ZITA cervical extension with Tband RTB  2x10, 5"       Bent over rows Red   2x10 BL 5"       Pec Stretch 1' @ 45*      @ 90*  R only       Scalene Stretch DC       IR/ER Green 2x10 ea       Wall push ups 10x                  R shoulder isometrics 10x 5" x 1 PT session 2* elevated R shoulder pain        Modalities  10 min  MH cervical,  R UT/pec mm  Supine/incline          * No adverse affects noted post modality session

## 2019-03-21 NOTE — PROGRESS NOTES
PT Re-Evaluation     Today's date: 3/21/2019  Patient name: Kye Cramer  : 1980  MRN: [id number]  Referring provider: Annemarie Francisco MD  Dx:   Encounter Diagnosis     ICD-10-CM    1  Diffuse myofascial pain syndrome M79 18        Assessment  Assessment details: Patient is a 37y o  year old female presenting to [facility] s/p diagnosis of myofascial pain  Patient demonstrates ongoing decreased strength in UEs, decreased overhead endurance, ongoing but improved cervical spine pain, and limited functional independence  Patient is unable to return to normal household work at this time  Continues to limit lifting and carrying  She has ongoing pain and weakness resulting in modifications of ADLs and IADLs  She is pending trigger point injections in April, possible Botox injections  She will benefit from interventions to maximize return to PLOF as able  Thank you for this referral and the ability to participate in the ongoing care of this patient  Impairments: abnormal coordination, abnormal muscle firing, activity intolerance, impaired physical strength, lacks appropriate home exercise program, pain with function and scapular dyskinesis  Understanding of Dx/Px/POC: good   Prognosis: good    Goals  In 4 weeks, patient will:  1  Demonstrate ability to perform 30 minutes of activity without requiring seated rest break - MET  2  Demonstrate ability to maintain upright balance unsupported by UEs while reaching above shoulder height without resistance to promote postural stability with ADLs - MET   3  Demonstrate ability to lift up to 10 lbs from waist to shoulder unsupported by UEs to promote stability with ADLs - NOT MET  4  Demonstrate increased strength of bilateral UEs to allow for improved transfer quality and stability - ONGOING    In 4-10 weeks, patient will: - ONGOING  1  Demonstrate reduced deficits based on FOTO outcome measures  2  Demonstrate consistent carryover with HEP  3   Demonstrate ability to perform 60 minutes of activity without requiring seated rest break        Plan  Patient would benefit from: skilled physical therapy  Other planned modality interventions: modalities prn for symptom management  Planned therapy interventions: manual therapy, Bhatia taping, neuromuscular re-education, therapeutic exercise and home exercise program  Frequency: 2x week  Duration in weeks: 4  Treatment plan discussed with: patient        Subjective Evaluation    History of Present Illness  Mechanism of injury: UPDATE:  Upcoming trigger point injections 4/4/19 by neurology  Had an injection by ortho in her shoulder to help with her slap tear  Patient note relief for approximately one week  Pain has returned to the same level as before  Pain level is decreasing with therapy, especially with the Graston  Had pain along right deltoid bursa that has since improved  All functional activity has improved, not completely relieved  Patient reports she had to stop PT with gall bladder issues  Since that time she has been follow with neurology and orthopedics for ongoing neck and pain in right GHJ  She had an injection that didn't seem to help  She was transitioned to another more specialized neurologist for trigger points injections  She was referred to [facility]  for interventions as well            Recurrent probem    Quality of life: good    Pain  Location: Cervical spine; 3/10 average, 6/10 at worst   Right GHJ: 4/10, 9/10 at worst   Quality: sharp  Relieving factors: support and medications  Aggravating factors: lifting and overhead activity  Progression: no change    Social Support  0  Lives in: [facility]  Lives with: spouse and young children    Employment status: not working  Hand dominance: right      Diagnostic Tests  X-ray: normal  Treatments  Previous treatment: physical therapy  Patient Goals  Patient goals for therapy: decreased pain, increased motion, increased strength, independence with ADLs/IADLs and return to sport/leisure activities          Objective     Concurrent Complaints  Positive for disturbed sleep and headaches  Negative for dizziness, faints, nausea/motion sickness, tinnitus, trouble swallowing, difficulty breathing, shortness of breath, respiratory pain, visual change, cardiac problem, kidney problem, gallbladder problem, stomach problem, ulcer, appendix problem, spleen problem, pancreas problem, history of cancer, history of trauma and infection    Static Posture     Head  Forward  Shoulders  Rounded  Postural Observations  Seated posture: fair  Standing posture: good  Correction of posture: has no consistent effect        Tenderness   Cervical Spine   No tenderness in the hyoid bone, thyroid cartilage, sternum, facet joint, spinous process and pedicle       Additional Tenderness Details  TTP right bicipital groove - intermittent at this point with overuse  Trigger point right UT - ongoing  Trigger point right levator - ongoing  Bilateral posterior anterior cervical tightness - improved anterior, but ongoing scalene restrictions right > left  Pain centralized to cervical spine and to right GHJ  TTP Right ACJ  TTP Right scapular spine        Active Range of Motion   Cervical/Thoracic Spine       Cervical    Flexion: 40 degrees   Extension: 41 degrees      Left lateral flexion: 35 degrees     with pain  Right lateral flexion: 37 degrees      Left rotation:  Restriction level: minimal  Right rotation:  Restriction level: minimal    Additional Active Range of Motion Details  Mild pain with left > right side bending  Mild pain with left rotation > right    Joint Play     Comments: Restrictions from T1 to T9 - hypomobility    General Comments:      Shoulder Comments   ROM WFL bilaterally  GHJ flexion 4+/5  GHJ ABd 4+/5  GHJ IR 4+/5  GHJ ER 4/5  GHJ ADd 4+/5    Elbow Comments   ROM WFL bilaterally  Elbow flex 4+/5  Elbow extn 4+/5    Wrist/Hand Comments  ROM WFL bilaterally  Wrist flexion and extn 4+/5  Neuro Exam:     Headaches   Patient reports headaches: Yes

## 2019-03-26 ENCOUNTER — APPOINTMENT (OUTPATIENT)
Dept: PHYSICAL THERAPY | Facility: CLINIC | Age: 39
End: 2019-03-26
Payer: COMMERCIAL

## 2019-03-27 ENCOUNTER — OFFICE VISIT (OUTPATIENT)
Dept: PHYSICAL THERAPY | Facility: CLINIC | Age: 39
End: 2019-03-27
Payer: COMMERCIAL

## 2019-03-27 DIAGNOSIS — M79.18 DIFFUSE MYOFASCIAL PAIN SYNDROME: Primary | ICD-10-CM

## 2019-03-27 PROCEDURE — 97110 THERAPEUTIC EXERCISES: CPT

## 2019-03-27 PROCEDURE — 97140 MANUAL THERAPY 1/> REGIONS: CPT

## 2019-03-27 NOTE — PROGRESS NOTES
Daily Note     Today's date: 3/27/2019  Patient name: Gal Ashton  : 1980  MRN: [id number]  Referring provider: Nimisha England MD  Dx:   Encounter Diagnosis     ICD-10-CM    1  Diffuse myofascial pain syndrome M79 18                   Precautions: Radicular symptoms  Re-eval Date: 19    Date 3/21 3/27      Visit Count 11 12      FOTO Completed       Pain In 6 neck 4      Pain Out 2 neck 2      RTD           Subjective: Feeling better today Shoulder/ neck not so bad today      Objective: See treatment diary below      Assessment: Tolerated treatment fairly well with increase reps performed to pt tolerance  Pt demonstrates improved PROM R shoulder WFL with pt educated towel IR self stretch to tolerance  Pt conts with mm tightness/spasms and LTP's R CS/shoulder/pec region  Pt demonstrating increase R CS rotation/downward tild  Patient would benefit from continued PT      Plan: Continue per plan of care       Manual  20 min 20 min        SOR in supine to tolerance  Graston IASTM # 5 sweeping/fanning and framing to cervical spine, BL UT, pec, bicep, delta bursa    PROM Cervical to pt tolerance  PROM R shoulder - motion check with IR stretch    Date        UBE 90 RPM  8 min 90 RPM  10 min      UT stretch reviewed       Levator stretch review       Cervical AROM  10x 5"  To pt tolerance       Chin tucks DC       Scap retraction DC       Shoulder raises with cervical stabilization Red flex  2x10  No TB  scap 2x10 Red flex  3x10  No TB  scap 2x10      B ER with cervical stabilization Green  2x10, 5" Green  3x10, 5"      Cervical isometrics  DC       Scap clocks  Held       MTP/LTP Green  2x10 ea Green  3x10 ea      ZITA cervical extension with Tband RTB  2x10, 5" RTB  2x10, 5"      Bent over rows Red   2x10 BL 5" Red   2x10 BL 5"      Pec Stretch 1' @ 45*      @ 90*  R only 1' @ 45*      @ 90*  R only      Scalene Stretch DC       IR/ER Green 2x10 ea Green 2310 ea      Wall push ups 10x   10x R shoulder isometrics 10x 5" x 1 PT session 2* elevated R shoulder pain        Modalities  10 min  MH cervical,  R UT/pec mm  Supine/incline 10 min  MH cervical,  R UT/pec mm  Supine/incline         * No adverse affects noted post modality session

## 2019-03-28 ENCOUNTER — APPOINTMENT (OUTPATIENT)
Dept: PHYSICAL THERAPY | Facility: CLINIC | Age: 39
End: 2019-03-28
Payer: COMMERCIAL

## 2019-03-29 ENCOUNTER — APPOINTMENT (OUTPATIENT)
Dept: PHYSICAL THERAPY | Facility: CLINIC | Age: 39
End: 2019-03-29
Payer: COMMERCIAL

## 2019-04-01 ENCOUNTER — OFFICE VISIT (OUTPATIENT)
Dept: PHYSICAL THERAPY | Facility: CLINIC | Age: 39
End: 2019-04-01
Payer: COMMERCIAL

## 2019-04-01 DIAGNOSIS — M79.18 DIFFUSE MYOFASCIAL PAIN SYNDROME: Primary | ICD-10-CM

## 2019-04-01 PROCEDURE — 97110 THERAPEUTIC EXERCISES: CPT

## 2019-04-01 PROCEDURE — 97140 MANUAL THERAPY 1/> REGIONS: CPT

## 2019-04-02 ENCOUNTER — OFFICE VISIT (OUTPATIENT)
Dept: NEUROLOGY | Facility: CLINIC | Age: 39
End: 2019-04-02
Payer: COMMERCIAL

## 2019-04-02 VITALS
WEIGHT: 189 LBS | BODY MASS INDEX: 30.51 KG/M2 | SYSTOLIC BLOOD PRESSURE: 110 MMHG | DIASTOLIC BLOOD PRESSURE: 68 MMHG | HEART RATE: 84 BPM | TEMPERATURE: 98.1 F

## 2019-04-02 DIAGNOSIS — M79.18 CERVICAL MYOFASCIAL PAIN SYNDROME: Primary | ICD-10-CM

## 2019-04-02 PROCEDURE — 20553 NJX 1/MLT TRIGGER POINTS 3/>: CPT | Performed by: PHYSICAL MEDICINE & REHABILITATION

## 2019-04-02 PROCEDURE — 99212 OFFICE O/P EST SF 10 MIN: CPT | Performed by: PHYSICAL MEDICINE & REHABILITATION

## 2019-04-02 RX ORDER — BUPIVACAINE HYDROCHLORIDE 2.5 MG/ML
6 INJECTION, SOLUTION INFILTRATION; PERINEURAL ONCE
Status: COMPLETED | OUTPATIENT
Start: 2019-04-02 | End: 2019-04-02

## 2019-04-02 RX ADMIN — BUPIVACAINE HYDROCHLORIDE 6 ML: 2.5 INJECTION, SOLUTION INFILTRATION; PERINEURAL at 12:25

## 2019-04-03 ENCOUNTER — APPOINTMENT (OUTPATIENT)
Dept: PHYSICAL THERAPY | Facility: CLINIC | Age: 39
End: 2019-04-03
Payer: COMMERCIAL

## 2019-04-04 ENCOUNTER — OFFICE VISIT (OUTPATIENT)
Dept: PHYSICAL THERAPY | Facility: CLINIC | Age: 39
End: 2019-04-04
Payer: COMMERCIAL

## 2019-04-04 DIAGNOSIS — M79.18 DIFFUSE MYOFASCIAL PAIN SYNDROME: Primary | ICD-10-CM

## 2019-04-04 PROCEDURE — 97140 MANUAL THERAPY 1/> REGIONS: CPT

## 2019-04-04 PROCEDURE — 97110 THERAPEUTIC EXERCISES: CPT

## 2019-04-08 ENCOUNTER — APPOINTMENT (OUTPATIENT)
Dept: PHYSICAL THERAPY | Facility: CLINIC | Age: 39
End: 2019-04-08
Payer: COMMERCIAL

## 2019-04-10 ENCOUNTER — OFFICE VISIT (OUTPATIENT)
Dept: PHYSICAL THERAPY | Facility: CLINIC | Age: 39
End: 2019-04-10
Payer: COMMERCIAL

## 2019-04-10 DIAGNOSIS — M79.18 DIFFUSE MYOFASCIAL PAIN SYNDROME: Primary | ICD-10-CM

## 2019-04-10 PROCEDURE — 97140 MANUAL THERAPY 1/> REGIONS: CPT

## 2019-04-10 PROCEDURE — 97110 THERAPEUTIC EXERCISES: CPT

## 2019-04-16 ENCOUNTER — OFFICE VISIT (OUTPATIENT)
Dept: PHYSICAL THERAPY | Facility: CLINIC | Age: 39
End: 2019-04-16
Payer: COMMERCIAL

## 2019-04-16 DIAGNOSIS — M79.18 DIFFUSE MYOFASCIAL PAIN SYNDROME: Primary | ICD-10-CM

## 2019-04-16 PROCEDURE — 97140 MANUAL THERAPY 1/> REGIONS: CPT

## 2019-04-16 PROCEDURE — 97110 THERAPEUTIC EXERCISES: CPT

## 2019-04-19 ENCOUNTER — OFFICE VISIT (OUTPATIENT)
Dept: PHYSICAL THERAPY | Facility: CLINIC | Age: 39
End: 2019-04-19
Payer: COMMERCIAL

## 2019-04-19 DIAGNOSIS — M79.18 DIFFUSE MYOFASCIAL PAIN SYNDROME: Primary | ICD-10-CM

## 2019-04-19 PROCEDURE — 97110 THERAPEUTIC EXERCISES: CPT

## 2019-04-19 PROCEDURE — 97140 MANUAL THERAPY 1/> REGIONS: CPT

## 2019-04-23 ENCOUNTER — EVALUATION (OUTPATIENT)
Dept: PHYSICAL THERAPY | Facility: CLINIC | Age: 39
End: 2019-04-23
Payer: COMMERCIAL

## 2019-04-23 DIAGNOSIS — M79.18 DIFFUSE MYOFASCIAL PAIN SYNDROME: Primary | ICD-10-CM

## 2019-04-23 PROCEDURE — 97164 PT RE-EVAL EST PLAN CARE: CPT | Performed by: PHYSICAL THERAPIST

## 2019-04-23 PROCEDURE — 97140 MANUAL THERAPY 1/> REGIONS: CPT | Performed by: PHYSICAL THERAPIST

## 2019-04-23 PROCEDURE — 97110 THERAPEUTIC EXERCISES: CPT | Performed by: PHYSICAL THERAPIST

## 2019-04-24 ENCOUNTER — OFFICE VISIT (OUTPATIENT)
Dept: PHYSICAL THERAPY | Facility: CLINIC | Age: 39
End: 2019-04-24
Payer: COMMERCIAL

## 2019-04-24 DIAGNOSIS — M79.18 DIFFUSE MYOFASCIAL PAIN SYNDROME: Primary | ICD-10-CM

## 2019-04-24 PROCEDURE — 97110 THERAPEUTIC EXERCISES: CPT

## 2019-04-24 PROCEDURE — 97140 MANUAL THERAPY 1/> REGIONS: CPT

## 2019-04-29 ENCOUNTER — OFFICE VISIT (OUTPATIENT)
Dept: FAMILY MEDICINE CLINIC | Facility: CLINIC | Age: 39
End: 2019-04-29
Payer: COMMERCIAL

## 2019-04-29 VITALS
WEIGHT: 189.8 LBS | DIASTOLIC BLOOD PRESSURE: 80 MMHG | TEMPERATURE: 97.6 F | BODY MASS INDEX: 30.5 KG/M2 | HEIGHT: 66 IN | SYSTOLIC BLOOD PRESSURE: 122 MMHG

## 2019-04-29 DIAGNOSIS — M54.50 ACUTE RIGHT-SIDED LOW BACK PAIN WITHOUT SCIATICA: ICD-10-CM

## 2019-04-29 DIAGNOSIS — F17.200 SMOKING: ICD-10-CM

## 2019-04-29 DIAGNOSIS — E66.09 CLASS 1 OBESITY DUE TO EXCESS CALORIES WITHOUT SERIOUS COMORBIDITY WITH BODY MASS INDEX (BMI) OF 30.0 TO 30.9 IN ADULT: Primary | ICD-10-CM

## 2019-04-29 LAB
SL AMB  POCT GLUCOSE, UA: NORMAL
SL AMB LEUKOCYTE ESTERASE,UA: NORMAL
SL AMB POCT BILIRUBIN,UA: NORMAL
SL AMB POCT BLOOD,UA: NORMAL
SL AMB POCT CLARITY,UA: CLEAR
SL AMB POCT COLOR,UA: NORMAL
SL AMB POCT KETONES,UA: NORMAL
SL AMB POCT NITRITE,UA: NORMAL
SL AMB POCT PH,UA: 7
SL AMB POCT SPECIFIC GRAVITY,UA: 1
SL AMB POCT URINE PROTEIN: NORMAL
SL AMB POCT UROBILINOGEN: 0.2

## 2019-04-29 PROCEDURE — 99214 OFFICE O/P EST MOD 30 MIN: CPT | Performed by: FAMILY MEDICINE

## 2019-04-29 PROCEDURE — 81002 URINALYSIS NONAUTO W/O SCOPE: CPT | Performed by: FAMILY MEDICINE

## 2019-04-29 RX ORDER — METHOCARBAMOL 500 MG/1
500 TABLET, FILM COATED ORAL 3 TIMES DAILY
Qty: 30 TABLET | Refills: 1 | Status: SHIPPED | OUTPATIENT
Start: 2019-04-29 | End: 2019-06-05 | Stop reason: ALTCHOICE

## 2019-04-29 RX ORDER — IBUPROFEN 800 MG/1
800 TABLET ORAL EVERY 8 HOURS PRN
Qty: 30 TABLET | Refills: 1 | Status: SHIPPED | OUTPATIENT
Start: 2019-04-29 | End: 2019-06-05 | Stop reason: ALTCHOICE

## 2019-04-29 RX ORDER — PREDNISONE 10 MG/1
TABLET ORAL
Qty: 30 TABLET | Refills: 0 | Status: SHIPPED | OUTPATIENT
Start: 2019-04-29 | End: 2019-05-15 | Stop reason: SDUPTHER

## 2019-04-30 ENCOUNTER — OFFICE VISIT (OUTPATIENT)
Dept: PHYSICAL THERAPY | Facility: CLINIC | Age: 39
End: 2019-04-30
Payer: COMMERCIAL

## 2019-04-30 DIAGNOSIS — M79.18 DIFFUSE MYOFASCIAL PAIN SYNDROME: Primary | ICD-10-CM

## 2019-04-30 PROCEDURE — 97110 THERAPEUTIC EXERCISES: CPT

## 2019-04-30 PROCEDURE — 97140 MANUAL THERAPY 1/> REGIONS: CPT

## 2019-05-02 ENCOUNTER — APPOINTMENT (OUTPATIENT)
Dept: PHYSICAL THERAPY | Facility: CLINIC | Age: 39
End: 2019-05-02
Payer: COMMERCIAL

## 2019-05-02 ENCOUNTER — APPOINTMENT (EMERGENCY)
Dept: CT IMAGING | Facility: HOSPITAL | Age: 39
End: 2019-05-02
Payer: COMMERCIAL

## 2019-05-02 ENCOUNTER — HOSPITAL ENCOUNTER (EMERGENCY)
Facility: HOSPITAL | Age: 39
Discharge: HOME | End: 2019-05-02
Attending: EMERGENCY MEDICINE | Admitting: EMERGENCY MEDICINE
Payer: COMMERCIAL

## 2019-05-02 VITALS
HEIGHT: 66 IN | RESPIRATION RATE: 20 BRPM | HEART RATE: 63 BPM | BODY MASS INDEX: 29.73 KG/M2 | OXYGEN SATURATION: 98 % | WEIGHT: 185 LBS | TEMPERATURE: 97.4 F | SYSTOLIC BLOOD PRESSURE: 107 MMHG | DIASTOLIC BLOOD PRESSURE: 68 MMHG

## 2019-05-02 DIAGNOSIS — M54.50 ACUTE RIGHT-SIDED LOW BACK PAIN WITHOUT SCIATICA: Primary | ICD-10-CM

## 2019-05-02 DIAGNOSIS — R10.9 ACUTE ABDOMINAL PAIN: ICD-10-CM

## 2019-05-02 LAB
ANION GAP SERPL CALCULATED.3IONS-SCNC: 6 MMOL/L (ref 4–13)
BASOPHILS # BLD AUTO: 0.1 THOUSANDS/ΜL (ref 0–0.1)
BASOPHILS NFR BLD AUTO: 1 % (ref 0–2)
BILIRUB UR QL STRIP: NEGATIVE
BUN SERPL-MCNC: 14 MG/DL (ref 7–25)
CALCIUM SERPL-MCNC: 9.9 MG/DL (ref 8.6–10.5)
CHLORIDE SERPL-SCNC: 104 MMOL/L (ref 98–107)
CLARITY UR: ABNORMAL
CO2 SERPL-SCNC: 28 MMOL/L (ref 21–31)
COLOR UR: YELLOW
CREAT SERPL-MCNC: 0.76 MG/DL (ref 0.6–1.2)
EOSINOPHIL # BLD AUTO: 0.1 THOUSAND/ΜL (ref 0–0.61)
EOSINOPHIL NFR BLD AUTO: 1 % (ref 0–5)
ERYTHROCYTE [DISTWIDTH] IN BLOOD BY AUTOMATED COUNT: 12.6 % (ref 11.5–14.5)
GFR SERPL CREATININE-BSD FRML MDRD: 98 ML/MIN/1.73SQ M
GLUCOSE SERPL-MCNC: 92 MG/DL (ref 65–99)
GLUCOSE UR STRIP-MCNC: NEGATIVE MG/DL
HCT VFR BLD AUTO: 42.5 % (ref 42–47)
HGB BLD-MCNC: 14.8 G/DL (ref 12–16)
HGB UR QL STRIP.AUTO: NEGATIVE
KETONES UR STRIP-MCNC: NEGATIVE MG/DL
LEUKOCYTE ESTERASE UR QL STRIP: NEGATIVE
LYMPHOCYTES # BLD AUTO: 3.9 THOUSANDS/ΜL (ref 0.6–4.47)
LYMPHOCYTES NFR BLD AUTO: 31 % (ref 21–51)
MCH RBC QN AUTO: 33 PG (ref 26–34)
MCHC RBC AUTO-ENTMCNC: 34.7 G/DL (ref 31–37)
MCV RBC AUTO: 95 FL (ref 81–99)
MONOCYTES # BLD AUTO: 0.7 THOUSAND/ΜL (ref 0.17–1.22)
MONOCYTES NFR BLD AUTO: 6 % (ref 2–12)
NEUTROPHILS # BLD AUTO: 7.7 THOUSANDS/ΜL (ref 1.4–6.5)
NEUTS SEG NFR BLD AUTO: 62 % (ref 42–75)
NITRITE UR QL STRIP: NEGATIVE
PH UR STRIP.AUTO: 7 [PH]
PLATELET # BLD AUTO: 288 THOUSANDS/UL (ref 149–390)
PMV BLD AUTO: 7.5 FL (ref 8.6–11.7)
POTASSIUM SERPL-SCNC: 4.7 MMOL/L (ref 3.5–5.5)
PROT UR STRIP-MCNC: NEGATIVE MG/DL
RBC # BLD AUTO: 4.48 MILLION/UL (ref 3.9–5.2)
SODIUM SERPL-SCNC: 138 MMOL/L (ref 134–143)
SP GR UR STRIP.AUTO: 1.02 (ref 1–1.03)
UROBILINOGEN UR QL STRIP.AUTO: 0.2 E.U./DL
WBC # BLD AUTO: 12.5 THOUSAND/UL (ref 4.8–10.8)

## 2019-05-02 PROCEDURE — 74176 CT ABD & PELVIS W/O CONTRAST: CPT

## 2019-05-02 PROCEDURE — 36415 COLL VENOUS BLD VENIPUNCTURE: CPT | Performed by: EMERGENCY MEDICINE

## 2019-05-02 PROCEDURE — 96361 HYDRATE IV INFUSION ADD-ON: CPT

## 2019-05-02 PROCEDURE — 80048 BASIC METABOLIC PNL TOTAL CA: CPT | Performed by: EMERGENCY MEDICINE

## 2019-05-02 PROCEDURE — 81003 URINALYSIS AUTO W/O SCOPE: CPT | Performed by: EMERGENCY MEDICINE

## 2019-05-02 PROCEDURE — 96374 THER/PROPH/DIAG INJ IV PUSH: CPT

## 2019-05-02 PROCEDURE — 96375 TX/PRO/DX INJ NEW DRUG ADDON: CPT

## 2019-05-02 PROCEDURE — 99284 EMERGENCY DEPT VISIT MOD MDM: CPT

## 2019-05-02 PROCEDURE — 85025 COMPLETE CBC W/AUTO DIFF WBC: CPT | Performed by: EMERGENCY MEDICINE

## 2019-05-02 RX ORDER — ONDANSETRON 2 MG/ML
4 INJECTION INTRAMUSCULAR; INTRAVENOUS ONCE
Status: COMPLETED | OUTPATIENT
Start: 2019-05-02 | End: 2019-05-02

## 2019-05-02 RX ORDER — KETOROLAC TROMETHAMINE 10 MG/1
10 TABLET, FILM COATED ORAL EVERY 6 HOURS PRN
Qty: 20 TABLET | Refills: 0 | Status: SHIPPED | OUTPATIENT
Start: 2019-05-02 | End: 2019-06-05 | Stop reason: ALTCHOICE

## 2019-05-02 RX ORDER — METHOCARBAMOL 750 MG/1
750 TABLET, FILM COATED ORAL 3 TIMES DAILY
Qty: 20 TABLET | Refills: 0 | Status: SHIPPED | OUTPATIENT
Start: 2019-05-02 | End: 2019-06-05 | Stop reason: ALTCHOICE

## 2019-05-02 RX ORDER — KETOROLAC TROMETHAMINE 30 MG/ML
30 INJECTION, SOLUTION INTRAMUSCULAR; INTRAVENOUS ONCE
Status: COMPLETED | OUTPATIENT
Start: 2019-05-02 | End: 2019-05-02

## 2019-05-02 RX ADMIN — SODIUM CHLORIDE 1000 ML: 0.9 INJECTION, SOLUTION INTRAVENOUS at 09:39

## 2019-05-02 RX ADMIN — ONDANSETRON 4 MG: 2 INJECTION INTRAMUSCULAR; INTRAVENOUS at 09:36

## 2019-05-02 RX ADMIN — KETOROLAC TROMETHAMINE 30 MG: 30 INJECTION, SOLUTION INTRAMUSCULAR; INTRAVENOUS at 09:37

## 2019-05-02 RX ADMIN — MORPHINE SULFATE 2 MG: 2 INJECTION, SOLUTION INTRAMUSCULAR; INTRAVENOUS at 10:22

## 2019-05-07 ENCOUNTER — APPOINTMENT (OUTPATIENT)
Dept: PHYSICAL THERAPY | Facility: CLINIC | Age: 39
End: 2019-05-07
Payer: COMMERCIAL

## 2019-05-09 ENCOUNTER — OFFICE VISIT (OUTPATIENT)
Dept: PHYSICAL THERAPY | Facility: CLINIC | Age: 39
End: 2019-05-09
Payer: COMMERCIAL

## 2019-05-09 DIAGNOSIS — M79.18 DIFFUSE MYOFASCIAL PAIN SYNDROME: Primary | ICD-10-CM

## 2019-05-09 PROCEDURE — 97110 THERAPEUTIC EXERCISES: CPT

## 2019-05-09 PROCEDURE — 97140 MANUAL THERAPY 1/> REGIONS: CPT

## 2019-05-10 ENCOUNTER — OFFICE VISIT (OUTPATIENT)
Dept: PHYSICAL THERAPY | Facility: CLINIC | Age: 39
End: 2019-05-10
Payer: COMMERCIAL

## 2019-05-10 DIAGNOSIS — M79.18 DIFFUSE MYOFASCIAL PAIN SYNDROME: Primary | ICD-10-CM

## 2019-05-10 PROCEDURE — 97140 MANUAL THERAPY 1/> REGIONS: CPT

## 2019-05-10 PROCEDURE — 97110 THERAPEUTIC EXERCISES: CPT

## 2019-05-14 ENCOUNTER — OFFICE VISIT (OUTPATIENT)
Dept: PHYSICAL THERAPY | Facility: CLINIC | Age: 39
End: 2019-05-14
Payer: COMMERCIAL

## 2019-05-14 DIAGNOSIS — M79.18 DIFFUSE MYOFASCIAL PAIN SYNDROME: Primary | ICD-10-CM

## 2019-05-14 PROCEDURE — 97140 MANUAL THERAPY 1/> REGIONS: CPT

## 2019-05-14 PROCEDURE — 97110 THERAPEUTIC EXERCISES: CPT

## 2019-05-15 ENCOUNTER — OFFICE VISIT (OUTPATIENT)
Dept: FAMILY MEDICINE CLINIC | Facility: CLINIC | Age: 39
End: 2019-05-15
Payer: COMMERCIAL

## 2019-05-15 VITALS
TEMPERATURE: 99.2 F | SYSTOLIC BLOOD PRESSURE: 120 MMHG | DIASTOLIC BLOOD PRESSURE: 80 MMHG | BODY MASS INDEX: 30.18 KG/M2 | WEIGHT: 187.8 LBS | HEIGHT: 66 IN

## 2019-05-15 DIAGNOSIS — M54.50 ACUTE RIGHT-SIDED LOW BACK PAIN WITHOUT SCIATICA: Primary | ICD-10-CM

## 2019-05-15 PROCEDURE — 99213 OFFICE O/P EST LOW 20 MIN: CPT | Performed by: FAMILY MEDICINE

## 2019-05-15 RX ORDER — PREDNISONE 10 MG/1
TABLET ORAL
Qty: 30 TABLET | Refills: 0 | Status: SHIPPED | OUTPATIENT
Start: 2019-05-15 | End: 2019-06-05 | Stop reason: CLARIF

## 2019-05-16 ENCOUNTER — APPOINTMENT (OUTPATIENT)
Dept: PHYSICAL THERAPY | Facility: CLINIC | Age: 39
End: 2019-05-16
Payer: COMMERCIAL

## 2019-05-16 ENCOUNTER — OFFICE VISIT (OUTPATIENT)
Dept: PHYSICAL THERAPY | Facility: CLINIC | Age: 39
End: 2019-05-16
Payer: COMMERCIAL

## 2019-05-16 DIAGNOSIS — M79.18 DIFFUSE MYOFASCIAL PAIN SYNDROME: Primary | ICD-10-CM

## 2019-05-16 PROCEDURE — 97140 MANUAL THERAPY 1/> REGIONS: CPT

## 2019-05-16 PROCEDURE — 97110 THERAPEUTIC EXERCISES: CPT

## 2019-05-17 ENCOUNTER — APPOINTMENT (OUTPATIENT)
Dept: PHYSICAL THERAPY | Facility: CLINIC | Age: 39
End: 2019-05-17
Payer: COMMERCIAL

## 2019-05-21 ENCOUNTER — OFFICE VISIT (OUTPATIENT)
Dept: PHYSICAL THERAPY | Facility: CLINIC | Age: 39
End: 2019-05-21
Payer: COMMERCIAL

## 2019-05-21 DIAGNOSIS — M79.18 DIFFUSE MYOFASCIAL PAIN SYNDROME: Primary | ICD-10-CM

## 2019-05-21 PROCEDURE — 97110 THERAPEUTIC EXERCISES: CPT | Performed by: PHYSICAL THERAPIST

## 2019-05-21 PROCEDURE — 97140 MANUAL THERAPY 1/> REGIONS: CPT | Performed by: PHYSICAL THERAPIST

## 2019-05-23 ENCOUNTER — APPOINTMENT (OUTPATIENT)
Dept: PHYSICAL THERAPY | Facility: CLINIC | Age: 39
End: 2019-05-23
Payer: COMMERCIAL

## 2019-05-24 ENCOUNTER — OFFICE VISIT (OUTPATIENT)
Dept: PHYSICAL THERAPY | Facility: CLINIC | Age: 39
End: 2019-05-24
Payer: COMMERCIAL

## 2019-05-24 DIAGNOSIS — M79.18 DIFFUSE MYOFASCIAL PAIN SYNDROME: Primary | ICD-10-CM

## 2019-05-24 PROCEDURE — 97110 THERAPEUTIC EXERCISES: CPT

## 2019-05-24 PROCEDURE — 97140 MANUAL THERAPY 1/> REGIONS: CPT

## 2019-05-29 ENCOUNTER — EVALUATION (OUTPATIENT)
Dept: PHYSICAL THERAPY | Facility: CLINIC | Age: 39
End: 2019-05-29
Payer: COMMERCIAL

## 2019-05-29 DIAGNOSIS — M79.18 DIFFUSE MYOFASCIAL PAIN SYNDROME: Primary | ICD-10-CM

## 2019-05-29 PROCEDURE — 97140 MANUAL THERAPY 1/> REGIONS: CPT | Performed by: PHYSICAL THERAPIST

## 2019-05-29 PROCEDURE — 97110 THERAPEUTIC EXERCISES: CPT | Performed by: PHYSICAL THERAPIST

## 2019-05-29 PROCEDURE — 97164 PT RE-EVAL EST PLAN CARE: CPT | Performed by: PHYSICAL THERAPIST

## 2019-05-30 ENCOUNTER — OFFICE VISIT (OUTPATIENT)
Dept: PHYSICAL THERAPY | Facility: CLINIC | Age: 39
End: 2019-05-30
Payer: COMMERCIAL

## 2019-05-30 DIAGNOSIS — M79.18 DIFFUSE MYOFASCIAL PAIN SYNDROME: Primary | ICD-10-CM

## 2019-05-30 PROCEDURE — 97110 THERAPEUTIC EXERCISES: CPT

## 2019-05-30 PROCEDURE — 97112 NEUROMUSCULAR REEDUCATION: CPT

## 2019-05-31 ENCOUNTER — TELEPHONE (OUTPATIENT)
Dept: FAMILY MEDICINE CLINIC | Facility: CLINIC | Age: 39
End: 2019-05-31

## 2019-06-03 DIAGNOSIS — T75.3XXS SEA SICKNESS, SEQUELA: Primary | ICD-10-CM

## 2019-06-03 RX ORDER — SCOLOPAMINE TRANSDERMAL SYSTEM 1 MG/1
1 PATCH, EXTENDED RELEASE TRANSDERMAL
Qty: 4 PATCH | Refills: 0 | Status: SHIPPED | OUTPATIENT
Start: 2019-06-03 | End: 2019-08-02

## 2019-06-04 ENCOUNTER — OFFICE VISIT (OUTPATIENT)
Dept: NEUROLOGY | Facility: CLINIC | Age: 39
End: 2019-06-04
Payer: COMMERCIAL

## 2019-06-04 ENCOUNTER — OFFICE VISIT (OUTPATIENT)
Dept: PHYSICAL THERAPY | Facility: CLINIC | Age: 39
End: 2019-06-04
Payer: COMMERCIAL

## 2019-06-04 VITALS
TEMPERATURE: 97.9 F | RESPIRATION RATE: 12 BRPM | WEIGHT: 185.2 LBS | BODY MASS INDEX: 29.77 KG/M2 | SYSTOLIC BLOOD PRESSURE: 111 MMHG | HEART RATE: 84 BPM | DIASTOLIC BLOOD PRESSURE: 62 MMHG | HEIGHT: 66 IN

## 2019-06-04 DIAGNOSIS — M79.18 MYOFASCIAL PAIN: Primary | ICD-10-CM

## 2019-06-04 DIAGNOSIS — M79.18 DIFFUSE MYOFASCIAL PAIN SYNDROME: Primary | ICD-10-CM

## 2019-06-04 PROCEDURE — 20553 NJX 1/MLT TRIGGER POINTS 3/>: CPT | Performed by: PHYSICAL MEDICINE & REHABILITATION

## 2019-06-04 PROCEDURE — 97110 THERAPEUTIC EXERCISES: CPT | Performed by: PHYSICAL THERAPIST

## 2019-06-04 PROCEDURE — 99212 OFFICE O/P EST SF 10 MIN: CPT | Performed by: PHYSICAL MEDICINE & REHABILITATION

## 2019-06-04 PROCEDURE — 97140 MANUAL THERAPY 1/> REGIONS: CPT | Performed by: PHYSICAL THERAPIST

## 2019-06-04 RX ORDER — BUPIVACAINE HYDROCHLORIDE 2.5 MG/ML
4 INJECTION, SOLUTION INFILTRATION; PERINEURAL ONCE
Status: COMPLETED | OUTPATIENT
Start: 2019-06-04 | End: 2019-06-04

## 2019-06-04 RX ADMIN — BUPIVACAINE HYDROCHLORIDE 4 ML: 2.5 INJECTION, SOLUTION INFILTRATION; PERINEURAL at 11:25

## 2019-06-05 ENCOUNTER — OFFICE VISIT (OUTPATIENT)
Dept: OBGYN CLINIC | Facility: CLINIC | Age: 39
End: 2019-06-05
Payer: COMMERCIAL

## 2019-06-05 VITALS
HEART RATE: 87 BPM | BODY MASS INDEX: 29.25 KG/M2 | WEIGHT: 182 LBS | DIASTOLIC BLOOD PRESSURE: 74 MMHG | HEIGHT: 66 IN | SYSTOLIC BLOOD PRESSURE: 106 MMHG

## 2019-06-05 DIAGNOSIS — M22.2X1 PATELLOFEMORAL DISORDER OF RIGHT KNEE: Primary | ICD-10-CM

## 2019-06-05 PROCEDURE — 99213 OFFICE O/P EST LOW 20 MIN: CPT | Performed by: ORTHOPAEDIC SURGERY

## 2019-06-05 RX ORDER — BUPROPION HYDROCHLORIDE 100 MG/1
100 TABLET, EXTENDED RELEASE ORAL DAILY
Refills: 0 | COMMUNITY
Start: 2019-05-17

## 2019-06-05 RX ORDER — PREDNISONE 1 MG/1
TABLET ORAL
Refills: 0 | COMMUNITY
Start: 2019-05-15 | End: 2019-06-05 | Stop reason: ALTCHOICE

## 2019-06-06 ENCOUNTER — OFFICE VISIT (OUTPATIENT)
Dept: PHYSICAL THERAPY | Facility: CLINIC | Age: 39
End: 2019-06-06
Payer: COMMERCIAL

## 2019-06-06 DIAGNOSIS — M79.18 DIFFUSE MYOFASCIAL PAIN SYNDROME: Primary | ICD-10-CM

## 2019-06-06 PROCEDURE — 97110 THERAPEUTIC EXERCISES: CPT | Performed by: PHYSICAL THERAPIST

## 2019-06-06 PROCEDURE — 97140 MANUAL THERAPY 1/> REGIONS: CPT | Performed by: PHYSICAL THERAPIST

## 2019-06-11 ENCOUNTER — OFFICE VISIT (OUTPATIENT)
Dept: PHYSICAL THERAPY | Facility: CLINIC | Age: 39
End: 2019-06-11
Payer: COMMERCIAL

## 2019-06-11 DIAGNOSIS — M79.18 DIFFUSE MYOFASCIAL PAIN SYNDROME: Primary | ICD-10-CM

## 2019-06-11 DIAGNOSIS — M79.10 MUSCLE PAIN: Primary | ICD-10-CM

## 2019-06-11 DIAGNOSIS — R20.2 PARESTHESIA: ICD-10-CM

## 2019-06-11 PROCEDURE — 97140 MANUAL THERAPY 1/> REGIONS: CPT | Performed by: PHYSICAL THERAPIST

## 2019-06-11 PROCEDURE — 97110 THERAPEUTIC EXERCISES: CPT | Performed by: PHYSICAL THERAPIST

## 2019-06-13 ENCOUNTER — OFFICE VISIT (OUTPATIENT)
Dept: PHYSICAL THERAPY | Facility: CLINIC | Age: 39
End: 2019-06-13
Payer: COMMERCIAL

## 2019-06-13 DIAGNOSIS — M79.18 DIFFUSE MYOFASCIAL PAIN SYNDROME: Primary | ICD-10-CM

## 2019-06-13 PROCEDURE — 97110 THERAPEUTIC EXERCISES: CPT

## 2019-06-13 PROCEDURE — 97140 MANUAL THERAPY 1/> REGIONS: CPT

## 2019-06-13 RX ORDER — TIZANIDINE 4 MG/1
4 TABLET ORAL
Qty: 30 TABLET | Refills: 1 | Status: SHIPPED | OUTPATIENT
Start: 2019-06-13 | End: 2019-07-30 | Stop reason: SDUPTHER

## 2019-06-13 RX ORDER — GABAPENTIN 300 MG/1
300 CAPSULE ORAL 3 TIMES DAILY
Qty: 90 CAPSULE | Refills: 1 | Status: SHIPPED | OUTPATIENT
Start: 2019-06-13 | End: 2019-08-03 | Stop reason: SDUPTHER

## 2019-07-01 ENCOUNTER — OFFICE VISIT (OUTPATIENT)
Dept: PHYSICAL THERAPY | Facility: CLINIC | Age: 39
End: 2019-07-01
Payer: COMMERCIAL

## 2019-07-01 DIAGNOSIS — M79.18 DIFFUSE MYOFASCIAL PAIN SYNDROME: Primary | ICD-10-CM

## 2019-07-01 PROCEDURE — 97110 THERAPEUTIC EXERCISES: CPT

## 2019-07-01 PROCEDURE — 97140 MANUAL THERAPY 1/> REGIONS: CPT

## 2019-07-01 NOTE — PROGRESS NOTES
Daily Note     Today's date: 2019  Patient name: Ashleigh Fernandes  : 1980  MRN: [id number]  Referring provider: Jorge Mendez MD  Dx:   Encounter Diagnosis     ICD-10-CM    1  Diffuse myofascial pain syndrome M79 18                   Precautions: Radicular symptoms  Re-eval Date: 7/10/19    Date      Visit Count 31 32 33     FOTO  Completed      Pain In Neck 2  R shoulder 4  LBP 5 Neck 4  R shoulder 6  LBP 5 Neck 6  R shoulder 5  LBP 4     Pain Out Neck 1  R shoulder 3   LBP 5 Neck 3  R shoulder 4  LBP 4 Neck  4  R shoulder 3  LBP 4     RTD August          Subjective: I was in more pain overall with past 2 weeks on vacation  Wasn't able to do my exercises daily  When I do repetitive daily activities increase R shoulder pain 1* in the front of the shoulder      Objective: See treatment diary below      Assessment: Tolerated treatment fairly well with pt indicating increase mm fatigue  Denied any increase pain with TE  Noted R shoulder PROM WNL with tenderness with light palpation over R bicep tendon/pec mm  Soft tissue tightness noted BL CS/UT/scap mm with pt indicating good tolerance with MT  Patient would benefit from continued PT      Plan: Continue per plan of care        Manual  25 mins 25 min 30 min       SOR in supine to tolerance   STM /DTM BL CS/UT/scap R pec mm, to pt tolerance - supine  Cspine PROM - sub max  Thoracic vertebral mobs to tolerance, grade II-III  np  Upper cervical spine mobs in supine to tolerance  GISTM # 4 gentle sweeping to BL CS/UT/scap mm - seated to pt tolerance    Treatment Diary        UBE 60 ROM  10 min alt  60 RPM  10 min alt     Overhead reaching  Dynavision  2x 1 min  R/L upper quad      Body Blade 15 sec x 3 B/L 15 sec x 3 B/L      IR/ER above 90* 1x10 B/L  Yellow TB       Quadriped   opp UE/LE  10x 3"      Shoulder raises with cervical stabilization Red TB   2x10  seated resume      B ER with cervical stabilization Red TB   2x10  seated Green TB  2x 10 Green TB  2x 10     MTP/LTP Blue   3x10 stand Blue  2x10 stand Blue  3x10 stand     ZITA cervical extension with Tband        Bent over rows        Pec Stretch B 1x60" ea   Sub max @ home      Scalene Stretch        IR/ER Red TB   2x10  seated @ home Blue  3x10     Wall push ups 3x10 @ home      From 90 degrees  Flex  ABd  ER   Press Flexion,  ER    press  ABd    2# 1x10  3# 1x10 except MP at 2x15 Flexion,  ER    press  ABd    2# 2x10 ea Flexion,  ER    press  ABd    2# 3x10 ea     Ball on the wall 4 way   30 x ea   Red wt ball  @ 100* resume      Thoracic foam roll        Prone   I/T/Y's 15 x5" ea  B/L  1# resume        UT stretch DC ---      Levator stretch DC ---      Cervical AROM  DC ---      Chin tucks DC ---      Scap retraction DC ---        Scap ABCs DC ---        Cervical isometrics  DC ---      Scap clocks  DC ----               Modalities  10 min  Cervical spine CP  Thoracic with MH supine 10 min  MHP Cerv/Thoracic/Lumbar  R bicep CP Pt def       heat pack x 10 minutes supine to cervical spine at end of session to pt tolerance  Pt was instructed to verbalize any pain/discomfort to PT during/following tx  Skin was intact pre/post tx without adverse reaction noted

## 2019-07-03 ENCOUNTER — APPOINTMENT (OUTPATIENT)
Dept: PHYSICAL THERAPY | Facility: CLINIC | Age: 39
End: 2019-07-03
Payer: COMMERCIAL

## 2019-07-05 ENCOUNTER — OFFICE VISIT (OUTPATIENT)
Dept: PHYSICAL THERAPY | Facility: CLINIC | Age: 39
End: 2019-07-05
Payer: COMMERCIAL

## 2019-07-05 DIAGNOSIS — M79.18 DIFFUSE MYOFASCIAL PAIN SYNDROME: Primary | ICD-10-CM

## 2019-07-05 PROCEDURE — 97110 THERAPEUTIC EXERCISES: CPT

## 2019-07-05 PROCEDURE — 97140 MANUAL THERAPY 1/> REGIONS: CPT

## 2019-07-05 NOTE — PROGRESS NOTES
Daily Note     Today's date: 2019  Patient name: Dedra Dietrich  : 1980  MRN: [id number]  Referring provider: Josephine Eng MD  Dx:   Encounter Diagnosis     ICD-10-CM    1  Diffuse myofascial pain syndrome M79 18                   Precautions: Radicular symptoms  Re-eval Date: 7/10/19    Date     Visit Count 31 32 33 34    FOTO  Completed      Pain In Neck 2  R shoulder 4  LBP 5 Neck 4  R shoulder 6  LBP 5 Neck 6  R shoulder 5  LBP 4 Neck 6  R shoulder 6  LBP 5    Pain Out Neck 1  R shoulder 3   LBP 5 Neck 3  R shoulder 4  LBP 4 Neck  4  R shoulder 3  LBP 4 Neck 5  R shoulder 5  LBP 5    RTD August          Subjective: I was reaching for something under the the table yesterday after picnic cleanup  Aggrivated my right shoulder      Objective: See treatment diary below      Assessment: Tolerated treatment well  Limited PROM R shoulder 1* IR/Flex end ranges and CS in all planes  Good tolerance with MT/GISTM today with progression of TE to pt tolerancePatient would benefit from continued PT      Plan: Continue per plan of care                Manual  25 mins 25 min 30 min 25 min      SOR in supine to tolerance   STM /DTM BL CS/UT/scap R pec mm, to pt tolerance - supine  Cspine PROM - sub max  Thoracic vertebral mobs to tolerance, grade II-III  np  Upper cervical spine mobs in supine to tolerance  GISTM # 4 gentle sweeping to BL CS/UT/scap mm - seated to pt tolerance    Treatment Diary        UBE 60 ROM  10 min alt  60 RPM  10 min alt 60 RPM  10 min alt    Overhead reaching  Dynavision  2x 1 min  R/L upper quad      Body Blade 15 sec x 3 B/L 15 sec x 3 B/L  30" ea dir    IR/ER above 90* 1x10 B/L  Yellow TB   resume    Quadriped   opp UE/LE  10x 3"      Shoulder raises with cervical stabilization Red TB   2x10  seated resume      B ER with cervical stabilization Red TB   2x10  seated Green TB  2x 10 Green TB  2x 10 Green TB  2x 10    MTP/LTP Blue   3x10 stand Blue  2x10 stand Blue  3x10 stand Blue  3x10 stand    ZITA cervical extension with Tband        Bent over rows        Pec Stretch B 1x60" ea   Sub max @ home      Scalene Stretch        IR/ER Red TB   2x10  seated @ home Blue  3x10 Blue  3x10    Wall push ups 3x10 @ home      From 90 degrees  Flex  ABd  ER   Press Flexion,  ER    press  ABd    2# 1x10  3# 1x10 except MP at 2x15 Flexion,  ER    press  ABd    2# 2x10 ea Flexion,  ER    press  ABd    2# 3x10 ea Flexion,  ER    press  ABd    2# 3x10 ea    Ball on the wall 4 way   30 x ea   Red wt ball  @ 100* resume      Thoracic foam roll        Prone   I/T/Y's 15 x5" ea  B/L  1# resume  1#  2x10 ea dir 5"      UT stretch DC ---      Levator stretch DC ---      Cervical AROM  DC ---      Chin tucks DC ---      Scap retraction DC ---        Scap ABCs DC ---        Cervical isometrics  DC ---      Scap clocks  DC ----               Modalities  10 min  Cervical spine CP  Thoracic with MH supine 10 min  MHP Cerv/Thoracic/Lumbar  R bicep CP Pt def MH to Cerv/R shoulder  10 min  seated      heat pack x 10 minutes supine to cervical spine at end of session to pt tolerance  Pt was instructed to verbalize any pain/discomfort to PT during/following tx  Skin was intact pre/post tx without adverse reaction noted

## 2019-07-08 ENCOUNTER — PATIENT OUTREACH (OUTPATIENT)
Dept: FAMILY MEDICINE CLINIC | Facility: CLINIC | Age: 39
End: 2019-07-08

## 2019-07-08 NOTE — LETTER
7/10/19    [address] [place] [address]  [place] [address] [id number]    Dear Vicki Pichardo,    [address] [facility] and [address] are committed to your health and satisfaction  We spoke recently about ways to meet your goals in self-management of your chronic illnesses  I have enclosed a copy of the plan of care we discussed  Please contact me directly at [phone] Monday through Friday from 8am to 4:30pm     Sincerely,    Peg Harris RN, [address] [facility] may refer collectively to [address] [place] [address] , and [facility], unless otherwise noted

## 2019-07-08 NOTE — PROGRESS NOTES
Outpatient Care Management Note:  Received return call from Areil  Annual SNP assessment completed  Denies any current needs, provided contact information gfor questions or concerns  Outpatient Care Management Note: Message left for patient to please return call  Contact information left on message

## 2019-07-09 ENCOUNTER — EVALUATION (OUTPATIENT)
Dept: PHYSICAL THERAPY | Facility: CLINIC | Age: 39
End: 2019-07-09
Payer: COMMERCIAL

## 2019-07-09 DIAGNOSIS — M79.18 DIFFUSE MYOFASCIAL PAIN SYNDROME: Primary | ICD-10-CM

## 2019-07-09 PROCEDURE — 97110 THERAPEUTIC EXERCISES: CPT | Performed by: PHYSICAL THERAPIST

## 2019-07-09 PROCEDURE — 97164 PT RE-EVAL EST PLAN CARE: CPT | Performed by: PHYSICAL THERAPIST

## 2019-07-09 PROCEDURE — 97140 MANUAL THERAPY 1/> REGIONS: CPT | Performed by: PHYSICAL THERAPIST

## 2019-07-09 NOTE — PROGRESS NOTES
PT Re-Evaluation     Today's date: 2019  Patient name: Aidan Diaz  : 1980  MRN: [id number]  Referring provider: Tu Rashid MD  Dx:   Encounter Diagnosis     ICD-10-CM    1  Diffuse myofascial pain syndrome M79 18        Assessment  Assessment details: Patient is a 37y o  year old female presenting to OPPT s/p diagnosis of myofascial pain  She has made progress with PT thus far  Does still have increased pain with increased repetitive movement, however, this has been an ongoing problem  Patient does respond well to manual interventions  Use of massage therapy for maintenance has been discussed with patient  Strength and postural awareness have improved and patient is independent with HEP at this time  Plan for discharge to HEP after wean to once per week  Impairments: abnormal coordination, abnormal muscle firing, activity intolerance, impaired physical strength, lacks appropriate home exercise program, pain with function and scapular dyskinesis  Understanding of Dx/Px/POC: good   Prognosis: good    Goals  In 4 weeks, patient will:  1  Demonstrate ability to perform 30 minutes of activity without requiring seated rest break - MET  2  Demonstrate ability to maintain upright balance unsupported by UEs while reaching above shoulder height without resistance to promote postural stability with ADLs - MET   3  Demonstrate ability to lift up to 10 lbs from waist to shoulder unsupported by UEs to promote stability with ADLs - MET  4  Demonstrate increased strength of bilateral UEs to allow for improved transfer quality and stability - ONGOING    In 4-10 weeks, patient will:   1  Demonstrate reduced deficits based on FOTO outcome measures- ONGOING  2  Demonstrate consistent carryover with HEP - MET  3  Demonstrate ability to perform 60 minutes of activity without requiring seated rest break - MET    UPDATED GOALS:  1   Demonstrate ability to lift 40# from floor with straight arm and carry 60 feet to return to independent management of heating system at home - MET  2  Demonstrate ability to lift 10 # from shoulder and overhead - MET    Plan  Patient would benefit from: skilled physical therapy  Other planned modality interventions: modalities prn for symptom management  Planned therapy interventions: manual therapy, Bhatia taping, neuromuscular re-education, therapeutic exercise and home exercise program  Frequency: 1x week  Duration in weeks: 3  Treatment plan discussed with: patient        Subjective Evaluation    History of Present Illness  Mechanism of injury: UPDATE:  Patient reports increased activity preparing and cleaning up from a party this past weekend  She notes that she was lifting, cleaning, carrying items to include a folding table  She notes increased discomfort after all of the lifting  Patient notes she did not rest appropriately  Pain increased since then  Injections upcoming again in August     Patient had injection 4/2/18  This helped for about 3 weeks  She notes that it's starting to return  Neck and shoulder both starting to feel worse  She has a follow up with physiatry in May and is going to schedule an appointment with ortho  Ortho does not plan right GHJ surgery at this point  Functionally patient notes minimal improvement  Does note that her neck is a little looser  Is still limiting herself with right GHJ use for functional tasks, avoids lifting, reaching overhead, lifting from the ground is difficult  All of these tasks produce pain  Did recently note right GHJ impacted on edge of desk that ayala her arm  Pain has been elevated for a week or so  Patient reports she had to stop PT with gall bladder issues  Since that time she has been follow with neurology and orthopedics for ongoing neck and pain in right GHJ  She had an injection that didn't seem to help    She was transitioned to another more specialized neurologist for trigger points injections  She was referred to [facility]  for interventions as well  Recurrent probem    Quality of life: good    Pain  Location: Cervical spine; 3/10 average, 6/10 at worst   Right GHJ: 4/10, 7/10 at worst   Quality: sharp  Relieving factors: support and medications  Aggravating factors: lifting and overhead activity  Progression: no change    Social Support  0  Lives in: [facility]  Lives with: spouse and young children    Employment status: not working  Hand dominance: right      Diagnostic Tests  X-ray: normal  Treatments  Previous treatment: physical therapy  Patient Goals  Patient goals for therapy: decreased pain, increased motion, increased strength, independence with ADLs/IADLs and return to sport/leisure activities          Objective     Concurrent Complaints  Positive for disturbed sleep and headaches  Negative for dizziness, faints, nausea/motion sickness, tinnitus, trouble swallowing, difficulty breathing, shortness of breath, respiratory pain, visual change, cardiac problem, kidney problem, gallbladder problem, stomach problem, ulcer, appendix problem, spleen problem, pancreas problem, history of cancer, history of trauma and infection    Additional Special Questions  Usually sleeps on right side and hasn't been able to sleep on right side due to pain in GHJ and neck  HAs have improved, on average 2 headaches per month  Prior to starting PT, HAs were more like once a week  Improved sleeping tolerance  Static Posture     Shoulders  Elevated  Postural Observations  Seated posture: good  Standing posture: good  Correction of posture: has no consistent effect    Additional Postural Observation Details  More aware of self postural correction  Tenderness   Cervical Spine   No tenderness in the hyoid bone, thyroid cartilage, sternum, facet joint, spinous process and pedicle       Additional Tenderness Details  TTP right bicipital groove - intermittent at this point with overuse  Trigger point right UT - improved  Trigger point right levator - ongoing, improved, but not resolved  Bilateral posterior anterior cervical tightness - improved anterior, but ongoing scalene restrictions right > left - IMPROVED, but not resolved  Active TP left medial scalene has resolved  Pain centralized to cervical spine and to right GHJ - ONGOING, no radicular symptoms   TTP Right ACJ - IMPROVED  TTP Right scapular spine - IMPROVED        Active Range of Motion   Cervical/Thoracic Spine       Cervical    Flexion: 40 degrees   Extension: 41 degrees      Left lateral flexion: 39 degrees      Right lateral flexion: 38 degrees      Left rotation:  Restriction level: minimal  Right rotation:  Restriction level: minimal    Additional Active Range of Motion Details  Mild pain with left > right side bending  Mild pain with left rotation > right    Joint Play     Comments: Restrictions from T5 to T9 - improved, guarded intermittently    General Comments:      Elbow Comments   ROM WFL bilaterally  Elbow flex 5/5  Elbow extn 5/5    Wrist/Hand Comments  ROM WFL bilaterally  Wrist flexion and extn 5/5  Neuro Exam:     Headaches   Patient reports headaches: Yes       Precautions: Radicular symptoms  Re-eval Date: 7/10/19    Date 6/11 6/13 7/1 7/5 7/9   Visit Count 31 32 33 34 35   FOTO  Completed      Pain In Neck 2  R shoulder 4  LBP 5 Neck 4  R shoulder 6  LBP 5 Neck 6  R shoulder 5  LBP 4 Neck 6  R shoulder 6  LBP 5 See RE   Pain Out Neck 1  R shoulder 3   LBP 5 Neck 3  R shoulder 4  LBP 4 Neck  4  R shoulder 3  LBP 4 Neck 5  R shoulder 5  LBP 5 See RE   RTD August 7/5    Manual  25 mins 25 min 30 min 25 min 25 mins     SOR in supine to tolerance   STM /DTM BL CS/UT/scap R pec mm, to pt tolerance - supine  Cspine PROM - sub max  Thoracic vertebral mobs to tolerance, grade II-III  np  Upper cervical spine mobs in supine to tolerance  GISTM # 4 gentle sweeping to BL CS/UT/scap mm - seated to pt tolerance    Treatment Diary        UBE 60 ROM  10 min alt  60 RPM  10 min alt 60 RPM  10 min alt 60 RPM  10 min alt   Overhead reaching  [employer]  2x 1 min  R/L upper quad      Body Blade 15 sec x 3 B/L 15 sec x 3 B/L  30" ea dir 30" ea dir   IR/ER above 90* 1x10 B/L  Yellow TB   resume 1x10 B/L  Yellow TB   Quadriped   opp UE/LE  10x 3"      Shoulder raises with cervical stabilization Red TB   2x10  seated resume      B ER with cervical stabilization Red TB   2x10  seated Green TB  2x 10 Green TB  2x 10 Green TB  2x 10 Green TB  2x 10   MTP/LTP Blue   3x10 stand Blue  2x10 stand Blue  3x10 stand Blue  3x10 stand Blue  3x10 stand   ZITA cervical extension with Tband        Bent over rows        Pec Stretch B 1x60" ea   Sub max @ home      Scalene Stretch        IR/ER Red TB   2x10  seated @ home Blue  3x10 Blue  3x10 Blue  3x10   Wall push ups 3x10 @ home      From 90 degrees  Flex  ABd  ER  [employer] Flexion,  ER    press  ABd    2# 1x10  3# 1x10 except MP at 2x15 Flexion,  ER    press  ABd    2# 2x10 ea Flexion,  ER    press  ABd    2# 3x10 ea Flexion,  ER    press  ABd    2# 3x10 ea Flexion,  ER    press  ABd    2# 3x10 ea   Ball on the wall 4 way   30 x ea   Red wt ball  @ 100* resume      Thoracic foam roll        Prone   I/T/Y's 15 x5" ea  B/L  1# resume  1#  2x10 ea dir 5" 1#  2x10 ea dir 5"     UT stretch DC ---      Levator stretch DC ---      Cervical AROM  DC ---      Chin tucks DC ---      Scap retraction DC ---        Scap ABCs DC ---        Cervical isometrics  DC ---      Scap clocks  DC ----               Modalities  10 min  Cervical spine CP  Thoracic with MH supine 10 min  MHP Cerv/Thoracic/Lumbar  R bicep CP Pt def MH to Cerv/R shoulder  10 min  seated MH to Cerv/R shoulder  10 min  seated     heat pack x 10 minutes supine to cervical spine at end of session to pt tolerance  Pt was instructed to verbalize any pain/discomfort to PT during/following tx     Skin was intact pre/post tx without adverse reaction noted

## 2019-07-11 ENCOUNTER — OFFICE VISIT (OUTPATIENT)
Dept: PHYSICAL THERAPY | Facility: CLINIC | Age: 39
End: 2019-07-11
Payer: COMMERCIAL

## 2019-07-11 DIAGNOSIS — M79.18 DIFFUSE MYOFASCIAL PAIN SYNDROME: Primary | ICD-10-CM

## 2019-07-11 PROCEDURE — 97110 THERAPEUTIC EXERCISES: CPT

## 2019-07-11 PROCEDURE — 97140 MANUAL THERAPY 1/> REGIONS: CPT

## 2019-07-11 PROCEDURE — 97035 APP MDLTY 1+ULTRASOUND EA 15: CPT

## 2019-07-11 NOTE — PROGRESS NOTES
Daily Note     Today's date: 2019  Patient name: Matilde Kee  : 1980  MRN: [id number]  Referring provider: Joshua Zaldivar MD  Dx:   Encounter Diagnosis     ICD-10-CM    1  Diffuse myofascial pain syndrome M79 18                   Subjective: I am sore today R anterior shoulder / UT mm today      Objective: See treatment diary below      Assessment: Tolerated treatment well  Denied any increase pain with progression of TE today  Pt demonstrates improved overall CS / R shoulder PROM  Noted swelling and tenderness with palpation R SCJ  Region  Patient demonstrated fatigue post treatment      Plan: Continue per plan of care    with potential DC in 3 weeks with 1x/week          Precautions: Radicular symptoms  Re-eval Date: 19    Date        Visit Count 36       FOTO    DC/Complete    Pain In 5-6       Pain Out 4-5       RTD August          Manual  20 min         SOR in supine to tolerance   STM /DTM BL CS/UT/scap R pec mm, to pt tolerance - supine  Cspine PROM - sub max  Thoracic vertebral mobs to tolerance, grade II-III  np  Upper cervical spine mobs in supine to tolerance  GISTM # 4 gentle sweeping to BL CS/UT/scap mm - seated to pt tolerance    Treatment Diary        UBE 60 ROM  10 min alt       Overhead reaching        Body Blade 15 sec x 3 B/L       IR/ER above 90* 1x10 B/L  Yellow TB       Quadriped   opp UE/LE        Shoulder raises with cervical stabilization Red TB   2x10  seated       B ER with cervical stabilization Red TB   2x10  seated       Tricep 9# 3x10       MTP/LTP Janny  10#  2x10       ZITA cervical extension with Tband        Bent over rows        Pec Stretch B 1x60" ea   Sub max       Scalene Stretch        IR/ER Red TB   2x10  seated       Wall push ups 3x10       From 90 degrees  Flex  ABd  ER  [employer] Flexion,  ER    press  ABd    2# 1x10  3# 1x10 except MP at [employer] on the wall 4 way   30 x ea   Red wt ball  @ 100*       Thoracic foam roll Prone   I/T/Y's 15 x5" ea  B/L NP  1#         UT stretch DC ---      Levator stretch DC ---      Cervical AROM  DC ---      Chin tucks DC ---      Scap retraction DC ---        Scap ABCs DC ---        Cervical isometrics  DC ---      Scap clocks  DC ----           Modalities  10 min  Cervical spine CP  Thoracic with MH supine       US R anterior shoulder 8 min  1 MHz  50%  1 0 w/cm2         heat pack x 10 minutes supine to cervical spine at end of session to pt tolerance  Pt was instructed to verbalize any pain/discomfort to PT during/following tx  Skin was intact pre/post tx without adverse reaction noted

## 2019-07-16 ENCOUNTER — OFFICE VISIT (OUTPATIENT)
Dept: PHYSICAL THERAPY | Facility: CLINIC | Age: 39
End: 2019-07-16
Payer: COMMERCIAL

## 2019-07-16 DIAGNOSIS — M79.18 DIFFUSE MYOFASCIAL PAIN SYNDROME: Primary | ICD-10-CM

## 2019-07-16 PROCEDURE — 97110 THERAPEUTIC EXERCISES: CPT

## 2019-07-16 PROCEDURE — 97035 APP MDLTY 1+ULTRASOUND EA 15: CPT

## 2019-07-16 PROCEDURE — 97140 MANUAL THERAPY 1/> REGIONS: CPT

## 2019-07-16 NOTE — PROGRESS NOTES
Daily Note     Today's date: 2019  Patient name: Hellen Sherwood  : 1980  MRN: [id number]  Referring provider: Susy Kay MD  Dx:   Encounter Diagnosis     ICD-10-CM    1  Diffuse myofascial pain syndrome M79 18                   Subjective: I fell coming out of the bedroom tripped on dog toy landed on my right hip  Increase overall pain today        Objective: See treatment diary below      Assessment: Tolerated treatment fairly well with pt indicating increase discomfort with reaching > 90*  Pt demonstrates tenderness with palpation anterior R delta bursa region  Improvement with CS PROM in all planes  Continues with mm tightness / spasms BL Cspine/UT/scapular mm   R shoulder PROM WFL in all planes  Patient would benefit from continued PT to maximize  strength/stability Cspine/R shoulder to improve overall QOL  Review HEP with anticipation of DC in 2 weeks      Plan: Continue per plan of care        Precautions: Radicular symptoms  Re-eval Date: 19    Date       Visit Count 36 37      FOTO    DC/Complete    Pain In 5-6 Cerv 5  R shoulder 7  LBP 5      Pain Out 4-5 Cerv 4  R shoulder 5  LBP 5      RTD August          Manual  20 min 20 min        SOR in supine to tolerance   STM /DTM BL CS/UT/scap R pec mm, to pt tolerance - supine  Cspine PROM - sub max  Thoracic vertebral mobs to tolerance, grade II-III  np  Upper cervical spine mobs in supine to tolerance  GISTM # 4 gentle sweeping to BL CS/UT/scap mm - seated to pt tolerance    Treatment Diary        UBE 60 ROM  10 min alt 60 ROM  10 min alt      Overhead reaching        Body Blade 15 sec x 3 B/L Held      IR/ER above 90* 1x10 B/L  Yellow TB review      Quadriped   opp UE/LE        Shoulder raises with cervical stabilization Red TB   2x10  seated review      B ER with cervical stabilization Red TB   2x10  seated review      Tricep 9# 3x10 [place]  10# 2x10      MTP/LTP Janny  10#  2x10 Janny  10#   2x10      ZITA cervical extension with Tband        Bent over rows        Pec Stretch B 1x60" ea   Sub max review      Scalene Stretch        IR/ER Red TB   2x10  seated review      Wall push ups 3x10       From 90 degrees  Flex  ABd  ER  [employer] Flexion,  ER    press  ABd    2# 1x10  3# 1x10 except MP at 2x15 Flex 2# 10x  Scap 0# 2x10  MP 1# 2x10      Ball on the wall 4 way   30 x ea   Red wt ball  @ 100* 4 way   30 x ea   Red wt ball  @ 90*      Thoracic foam roll        Prone   I/T/Y's 15 x5" ea  B/L NP  1# Pball  10x, 5" ea dir  0#          UT stretch DC ---      Levator stretch DC ---      Cervical AROM  DC ---      Chin tucks DC ---      Scap retraction DC ---        Scap ABCs DC ---        Cervical isometrics  DC ---      Scap clocks  DC ----           Modalities  10 min  Cervical spine CP  Thoracic with MH supine Pt def      US R anterior shoulder 8 min  1 MHz  50%  1 0 w/cm2 10 min  1 MHz  50%  1 0 w/cm2        heat pack x 10 minutes supine to cervical spine at end of session to pt tolerance  Pt was instructed to verbalize any pain/discomfort to PT during/following tx  Skin was intact pre/post tx without adverse reaction noted

## 2019-07-18 ENCOUNTER — TRANSCRIBE ORDERS (OUTPATIENT)
Dept: FAMILY MEDICINE CLINIC | Facility: CLINIC | Age: 39
End: 2019-07-18

## 2019-07-18 ENCOUNTER — APPOINTMENT (OUTPATIENT)
Dept: PHYSICAL THERAPY | Facility: CLINIC | Age: 39
End: 2019-07-18
Payer: COMMERCIAL

## 2019-07-18 ENCOUNTER — TELEPHONE (OUTPATIENT)
Dept: FAMILY MEDICINE CLINIC | Facility: CLINIC | Age: 39
End: 2019-07-18

## 2019-07-18 DIAGNOSIS — Z00.00 ROUTINE ADULT HEALTH MAINTENANCE: Primary | ICD-10-CM

## 2019-07-18 DIAGNOSIS — M25.561 ACUTE PAIN OF BOTH KNEES: Primary | ICD-10-CM

## 2019-07-18 DIAGNOSIS — M25.562 ACUTE PAIN OF BOTH KNEES: Primary | ICD-10-CM

## 2019-07-23 ENCOUNTER — APPOINTMENT (OUTPATIENT)
Dept: LAB | Facility: CLINIC | Age: 39
End: 2019-07-23
Payer: COMMERCIAL

## 2019-07-23 ENCOUNTER — APPOINTMENT (OUTPATIENT)
Dept: RADIOLOGY | Facility: CLINIC | Age: 39
End: 2019-07-23
Payer: COMMERCIAL

## 2019-07-23 ENCOUNTER — OFFICE VISIT (OUTPATIENT)
Dept: PHYSICAL THERAPY | Facility: CLINIC | Age: 39
End: 2019-07-23
Payer: COMMERCIAL

## 2019-07-23 DIAGNOSIS — M25.562 ACUTE PAIN OF BOTH KNEES: ICD-10-CM

## 2019-07-23 DIAGNOSIS — M79.18 DIFFUSE MYOFASCIAL PAIN SYNDROME: Primary | ICD-10-CM

## 2019-07-23 DIAGNOSIS — M25.561 ACUTE PAIN OF BOTH KNEES: ICD-10-CM

## 2019-07-23 DIAGNOSIS — Z00.00 ROUTINE ADULT HEALTH MAINTENANCE: ICD-10-CM

## 2019-07-23 LAB
ALBUMIN SERPL BCP-MCNC: 4 G/DL (ref 3.5–5)
ALP SERPL-CCNC: 67 U/L (ref 46–116)
ALT SERPL W P-5'-P-CCNC: 14 U/L (ref 12–78)
ANION GAP SERPL CALCULATED.3IONS-SCNC: 5 MMOL/L (ref 4–13)
AST SERPL W P-5'-P-CCNC: 9 U/L (ref 5–45)
BASOPHILS # BLD AUTO: 0.06 THOUSANDS/ΜL (ref 0–0.1)
BASOPHILS NFR BLD AUTO: 1 % (ref 0–1)
BILIRUB SERPL-MCNC: 0.34 MG/DL (ref 0.2–1)
BUN SERPL-MCNC: 12 MG/DL (ref 5–25)
CALCIUM SERPL-MCNC: 8.9 MG/DL (ref 8.3–10.1)
CHLORIDE SERPL-SCNC: 112 MMOL/L (ref 100–108)
CHOLEST SERPL-MCNC: 147 MG/DL (ref 50–200)
CO2 SERPL-SCNC: 26 MMOL/L (ref 21–32)
CREAT SERPL-MCNC: 0.76 MG/DL (ref 0.6–1.3)
EOSINOPHIL # BLD AUTO: 0.22 THOUSAND/ΜL (ref 0–0.61)
EOSINOPHIL NFR BLD AUTO: 2 % (ref 0–6)
ERYTHROCYTE [DISTWIDTH] IN BLOOD BY AUTOMATED COUNT: 12.5 % (ref 11.6–15.1)
GFR SERPL CREATININE-BSD FRML MDRD: 98 ML/MIN/1.73SQ M
GLUCOSE P FAST SERPL-MCNC: 87 MG/DL (ref 65–99)
HCT VFR BLD AUTO: 41.6 % (ref 34.8–46.1)
HDLC SERPL-MCNC: 43 MG/DL (ref 40–60)
HGB BLD-MCNC: 13.7 G/DL (ref 11.5–15.4)
IMM GRANULOCYTES # BLD AUTO: 0.06 THOUSAND/UL (ref 0–0.2)
IMM GRANULOCYTES NFR BLD AUTO: 1 % (ref 0–2)
LDLC SERPL CALC-MCNC: 79 MG/DL (ref 0–100)
LYMPHOCYTES # BLD AUTO: 4.7 THOUSANDS/ΜL (ref 0.6–4.47)
LYMPHOCYTES NFR BLD AUTO: 40 % (ref 14–44)
MCH RBC QN AUTO: 31.6 PG (ref 26.8–34.3)
MCHC RBC AUTO-ENTMCNC: 32.9 G/DL (ref 31.4–37.4)
MCV RBC AUTO: 96 FL (ref 82–98)
MONOCYTES # BLD AUTO: 0.6 THOUSAND/ΜL (ref 0.17–1.22)
MONOCYTES NFR BLD AUTO: 5 % (ref 4–12)
NEUTROPHILS # BLD AUTO: 6.07 THOUSANDS/ΜL (ref 1.85–7.62)
NEUTS SEG NFR BLD AUTO: 51 % (ref 43–75)
NONHDLC SERPL-MCNC: 104 MG/DL
NRBC BLD AUTO-RTO: 0 /100 WBCS
PLATELET # BLD AUTO: 294 THOUSANDS/UL (ref 149–390)
PMV BLD AUTO: 9.8 FL (ref 8.9–12.7)
POTASSIUM SERPL-SCNC: 4.2 MMOL/L (ref 3.5–5.3)
PROT SERPL-MCNC: 6.7 G/DL (ref 6.4–8.2)
RBC # BLD AUTO: 4.33 MILLION/UL (ref 3.81–5.12)
SODIUM SERPL-SCNC: 143 MMOL/L (ref 136–145)
TRIGL SERPL-MCNC: 124 MG/DL
TSH SERPL DL<=0.05 MIU/L-ACNC: 2.56 UIU/ML (ref 0.36–3.74)
WBC # BLD AUTO: 11.71 THOUSAND/UL (ref 4.31–10.16)

## 2019-07-23 PROCEDURE — 97035 APP MDLTY 1+ULTRASOUND EA 15: CPT

## 2019-07-23 PROCEDURE — 80053 COMPREHEN METABOLIC PANEL: CPT

## 2019-07-23 PROCEDURE — 80061 LIPID PANEL: CPT

## 2019-07-23 PROCEDURE — 85025 COMPLETE CBC W/AUTO DIFF WBC: CPT

## 2019-07-23 PROCEDURE — 97110 THERAPEUTIC EXERCISES: CPT

## 2019-07-23 PROCEDURE — 36415 COLL VENOUS BLD VENIPUNCTURE: CPT

## 2019-07-23 PROCEDURE — 73562 X-RAY EXAM OF KNEE 3: CPT

## 2019-07-23 PROCEDURE — 97140 MANUAL THERAPY 1/> REGIONS: CPT

## 2019-07-23 PROCEDURE — 84443 ASSAY THYROID STIM HORMONE: CPT

## 2019-07-23 NOTE — PROGRESS NOTES
Daily Note     Today's date: 2019  Patient name: Hellen Sherwood  : 1980  MRN: [id number]  Referring provider: Susy Kay MD  Dx:   Encounter Diagnosis     ICD-10-CM    1  Diffuse myofascial pain syndrome M79 18                   Subjective: R shoulder is killing me    I washed the car and had to stop because of increase pain        Objective: See treatment diary below      Assessment: Tolerated treatment fairly well with modified TE to pt tolerance with elevated R shoulder pain  Increase pain with AROM R shoulder flexion to anterior R shoulder tenderness Delta bursa  Patient to review HEP NV with anticipated DC       Plan: Continue per plan of care    x 1 visit     Precautions: Radicular symptoms  Re-eval Date: 19    Date      Visit Count 36 37 45     FOTO    DC/Complete    Pain In 5-6 Cerv 5  R shoulder 7  LBP 5 Cerv 6  R shoulder 8  LBP 4-5     Pain Out 4-5 Cerv 4  R shoulder 5  LBP 5 cerv 6  R shoulder 6  LBP 4     RTD August          Manual  20 min 20 min 15 min       SOR in supine to tolerance   STM /DTM BL CS/UT/scap R pec mm, to pt tolerance - supine  Cspine PROM - sub max  Thoracic vertebral mobs to tolerance, grade II-III  np  Upper cervical spine mobs in supine to tolerance  GISTM # 4 gentle sweeping to BL CS/UT/scap mm - seated to pt tolerance    Treatment Diary        UBE 60 ROM  10 min alt 60 ROM  10 min alt 60 ROM  10 min alt     Overhead reaching        Body Blade 15 sec x 3 B/L Held      IR/ER above 90* 1x10 B/L  Yellow TB review      Quadriped   opp UE/LE        Shoulder raises with cervical stabilization Red TB   2x10  seated review      B ER with cervical stabilization Red TB   2x10  seated review      Tricep 9# 3x10 [place]  10# 2x10      MTP/LTP [place]  10#  2x10 Janny  10#   2x10 Green TB  2x 10 ea     ZITA cervical extension with Tband        Bent over rows        Pec Stretch B 1x60" ea   Sub max review      Scalene Stretch        IR/ER Red TB   2x10  seated review Green  2x 10 ea     Wall push ups 3x10       From 90 degrees  Flex  ABd  ER  [employer] Flexion,  ER    press  ABd    2# 1x10  3# 1x10 except MP at 2x15 Flex 2# 10x  Scap 0# 2x10  MP 1# 2x10 Unable flex  Pain  Scap 0- 90*  2x10 0#  AROM     Ball on the wall 4 way   30 x ea   Red wt ball  @ 100* 4 way   30 x ea   Red wt ball  @ 90* 4 way   30 x ea   Red wt ball  < 90*     Thoracic foam roll        Prone   I/T/Y's 15 x5" ea  B/L NP  1# Pball  10x, 5" ea dir  0#          UT stretch DC ---      Levator stretch DC ---      Cervical AROM  DC ---      Chin tucks DC ---      Scap retraction DC ---        Scap ABCs DC ---        Cervical isometrics  DC ---      Scap clocks  DC ----           Modalities  10 min  Cervical spine CP  Thoracic with MH supine Pt def MH to Cerv  CP R shoulder  10 min   Supine/roll     US R anterior shoulder 8 min  1 MHz  50%  1 0 w/cm2 10 min  1 MHz  50%  1 0 w/cm2 10 min  1 MHz  50%  1 0 w/cm2       heat pack x 10 minutes supine to cervical spine at end of session to pt tolerance  Pt was instructed to verbalize any pain/discomfort to PT during/following tx  Skin was intact pre/post tx without adverse reaction noted

## 2019-07-30 ENCOUNTER — APPOINTMENT (OUTPATIENT)
Dept: PHYSICAL THERAPY | Facility: CLINIC | Age: 39
End: 2019-07-30
Payer: COMMERCIAL

## 2019-07-30 ENCOUNTER — OFFICE VISIT (OUTPATIENT)
Dept: NEUROLOGY | Facility: CLINIC | Age: 39
End: 2019-07-30
Payer: COMMERCIAL

## 2019-07-30 VITALS
BODY MASS INDEX: 29.38 KG/M2 | DIASTOLIC BLOOD PRESSURE: 64 MMHG | TEMPERATURE: 98.4 F | SYSTOLIC BLOOD PRESSURE: 108 MMHG | RESPIRATION RATE: 14 BRPM | HEART RATE: 82 BPM | HEIGHT: 66 IN

## 2019-07-30 DIAGNOSIS — M47.812 FACET ARTHROPATHY, CERVICAL: ICD-10-CM

## 2019-07-30 DIAGNOSIS — M54.2 CERVICALGIA: ICD-10-CM

## 2019-07-30 DIAGNOSIS — M79.10 MUSCLE PAIN: ICD-10-CM

## 2019-07-30 DIAGNOSIS — M79.18 CERVICAL MYOFASCIAL PAIN SYNDROME: Primary | ICD-10-CM

## 2019-07-30 PROCEDURE — 99213 OFFICE O/P EST LOW 20 MIN: CPT | Performed by: PHYSICAL MEDICINE & REHABILITATION

## 2019-07-30 PROCEDURE — 20553 NJX 1/MLT TRIGGER POINTS 3/>: CPT | Performed by: PHYSICAL MEDICINE & REHABILITATION

## 2019-07-30 RX ORDER — TIZANIDINE 4 MG/1
4 TABLET ORAL
Qty: 30 TABLET | Refills: 3 | Status: SHIPPED | OUTPATIENT
Start: 2019-07-30 | End: 2019-08-12 | Stop reason: SDUPTHER

## 2019-07-30 RX ORDER — LIDOCAINE HYDROCHLORIDE 10 MG/ML
6 INJECTION, SOLUTION INFILTRATION; PERINEURAL ONCE
Status: COMPLETED | OUTPATIENT
Start: 2019-07-30 | End: 2019-07-30

## 2019-07-30 RX ADMIN — LIDOCAINE HYDROCHLORIDE 6 ML: 10 INJECTION, SOLUTION INFILTRATION; PERINEURAL at 11:59

## 2019-07-30 NOTE — PROGRESS NOTES
Physical Medicine & Rehabilitation Procedure Note:  Nohemi Morton  37 y o  Diagnoses and all orders for this visit:    Cervical myofascial pain syndrome  -     lidocaine (XYLOCAINE) 1 % injection 6 mL  -     Discontinue: triamcinolone acetonide (KENALOG-10) 10 mg/mL injection 20 mg    Cervicalgia  -     Ambulatory referral to Pain Management; Future  -     lidocaine (XYLOCAINE) 1 % injection 6 mL  -     Discontinue: triamcinolone acetonide (KENALOG-10) 10 mg/mL injection 20 mg    Facet arthropathy, cervical  -     Ambulatory referral to Pain Management; Future    Muscle pain  -     tiZANidine (ZANAFLEX) 4 mg tablet; Take 1 tablet (4 mg total) by mouth daily at bedtime      Plan:   As part of her continued treatment plan, trigger points were administered today  Known facet arthropathy cervical spine with clinical reduced cervical range of motion provocative of pain:  Referral made to Spine and Pain to evaluate possible facet blocks  Refill provided for Zanaflex    HPI/SUBJECTIVE:   34year old female with past medical history of depression, bipolar, hyperlipidemia   Patient presents today for follow-up of her chronic neck pain and cervical myofascial pain  Last seen 6/4/19    Interim update: Saw orthopedics for knee pain, diagnosed with Patellofemoral syndrome  Also has right shoulder SLAP tear and receiving injections        ROS: neck pain    OBJECTIVE:   /64 (BP Location: Left arm, Patient Position: Sitting, Cuff Size: Standard)   Pulse 82   Temp 98 4 °F (36 9 °C)   Resp 14   Ht 5' 6" (1 676 m)   BMI 29 38 kg/m²   General:  No acute distress  Psych:  Normal mood and affect  MSK:   Reduced cervical range of motion in lateral bending and rotation worse towards the left    Procedure Note:     Procedure: Trigger point injections    Indication: Cervical myofascial pain syndrome    Informed consent was obtained, risks/benefits of procedure and medication were reviewed with the patient, and consent signed by patient prior to procedure and placed in the chart  Trigger and tender points were palpated, identified, and marked bilateral suboccipital musculature, bilateral semispinalis cervicis, bilateral upper trapezius, bilateral rhomboid    Patient's skin was prepped and cleaned with alcohol  Using a 27 gauge 0 5 and 1 25 inch needle, 0 5cc of 1% Lidocaine  was injected into each of 12 trigger/tender points marked in the muscles identified  No complications were experienced and patient tolerated procedure well  Post injection immediate reduction of pain: 45 %     Post procedure instructions were provided with patient understanding

## 2019-08-01 ENCOUNTER — OFFICE VISIT (OUTPATIENT)
Dept: PHYSICAL THERAPY | Facility: CLINIC | Age: 39
End: 2019-08-01
Payer: COMMERCIAL

## 2019-08-01 DIAGNOSIS — M79.18 DIFFUSE MYOFASCIAL PAIN SYNDROME: Primary | ICD-10-CM

## 2019-08-01 PROCEDURE — 97535 SELF CARE MNGMENT TRAINING: CPT

## 2019-08-01 PROCEDURE — 97110 THERAPEUTIC EXERCISES: CPT

## 2019-08-01 NOTE — PROGRESS NOTES
Daily Note     Today's date: 2019  Patient name: Marisela Napoles  : 1980  MRN: [id number]  Referring provider: Naomi Velasquez MD  Dx:   Encounter Diagnosis     ICD-10-CM    1  Diffuse myofascial pain syndrome M79 18                   Subjective:  I had injections Dr Luly Reeder on Tuesday, seemed to have helped overall so far  Tmrw I see Dr Javier Coker for shots right shoulder   They are talking about doing possible referral to PM for nerve block      Objective: See treatment diary below      Assessment: Tolerated treatment well    Denied any increase pain Cervical/R shoulder  1* focus review HEP, provided handout and encourage carryover/compliancy to maximize overall ROM/strength/stability cervical/R shoulder  Patient DC to HEP with all questions address prior to leaving PT      Plan: DC to HEP  See last RA for additional findings     Precautions: Radicular symptoms  Re-eval Date: 19    Date     Visit Count 36 37 38 44    FOTO    DC/Complete    Pain In 5-6 Cerv 5  R shoulder 7  LBP 5 Cerv 6  R shoulder 8  LBP 4-5 Cerv 4  R shoulder 6  LBP 1    Pain Out 4-5 Cerv 4  R shoulder 5  LBP 5 cerv 6  R shoulder 6  LBP 4     RTD August          Manual  20 min 20 min 15 min Review AROM/self stretches      SOR in supine to tolerance   STM /DTM BL CS/UT/scap R pec mm, to pt tolerance - supine  Cspine PROM - sub max  Thoracic vertebral mobs to tolerance, grade II-III  np  Upper cervical spine mobs in supine to tolerance  GISTM # 4 gentle sweeping to BL CS/UT/scap mm - seated to pt tolerance    Treatment Diary        UBE 60 ROM  10 min alt 60 ROM  10 min alt 60 ROM  10 min alt 60 ROM  10 min alt    Overhead reaching        Body Blade 15 sec x 3 B/L Held      IR/ER above 90* 1x10 B/L  Yellow TB review      Quadriped   opp UE/LE        Shoulder raises with cervical stabilization Red TB   2x10  seated review      B ER with cervical stabilization Red TB   2x10  seated review  Red TB   2x10  seated    Tricep 9# 3x10 Janny  10# 2x10      MTP/LTP Janny  10#  2x10 Janny  10#   2x10 Green TB  2x 10 ea Green TB  2x 10 ea    ZITA cervical extension with Tband        Bent over rows        Pec Stretch B 1x60" ea   Sub max review  BL 2x 60"  45/85* supine    Scalene Stretch        IR/ER Red TB   2x10  seated review Green  2x 10 ea Green  2x 10 ea    Wall push ups 3x10       From 90 degrees  Flex  ABd  ER  [employer] Flexion,  ER    press  ABd    2# 1x10  3# 1x10 except MP at 2x15 Flex 2# 10x  Scap 0# 2x10  MP 1# 2x10 Unable flex  Pain  Scap 0- 90*  2x10 0#  AROM Flex 1# 10x  Scap 0# 2x10  MP 0# 2x10    Ball on the wall 4 way   30 x ea   Red wt ball  @ 100* 4 way   30 x ea   Red wt ball  @ 90* 4 way   30 x ea   Red wt ball  < 90* review    Thoracic foam roll        Prone   I/T/Y's 15 x5" ea  B/L NP  1# Pball  10x, 5" ea dir  0#    Pball  10x, 5" ea dir  0#      UT stretch DC ---      Levator stretch DC ---      Cervical AROM  DC ---      Chin tucks DC ---      Scap retraction DC ---        Scap ABCs DC ---        Cervical isometrics  DC ---      Scap clocks  DC ----           Modalities  10 min  Cervical spine CP  Thoracic with MH supine Pt def MH to Cerv  CP R shoulder  10 min   Supine/roll Pt def    US R anterior shoulder 8 min  1 MHz  50%  1 0 w/cm2 10 min  1 MHz  50%  1 0 w/cm2 10 min  1 MHz  50%  1 0 w/cm2       heat pack x 10 minutes supine to cervical spine at end of session to pt tolerance  Pt was instructed to verbalize any pain/discomfort to PT during/following tx  Skin was intact pre/post tx without adverse reaction noted

## 2019-08-02 ENCOUNTER — OFFICE VISIT (OUTPATIENT)
Dept: OBGYN CLINIC | Facility: CLINIC | Age: 39
End: 2019-08-02
Payer: COMMERCIAL

## 2019-08-02 VITALS
SYSTOLIC BLOOD PRESSURE: 110 MMHG | RESPIRATION RATE: 16 BRPM | WEIGHT: 180 LBS | HEART RATE: 90 BPM | HEIGHT: 66 IN | DIASTOLIC BLOOD PRESSURE: 72 MMHG | BODY MASS INDEX: 28.93 KG/M2

## 2019-08-02 DIAGNOSIS — M25.811 CLICKING RIGHT SHOULDER: ICD-10-CM

## 2019-08-02 DIAGNOSIS — S43.431D SUPERIOR GLENOID LABRUM LESION OF RIGHT SHOULDER, SUBSEQUENT ENCOUNTER: Primary | ICD-10-CM

## 2019-08-02 PROCEDURE — 99213 OFFICE O/P EST LOW 20 MIN: CPT | Performed by: FAMILY MEDICINE

## 2019-08-02 RX ORDER — NAPROXEN 500 MG/1
500 TABLET ORAL 2 TIMES DAILY WITH MEALS
Qty: 30 TABLET | Refills: 1 | Status: SHIPPED | OUTPATIENT
Start: 2019-08-02 | End: 2019-08-22 | Stop reason: SDUPTHER

## 2019-08-02 NOTE — PROGRESS NOTES
Assessment/Plan:  Assessment/Plan   Diagnoses and all orders for this visit:    Superior glenoid labrum lesion of right shoulder, subsequent encounter  -     Ambulatory referral to Orthopedic Surgery; Future  -     naproxen (NAPROSYN) 500 mg tablet; Take 1 tablet (500 mg total) by mouth 2 (two) times a day with meals    Clicking right shoulder  -     Ambulatory referral to Orthopedic Surgery; Future          33-year-old right-hand-dominant female with right shoulder pain of more than 8 months duration  Discussed with patient physical exam, impression and plan  Physical exam is noted for tenderness upon palpation of the trapezius and lateral subacromial aspect of shoulder  She has range of motion of forward flexion to 170°, abduction 170°, and internal rotation to lumbar spine  She has normal strength of the shoulder and positive Leung and positive [place]'s testing, as well as positive axial loading grind  I discussed with patient that I administered to her subacromial and glenohumeral joint injections, prescribed her naproxen 500 mg twice daily, and she completed formal physical therapy, and since she still symptomatic I recommend she be seen evaluated orthopedic surgeon for further evaluation and recommendation of treatment to which she agreed  She may continue with taking naproxen 500 mg twice daily as needed  Subjective:   Patient ID: Andrei Caraballo is a 37 y o  female  Chief Complaint   Patient presents with    Right Shoulder - Pain, Follow-up       33-year-old right-hand-dominant female following up for right shoulder pain of more than 8 months duration  She was last seen nearly 6 months ago at which point she received glenohumeral joint corticosteroid injection, due to symptoms from SLAP tear  Today she reports only having experienced mild improvement in pain after receiving corticosteroid injection  She also recently completed formal physical therapy   She is still having pain described as generalized to the shoulder worse at the anterior lateral aspect, constant, achy and sometimes sharp, radiating distally along the anterior aspect the upper arm, worse with elevating the arm, associated with clicking and popping, and improved with return to neutral position  She also reports difficulty with sleep as pain is worsened by laying on her right side  Shoulder Pain   This is a chronic problem  The current episode started more than 1 year ago  The problem occurs constantly  The problem has been unchanged  Associated symptoms include arthralgias  Pertinent negatives include no joint swelling, numbness or weakness  Exacerbated by: Arm use  She has tried rest and NSAIDs (Physical therapy, corticosteroid injection) for the symptoms  The treatment provided mild relief  Review of Systems   Musculoskeletal: Positive for arthralgias  Negative for joint swelling  Neurological: Negative for weakness and numbness  Objective:  Vitals:    08/02/19 0909   BP: 110/72   BP Location: Right arm   Patient Position: Sitting   Cuff Size: Large   Pulse: 90   Resp: 16   Weight: 81 6 kg (180 lb)   Height: 5' 6" (1 676 m)     Right Shoulder Exam     Tenderness   Right shoulder tenderness location: Trapezius, lateral subacromial     Range of Motion   Active abduction: 170   Forward flexion: 170   Internal rotation 0 degrees: Lumbar     Muscle Strength   The patient has normal right shoulder strength  Tests   Leung test: positive    Comments:  Negative belly press  Negative push-off  Positive Evans's  Positive axial load and grind            Physical Exam   Constitutional: She is oriented to person, place, and time  She appears well-developed  No distress  HENT:   Head: Normocephalic  Eyes: Conjunctivae are normal    Neck: No tracheal deviation present  Cardiovascular: Normal rate  Pulmonary/Chest: Effort normal  No respiratory distress  Abdominal: She exhibits no distension     Neurological: She is alert and oriented to person, place, and time  Skin: Skin is warm and dry  Psychiatric: She has a normal mood and affect  Her behavior is normal    Nursing note and vitals reviewed

## 2019-08-03 DIAGNOSIS — R20.2 PARESTHESIA: ICD-10-CM

## 2019-08-05 RX ORDER — GABAPENTIN 300 MG/1
CAPSULE ORAL
Qty: 90 CAPSULE | Refills: 1 | Status: SHIPPED | OUTPATIENT
Start: 2019-08-05 | End: 2019-10-03 | Stop reason: SDUPTHER

## 2019-08-07 ENCOUNTER — OFFICE VISIT (OUTPATIENT)
Dept: OBGYN CLINIC | Facility: CLINIC | Age: 39
End: 2019-08-07
Payer: COMMERCIAL

## 2019-08-07 VITALS
WEIGHT: 178.6 LBS | HEART RATE: 87 BPM | SYSTOLIC BLOOD PRESSURE: 109 MMHG | DIASTOLIC BLOOD PRESSURE: 75 MMHG | RESPIRATION RATE: 16 BRPM | HEIGHT: 66 IN | BODY MASS INDEX: 28.7 KG/M2

## 2019-08-07 DIAGNOSIS — M75.41 IMPINGEMENT SYNDROME OF RIGHT SHOULDER: ICD-10-CM

## 2019-08-07 DIAGNOSIS — S43.431D SUPERIOR GLENOID LABRUM LESION OF RIGHT SHOULDER, SUBSEQUENT ENCOUNTER: Primary | ICD-10-CM

## 2019-08-07 DIAGNOSIS — S43.431A SUPERIOR GLENOID LABRUM LESION OF RIGHT SHOULDER, INITIAL ENCOUNTER: ICD-10-CM

## 2019-08-07 DIAGNOSIS — M25.811 CLICKING RIGHT SHOULDER: ICD-10-CM

## 2019-08-07 PROCEDURE — 99213 OFFICE O/P EST LOW 20 MIN: CPT | Performed by: ORTHOPAEDIC SURGERY

## 2019-08-07 RX ORDER — CEFAZOLIN SODIUM 2 G/50ML
2000 SOLUTION INTRAVENOUS ONCE
Status: CANCELLED | OUTPATIENT
Start: 2019-08-14 | End: 2019-08-07

## 2019-08-07 RX ORDER — SODIUM CHLORIDE, SODIUM LACTATE, POTASSIUM CHLORIDE, CALCIUM CHLORIDE 600; 310; 30; 20 MG/100ML; MG/100ML; MG/100ML; MG/100ML
125 INJECTION, SOLUTION INTRAVENOUS CONTINUOUS
Status: CANCELLED | OUTPATIENT
Start: 2019-08-14

## 2019-08-07 RX ORDER — CHLORHEXIDINE GLUCONATE 4 G/100ML
SOLUTION TOPICAL DAILY PRN
Status: CANCELLED | OUTPATIENT
Start: 2019-08-14

## 2019-08-07 NOTE — PROGRESS NOTES
Assessment:     1  Superior glenoid labrum lesion of right shoulder, subsequent encounter    2  Impingement syndrome of right shoulder    3  Clicking right shoulder    4  Superior glenoid labrum lesion of right shoulder, initial encounter          Plan:     Problem List Items Addressed This Visit        Musculoskeletal and Integument    SLAP lesion of right shoulder - Primary     18-year-old female with slap lesion of her right shoulder and some underlying impingement  She has failed physical therapy and injections  Treatment options were discussed  She did elect to proceed with surgery  We discussed repair versus tenotomy versus tenodesis  She preferred to proceed with a tenotomy rotating is reasonable given her age activity level  Risks and benefits of surgery were discussed  Informed consent was obtained  Her preoperative paperwork was completed  Relevant Orders    Case request operating room: ARTHROSCOPY SHOULDER WITH BICEPS TENOTOMY AND SAD (Completed)       Other    Impingement syndrome of right shoulder    Relevant Orders    Case request operating room: ARTHROSCOPY SHOULDER WITH BICEPS TENOTOMY AND SAD (Completed)      Other Visit Diagnoses     Clicking right shoulder               Patient ID: Clayton Vee is a 37 y o  female  Chief Complaint:  Right shoulder pain    HPI:  18-year-old female here today for evaluation of her right shoulder  This has been going on now for approximately nine months  There is no specific injury  She has worked with physical therapy and had multiple injections  Nothing has given her significant relief  She has trouble putting her Jenkins and holding things away from her body or overhead her very painful for her  She has a lot of difficulty sleeping and it wakes her up at night  She is right-hand dominant, 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  She has a lot of clicking in her shoulder    She is frustrated that she has failed to get better with these other interventions and is looking to see what else can be done for her  She does get some numbness and tingling when she is holding her hand at shoulder level away from her body  Patient's medical intake was reviewed        Allergy:  No Known Allergies    Medications:  all current active meds have been reviewed    Past Medical History:  Past Medical History:   Diagnosis Date    Anxiety     Bipolar disorder ([facility] [address]     Chronic pain     Endometriosis     Hyperlipidemia     Urinary frequency     resolved: 6/14/2016    Urinary urgency     resolved: 6/14/2016       Past Surgical History:  Past Surgical History:   Procedure Laterality Date    BLADDER SUSPENSION      CHOLECYSTECTOMY      HERNIA REPAIR      HYSTERECTOMY      partial    OVARIAN CYST REMOVAL      [place] ARTHROSCOPY SHOULDER SURGICAL BICEPS TENODESIS Right 8/14/2019    Procedure: ARTHROSCOPY SHOULDER WITH BICEPS TENOTOMY AND SAD;  Surgeon: Sparkle Douglas MD;  Location: [facility];  Service: Orthopedics    [place] LAP,CHOLECYSTECTOMY N/A 4/30/2018    Procedure: CHOLECYSTECTOMY LAPAROSCOPIC;  Surgeon: Vivienne Humphreys DO;  Location: [facility];  Service: General    TONSILLECTOMY         Family History:  Family History   Problem Relation Age of Onset    Diabetes Mother     Heart disease Mother         rheumatic    Neuropathy Mother     COPD Mother     Diabetes type II Mother     Cancer Father     Lung cancer Father     Stroke Maternal Grandmother         CVA    Diabetes type II Maternal Grandmother         mellitus    Other Maternal Grandfather         esophageal abnormality    Diabetes type II Paternal Grandmother         mellitus    Diabetes type II Paternal Grandfather         mellitus    Depression Family     Esophageal cancer Family     Lung cancer Family     Stomach cancer Family        Social History:  Social History     Substance and Sexual Activity   Alcohol Use Yes    Frequency: 2-4 times a month    Drinks per session: 1 or 2    Binge frequency: Never    Comment: rare; consumes alcohol occasionally (as per allscripts); social alcohol use (As per allscripts)     Social History     Substance and Sexual Activity   Drug Use No     Social History     Tobacco Use   Smoking Status Current Every Day Smoker    Packs/day: 1 50    Types: Cigarettes   Smokeless Tobacco Never Used           ROS:  Review of Systems   Constitutional: Negative  HENT: Negative  Eyes: Negative  Respiratory: Negative  Cardiovascular: Negative  Gastrointestinal: Negative  Endocrine: Negative  Genitourinary: Negative  Musculoskeletal: Positive for arthralgias  Skin: Negative  Allergic/Immunologic: Negative  Neurological: Positive for numbness  Hematological: Negative  Psychiatric/Behavioral: Negative  Objective:  BP Readings from Last 1 Encounters:   08/14/19 160/75      Wt Readings from Last 1 Encounters:   08/07/19 81 kg (178 lb 9 6 oz)        BMI:   Estimated body mass index is 28 83 kg/m² as calculated from the following:    Height as of this encounter: 5' 6" (1 676 m)  Weight as of this encounter: 81 kg (178 lb 9 6 oz)  EXAM:   Physical Exam   Constitutional: She appears well-developed and well-nourished  No distress  HENT:   Head: Normocephalic and atraumatic  Eyes: Right eye exhibits no discharge  Left eye exhibits no discharge  Neck: Normal range of motion  Neck supple  No tracheal deviation present  Cardiovascular: Normal rate and regular rhythm  Pulmonary/Chest: Effort normal and breath sounds normal  No respiratory distress  She exhibits no tenderness  Abdominal: Soft  She exhibits no distension  There is no tenderness  Neurological: She is alert  Skin: Skin is warm and dry  She is not diaphoretic  No erythema  Psychiatric: She has a normal mood and affect  Her behavior is normal    Vitals reviewed      Right Shoulder Exam     Tenderness   The patient is experiencing tenderness in the biceps tendon  Range of Motion   Active abduction: normal   External rotation: normal   Forward flexion: normal   Internal rotation 0 degrees:  Mid thoracic abnormal     Muscle Strength   The patient has normal right shoulder strength  Internal rotation: 5/5   External rotation: 5/5   Supraspinatus: 5/5   Subscapularis: 5/5     Tests   Apprehension: negative  Leung test: negative  Cross arm: negative  Impingement: positive  Drop arm: negative    Other   Erythema: absent  Scars: absent  Sensation: normal  Pulse: present    Comments:  Negative Tinel's over the cubital tunnel, positive speed's test, positive Thayer's test, very mild crepitus with range of motion      Left Shoulder Exam   Left shoulder exam is normal     Tenderness   The patient is experiencing no tenderness  Range of Motion   The patient has normal left shoulder ROM  Internal rotation 0 degrees:  Upperthoracic normal     Muscle Strength   The patient has normal left shoulder strength  Tests   Apprehension: negative  Leung test: negative  Cross arm: negative  Impingement: negative  Drop arm: negative    Other   Erythema: absent  Sensation: normal  Pulse: present               Radiographs:  I have personally reviewed pertinent films in PACS and my interpretation is X-rays of the right shoulder are reviewed which show no bony abnormalities in well-maintained joint spaces  MRI is reviewed which shows a superior labral lesion  Rotator cuff is intact  Jahaira Mort

## 2019-08-07 NOTE — H&P (VIEW-ONLY)
Assessment:     1  Superior glenoid labrum lesion of right shoulder, subsequent encounter    2  Impingement syndrome of left shoulder    3  Clicking right shoulder    4  Superior glenoid labrum lesion of right shoulder, initial encounter          Plan:     Problem List Items Addressed This Visit        Musculoskeletal and Integument    SLAP lesion of right shoulder - Primary     53-year-old female with slap lesion of her right shoulder and some underlying impingement  She has failed physical therapy and injections  Treatment options were discussed  She did elect to proceed with surgery  We discussed repair versus tenotomy versus tenodesis  She preferred to proceed with a tenotomy rotating is reasonable given her age activity level  Risks and benefits of surgery were discussed  Informed consent was obtained  Her preoperative paperwork was completed  Relevant Orders    Case request operating room: ARTHROSCOPY SHOULDER WITH BICEPS TENOTOMY AND SAD (Completed)       Other    Impingement syndrome of left shoulder    Relevant Orders    Case request operating room: ARTHROSCOPY SHOULDER WITH BICEPS TENOTOMY AND SAD (Completed)      Other Visit Diagnoses     Clicking right shoulder               Patient ID: Dell Gamez is a 37 y o  female  Chief Complaint:  Right shoulder pain    HPI:  53-year-old female here today for evaluation of her right shoulder  This has been going on now for approximately nine months  There is no specific injury  She has worked with physical therapy and had multiple injections  Nothing has given her significant relief  She has trouble putting her Jenkins and holding things away from her body or overhead her very painful for her  She has a lot of difficulty sleeping and it wakes her up at night  She is right-hand dominant, 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  She has a lot of clicking in her shoulder    She is frustrated that she has failed to get better with these other interventions and is looking to see what else can be done for her  She does get some numbness and tingling when she is holding her hand at shoulder level away from her body  Patient's medical intake was reviewed        Allergy:  No Known Allergies    Medications:  all current active meds have been reviewed    Past Medical History:  Past Medical History:   Diagnosis Date    Anxiety     Bipolar disorder ([place] [address] )     Chronic pain     Endometriosis     Hyperlipidemia     Urinary frequency     resolved: 6/14/2016    Urinary urgency     resolved: 6/14/2016       Past Surgical History:  Past Surgical History:   Procedure Laterality Date    BLADDER SUSPENSION      HERNIA REPAIR      HYSTERECTOMY      partial    OVARIAN CYST REMOVAL      [place] LAP,CHOLECYSTECTOMY N/A 4/30/2018    Procedure: CHOLECYSTECTOMY LAPAROSCOPIC;  Surgeon: Chester Wang DO;  Location: [facility];  Service: General    TONSILLECTOMY         Family History:  Family History   Problem Relation Age of Onset    Diabetes Mother     Heart disease Mother         rheumatic    Neuropathy Mother     COPD Mother     Diabetes type II Mother     Cancer Father     Lung cancer Father     Stroke Maternal Grandmother         CVA    Diabetes type II Maternal Grandmother         mellitus    Other Maternal Grandfather         esophageal abnormality    Diabetes type II Paternal Grandmother         mellitus    Diabetes type II Paternal Grandfather         mellitus    Depression Family     Esophageal cancer Family     Lung cancer Family     Stomach cancer Family        Social History:  Social History     Substance and Sexual Activity   Alcohol Use Yes    Frequency: 2-4 times a month    Drinks per session: 1 or 2    Binge frequency: Never    Comment: rare; consumes alcohol occasionally (as per allscripts); social alcohol use (As per allscripts)     Social History     Substance and Sexual Activity   Drug Use No     Social History     Tobacco Use Smoking Status Current Every Day Smoker    Packs/day: 1 50    Types: Cigarettes   Smokeless Tobacco Never Used           ROS:  Review of Systems   Constitutional: Negative  HENT: Negative  Eyes: Negative  Respiratory: Negative  Cardiovascular: Negative  Gastrointestinal: Negative  Endocrine: Negative  Genitourinary: Negative  Musculoskeletal: Positive for arthralgias  Skin: Negative  Allergic/Immunologic: Negative  Neurological: Positive for numbness  Hematological: Negative  Psychiatric/Behavioral: Negative  Objective:  BP Readings from Last 1 Encounters:   08/07/19 109/75      Wt Readings from Last 1 Encounters:   08/07/19 81 kg (178 lb 9 6 oz)        BMI:   Estimated body mass index is 28 83 kg/m² as calculated from the following:    Height as of this encounter: 5' 6" (1 676 m)  Weight as of this encounter: 81 kg (178 lb 9 6 oz)  EXAM:   Physical Exam   Constitutional: She appears well-developed and well-nourished  No distress  HENT:   Head: Normocephalic and atraumatic  Eyes: Right eye exhibits no discharge  Left eye exhibits no discharge  Neck: Normal range of motion  Neck supple  No tracheal deviation present  Cardiovascular: Normal rate and regular rhythm  Pulmonary/Chest: Effort normal and breath sounds normal  No respiratory distress  She exhibits no tenderness  Abdominal: Soft  She exhibits no distension  There is no tenderness  Neurological: She is alert  Skin: Skin is warm and dry  She is not diaphoretic  No erythema  Psychiatric: She has a normal mood and affect  Her behavior is normal    Vitals reviewed  Right Shoulder Exam     Tenderness   The patient is experiencing tenderness in the biceps tendon      Range of Motion   Active abduction: normal   External rotation: normal   Forward flexion: normal   Internal rotation 0 degrees:  Mid thoracic abnormal     Muscle Strength   The patient has normal right shoulder strength  Internal rotation: 5/5   External rotation: 5/5   Supraspinatus: 5/5   Subscapularis: 5/5     Tests   Apprehension: negative  Leung test: negative  Cross arm: negative  Impingement: positive  Drop arm: negative    Other   Erythema: absent  Scars: absent  Sensation: normal  Pulse: present    Comments:  Negative Tinel's over the cubital tunnel, positive speed's test, positive [place]'s test, very mild crepitus with range of motion      Left Shoulder Exam   Left shoulder exam is normal     Tenderness   The patient is experiencing no tenderness  Range of Motion   The patient has normal left shoulder ROM  Internal rotation 0 degrees:  Upperthoracic normal     Muscle Strength   The patient has normal left shoulder strength  Tests   Apprehension: negative  Leung test: negative  Cross arm: negative  Impingement: negative  Drop arm: negative    Other   Erythema: absent  Sensation: normal  Pulse: present               Radiographs:  I have personally reviewed pertinent films in PACS and my interpretation is X-rays of the right shoulder are reviewed which show no bony abnormalities in well-maintained joint spaces  MRI is reviewed which shows a superior labral lesion  Rotator cuff is intact  Chandni Heath

## 2019-08-08 ENCOUNTER — APPOINTMENT (OUTPATIENT)
Dept: LAB | Facility: CLINIC | Age: 39
End: 2019-08-08
Payer: COMMERCIAL

## 2019-08-08 DIAGNOSIS — Z01.812 PRE-OPERATIVE LABORATORY EXAMINATION: ICD-10-CM

## 2019-08-08 DIAGNOSIS — S43.431D SUPERIOR GLENOID LABRUM LESION OF RIGHT SHOULDER, SUBSEQUENT ENCOUNTER: ICD-10-CM

## 2019-08-08 LAB
ANION GAP SERPL CALCULATED.3IONS-SCNC: 6 MMOL/L (ref 4–13)
BUN SERPL-MCNC: 7 MG/DL (ref 5–25)
CALCIUM SERPL-MCNC: 9.5 MG/DL (ref 8.3–10.1)
CHLORIDE SERPL-SCNC: 109 MMOL/L (ref 100–108)
CO2 SERPL-SCNC: 26 MMOL/L (ref 21–32)
CREAT SERPL-MCNC: 0.74 MG/DL (ref 0.6–1.3)
GFR SERPL CREATININE-BSD FRML MDRD: 101 ML/MIN/1.73SQ M
GLUCOSE SERPL-MCNC: 82 MG/DL (ref 65–140)
POTASSIUM SERPL-SCNC: 4.2 MMOL/L (ref 3.5–5.3)
SODIUM SERPL-SCNC: 141 MMOL/L (ref 136–145)

## 2019-08-08 PROCEDURE — 36415 COLL VENOUS BLD VENIPUNCTURE: CPT

## 2019-08-08 PROCEDURE — 80048 BASIC METABOLIC PNL TOTAL CA: CPT

## 2019-08-09 PROBLEM — S43.431A SUPERIOR GLENOID LABRUM LESION OF RIGHT SHOULDER: Status: ACTIVE | Noted: 2019-08-09

## 2019-08-12 DIAGNOSIS — M79.10 MUSCLE PAIN: ICD-10-CM

## 2019-08-12 NOTE — TELEPHONE ENCOUNTER
Pt sent med refill thru mychart for her tizanidine 4 mg to be sent to [facility]

## 2019-08-13 ENCOUNTER — ANESTHESIA EVENT (OUTPATIENT)
Dept: PERIOP | Facility: HOSPITAL | Age: 39
End: 2019-08-13
Payer: COMMERCIAL

## 2019-08-13 RX ORDER — TIZANIDINE 4 MG/1
4 TABLET ORAL
Qty: 30 TABLET | Refills: 0 | Status: SHIPPED | OUTPATIENT
Start: 2019-08-13 | End: 2019-11-07 | Stop reason: SDUPTHER

## 2019-08-14 ENCOUNTER — ANESTHESIA (OUTPATIENT)
Dept: PERIOP | Facility: HOSPITAL | Age: 39
End: 2019-08-14
Payer: COMMERCIAL

## 2019-08-14 ENCOUNTER — HOSPITAL ENCOUNTER (OUTPATIENT)
Facility: HOSPITAL | Age: 39
Setting detail: OUTPATIENT SURGERY
Discharge: HOME | End: 2019-08-14
Attending: ORTHOPAEDIC SURGERY | Admitting: ORTHOPAEDIC SURGERY
Payer: COMMERCIAL

## 2019-08-14 VITALS
SYSTOLIC BLOOD PRESSURE: 160 MMHG | RESPIRATION RATE: 18 BRPM | TEMPERATURE: 99 F | DIASTOLIC BLOOD PRESSURE: 75 MMHG | OXYGEN SATURATION: 95 % | HEART RATE: 87 BPM

## 2019-08-14 DIAGNOSIS — S43.431D SUPERIOR GLENOID LABRUM LESION OF RIGHT SHOULDER, SUBSEQUENT ENCOUNTER: Primary | ICD-10-CM

## 2019-08-14 PROCEDURE — 29822 SHO ARTHRS SRG LMTD DBRDMT: CPT | Performed by: ORTHOPAEDIC SURGERY

## 2019-08-14 PROCEDURE — NC001 PR NO CHARGE: Performed by: PHYSICIAN ASSISTANT

## 2019-08-14 PROCEDURE — C9290 INJ, BUPIVACAINE LIPOSOME: HCPCS | Performed by: ORTHOPAEDIC SURGERY

## 2019-08-14 PROCEDURE — 29822 SHO ARTHRS SRG LMTD DBRDMT: CPT | Performed by: PHYSICIAN ASSISTANT

## 2019-08-14 PROCEDURE — 29826 SHO ARTHRS SRG DECOMPRESSION: CPT | Performed by: PHYSICIAN ASSISTANT

## 2019-08-14 PROCEDURE — 29826 SHO ARTHRS SRG DECOMPRESSION: CPT | Performed by: ORTHOPAEDIC SURGERY

## 2019-08-14 RX ORDER — ACETAMINOPHEN 325 MG/1
650 TABLET ORAL EVERY 6 HOURS PRN
Status: DISCONTINUED | OUTPATIENT
Start: 2019-08-14 | End: 2019-08-14 | Stop reason: HOSPADM

## 2019-08-14 RX ORDER — ONDANSETRON 2 MG/ML
INJECTION INTRAMUSCULAR; INTRAVENOUS AS NEEDED
Status: DISCONTINUED | OUTPATIENT
Start: 2019-08-14 | End: 2019-08-14 | Stop reason: SURG

## 2019-08-14 RX ORDER — OXYCODONE HYDROCHLORIDE AND ACETAMINOPHEN 5; 325 MG/1; MG/1
1 TABLET ORAL EVERY 4 HOURS PRN
Status: DISCONTINUED | OUTPATIENT
Start: 2019-08-14 | End: 2019-08-14 | Stop reason: HOSPADM

## 2019-08-14 RX ORDER — ONDANSETRON 2 MG/ML
4 INJECTION INTRAMUSCULAR; INTRAVENOUS EVERY 6 HOURS PRN
Status: DISCONTINUED | OUTPATIENT
Start: 2019-08-14 | End: 2019-08-14 | Stop reason: HOSPADM

## 2019-08-14 RX ORDER — SODIUM CHLORIDE, SODIUM LACTATE, POTASSIUM CHLORIDE, CALCIUM CHLORIDE 600; 310; 30; 20 MG/100ML; MG/100ML; MG/100ML; MG/100ML
125 INJECTION, SOLUTION INTRAVENOUS CONTINUOUS
Status: ACTIVE | OUTPATIENT
Start: 2019-08-14 | End: 2019-08-14

## 2019-08-14 RX ORDER — LIDOCAINE HYDROCHLORIDE 10 MG/ML
INJECTION, SOLUTION INFILTRATION; PERINEURAL
Status: COMPLETED | OUTPATIENT
Start: 2019-08-14 | End: 2019-08-14

## 2019-08-14 RX ORDER — SODIUM CHLORIDE, SODIUM LACTATE, POTASSIUM CHLORIDE, CALCIUM CHLORIDE 600; 310; 30; 20 MG/100ML; MG/100ML; MG/100ML; MG/100ML
125 INJECTION, SOLUTION INTRAVENOUS CONTINUOUS
Status: DISCONTINUED | OUTPATIENT
Start: 2019-08-14 | End: 2019-08-14

## 2019-08-14 RX ORDER — FENTANYL CITRATE/PF 50 MCG/ML
25 SYRINGE (ML) INJECTION
Status: DISCONTINUED | OUTPATIENT
Start: 2019-08-14 | End: 2019-08-14 | Stop reason: HOSPADM

## 2019-08-14 RX ORDER — DEXAMETHASONE SODIUM PHOSPHATE 10 MG/ML
INJECTION, SOLUTION INTRAMUSCULAR; INTRAVENOUS AS NEEDED
Status: DISCONTINUED | OUTPATIENT
Start: 2019-08-14 | End: 2019-08-14 | Stop reason: SURG

## 2019-08-14 RX ORDER — CHLORHEXIDINE GLUCONATE 4 G/100ML
SOLUTION TOPICAL DAILY PRN
Status: DISCONTINUED | OUTPATIENT
Start: 2019-08-14 | End: 2019-08-14 | Stop reason: HOSPADM

## 2019-08-14 RX ORDER — PROPOFOL 10 MG/ML
INJECTION, EMULSION INTRAVENOUS AS NEEDED
Status: DISCONTINUED | OUTPATIENT
Start: 2019-08-14 | End: 2019-08-14 | Stop reason: SURG

## 2019-08-14 RX ORDER — BUPIVACAINE HYDROCHLORIDE 5 MG/ML
INJECTION, SOLUTION PERINEURAL
Status: COMPLETED | OUTPATIENT
Start: 2019-08-14 | End: 2019-08-14

## 2019-08-14 RX ORDER — CEFAZOLIN SODIUM 2 G/50ML
2000 SOLUTION INTRAVENOUS ONCE
Status: COMPLETED | OUTPATIENT
Start: 2019-08-14 | End: 2019-08-14

## 2019-08-14 RX ORDER — HYDROCODONE BITARTRATE AND ACETAMINOPHEN 5; 325 MG/1; MG/1
1 TABLET ORAL EVERY 6 HOURS PRN
Qty: 25 TABLET | Refills: 0 | Status: SHIPPED | OUTPATIENT
Start: 2019-08-14 | End: 2019-08-24

## 2019-08-14 RX ORDER — ONDANSETRON 2 MG/ML
4 INJECTION INTRAMUSCULAR; INTRAVENOUS ONCE AS NEEDED
Status: DISCONTINUED | OUTPATIENT
Start: 2019-08-14 | End: 2019-08-14 | Stop reason: HOSPADM

## 2019-08-14 RX ORDER — LIDOCAINE HYDROCHLORIDE 10 MG/ML
INJECTION, SOLUTION INFILTRATION; PERINEURAL AS NEEDED
Status: DISCONTINUED | OUTPATIENT
Start: 2019-08-14 | End: 2019-08-14 | Stop reason: SURG

## 2019-08-14 RX ORDER — SODIUM CHLORIDE, SODIUM LACTATE, POTASSIUM CHLORIDE, CALCIUM CHLORIDE 600; 310; 30; 20 MG/100ML; MG/100ML; MG/100ML; MG/100ML
125 INJECTION, SOLUTION INTRAVENOUS CONTINUOUS
Status: DISCONTINUED | OUTPATIENT
Start: 2019-08-14 | End: 2019-08-14 | Stop reason: HOSPADM

## 2019-08-14 RX ORDER — FENTANYL CITRATE 50 UG/ML
INJECTION, SOLUTION INTRAMUSCULAR; INTRAVENOUS
Status: COMPLETED | OUTPATIENT
Start: 2019-08-14 | End: 2019-08-14

## 2019-08-14 RX ORDER — MIDAZOLAM HYDROCHLORIDE 1 MG/ML
INJECTION INTRAMUSCULAR; INTRAVENOUS
Status: COMPLETED | OUTPATIENT
Start: 2019-08-14 | End: 2019-08-14

## 2019-08-14 RX ORDER — SUCCINYLCHOLINE/SOD CL,ISO/PF 100 MG/5ML
SYRINGE (ML) INTRAVENOUS AS NEEDED
Status: DISCONTINUED | OUTPATIENT
Start: 2019-08-14 | End: 2019-08-14 | Stop reason: SURG

## 2019-08-14 RX ORDER — CYCLOBENZAPRINE HCL 10 MG
10 TABLET ORAL
Qty: 20 TABLET | Refills: 0 | Status: SHIPPED | OUTPATIENT
Start: 2019-08-14 | End: 2019-11-07 | Stop reason: ALTCHOICE

## 2019-08-14 RX ADMIN — MIDAZOLAM HYDROCHLORIDE 2 MG: 1 INJECTION, SOLUTION INTRAMUSCULAR; INTRAVENOUS at 08:40

## 2019-08-14 RX ADMIN — BUPIVACAINE HYDROCHLORIDE 10 ML: 5 INJECTION, SOLUTION PERINEURAL at 08:40

## 2019-08-14 RX ADMIN — CEFAZOLIN SODIUM 2000 MG: 2 SOLUTION INTRAVENOUS at 09:36

## 2019-08-14 RX ADMIN — SODIUM CHLORIDE, SODIUM LACTATE, POTASSIUM CHLORIDE, AND CALCIUM CHLORIDE 125 ML/HR: .6; .31; .03; .02 INJECTION, SOLUTION INTRAVENOUS at 08:21

## 2019-08-14 RX ADMIN — LIDOCAINE HYDROCHLORIDE 3 ML: 10 INJECTION, SOLUTION INFILTRATION; PERINEURAL at 08:40

## 2019-08-14 RX ADMIN — LIDOCAINE HYDROCHLORIDE 50 MG: 10 INJECTION, SOLUTION INFILTRATION; PERINEURAL at 09:41

## 2019-08-14 RX ADMIN — PHENYLEPHRINE HYDROCHLORIDE 100 MCG: 10 INJECTION INTRAVENOUS at 09:55

## 2019-08-14 RX ADMIN — FENTANYL CITRATE 50 MCG: 50 INJECTION, SOLUTION INTRAMUSCULAR; INTRAVENOUS at 08:40

## 2019-08-14 RX ADMIN — BUPIVACAINE 20 ML: 13.3 INJECTION, SUSPENSION, LIPOSOMAL INFILTRATION at 08:43

## 2019-08-14 RX ADMIN — FENTANYL CITRATE 50 MCG: 50 INJECTION, SOLUTION INTRAMUSCULAR; INTRAVENOUS at 10:16

## 2019-08-14 RX ADMIN — PROPOFOL 100 MG: 10 INJECTION, EMULSION INTRAVENOUS at 10:08

## 2019-08-14 RX ADMIN — ONDANSETRON HYDROCHLORIDE 4 MG: 2 INJECTION, SOLUTION INTRAVENOUS at 10:40

## 2019-08-14 RX ADMIN — PROPOFOL 200 MG: 10 INJECTION, EMULSION INTRAVENOUS at 09:41

## 2019-08-14 RX ADMIN — Medication 100 MG: at 09:41

## 2019-08-14 RX ADMIN — DEXAMETHASONE SODIUM PHOSPHATE 10 MG: 10 INJECTION, SOLUTION INTRAMUSCULAR; INTRAVENOUS at 09:59

## 2019-08-14 NOTE — DISCHARGE INSTRUCTIONS
POST-OPERATIVE SHOULDER INSTRUCTIONS 1    The principal surgical findings in your shoulder were:    1  Right shoulder biceps tendinopathy    2  Right shoulder impingement      The following corrective procedures were performed:     1  Biceps tenotomy    2  Subacromial decompression        FOLLOW-UP:    You will need an appointment in:   Please call the office at   today  GENERAL INSTRUCTIONS:     · Apply an ice pack to your shoulder for the next 12-24 hours  Bruising may appear on your arm or chest in a few days  · Your sling is only for comfort  You may remove it part-time or completely when you wish  · If you have an upset stomach, take only cool, clear liquids such as Gatorade, Jello, or Ginger ale  If nausea persists more then 24 hours, notify the office  · Low-grade temperature is not uncommon after surgery  However, of your temperature exceeds 101 degrees, please notify the office  · Any prescriptions you received before or after surgery should be filled immediately and taken according to directions on the label  Taking medication with food or with a glass of milk will avoid stomach upset  EXERCISES:     · Move and use your arm as comfort allows in any direction, but does not cause pain  Restrict lifting, pushing, and pulling until seen in the office  · Bend forward at the waist, allowing your operated arm to hang toward the floor  Hold a can of soup or object with similar weight, and swing the arm gently in all directions for 30 seconds  Repeat 4-5 times daily  · Lie on your back with your elbow at your side  Bend your elbow 90 degrees, then rotate the forearm toward and away from your trunk in a waving motion for 30 seconds  · Bend and straighten your elbow alternately for 30 seconds  · Lace your fingers together; with your opposite arm, carry your operated arm overhead as far as possible, then down again  Repeat 10 times     · De the above exercises 4-6 timed daily, always within reasonable comfort range  · For the first 48 hours, inhale deeply and hold your breath for 3 seconds; exhale completely  Repeat 10 times, 4 times daily  · If you smoke, avoid cigarettes for 48 hours  · Check arm pulse of the affected arm, every 2 hours for the first 24 hours and then every 8 hours  Observe and report any signs of loss of circulation of affected arm as well as any redness, excessive swelling, or drainage from incision  BANDAGES:    · Your bandage may show blood stains within 1-12 hours  This is mostly fluid that was used to irrigate your shoulder, slightly stained with blood  It is no cause for concern  However, if your bandage becomes saturated, notify my office right away  · Remove and discard your bandages and yellow gauze after 48 hours  Apply Band-aids, or apply a dry sterile dressing to the wound or, dressing change as discussed post-operatively with orthopedic surgeon and your family  · May shower in 72 hours  Do not soak the incisions  · ABDs are used under the affected arm (for female patients, under the breasts also) to absorb moisture and to prevent skin breakdown  They should be changed daily  · Keep wound dry and clean  CLOTHING:    · Sling/abduction sling may be worn over clothes  · Female patients may start wearing a bra when they feel comfortable  WORK:     · Your comfort should be your guide for returning to work  Avoid overhead positions for 3 weeks, except for your exercises  · Limit lifting, pushing, or pulling according to comfort

## 2019-08-14 NOTE — INTERVAL H&P NOTE
H&P reviewed  After examining the patient I find no changes in the patients condition since the H&P had been written      Vitals:    08/14/19 0804   BP: 121/64   Pulse: 80   Resp: 18   Temp: 99 1 °F (37 3 °C)   SpO2: 98%

## 2019-08-14 NOTE — ANESTHESIA PROCEDURE NOTES
Peripheral Block    Patient location during procedure: post-op  Start time: 8/14/2019 8:39 AM  Reason for block: at surgeon's request and post-op pain management  Staffing  Anesthesiologist: Otilia Cohen MD  Performed: anesthesiologist   Preanesthetic Checklist  Completed: patient identified, site marked, surgical consent, pre-op evaluation, timeout performed, IV checked, risks and benefits discussed and monitors and equipment checked  Peripheral Block  Patient position: supine  Prep: ChloraPrep  Patient monitoring: continuous pulse ox, frequent blood pressure checks, heart rate and cardiac monitor  Block type: fascia iliaca and interscalene  Laterality: right  Injection technique: single-shot  Procedures: ultrasound guided, Ultrasound guidance required for the procedure to increase accuracy and safety of medication placement and decrease risk of complications  and nerve stimulator  Ultrasound permanent image savedbupivacaine (MARCAINE) 0 5 % perineural infiltration, 10 mL  fentaNYL 50 mcg/mL IV, 50 mcg  midazolam (VERSED) 2 mg/2 mL IV, 2 mg  lidocaine (XYLOCAINE) 1 % infiltration, 3 mL  Needle  Needle type: Stimuplex   Needle gauge: 21 G  Needle length: 5 cm  Needle localization: anatomical landmarks, nerve stimulator and ultrasound guidance  Needle insertion depth: 1 5 cm  Assessment  Injection assessment: incremental injection, local visualized surrounding nerve on ultrasound, negative aspiration for heme and transient paresthesias  Paresthesia pain: immediately resolved  Heart rate change: no  Slow fractionated injection: yes  Post-procedure:  site cleaned  patient tolerated the procedure well with no immediate complications  Additional Notes  Bupivacaine 0 5% 10 ml and Exparel 20 ml

## 2019-08-14 NOTE — DISCHARGE SUMMARY
[facility] Discharge Note  Lopez Casiano, 37 y o  female  MRN: [id number]      Preoperative diagnosis: right shoulder impingement, biceps tendinopathy    Postoperative diagnosis: right shoulder impingement, biceps tendinopathy    Procedure: right shoulder arthroscopy with biceps tenotomy and subacromial decompression    After procedure, patient was brought to PACU  Pain was controlled with IV and oral pain medication  Patient was discharged to home after cleared by anesthesia and when criteria was met  Condition: good    Discharge medications:   See after visit summary for reconciled discharge medications provided to patient and family  Please refer to discharge instructions for further information

## 2019-08-14 NOTE — ANESTHESIA PREPROCEDURE EVALUATION
Review of Systems/Medical History  Patient summary reviewed  Chart reviewed      Cardiovascular  EKG reviewed, Exercise tolerance (METS): >4,  Hyperlipidemia,    Pulmonary  Smoker cigarette smoker  , Tobacco cessation counseling given , COPD mild- PRN medication , No shortness of breath,        GI/Hepatic            Endo/Other     GYN       Hematology   Musculoskeletal  Back pain , cervical pain, Osteoarthritis,        Neurology    No motor deficit , Headaches, Paresthesias,    Psychology   Anxiety, Depression , bipolar disorder and being treated for depression,   Chronic pain,            Physical Exam    Airway    Mallampati score: III  TM Distance: >3 FB  Neck ROM: full     Dental   upper dentures and lower dentures,     Cardiovascular      Pulmonary      Other Findings        Anesthesia Plan  ASA Score- 3     Anesthesia Type- general and regional with ASA Monitors  Additional Monitors:   Airway Plan: ETT  Plan Factors- Patient instructed to abstain from smoking on day of procedure  Patient did not smoke on day of surgery  Induction- intravenous  Postoperative Plan- Plan for postoperative opioid use  Informed Consent- Anesthetic plan and risks discussed with patient  I personally reviewed this patient with the CRNA  Discussed and agreed on the Anesthesia Plan with the CRNA  Katie Johnson

## 2019-08-14 NOTE — OP NOTE
Orthopedics ARTHROSCOPY SHOULDER WITH BICEPS TENOTOMY AND SAD  Op Note      Pre-op Diagnosis:   Superior glenoid labrum lesion of right shoulder  Impingement syndrome of left shoulder     Post-op Diagnosis:  Same    Procedure:  ARTHROSCOPY SHOULDER WITH BICEPS TENOTOMY AND SUBACROMIAL DECOMPRESSION    Surgeon:  Surgeon(s):  MD Jenny Ramírez PA-C     A qualified resident physician was not available and A physician assistant was required during the procedure for retraction tissue handling,dissection and suturing    Anesthesia:  Choice    Tourniquet Time:  None    Estimated Blood Loss:  Minimal    Material sent to lab:  None      Complications:  None    Findings:  Examination under anesthesia revealed the shoulder was  stable  Arthroscopic examination revealed superior labral tear, no articular deficits, anterior acromial spur and an intact rotator cuff  Indications:  37 y o  y/o female with pain in his right shoulder with exam and MRI consistent with a superior labral tear  The patient was unresponsive to nonoperative management  Treatment options were discussed, and the patient wished to proceed with surgical intervention  Risks and benefits of surgery were discussed which included but were not limited to infection, neurovascular damage, incomplete resolution of symptoms, wound healing problems, stiffness, need for further surgery, DVT, PE, and death  Informed consent was obtained  Procedure: The patient was met preoperatively and the right shoulder was identified and signed  A regional block was placed  The patient was taken back to the operating room where a General LMA was performed  Patient was positioned in the  [place] chair position  Bony prominences were well-padded  The shoulder was sterilely prepped and draped in the usual fashion  A timeout was held identifying the patient, side, site, antibiotics, and allergies  The patient received Ancef preoperatively      A posterior portal was established after marking out the bony landmarks  I insufflated the glenohumeral joint with 60 mL of normal saline, made an incision on the skin after placing local anesthesia, and placed the trocar into the joint  An anterior portal was then established in the inside out technique using a switching stick just proximal to the supraspinatus  Just prior to skin incision local anesthesia was used, and incision was made, the area was dilated, and a cannula placed  A diagnostic shoulder arthroscopy was performed with the above-stated results  The labrum superiorly had instability and was torn off the superior glenoid  There is a lot of inflammation around the shoulder joint  This was debrided back in a biceps tenotomy was performed  The tissue was debrided back to stable rim  The instruments were then placed in the subacromial space  A lateral portal was established with an 18 gauge needle in a trocar  Fl[place] Craft was placed through the lateral portal and a bursectomy was performed to allow for good visualization  There was a moderate-sized anterior acromial spur  This was debrided back with a bur  Once the procedure was completed all extraneous fluid was drained  Portal sites were closed with 4-0 nylon  Sterile dressing was placed  Patient was placed in a sling postoperatively  Disposition:  PACU    Plan:  Patient will follow-up in 7-10 days for wound check and suture removal  The patient will be in a sling as tolerated  Anticipate postop physical therapy with no restrictions       @[facility]@     Date: 8/14/2019  Time: 11:00 AM

## 2019-08-14 NOTE — ANESTHESIA POSTPROCEDURE EVALUATION
Post-Op Assessment Note    CV Status:  Stable       Mental Status:  Alert   Hydration Status:  Stable   PONV Controlled:  None   Airway Patency:  Patent   Post Op Vitals Reviewed: Yes      Staff: CRNA           BP   132/63   Temp   98 9   Pulse  100   Resp   26   SpO2   100%

## 2019-08-15 ENCOUNTER — PATIENT OUTREACH (OUTPATIENT)
Dept: FAMILY MEDICINE CLINIC | Facility: CLINIC | Age: 39
End: 2019-08-15

## 2019-08-16 ENCOUNTER — PATIENT OUTREACH (OUTPATIENT)
Dept: FAMILY MEDICINE CLINIC | Facility: CLINIC | Age: 39
End: 2019-08-16

## 2019-08-20 PROBLEM — M75.41 IMPINGEMENT SYNDROME OF RIGHT SHOULDER: Status: ACTIVE | Noted: 2017-11-28

## 2019-08-22 ENCOUNTER — OFFICE VISIT (OUTPATIENT)
Dept: OBGYN CLINIC | Facility: CLINIC | Age: 39
End: 2019-08-22

## 2019-08-22 VITALS
BODY MASS INDEX: 28.93 KG/M2 | SYSTOLIC BLOOD PRESSURE: 110 MMHG | WEIGHT: 180 LBS | DIASTOLIC BLOOD PRESSURE: 75 MMHG | HEART RATE: 67 BPM | HEIGHT: 66 IN

## 2019-08-22 DIAGNOSIS — M75.41 IMPINGEMENT SYNDROME OF RIGHT SHOULDER: ICD-10-CM

## 2019-08-22 DIAGNOSIS — S43.431D SUPERIOR GLENOID LABRUM LESION OF RIGHT SHOULDER, SUBSEQUENT ENCOUNTER: Primary | ICD-10-CM

## 2019-08-22 PROBLEM — S43.431A SLAP LESION OF RIGHT SHOULDER: Status: RESOLVED | Noted: 2017-10-18 | Resolved: 2019-08-22

## 2019-08-22 PROBLEM — S43.431A SUPERIOR GLENOID LABRUM LESION OF RIGHT SHOULDER: Status: RESOLVED | Noted: 2019-08-09 | Resolved: 2019-08-22

## 2019-08-22 PROCEDURE — 99024 POSTOP FOLLOW-UP VISIT: CPT | Performed by: ORTHOPAEDIC SURGERY

## 2019-08-22 RX ORDER — NAPROXEN 500 MG/1
500 TABLET ORAL 2 TIMES DAILY WITH MEALS
Qty: 60 TABLET | Refills: 1 | Status: SHIPPED | OUTPATIENT
Start: 2019-08-22 | End: 2019-10-24 | Stop reason: SDUPTHER

## 2019-08-22 RX ORDER — TRAMADOL HYDROCHLORIDE 50 MG/1
50 TABLET ORAL EVERY 6 HOURS PRN
Qty: 20 TABLET | Refills: 0 | Status: SHIPPED | OUTPATIENT
Start: 2019-08-22 | End: 2019-11-07 | Stop reason: ALTCHOICE

## 2019-08-22 NOTE — ASSESSMENT & PLAN NOTE
42-year-old female status post a right shoulder subacromial decompression with biceps tenotomy and debridement of a torn labrum  Overall she is doing very well  She still has some soreness  I recommended physical therapy  She has no restrictions  Follow-up in 6-8 weeks  She was given a refill on her naproxen instructed to take this regularly  She was given a small skip for tramadol which she should wean from as soon as possible  She will not be given any other pain medication

## 2019-08-22 NOTE — PROGRESS NOTES
Assessment:     1  Superior glenoid labrum lesion of right shoulder, subsequent encounter    2  Impingement syndrome of right shoulder          Plan:     Problem List Items Addressed This Visit        Musculoskeletal and Integument    RESOLVED: Superior glenoid labrum lesion of right shoulder - Primary     25-year-old female status post a right shoulder subacromial decompression with biceps tenotomy and debridement of a torn labrum  Overall she is doing very well  She still has some soreness  I recommended physical therapy  She has no restrictions  Follow-up in 6-8 weeks  She was given a refill on her naproxen instructed to take this regularly  She was given a small skip for tramadol which she should wean from as soon as possible  She will not be given any other pain medication  Relevant Medications    naproxen (NAPROSYN) 500 mg tablet    traMADol (ULTRAM) 50 mg tablet    Other Relevant Orders    Ambulatory referral to Physical Therapy       Other    RESOLVED: Impingement syndrome of right shoulder    Relevant Medications    traMADol (ULTRAM) 50 mg tablet    Other Relevant Orders    Ambulatory referral to Physical Therapy           Patient ID: Ree Barrios is a 37 y o  female  Chief Complaint:  Postop check    Subjective:  25-year-old female status post a right shoulder arthroscopy with biceps tenotomy and subacromial decompression on August 14, 2019  She still has some soreness  She states she has been using it  Her bruising is resolving  She had bruising in her arm and on her breast even  She denies any numbness or tingling  Her stitches have been bothering her  Allergy:  No Known Allergies    Medications:  all current active meds have been reviewed    ROS:  Review of Systems   All other systems reviewed and are negative        Objective:  BP Readings from Last 1 Encounters:   08/22/19 110/75      Wt Readings from Last 1 Encounters:   08/22/19 81 6 kg (180 lb)        Exam: Physical Exam  Right Shoulder Exam     Comments:  Patient's incisions are healing well with no erythema or drainage  Full active forward flexion and external rotation  Resolving ecchymosis on the inner arm    Soreness with pressure on the muscles and resisted muscle testing

## 2019-09-16 ENCOUNTER — OFFICE VISIT (OUTPATIENT)
Dept: FAMILY MEDICINE CLINIC | Facility: CLINIC | Age: 39
End: 2019-09-16
Payer: COMMERCIAL

## 2019-09-16 ENCOUNTER — CONSULT (OUTPATIENT)
Dept: PAIN MEDICINE | Facility: CLINIC | Age: 39
End: 2019-09-16
Payer: COMMERCIAL

## 2019-09-16 VITALS
SYSTOLIC BLOOD PRESSURE: 114 MMHG | WEIGHT: 181.4 LBS | DIASTOLIC BLOOD PRESSURE: 61 MMHG | HEIGHT: 66 IN | BODY MASS INDEX: 29.15 KG/M2

## 2019-09-16 VITALS
TEMPERATURE: 98.8 F | BODY MASS INDEX: 29.06 KG/M2 | HEART RATE: 88 BPM | HEIGHT: 66 IN | SYSTOLIC BLOOD PRESSURE: 124 MMHG | OXYGEN SATURATION: 97 % | DIASTOLIC BLOOD PRESSURE: 76 MMHG | WEIGHT: 180.8 LBS

## 2019-09-16 DIAGNOSIS — M54.2 CERVICALGIA: ICD-10-CM

## 2019-09-16 DIAGNOSIS — M54.16 LUMBAR RADICULOPATHY: ICD-10-CM

## 2019-09-16 DIAGNOSIS — Z13.31 NEGATIVE DEPRESSION SCREENING: ICD-10-CM

## 2019-09-16 DIAGNOSIS — Z00.00 MEDICARE ANNUAL WELLNESS VISIT, SUBSEQUENT: Primary | ICD-10-CM

## 2019-09-16 DIAGNOSIS — G89.4 CHRONIC PAIN SYNDROME: ICD-10-CM

## 2019-09-16 DIAGNOSIS — M54.2 CHRONIC NECK PAIN: ICD-10-CM

## 2019-09-16 DIAGNOSIS — M54.12 CERVICAL RADICULOPATHY: Primary | ICD-10-CM

## 2019-09-16 DIAGNOSIS — M47.812 FACET ARTHROPATHY, CERVICAL: ICD-10-CM

## 2019-09-16 DIAGNOSIS — E78.00 HYPERCHOLESTEROLEMIA: ICD-10-CM

## 2019-09-16 DIAGNOSIS — M79.18 CERVICAL MYOFASCIAL PAIN SYNDROME: ICD-10-CM

## 2019-09-16 DIAGNOSIS — G89.29 CHRONIC NECK PAIN: ICD-10-CM

## 2019-09-16 PROCEDURE — 99204 OFFICE O/P NEW MOD 45 MIN: CPT | Performed by: ANESTHESIOLOGY

## 2019-09-16 PROCEDURE — 3008F BODY MASS INDEX DOCD: CPT | Performed by: FAMILY MEDICINE

## 2019-09-16 PROCEDURE — G0439 PPPS, SUBSEQ VISIT: HCPCS | Performed by: FAMILY MEDICINE

## 2019-09-16 PROCEDURE — 99213 OFFICE O/P EST LOW 20 MIN: CPT | Performed by: FAMILY MEDICINE

## 2019-09-16 NOTE — PROGRESS NOTES
Assessment:  1  Cervical radiculopathy    2  Cervicalgia    3  Facet arthropathy, cervical    4  Lumbar radiculopathy    5  Cervical myofascial pain syndrome    6  Chronic neck pain    7  Chronic pain syndrome        Plan:  Patient is a 64-year-old female with complaints of neck pain, bilateral arm pain, bilateral leg pain with a history significant for cervical facet arthropathy, lumbar radiculopathy presents to office for initial consultation  X-ray lumbar spine from 06/14/2017 was within normal limits  MRI cervical spine showed minor right facet arthropathy at C2-C3, C3-C4, C5-C6, C6-C7 with mild disc bulges  MRI of right shoulder shows evidence of a small slap tear of the glenoid labrum which extends into the biceps anchor  X-ray of bilateral knees within normal limits  From clinical presentation patient history patient appears to have radiculopathy in the L5 and S1 nerve root distributions bilaterally  Patient also notes some radicular symptoms in the C5, C6, C7 nerve root distribution bilaterally  Patient is status post right shoulder arthroscopy with biceps tenotomy and subacromial decompression performed on 08/14/2019  Patient has accomplished several months of physical therapy for the cervical spine and shoulder  Patient reports some relief of her lower extremity radicular symptoms after taking gabapentin  1   We will provide patient with a physical therapy referral for a  lumbar core strengthening and cervical spine strengthening exercises  Patient fails to show any improvement in her symptoms over the next 6 weeks will then order MRIs of the mentioned levels  2   We will order bilateral lower EMGs of the upper lower extremities to assess for pathology regards to radicular symptoms  3  We will schedule patient for a C7-T1 interlaminar epidural steroid injection to help reduce the cervical radicular symptoms    4  We will schedule patient for lumbar core strengthening exercises to help treat radicular symptoms                    [place] Prescription Drug Monitoring Program report was reviewed and was appropriate       History of Present Illness: The patient is a 37 y o  female who presents for consultation in regards to Neck Pain; Shoulder Pain; Hand Pain; Arm Pain; Knee Pain; Leg Pain; and Foot Pain  Symptoms have been present for 3 years  Symptoms began without any precipitating injury or trauma  Pain is reported to be 8 on the numeric rating scale  Symptoms are felt constantly and worst in the no typical pattern  Symptoms are characterized as cramping, shooting, sharp, numbing and tingling  Symptoms are associated with bilateral arm weakness and bilateral leg weakness  Aggravating factors include bending and exercise  Relieving factors include nothing  No change in symptoms with kneeling, lying down, standing, leaning forward, leaning bckward, sitting, walking, turning the head, relaxation, coughing/sneezing and bowel movements  Treatments that have been helpful include physical therapy and traction  home exercise, TENS unit and heat/ice have provided no relief  Medications tried to relieve symptoms temp sneezing, Lidoderm, Tylenol, ibuprofen, naproxen, Cymbalta, Paxil, gabapentin, Depakote without any alleviation of his symptoms  Review of Systems:    Review of Systems   Musculoskeletal: Positive for arthralgias  Neurological: Positive for numbness and headaches  All other systems reviewed and are negative          Past Medical History:   Diagnosis Date    Anxiety     Bipolar disorder ([address] )     Chronic pain     Endometriosis     Hyperlipidemia     Urinary frequency     resolved: 6/14/2016    Urinary urgency     resolved: 6/14/2016       Past Surgical History:   Procedure Laterality Date    BLADDER SUSPENSION      CHOLECYSTECTOMY      HERNIA REPAIR      HYSTERECTOMY      partial    OVARIAN CYST REMOVAL      GA ARTHROSCOPY SHOULDER SURGICAL BICEPS TENODESIS Right 8/14/2019    Procedure: ARTHROSCOPY SHOULDER WITH BICEPS TENOTOMY AND SAD;  Surgeon: Lizette Hill MD;  Location: [facility];  Service: Orthopedics    IA LAP,CHOLECYSTECTOMY N/A 4/30/2018    Procedure: CHOLECYSTECTOMY LAPAROSCOPIC;  Surgeon: Francis Murillo DO;  Location: [facility];  Service: General    TONSILLECTOMY         Family History   Problem Relation Age of Onset    Diabetes Mother     Heart disease Mother         rheumatic    Neuropathy Mother     COPD Mother     Diabetes type II Mother     Cancer Father     Lung cancer Father     Stroke Maternal Grandmother         CVA    Diabetes type II Maternal Grandmother         mellitus    Other Maternal Grandfather         esophageal abnormality    Diabetes type II Paternal Grandmother         mellitus    Diabetes type II Paternal Grandfather         mellitus    Depression Family     Esophageal cancer Family     Lung cancer Family     Stomach cancer Family        Social History     Occupational History    Not on file   Tobacco Use    Smoking status: Current Every Day Smoker     Packs/day: 1 50     Types: Cigarettes    Smokeless tobacco: Never Used   Substance and Sexual Activity    Alcohol use: Yes     Frequency: 2-4 times a month     Drinks per session: 1 or 2     Binge frequency: Never     Comment: rare; consumes alcohol occasionally (as per allscripts); social alcohol use (As per allscripts)    Drug use: No    Sexual activity: Yes     Partners: Male         Current Outpatient Medications:     buPROPion (WELLBUTRIN SR) 100 mg 12 hr tablet, , Disp: , Rfl: 0    Cholecalciferol (VITAMIN D3) 2000 units TABS, Take 4,000 Units by mouth daily  , Disp: , Rfl:     citalopram (CeleXA) 20 mg tablet, Take 20 mg by mouth every morning, Disp: , Rfl: 0    cyclobenzaprine (FLEXERIL) 10 mg tablet, Take 1 tablet (10 mg total) by mouth daily at bedtime as needed for muscle spasms, Disp: 20 tablet, Rfl: 0    gabapentin (NEURONTIN) 300 mg capsule, take 1 capsule by mouth three times a day, Disp: 90 capsule, Rfl: 1    lamoTRIgine (LAMICTAL) 200 MG tablet, Take 1 tablet by mouth 2 (two) times a day, Disp: , Rfl:     lidocaine (XYLOCAINE) 5 % ointment, Apply topically as needed for mild pain, Disp: 35 44 g, Rfl: 0    LORazepam (ATIVAN) 0 5 mg tablet, Take 1 tablet by mouth daily at bedtime  , Disp: , Rfl:     Multiple Vitamin (DAILY VALUE MULTIVITAMIN) TABS, Take by mouth, Disp: , Rfl:     naproxen (NAPROSYN) 500 mg tablet, Take 1 tablet (500 mg total) by mouth 2 (two) times a day with meals, Disp: 60 tablet, Rfl: 1    Nerve Stimulator (STANDARD TENS) ISABELLA, by Does not apply route 3 (three) times a day as needed (pain), Disp: 1 Device, Rfl: 0    simvastatin (ZOCOR) 40 mg tablet, take 1 tablet by mouth once daily, Disp: 90 tablet, Rfl: 3    tiZANidine (ZANAFLEX) 4 mg tablet, Take 1 tablet (4 mg total) by mouth daily at bedtime, Disp: 30 tablet, Rfl: 0    traMADol (ULTRAM) 50 mg tablet, Take 1 tablet (50 mg total) by mouth every 6 (six) hours as needed for moderate pain, Disp: 20 tablet, Rfl: 0    No Known Allergies    Physical Exam:    /61 (BP Location: Right arm, Patient Position: Sitting, Cuff Size: Standard)   Ht 5' 6" (1 676 m)   Wt 82 3 kg (181 lb 6 4 oz)   BMI 29 28 kg/m²     Constitutional: normal, well developed, well nourished, alert, in no distress and non-toxic and no overt pain behavior  Eyes: anicteric  HEENT: grossly intact  Neck: supple, symmetric, trachea midline and no masses   Pulmonary:even and unlabored  Cardiovascular:No edema or pitting edema present  Skin:Normal without rashes or lesions and well hydrated  Psychiatric:Mood and affect appropriate  Neurologic:Cranial Nerves II-XII grossly intact  Musculoskeletal:antalgic     Cervical Spine examination demonstrates  Decreased ROM secondary to pain with lateral rotation to the left/right and bending to the left/right, in addition to neck flexion   5/5 upper extremity strength in all muscle groups bilaterally  Negative Spurling's maneuver to the b/l Ue, sensitivity to light touch intact b/l Ue  Lumbar/Sacral Spine examination demonstrates  Decreased range of motion lumbar spine with pain upon: flexion, lateral rotation to the left/right, and bending to the left/right  Bilateral lumbar paraspinals tender to palpation  Muscle spasms noted in the lumbar area bilaterally  5/5 lower extremity strength in all muscle groups bilaterally  Positive seated straight leg raise for bilateral lower extremities  Sensitivity to light touch intact bilateral lower extremities  2+ reflexes in the patella and Achilles    No ankle clonus       Imaging  RIGHT KNEE     INDICATION:   M25 561: Pain in right knee  M25 562: Pain in left knee      COMPARISON:  None     VIEWS:  XR KNEE 3 VW RIGHT NON INJURY         FINDINGS:     There is no acute fracture or dislocation      There is no joint effusion      No significant degenerative changes      No lytic or blastic lesions are seen      Soft tissues are unremarkable      IMPRESSION:     No acute osseous abnormality           LEFT KNEE     INDICATION:   M25 561: Pain in right knee  M25 562: Pain in left knee      COMPARISON:  None     VIEWS:  XR KNEE 3 VW LEFT NON INJURY         FINDINGS:     There is no acute fracture or dislocation      There is no joint effusion      No significant degenerative changes      No lytic or blastic lesions are seen      Soft tissues are unremarkable      IMPRESSION:     No acute osseous abnormality

## 2019-09-16 NOTE — PROGRESS NOTES
Assessment/Plan:    No problem-specific Assessment & Plan notes found for this encounter  Diagnoses and all orders for this visit:    Medicare annual wellness visit, subsequent    Hypercholesterolemia    Negative depression screening          PHQ-9 Depression Screening    PHQ-9:    Frequency of the following problems over the past two weeks:       Little interest or pleasure in doing things:  0 - not at all  Feeling down, depressed, or hopeless:  0 - not at all  Trouble falling or staying asleep, or sleeping too much:  0 - not at all  Feeling tired or having little energy:  0 - not at all  Poor appetite or overeatin - not at all  Feeling bad about yourself - or that you are a failure or have let yourself or your family down:  0 - not at all  Trouble concentrating on things, such as reading the newspaper or watching television:  0 - not at all  Moving or speaking so slowly that other people could have noticed  Or the opposite - being so fidgety or restless that you have been moving around a lot more than usual:  0 - not at all  Thoughts that you would be better off dead, or of hurting yourself in some way:  0 - not at all  PHQ-2 Score:  0  PHQ-9 Score:  0            Subjective:      Patient ID: Ree Barrios is a 37 y o  female  HPI    The following portions of the patient's history were reviewed and updated as appropriate: allergies, current medications, past family history, past medical history, past social history, past surgical history and problem list     Review of Systems   Constitutional: Negative for chills, fatigue and fever  HENT: Negative  Eyes: Negative  Respiratory: Negative for shortness of breath and wheezing  Cardiovascular: Negative for chest pain and palpitations  Gastrointestinal: Negative for abdominal pain, blood in stool, constipation, diarrhea, nausea and vomiting  Endocrine: Negative  Genitourinary: Negative for difficulty urinating and dysuria  Musculoskeletal: Positive for arthralgias and myalgias  Skin: Negative  Allergic/Immunologic: Negative  Neurological: Negative for seizures and syncope  Hematological: Negative for adenopathy  Psychiatric/Behavioral: Negative  Objective:    /76   Pulse 88   Temp 98 8 °F (37 1 °C) (Tympanic)   Ht 5' 6" (1 676 m)   Wt 82 kg (180 lb 12 8 oz)   SpO2 97%   BMI 29 18 kg/m²      Physical Exam   Constitutional: She is oriented to person, place, and time  She appears well-developed and well-nourished  HENT:   Head: Normocephalic and atraumatic  Right Ear: External ear normal    Left Ear: External ear normal    Nose: Nose normal    Mouth/Throat: Oropharynx is clear and moist    Eyes: Pupils are equal, round, and reactive to light  Conjunctivae and EOM are normal    Neck: Normal range of motion  Neck supple  Cardiovascular: Normal rate, regular rhythm and normal heart sounds  No murmur heard  Pulmonary/Chest: Effort normal and breath sounds normal  No respiratory distress  She has no wheezes  She has no rales  Abdominal: Soft  Bowel sounds are normal  She exhibits no distension and no mass  There is no tenderness  There is no rebound and no guarding  Musculoskeletal: Normal range of motion  She exhibits no edema  Lymphadenopathy:     She has no cervical adenopathy  Neurological: She is alert and oriented to person, place, and time  She has normal reflexes  She exhibits normal muscle tone  Skin: Skin is warm and dry  Psychiatric: She has a normal mood and affect  Her behavior is normal  Judgment and thought content normal    Nursing note and vitals reviewed

## 2019-09-16 NOTE — PROGRESS NOTES
Assessment and Plan:     Problem List Items Addressed This Visit     None      Visit Diagnoses     Medicare annual wellness visit, subsequent    -  Primary           Preventive health issues were discussed with patient, and age appropriate screening tests were ordered as noted in patient's After Visit Summary  Personalized health advice and appropriate referrals for health education or preventive services given if needed, as noted in patient's After Visit Summary       History of Present Illness:     Patient presents for Medicare Annual Wellness visit    Patient Care Team:  Néstor Morel DO as PCP - General  MD Jimmy Sahu RN as Lead OP Care Mgr (Care Coordination)     Problem List:     Patient Active Problem List   Diagnosis    Acute back pain    Bipolar disorder ([facility] [address] )    Breast lump on right side at 10 o'clock position    Chronic neck pain    Classic migraine    Contact with and suspected exposure to communicable disease    Dizziness    Encounter for gynecological examination with abnormal finding    COPD (chronic obstructive pulmonary disease) ([facility] [address] )    Generalized anxiety disorder    Hypercholesterolemia    Hyperlipidemia    Hyperglycemia    Leg cramps    Leg pain    Leukocytosis    Lower back pain    Lung nodule    Lymphadenitis, acute    Major depression    Muscle cramps    Other ovarian cyst, right side    Pain, hand    Paresthesia    Pelvic pain in female    Right lower quadrant abdominal pain    Shingles    Shortness of breath    Skin rash    Tobacco use disorder    Upper limb weakness    Vaginal discharge    Vitamin D deficiency    Weakness of both lower extremities    Gallbladder polyp    Cervicalgia    Cervical myofascial pain syndrome    Chronic pain syndrome    Lumbar radiculopathy    Cervical radiculopathy    Facet arthropathy, cervical      Past Medical and Surgical History:     Past Medical History:   Diagnosis Date    Anxiety     Bipolar disorder ([facility] [address] )     Chronic pain     Endometriosis     Hyperlipidemia     Urinary frequency     resolved: 6/14/2016    Urinary urgency     resolved: 6/14/2016     Past Surgical History:   Procedure Laterality Date    BLADDER SUSPENSION      CHOLECYSTECTOMY      HERNIA REPAIR      HYSTERECTOMY      partial    OVARIAN CYST REMOVAL      ND ARTHROSCOPY SHOULDER SURGICAL BICEPS TENODESIS Right 8/14/2019    Procedure: ARTHROSCOPY SHOULDER WITH BICEPS TENOTOMY AND SAD;  Surgeon: Vicente Nath MD;  Location: [facility];  Service: Orthopedics    ND LAP,CHOLECYSTECTOMY N/A 4/30/2018    Procedure: CHOLECYSTECTOMY LAPAROSCOPIC;  Surgeon: Meli Laguerre DO;  Location: [facility];  Service: General    TONSILLECTOMY        Family History:     Family History   Problem Relation Age of Onset    Diabetes Mother     Heart disease Mother         rheumatic    Neuropathy Mother     COPD Mother     Diabetes type II Mother     Cancer Father     Lung cancer Father     Stroke Maternal Grandmother         CVA    Diabetes type II Maternal Grandmother         mellitus    Other Maternal Grandfather         esophageal abnormality    Diabetes type II Paternal Grandmother         mellitus    Diabetes type II Paternal Grandfather         mellitus    Depression Family     Esophageal cancer Family     Lung cancer Family     Stomach cancer Family       Social History:     Social History     Socioeconomic History    Marital status: /Civil Union     Spouse name: None    Number of children: 1    Years of education: None    Highest education level: GED or equivalent   Occupational History    None   Social Needs    Financial resource strain: Not hard at all   [place] insecurity:     Worry: Never true     Inability: Never true    Transportation needs:     Medical: No     Non-medical: No   Tobacco Use    Smoking status: Current Every Day Smoker     Packs/day: 1 50     Types: Cigarettes    Smokeless tobacco: Never Used   Substance and Sexual Activity    Alcohol use: Yes     Frequency: 2-4 times a month     Drinks per session: 1 or 2     Binge frequency: Never     Comment: rare; consumes alcohol occasionally (as per allscripts); social alcohol use (As per allscripts)    Drug use: No    Sexual activity: Yes     Partners: Male   Lifestyle    Physical activity:     Days per week: 0 days     Minutes per session: 0 min    Stress:  Only a little   Relationships    Social connections:     Talks on phone: More than three times a week     Gets together: More than three times a week     Attends Yazidism service: None     Active member of club or organization: None     Attends meetings of clubs or organizations: None     Relationship status:     Intimate partner violence:     Fear of current or ex partner: No     Emotionally abused: No     Physically abused: No     Forced sexual activity: No   Other Topics Concern    None   Social History Narrative    Disabled    Drinks coffee    One child-age 7       Medications and Allergies:     Current Outpatient Medications   Medication Sig Dispense Refill    buPROPion (WELLBUTRIN SR) 100 mg 12 hr tablet   0    Cholecalciferol (VITAMIN D3) 2000 units TABS Take 4,000 Units by mouth daily        citalopram (CeleXA) 20 mg tablet Take 20 mg by mouth every morning  0    gabapentin (NEURONTIN) 300 mg capsule take 1 capsule by mouth three times a day 90 capsule 1    lamoTRIgine (LAMICTAL) 200 MG tablet Take 1 tablet by mouth 2 (two) times a day      lidocaine (XYLOCAINE) 5 % ointment Apply topically as needed for mild pain 35 44 g 0    LORazepam (ATIVAN) 0 5 mg tablet Take 1 tablet by mouth daily at bedtime        Multiple Vitamin (DAILY VALUE MULTIVITAMIN) TABS Take by mouth      naproxen (NAPROSYN) 500 mg tablet Take 1 tablet (500 mg total) by mouth 2 (two) times a day with meals 60 tablet 1    Nerve Stimulator (STANDARD TENS) ISABELLA by Does not apply route 3 (three) times a day as needed (pain) 1 Device 0    simvastatin (ZOCOR) 40 mg tablet take 1 tablet by mouth once daily 90 tablet 3    tiZANidine (ZANAFLEX) 4 mg tablet Take 1 tablet (4 mg total) by mouth daily at bedtime 30 tablet 0    cyclobenzaprine (FLEXERIL) 10 mg tablet Take 1 tablet (10 mg total) by mouth daily at bedtime as needed for muscle spasms (Patient not taking: Reported on 9/16/2019) 20 tablet 0    traMADol (ULTRAM) 50 mg tablet Take 1 tablet (50 mg total) by mouth every 6 (six) hours as needed for moderate pain (Patient not taking: Reported on 9/16/2019) 20 tablet 0     No current facility-administered medications for this visit  No Known Allergies   Immunizations:     Immunization History   Administered Date(s) Administered    INFLUENZA 10/25/2018    Influenza Quadrivalent Preservative Free 3 years and older IM 01/23/2018    Influenza, injectable, quadrivalent, preservative free 0 5 mL 10/25/2018    Td (adult), Unspecified 03/06/2007      Health Maintenance:         Topic Date Due    MAMMOGRAM  1978    PAP SMEAR  01/17/1999         Topic Date Due    Pneumococcal Vaccine: Pediatrics (0 to 5 Years) and At-Risk Patients (6 to 59 Years) (1 of 3 - PCV13) 01/17/1984    DTaP,Tdap,and Td Vaccines (1 - Tdap) 03/07/2007    INFLUENZA VACCINE  07/01/2019      Medicare Health Risk Assessment:     /76   Pulse 88   Temp 98 8 °F (37 1 °C) (Tympanic)   Ht 5' 6" (1 676 m)   Wt 82 kg (180 lb 12 8 oz)   SpO2 97%   BMI 29 18 kg/m²      Rasta is here for her Subsequent Wellness visit  Last Medicare Wellness visit information reviewed, patient interviewed and updates made to the record today  Health Risk Assessment:   Patient rates overall health as good  Patient feels that their physical health rating is same  Eyesight was rated as slightly worse  Hearing was rated as same  Patient feels that their emotional and mental health rating is same  Pain experienced in the last 7 days has been a lot  Patient's pain rating has been 8/10  Patient states that she has experienced no weight loss or gain in last 6 months  Depression Screening:   PHQ-2 Score: 0  PHQ-9 Score: 0      Fall Risk Screening: In the past year, patient has experienced: no history of falling in past year      Urinary Incontinence Screening:   Patient has not leaked urine accidently in the last six months  Home Safety:  Patient does not have trouble with stairs inside or outside of their home  Patient has working smoke alarms and has working carbon monoxide detector  Home safety hazards include: none  Nutrition:   Current diet is Regular  Medications:   Patient is currently taking over-the-counter supplements  OTC medications include: see medication list  Patient is able to manage medications  Activities of Daily Living (ADLs)/Instrumental Activities of Daily Living (IADLs):   Walk and transfer into and out of bed and chair?: Yes  Dress and groom yourself?: Yes    Bathe or shower yourself?: Yes    Feed yourself?  Yes  Do your laundry/housekeeping?: Yes  Manage your money, pay your bills and track your expenses?: Yes  Make your own meals?: Yes    Do your own shopping?: Yes    Previous Hospitalizations:   Any hospitalizations or ED visits within the last 12 months?: Yes    How many hospitalizations have you had in the last year?: 1-2    Advance Care Planning:   Living will: No    Durable POA for healthcare: No    Advanced directive: No    Advanced directive counseling given: Yes    Five wishes given: Yes    End of Life Decisions reviewed with patient: No    Provider agrees with end of life decisions: No      Cognitive Screening:   Provider or family/friend/caregiver concerned regarding cognition?: No    PREVENTIVE SCREENINGS      Cardiovascular Screening:    General: Screening Not Indicated and History Lipid Disorder      Diabetes Screening:     General: Screening Current      Colorectal Cancer Screening:     General: Screening Not Indicated      Breast Cancer Screening:       Due for: Mammogram        Cervical Cancer Screening:      Due for: Cervical Pap Smear      Osteoporosis Screening:    General: Screening Not Indicated      Abdominal Aortic Aneurysm (AAA) Screening:        General: Screening Not Indicated      Lung Cancer Screening:     General: Screening Not Indicated      Hepatitis C Screening:    General: Screening Not Indicated      Kel Bhat DO

## 2019-09-16 NOTE — PATIENT INSTRUCTIONS
Medicare Preventive Visit Patient Instructions  Thank you for completing your Welcome to Medicare Visit or Medicare Annual Wellness Visit today  Your next wellness visit will be due in one year (9/16/2020)  The screening/preventive services that you may require over the next 5-10 years are detailed below  Some tests may not apply to you based off risk factors and/or age  Screening tests ordered at today's visit but not completed yet may show as past due  Also, please note that scanned in results may not display below  Preventive Screenings:  Service Recommendations Previous Testing/Comments   Colorectal Cancer Screening  * Colonoscopy    * Fecal Occult Blood Test (FOBT)/Fecal Immunochemical Test (FIT)  * Fecal DNA/Cologuard Test  * Flexible Sigmoidoscopy Age: 54-65 years old   Colonoscopy: every 10 years (may be performed more frequently if at higher risk)  OR  FOBT/FIT: every 1 year  OR  Cologuard: every 3 years  OR  Sigmoidoscopy: every 5 years  Screening may be recommended earlier than age 48 if at higher risk for colorectal cancer  Also, an individualized decision between you and your healthcare provider will decide whether screening between the ages of 74-80 would be appropriate  Colonoscopy: 03/25/2019  FOBT/FIT: Not on file  Cologuard: Not on file  Sigmoidoscopy: Not on file         Breast Cancer Screening Age: 36 years old  Frequency: every 1-2 years  Not required if history of left and right mastectomy Mammogram: Not on file       Cervical Cancer Screening Between the ages of 21-29, pap smear recommended once every 3 years  Between the ages of 33-67, can perform pap smear with HPV co-testing every 5 years     Recommendations may differ for women with a history of total hysterectomy, cervical cancer, or abnormal pap smears in past  Pap Smear: Not on file       Hepatitis C Screening Once for adults born between [facility]  More frequently in patients at high risk for Hepatitis C Hep C Antibody: Not on file       Diabetes Screening 1-2 times per year if you're at risk for diabetes or have pre-diabetes Fasting glucose: 87 mg/dL   A1C: 5 6 %    Screening Current   Cholesterol Screening Once every 5 years if you don't have a lipid disorder  May order more often based on risk factors  Lipid panel: 07/23/2019    Screening Not Indicated  History Lipid Disorder     Other Preventive Screenings Covered by Medicare:  1  Abdominal Aortic Aneurysm (AAA) Screening: covered once if your at risk  You're considered to be at risk if you have a family history of AAA  2  Lung Cancer Screening: covers low dose CT scan once per year if you meet all of the following conditions: (1) Age 50-69; (2) No signs or symptoms of lung cancer; (3) Current smoker or have quit smoking within the last 15 years; (4) You have a tobacco smoking history of at least 30 pack years (packs per day multiplied by number of years you smoked); (5) You get a written order from a healthcare provider  3  Glaucoma Screening: covered annually if you're considered high risk: (1) You have diabetes OR (2) Family history of glaucoma OR (3)  aged 48 and older OR (3)  American aged 72 and older  3  Osteoporosis Screening: covered every 2 years if you meet one of the following conditions: (1) You're estrogen deficient and at risk for osteoporosis based off medical history and other findings; (2) Have a vertebral abnormality; (3) On glucocorticoid therapy for more than 3 months; (4) Have primary hyperparathyroidism; (5) On osteoporosis medications and need to assess response to drug therapy  · Last bone density test (DXA Scan): Not on file  5  HIV Screening: covered annually if you're between the age of 12-76  Also covered annually if you are younger than 13 and older than 72 with risk factors for HIV infection  For pregnant patients, it is covered up to 3 times per pregnancy      Immunizations:  Immunization Recommendations   Influenza Vaccine Annual influenza vaccination during flu season is recommended for all persons aged >= 6 months who do not have contraindications   Pneumococcal Vaccine (Prevnar and Pneumovax)  * Prevnar = PCV13  * Pneumovax = PPSV23   Adults 25-60 years old: 1-3 doses may be recommended based on certain risk factors  Adults 72 years old: Prevnar (PCV13) vaccine recommended followed by Pneumovax (PPSV23) vaccine  If already received PPSV23 since turning 65, then PCV13 recommended at least one year after PPSV23 dose  Hepatitis B Vaccine 3 dose series if at intermediate or high risk (ex: diabetes, end stage renal disease, liver disease)   Tetanus (Td) Vaccine - COST NOT COVERED BY MEDICARE PART B Following completion of primary series, a booster dose should be given every 10 years to maintain immunity against tetanus  Td may also be given as tetanus wound prophylaxis  Tdap Vaccine - COST NOT COVERED BY MEDICARE PART B Recommended at least once for all adults  For pregnant patients, recommended with each pregnancy  Shingles Vaccine (Shingrix) - COST NOT COVERED BY MEDICARE PART B  2 shot series recommended in those aged 48 and above     Health Maintenance Due:      Topic Date Due    MAMMOGRAM  1978    PAP SMEAR  01/17/1999     Immunizations Due:      Topic Date Due    Pneumococcal Vaccine: Pediatrics (0 to 5 Years) and At-Risk Patients (6 to 59 Years) (1 of 3 - PCV13) 01/17/1984    DTaP,Tdap,and Td Vaccines (1 - Tdap) 03/07/2007    INFLUENZA VACCINE  07/01/2019     Advance Directives   What are advance directives? Advance directives are legal documents that state your wishes and plans for medical care  These plans are made ahead of time in case you lose your ability to make decisions for yourself  Advance directives can apply to any medical decision, such as the treatments you want, and if you want to donate organs  What are the types of advance directives?   There are many types of advance directives, and each state has rules about how to use them  You may choose a combination of any of the following:  · Living will: This is a written record of the treatment you want  You can also choose which treatments you do not want, which to limit, and which to stop at a certain time  This includes surgery, medicine, IV fluid, and tube feedings  · Durable power of  for healthcare [facility]): This is a written record that states who you want to make healthcare choices for you when you are unable to make them for yourself  This person, called a proxy, is usually a family member or a friend  You may choose more than 1 proxy  · Do not resuscitate (DNR) order:  A DNR order is used in case your heart stops beating or you stop breathing  It is a request not to have certain forms of treatment, such as CPR  A DNR order may be included in other types of advance directives  · Medical directive: This covers the care that you want if you are in a coma, near death, or unable to make decisions for yourself  You can list the treatments you want for each condition  Treatment may include pain medicine, surgery, blood transfusions, dialysis, IV or tube feedings, and a ventilator (breathing machine)  · Values history: This document has questions about your views, beliefs, and how you feel and think about life  This information can help others choose the care that you would choose  Why are advance directives important? An advance directive helps you control your care  Although spoken wishes may be used, it is better to have your wishes written down  Spoken wishes can be misunderstood, or not followed  Treatments may be given even if you do not want them  An advance directive may make it easier for your family to make difficult choices about your care  Cigarette Smoking and Your Health   Risks to your health if you smoke:  Nicotine and other chemicals found in tobacco damage every cell in your body   Even if you are a light smoker, you have an increased risk for cancer, heart disease, and lung disease  If you are pregnant or have diabetes, smoking increases your risk for complications  Benefits to your health if you stop smoking:   · You decrease respiratory symptoms such as coughing, wheezing, and shortness of breath  · You reduce your risk for cancers of the lung, mouth, throat, kidney, bladder, pancreas, stomach, and cervix  If you already have cancer, you increase the benefits of chemotherapy  You also reduce your risk for cancer returning or a second cancer from developing  · You reduce your risk for heart disease, blood clots, heart attack, and stroke  · You reduce your risk for lung infections, and diseases such as pneumonia, asthma, chronic bronchitis, and emphysema  · Your circulation improves  More oxygen can be delivered to your body  If you have diabetes, you lower your risk for complications, such as kidney, artery, and eye diseases  You also lower your risk for nerve damage  Nerve damage can lead to amputations, poor vision, and blindness  · You improve your body's ability to heal and to fight infections  For more information and support to stop smoking:   · "[employer]  Phone: 0- 883 - 446-7530  Web Address: www [employer]  Weight Management   Why it is important to manage your weight:  Being overweight increases your risk of health conditions such as heart disease, high blood pressure, type 2 diabetes, and certain types of cancer  It can also increase your risk for osteoarthritis, sleep apnea, and other respiratory problems  Aim for a slow, steady weight loss  Even a small amount of weight loss can lower your risk of health problems  How to lose weight safely:  A safe and healthy way to lose weight is to eat fewer calories and get regular exercise  You can lose up about 1 pound a week by decreasing the number of calories you eat by 500 calories each day     Healthy meal plan for weight management:  A healthy meal plan includes a variety of foods, contains fewer calories, and helps you stay healthy  A healthy meal plan includes the following:  · Eat whole-grain foods more often  A healthy meal plan should contain fiber  Fiber is the part of grains, fruits, and vegetables that is not broken down by your body  Whole-grain foods are healthy and provide extra fiber in your diet  Some examples of whole-grain foods are whole-wheat breads and pastas, oatmeal, brown rice, and bulgur  · Eat a variety of vegetables every day  Include dark, leafy greens such as spinach, kale, zane greens, and mustard greens  Eat yellow and orange vegetables such as carrots, sweet potatoes, and winter squash  · Eat a variety of fruits every day  Choose fresh or canned fruit (canned in its own juice or light syrup) instead of juice  Fruit juice has very little or no fiber  · Eat low-fat dairy foods  Drink fat-free (skim) milk or 1% milk  Eat fat-free yogurt and low-fat cottage cheese  Try low-fat cheeses such as mozzarella and other reduced-fat cheeses  · Choose meat and other protein foods that are low in fat  Choose beans or other legumes such as split peas or lentils  Choose fish, skinless poultry (chicken or turkey), or lean cuts of red meat (beef or pork)  Before you cook meat or poultry, cut off any visible fat  · Use less fat and oil  Try baking foods instead of frying them  Add less fat, such as margarine, sour cream, regular salad dressing and mayonnaise to foods  Eat fewer high-fat foods  Some examples of high-fat foods include french fries, doughnuts, ice cream, and cakes  · Eat fewer sweets  Limit foods and drinks that are high in sugar  This includes candy, cookies, regular soda, and sweetened drinks  Exercise:  Exercise at least 30 minutes per day on most days of the week  Some examples of exercise include walking, biking, dancing, and swimming   You can also fit in more physical activity by taking the stairs instead of the elevator or parking farther away from stores  Ask your healthcare provider about the best exercise plan for you  © Copyright [employer] 2018 Information is for End User's use only and may not be sold, redistributed or otherwise used for commercial purposes   All illustrations and images included in CareNotes® are the copyrighted property of [employer]  or [address] [place] [address] [employer]

## 2019-09-18 ENCOUNTER — TELEPHONE (OUTPATIENT)
Dept: RADIOLOGY | Facility: CLINIC | Age: 39
End: 2019-09-18

## 2019-09-18 NOTE — TELEPHONE ENCOUNTER
----- Message from Noni Edmonds MA sent at 9/18/2019  8:48 AM EDT -----  Hold form scanned into media-procedure 10/17

## 2019-09-18 NOTE — TELEPHONE ENCOUNTER
S/w pt and advised of 4 day naproxen hold, beginning 10/13  Advised to hold any ibuprofen 24 hours prior  Pt aware that OR will call the day prior with arrival time and she needs a   Pt will cb with any questions

## 2019-09-20 ENCOUNTER — TELEPHONE (OUTPATIENT)
Dept: NEUROLOGY | Facility: CLINIC | Age: 39
End: 2019-09-20

## 2019-09-20 NOTE — TELEPHONE ENCOUNTER
Probably best to reschedule TPI - see if she can be added in November sometime instead    Dr Marilee Hutchison

## 2019-09-20 NOTE — TELEPHONE ENCOUNTER
Patient states she is scheduled with Dr Soraida Bravo with PM for block/injectionon 10/17  She states she is currently scheduled with Dr Ladi Abarca on 10/15 for possible TPI  She is asking if TPI appointment should be rescheduled for after her other injection with PM or if she should keep this appointment  Please advise  [address] to leave detailed message

## 2019-09-25 ENCOUNTER — APPOINTMENT (OUTPATIENT)
Dept: PHYSICAL THERAPY | Facility: CLINIC | Age: 39
End: 2019-09-25
Payer: COMMERCIAL

## 2019-10-02 ENCOUNTER — EVALUATION (OUTPATIENT)
Dept: PHYSICAL THERAPY | Facility: CLINIC | Age: 39
End: 2019-10-02
Payer: COMMERCIAL

## 2019-10-02 DIAGNOSIS — M54.16 LUMBAR RADICULOPATHY: Primary | ICD-10-CM

## 2019-10-02 PROCEDURE — 97162 PT EVAL MOD COMPLEX 30 MIN: CPT

## 2019-10-02 PROCEDURE — 97110 THERAPEUTIC EXERCISES: CPT

## 2019-10-02 NOTE — PROGRESS NOTES
PT Evaluation     Today's date: 10/2/2019  Patient name: Antony Falcon  : 1980  MRN: [id number]  Referring provider: Lili Lucas MD  Dx:   Encounter Diagnosis     ICD-10-CM    1  Lumbar radiculopathy M54 [id number]                   Assessment  Assessment details: Antony Falcon is a 37 y o  female presenting to outpatient physical therapy with diagnosis of Lumbar radiculopathy  (primary encounter diagnosis)  Patient's current impairments include lumbar pain, reduced trunk  range of motion, reduced lumbar/core  strength, reduced postural awareness, and reduced activity tolerance  Patient's present functional limitations include difficulty with ADLs with increased need for assistance, reliance on medication and/or modalities for pain relief, poor tolerance for functional mobility and activity, and difficulty completing [place]  responsibilities  Patient to benefit from skilled outpatient physical therapy 2x/week for 4-6  weeks in order to reduce pain, maximize pain free range of motion, increase strength and stability, and improve functional mobility/functional activity in order to maximize return to prior level of function with reduced limitations  Thank you for your referral     Impairments: abnormal or restricted ROM, activity intolerance, impaired physical strength, lacks appropriate home exercise program and pain with function  Barriers to therapy: cerv pain, recent R shld surg  Understanding of Dx/Px/POC: good   Prognosis: fair    Goals  STGs to be achieved in 4 weeks:  1  Pt to demonstrate reduced subjective pain rating "at worst" by at least 2-3 points from Initial Eval in order to allow for reduced pain with ADLs and improved functional activity tolerance  2  Pt to demonstrate increased AROM of trunk ext and L lat flex to min restriction in order to allow for greater ease and independence with ADLs and functional mobility     3  Pt to demonstrate increased strength of core and lumbar area in order to improve safety and stability with ADLs and functional mobility  LTGs to be achieved in 6-8 weeks:  1  Pt will be I with HEP in order to continue to improve quality of life and independence and reduce risk for re-injury  2  Pt to demonstrate return to min to no pain with activity  without limitations or restrictions  3  Pt to demonstrate improved function as noted by achieving or exceeding predicted score on FOTO outcomes assessment tool  Plan  Patient would benefit from: skilled physical therapy  Planned therapy interventions: manual therapy, self care, stretching, strengthening, therapeutic exercise, home exercise program and abdominal trunk stabilization  Frequency: 2x week  Duration in weeks: 6  Plan of Care beginning date: 10/2/2019  Treatment plan discussed with: patient        Subjective Evaluation    History of Present Illness  Mechanism of injury: Began with cramping in legs 2-3 years ago and  parasthesia in arms and legs  Was treated for neck issues but never for low back  Had recent inj in neck and discussed back problems  at that time and was given Rx for OPPT  Current sx include cramping in legs occasionally, has fairly constant LBP  But less bothersome than other painful body parts includingneck and R shld  Recent R shld surgery for SLAP tear and bone spur R shld    Pt has been rehabbing R shld at home  Pt feels back pain is masked by neck and shld pain            Recurrent probem    Quality of life: good    Pain  Current pain ratin  At best pain ratin  At worst pain ratin  Location: B LBP rad down RLE intermittently and seldomly down LLE  Quality: cramping, radiating, sharp and dull ache  Relieving factors: heat, change in position and medications (gabapentin, antiinflamm)  Aggravating factors: lifting (bending, exercising)  Progression: no change    Social Support  Steps to enter house: no  Stairs in house: yes (1 flight)   Lives in: multiple-level home  Lives with: adult children    Employment status: not working (disability)  Hand dominance: right      Diagnostic Tests  Abnormal x-ray: years ago  Abnormal MRI: only cerv  Treatments  Previous treatment: physical therapy, injection treatment, medication and massage  Current treatment: injection treatment, medication and physical therapy  Current treatment comments: cerv inj  Patient Goals  Patient goals for therapy: decreased pain, increased motion and increased strength  Patient goal: ret to gardening        Objective     Concurrent Complaints  Negative for disturbed sleep (shld and neck cause sleep disturbance), history of cancer, history of trauma and infection    Postural Observations  Seated posture: fair  Standing posture: fair  Correction of posture: makes symptoms worse    Additional Postural Observation Details  Fwd head, slight fwd trunk lean    Palpation   Left   Tenderness of the quadratus lumborum  Right   Tenderness of the quadratus lumborum  Tenderness     Left Hip   No tenderness in the PSIS  Right Hip   No tenderness in the PSIS       Neurological Testing     Sensation     Lumbar   Left   Intact: light touch    Right   Intact: light touch    Reflexes   Left   Patellar (L4): normal (2+)    Right   Patellar (L4): normal (2+)    Additional Neurological Details  B parasthesia strong in thighs and internittent but does travel down to calves at times    Active Range of Motion     Lumbar   Flexion:  WFL  Extension: Active lumbar extension: 50%  Restriction level: moderate  Left lateral flexion:  Restriction level: moderate  Right lateral flexion:  Restriction level: minimal  Left rotation:  WFL Restriction level: minimal  Right rotation:  WFL Restriction level: minimal    Additional Active Range of Motion Details  Ham length L 90, R 80    Strength/Myotome Testing     Left Hip   Planes of Motion   Flexion: 4  Extension: 5  Abduction: 4  Adduction: 5  External rotation: 5  Internal rotation: 4+    Right Hip Planes of Motion   Flexion: 4+  Extension: 5  Abduction: 4+  Adduction: 5  External rotation: 5  Internal rotation: 5    Left Knee   Flexion: 5  Extension: 5    Right Knee   Flexion: 5  Extension: 5    Left Ankle/Foot   Dorsiflexion: 5    Right Ankle/Foot   Dorsiflexion: 5    Tests     Lumbar     Left   Negative passive SLR  Right   Negative passive SLR       General Comments:    Lower quarter screen   Knees: unremarkable  Foot/ankle: unremarkable    Hip Comments   L hip slightly weaker than R             Precautions: recent R shld surgery(no precautions), cerv pain      Re-eval Date: 11/13/19    Date 10/2       Visit Count 1       FOTO yes       Pain In        Pain Out                Manual  10/2            Ham stretch             ITB stretch             Hip flexor/ quad stretch                                           Exercise Diary  10/2            Nustep             AH 10x5"            AH w/SLR 10 ea            AH w/ball lift             SKTC             DKTC             bridges             LTRs             Prone hip ext             Resisted amb in [place]  4 way             Standing hip ext/abd  foam             Prone trunk ext  W/arms at side                                                                              Sit on black disk/  AH with march             Sit on disk/  Trunk rot with ball                                                        Modalities

## 2019-10-03 DIAGNOSIS — R20.2 PARESTHESIA: ICD-10-CM

## 2019-10-03 RX ORDER — GABAPENTIN 300 MG/1
CAPSULE ORAL
Qty: 90 CAPSULE | Refills: 1 | Status: SHIPPED | OUTPATIENT
Start: 2019-10-03 | End: 2019-11-07 | Stop reason: SDUPTHER

## 2019-10-04 ENCOUNTER — OFFICE VISIT (OUTPATIENT)
Dept: OBGYN CLINIC | Facility: CLINIC | Age: 39
End: 2019-10-04
Payer: COMMERCIAL

## 2019-10-04 VITALS
SYSTOLIC BLOOD PRESSURE: 116 MMHG | HEART RATE: 69 BPM | HEIGHT: 66 IN | DIASTOLIC BLOOD PRESSURE: 74 MMHG | BODY MASS INDEX: 28.61 KG/M2 | WEIGHT: 178 LBS

## 2019-10-04 DIAGNOSIS — M75.01 ADHESIVE CAPSULITIS OF RIGHT SHOULDER: Primary | ICD-10-CM

## 2019-10-04 DIAGNOSIS — S43.431D SUPERIOR GLENOID LABRUM LESION OF RIGHT SHOULDER, SUBSEQUENT ENCOUNTER: ICD-10-CM

## 2019-10-04 PROCEDURE — 20610 DRAIN/INJ JOINT/BURSA W/O US: CPT | Performed by: ORTHOPAEDIC SURGERY

## 2019-10-04 PROCEDURE — 99024 POSTOP FOLLOW-UP VISIT: CPT | Performed by: ORTHOPAEDIC SURGERY

## 2019-10-04 RX ORDER — LIDOCAINE HYDROCHLORIDE 10 MG/ML
5 INJECTION, SOLUTION INFILTRATION; PERINEURAL
Status: COMPLETED | OUTPATIENT
Start: 2019-10-04 | End: 2019-10-04

## 2019-10-04 RX ORDER — BETAMETHASONE SODIUM PHOSPHATE AND BETAMETHASONE ACETATE 3; 3 MG/ML; MG/ML
6 INJECTION, SUSPENSION INTRA-ARTICULAR; INTRALESIONAL; INTRAMUSCULAR; SOFT TISSUE
Status: COMPLETED | OUTPATIENT
Start: 2019-10-04 | End: 2019-10-04

## 2019-10-04 RX ADMIN — BETAMETHASONE SODIUM PHOSPHATE AND BETAMETHASONE ACETATE 6 MG: 3; 3 INJECTION, SUSPENSION INTRA-ARTICULAR; INTRALESIONAL; INTRAMUSCULAR; SOFT TISSUE at 14:08

## 2019-10-04 RX ADMIN — LIDOCAINE HYDROCHLORIDE 5 ML: 10 INJECTION, SOLUTION INFILTRATION; PERINEURAL at 14:08

## 2019-10-04 NOTE — PATIENT INSTRUCTIONS
Shoulder Stretches      It is very important that you work aggressively on your shoulder range of motion  For each of the exercises listed below, the more frequently they are done, the longer the stretch is held, and the harder the stretch is pushed, the better the results will be  At minimum, recommendation is to do each of the stretches 2 to 3 times a day, but you can do them up to once every hour  1  Wall Crawls- Forward:  Stand facing a wall  Put your hand on the wall in front of you, crawl the hand up the wall as high as it will go  As the hand goes higher up on the wall, you will need to step closer to the wall  Once you get the hand up as high as it can go, lean/bow forward at your waist, getting a stretch in the shoulder  Hold this for 40 seconds  Stand up without removing your hand from the wall for 10 seconds  Then, lean/bow forward again for another 40 seconds  Rest by standing up, but without removing your hand from the wall  Crawl the hand up the wall a little bit more after 10 seconds and lean into one final stretch  You may then slowly lower your hand down  2  Wall Crawls- Sideways: Stand sideways to a wall  Put your hand on the wall to the side of you, crawling the hand up the wall as high as it will go  As the hand goes higher up on the wall, you will need to step closer to the wall  Once you get the hand up as high as it can go, lean sideways at your waist toward the wall, kicking your hip out away from the wall, getting a stretch in the shoulder  Hold this for 40 seconds  Stand up without removing your hand from the wall for 10 seconds  Then, lean sideways again for another 40 seconds  Rest by standing up, but without removing your hand from the wall  Crawl the hand up the wall a little bit more after 10 seconds and lean into one final stretch  You may then slowly lower your hand down        3  Outward Rotation Stretch:  Standing, keep your elbow, bent at ninety degrees, at your side  Place your hand on a wall or doorjamb and rotate your body away so that the arm is stretched outward  The elbow should remain at your side and you should feel a stretch on the front of your shoulder  Hold the stretch for 40 seconds  Rotate back for 10 seconds for a rest   Repeat 2 more times  4    Inward Rotation Stretch:  Use a towel held between your hands behind your back to pull up and stretch

## 2019-10-04 NOTE — PROGRESS NOTES
Assessment:     1  Adhesive capsulitis of right shoulder    2  Superior glenoid labrum lesion of right shoulder, subsequent encounter          Plan:     Problem List Items Addressed This Visit        Musculoskeletal and Integument    Adhesive capsulitis of right shoulder - Primary     33-year-old female status post a right shoulder arthroscopy with biceps tenotomy and subacromial decompression and August   She has significant tightness of her shoulder  I offered her cortisone injection today  Please refer to the procedure note  I also emphasized the importance of aggressive home stretching program as well as physical therapy  She was given a referral for this  She will follow up with me in six weeks  No x-rays will be needed  Relevant Orders    Ambulatory referral to Physical Therapy    RESOLVED: SLAP lesion of right shoulder           Patient ID: Betsey Falcon is a 37 y o  female  Chief Complaint:  Postop check    Subjective:  33-year-old female status post a right shoulder arthroscopy with biceps tenotomy and subacromial decompression on August 14, 2019  Today she states that her pain now is worse than it was preoperatively  She gets numbness and tingling that radiates all the way down her arm and all of her fingers  This comes and goes  She has a lot of difficulty putting her brought on reaching behind her  She points to the front of her shoulder as to where it hurts her the most   She has been trying to do the exercises she was given prior to surgery for her shoulder, but has not been working with physical therapy postoperatively at all  Allergy:  No Known Allergies    Medications:  all current active meds have been reviewed    ROS:  Review of Systems   Musculoskeletal: Positive for arthralgias  All other systems reviewed and are negative        Objective:  BP Readings from Last 1 Encounters:   10/04/19 116/74      Wt Readings from Last 1 Encounters:   10/04/19 80 7 kg (178 lb) Subjective   Patrick Villanueva is a 9 y.o. male.     History of Present Illness       Problem List  1. ADHD    Pt is 8 yo WM with the above medical issues. Is here for recheck. Accompanied by Father today  Currently takes Adderall 10 mg Po q daily for ADHD. Pt is accompanied by grandmother today. Appetite is good and sleep is adequte. Pt lost 3 lbs since last visit. Appetite and sleep is good . Denies any chest pain/ headaches or dizziness. Grandmother states he is doing well in school but do not have report card on file.  Brought Report card and pt is making A's and B's. Pt  Has been ill for past few days. Has had stomach upset.  No diarrhea no vomiting.  No fever today.  On growth chart he is running around the 10th percentile for height and weight. Weight is the same at 67 lbs     Father today states pt reported to teacher about slitting his wrist and about how he was stressed about father losing job.  Pt states he misses time and interaction with father.  Since father is home often they are spending good quality time together.  Father states they are about to go see counseling through the school. Counselor from [facility]     Today pt denies any suicidal ideations. Father states that he has not talked about slitting his wrist. .  Pt gained 5 lbs since last visit       The following portions of the patient's history were reviewed and updated as appropriate: allergies, current medications, past family history, past medical history, past social history, past surgical history and problem list.    Review of Systems   Constitutional: Negative.    HENT: Negative.    Eyes: Negative.    Respiratory: Negative.    Cardiovascular: Negative.    Gastrointestinal: Positive for abdominal pain.   Endocrine: Negative.    Genitourinary: Negative.    Musculoskeletal: Negative.    Skin: Negative.    Allergic/Immunologic: Negative.    Neurological: Negative.    Hematological: Negative.    Psychiatric/Behavioral: Positive for  "agitation and behavioral problems. The patient is nervous/anxious.        Objective    /70  Pulse 80  Temp 99.1 °F (37.3 °C)  Ht 141 cm (55.5\")  Wt 32.7 kg (72 lb)  BMI 16.43 kg/m2          Physical Exam   Constitutional: He is active.   HENT:   Nose: No nasal discharge.   Mouth/Throat: Mucous membranes are moist. Dentition is normal. No dental caries. No tonsillar exudate. Oropharynx is clear. Pharynx is normal.   Cardiovascular: Regular rhythm, S1 normal and S2 normal.    Pulmonary/Chest: Effort normal and breath sounds normal. No stridor. No respiratory distress. He has no wheezes. He has no rhonchi. He has no rales. He exhibits no retraction.   Abdominal: Soft. Bowel sounds are normal. He exhibits no distension and no mass. There is no hepatosplenomegaly. There is tenderness. There is no rebound and no guarding. No hernia.   Mild tenderess on palpation   Musculoskeletal: Normal range of motion. He exhibits no deformity.   Neurological: He is alert. He has normal reflexes. No cranial nerve deficit. Coordination normal.   Skin: Skin is warm.   Nursing note and vitals reviewed.      Assessment/Plan   Problems Addressed this Visit        Other    Attention deficit hyperactivity disorder (ADHD), combined type    Relevant Medications    amphetamine-dextroamphetamine (ADDERALL) 10 MG tablet      - recommended father and pt to go see counseling at school. Pt reports that he is not suicidal or does not want to slit his wrist today.  Father advised to let me know if this happens again   - refilled ADHD medication adderall 10 mg PO q daily. NANNETTE printed and on file. Gave 30 pills no refills. Weight stable today. Father states he is doing well on medication   - Pt already received influenza vaccination  - will keep report card on file    - will continue to monitor pt's suicidal ideations.  Pt has not had any episodes lately. Father will let me know if he starts developing worrisome symptoms   - recheck in 1 " Exam:   Physical Exam    Right Shoulder Exam     Comments:  Lacks 10 degrees of full forward flexion, lacks 10 degrees of full abduction, lacks 30 degrees of full external rotation, internal rotation is to the hip pocket  Pain at extremes of motion  Patient with poor mechanics throughout range of motion of the shoulder  Incisions well healed              Large joint arthrocentesis: R subacromial bursa  Date/Time: 10/4/2019 2:08 PM  Consent given by: patient  Timeout: Immediately prior to procedure a time out was called to verify the correct patient, procedure, equipment, support staff and site/side marked as required   Supporting Documentation  Indications: pain   Procedure Details  Location: shoulder - R subacromial bursa  Preparation: Patient was prepped and draped in the usual sterile fashion  Needle size: 22 G  Ultrasound guidance: no  Approach: posterior  Medications administered: 5 mL lidocaine 1 %; 6 mg betamethasone acetate-betamethasone sodium phosphate 6 (3-3) mg/mL    Patient tolerance: patient tolerated the procedure well with no immediate complications  Dressing:  Sterile dressing applied month or earlier if any problems arise

## 2019-10-04 NOTE — ASSESSMENT & PLAN NOTE
40-year-old female status post a right shoulder arthroscopy with biceps tenotomy and subacromial decompression and August   She has significant tightness of her shoulder  I offered her cortisone injection today  Please refer to the procedure note  I also emphasized the importance of aggressive home stretching program as well as physical therapy  She was given a referral for this  She will follow up with me in six weeks  No x-rays will be needed

## 2019-10-07 ENCOUNTER — OFFICE VISIT (OUTPATIENT)
Dept: PHYSICAL THERAPY | Facility: CLINIC | Age: 39
End: 2019-10-07
Payer: COMMERCIAL

## 2019-10-07 DIAGNOSIS — M54.16 LUMBAR RADICULOPATHY: ICD-10-CM

## 2019-10-07 PROCEDURE — 97110 THERAPEUTIC EXERCISES: CPT

## 2019-10-07 PROCEDURE — 97140 MANUAL THERAPY 1/> REGIONS: CPT

## 2019-10-07 NOTE — PROGRESS NOTES
Daily Note     Today's date: 10/7/2019  Patient name: Juno Ruiz  : 1980  MRN: [id number]  Referring provider: Alejandro Morrissey MD  Dx:   Encounter Diagnosis     ICD-10-CM    1  Lumbar radiculopathy M54 16 Ambulatory referral to Physical Therapy                  Subjective: Pt states the doctor wants pt to work on her shld as well and presents with a HEP of shld stretches  Objective: See treatment diary below      Assessment: Tolerated treatment well  Patient exhibited good technique with therapeutic exercises and would benefit from continued PT  Able to introduce core strengthening and stretching ex without adverse effects  Plan: Progress treatment as tolerated         Precautions: recent R shld surgery(no precautions), cerv pain      Re-eval Date: 19    Date 10/2 10/7      Visit Count 1 2      FOTO yes       Pain In  4      Pain Out                Manual  10/2 10/7           Ham stretch  4x30" B           ITB stretch  4x30" B           Hip flexor/ quad stretch                                           Exercise Diary  10/2 10/7           Nustep  L2 12' w/HP           AH 10x5" 10x5"           AH w/SLR 10 ea 10 ea           AH w/ball lift  10           SKTC  [id number]           DKTC  [id number]           bridges  10x5  sec           LTRs  10x5  sec           Prone hip ext  10 ea           Resisted amb in [place]  4 way             Standing hip ext/abd  foam  20 ea   0 HHA           Prone trunk ext  W/arms at side  10           Incline calf stretch  4x30"                                                               Sit on black disk/  AH with march             Sit on disk/  Trunk rot with ball                                                        Modalities

## 2019-10-10 ENCOUNTER — OFFICE VISIT (OUTPATIENT)
Dept: PHYSICAL THERAPY | Facility: CLINIC | Age: 39
End: 2019-10-10
Payer: COMMERCIAL

## 2019-10-10 DIAGNOSIS — S43.431D SUPERIOR GLENOID LABRUM LESION OF RIGHT SHOULDER, SUBSEQUENT ENCOUNTER: ICD-10-CM

## 2019-10-10 DIAGNOSIS — M75.41 IMPINGEMENT SYNDROME OF RIGHT SHOULDER: ICD-10-CM

## 2019-10-10 DIAGNOSIS — M54.16 LUMBAR RADICULOPATHY: Primary | ICD-10-CM

## 2019-10-10 PROCEDURE — 97110 THERAPEUTIC EXERCISES: CPT

## 2019-10-10 PROCEDURE — 97140 MANUAL THERAPY 1/> REGIONS: CPT

## 2019-10-10 NOTE — PROGRESS NOTES
Daily Note     Today's date: 10/10/2019  Patient name: Cristiane Cueto  : 1980  MRN: [id number]  Referring provider: Palomo Roman MD  Dx:   Encounter Diagnosis     ICD-10-CM    1  Lumbar radiculopathy M54 16    2  Superior glenoid labrum lesion of right shoulder, subsequent encounter [id number] Ambulatory referral to Physical Therapy   3  Impingement syndrome of right shoulder M75 41 Ambulatory referral to Physical Therapy                  Subjective:  Pt  States pain level today = "4"/10 @ LB and "7"/10 @ R shoulder  Pt  Reports  MD is recommending "Vigorous and aggressive " stretching for R shoulder while @ home, and also while here in clinic  Objective: See treatment diary below      Assessment: Tolerated treatment Fairly well with performance and tolerance to Back exercises, Fair+ with LB manual stretch and Fair minus to light-moderate R shoulder stretch         Plan: Progress treatment as tolerated  Con't services 2x/week asper POC/Goals            Precautions: recent R shld surgery(no precautions), cerv pain      Re-eval Date: 19    Date 10/2 10/7 10 10 19     Visit Count 1 2 3     FOTO yes       Pain In  4 4/10 LB  7/10 R shoulder     Pain Out   3/10 LB  8/10 R shoulder             Manual  10/2 10/7 10 10 19     Ham stretch  4x30" B 4x30" B       ITB stretch  4x30" B 4x30" B       Hip flexor/ quad stretch   *B 3x/30"                         Exercise Diary  10/2 10/7 10 10 19     Nustep  L2 12' w/HP L2 12' w/HP       AH 10x5" 10x5" 15x5"       AH w/SLR 10 ea 10 ea 2x10       AH w/ball lift  10 15x       SKTC  [id number] 10x10"       DKTC  [id number] 10x10"       bridges  10x5  sec 10x5"     LTRs  10x5  sec 10x5"     Prone hip ext  10 ea 15x       Resisted amb in [place]  4 way        Standing hip ext/abd  foam  20 ea   0 HHA 25x ea   0 HHA     Prone trunk ext  W/arms at side  10 10x3"     Incline calf stretch  4x30" 4x30"     R Shoulder Stretch   *All planes  10 min Sit on black disk/  AH with march        Sit on disk/  Trunk rot with ball                                    Modalities    10 10 19     MHP   To LB while on NuStep @ beginning of session       Cold Pk   R shoulder @ end of session  10 min

## 2019-10-15 ENCOUNTER — OFFICE VISIT (OUTPATIENT)
Dept: PHYSICAL THERAPY | Facility: CLINIC | Age: 39
End: 2019-10-15
Payer: COMMERCIAL

## 2019-10-15 DIAGNOSIS — M54.16 LUMBAR RADICULOPATHY: Primary | ICD-10-CM

## 2019-10-15 PROCEDURE — 97140 MANUAL THERAPY 1/> REGIONS: CPT

## 2019-10-15 PROCEDURE — 97110 THERAPEUTIC EXERCISES: CPT

## 2019-10-15 NOTE — PROGRESS NOTES
Daily Note     Today's date: 10/15/2019  Patient name: Bruce Hernandez  : 1980  MRN: [id number]  Referring provider: Betzaida Quinn MD  Dx:   Encounter Diagnosis     ICD-10-CM    1  Lumbar radiculopathy M54 16                   Subjective: I stopped taking my naproxin on Saturday  Increase overall neck, R shoulder and LB  Having injections on Thursday into neck region, it is a block      Objective: See treatment diary below      Assessment: Tolerated treatment fairly well  Pt indicates increase LBP with prolong supine  Modify TE /stretches upcoming to sitting/stand to pt tolerance  Pt to add SKTC/DKTC/ LTR to HEP   Patient would benefit from continued PT      Plan: Continue per plan of care        Precautions: recent R shld surgery(no precautions), cerv pain      Re-eval Date: 19    Date 10/2 10/7 10 10 19 10/15    Visit Count 1 2 3 4    FOTO yes       Pain In  4 4/10 LB  7/10 R shoulder 5 LB  7 R shoulder    Pain Out   3/10 LB  8/10 R shoulder 3 LB  7 R shoulder      Manual  10/2 10/7 10 10 19 10 min*    Ham stretch  4x30" B 4x30" B   2x 1 min    ITB stretch  4x30" B 4x30" B   2x 1 min    Hip flexor/ quad stretch   *B 3x/30" 1 x 1 min      Exercise Diary  10/2 10/7 10 10 19     Nustep  L2 12' w/HP L2 12' w/HP   L2 12 min w/MH    AH 10x5" 10x5" 15x5"   15x 5"    AH w/SLR 10 ea 10 ea 2x10   2x10     AH w/ball lift  10 15x   15x    SKTC  [id number] 10x10"   3x30" ea    DKTC  [id number] 10x10"   3x30"    bridges  10x5  sec 10x5" 15x 5"    LTRs  10x5  sec 10x5" 3x 30"    Prone hip ext  10 ea 15x   15x     Resisted amb in [place]  4 way        Standing hip ext/abd  foam  20 ea   0 HHA 25x ea   0 HHA 20x ea  1 HHA      Prone trunk ext  W/arms at side  10 10x3"     Incline calf stretch  4x30" 4x30" Review  @ home    R Shoulder Stretch   *All planes  10 min                                 Sit on black disk/  AH with march        Sit on disk/  Trunk rot with ball                                    Modalities 10 10 19     MHP   To LB while on NuStep @ beginning of session       Cold Pk   R shoulder @ end of session  10 min

## 2019-10-17 ENCOUNTER — APPOINTMENT (OUTPATIENT)
Dept: RADIOLOGY | Facility: HOSPITAL | Age: 39
End: 2019-10-17
Payer: COMMERCIAL

## 2019-10-17 ENCOUNTER — HOSPITAL ENCOUNTER (OUTPATIENT)
Facility: HOSPITAL | Age: 39
Setting detail: OUTPATIENT SURGERY
Discharge: HOME | End: 2019-10-17
Attending: ANESTHESIOLOGY | Admitting: ANESTHESIOLOGY
Payer: COMMERCIAL

## 2019-10-17 VITALS
WEIGHT: 178 LBS | OXYGEN SATURATION: 98 % | HEIGHT: 66 IN | RESPIRATION RATE: 18 BRPM | BODY MASS INDEX: 28.61 KG/M2 | DIASTOLIC BLOOD PRESSURE: 67 MMHG | SYSTOLIC BLOOD PRESSURE: 118 MMHG | HEART RATE: 85 BPM | TEMPERATURE: 97.8 F

## 2019-10-17 PROCEDURE — 62323 NJX INTERLAMINAR LMBR/SAC: CPT | Performed by: ANESTHESIOLOGY

## 2019-10-17 RX ORDER — LIDOCAINE HYDROCHLORIDE 10 MG/ML
INJECTION, SOLUTION INFILTRATION; PERINEURAL AS NEEDED
Status: DISCONTINUED | OUTPATIENT
Start: 2019-10-17 | End: 2019-10-17 | Stop reason: HOSPADM

## 2019-10-17 RX ORDER — DEXAMETHASONE SODIUM PHOSPHATE 10 MG/ML
INJECTION, SOLUTION INTRAMUSCULAR; INTRAVENOUS AS NEEDED
Status: DISCONTINUED | OUTPATIENT
Start: 2019-10-17 | End: 2019-10-17 | Stop reason: HOSPADM

## 2019-10-17 NOTE — OP NOTE
OPERATIVE REPORT  PATIENT NAME: Silvio Valle    :  1980  MRN: [id number]  Pt Location: [facility] OR ROOM 01    SURGERY DATE: 10/17/2019    Surgeon(s) and Role:     * Meron Duncan MD - Primary    Preop Diagnosis:  Cervical radiculopathy [M54 12]    Post-Op Diagnosis Codes:     * Cervical radiculopathy [M54 12]    Procedure(s) (LRB):  BLOCK / INJECTION EPIDURAL STEROID CERVICAL (N/A)    Specimen(s):  * No specimens in log *    Estimated Blood Loss:   Minimal    Drains:  * No LDAs found *    Anesthesia Type:   Local    Operative Indications:  Cervical radiculopathy [M54 12]      Operative Findings:  same    Complications:   None    Procedure and Technique:  Fluoroscopically-guided cervical Interlaminar Epidural Steroid Injection     Indication:  Cervical pain  Preoperative diagnosis:  Cervical degenerative disc disease  Postoperative diagnosis:  Cervical degenerative disc disease    Procedure: Fluoroscopically-guided  C7-T1 interlaminar epidural steroid injection under fluoroscopy      After discussing the risks, benefits, and alternatives to the procedure, the patient expressed understanding and wished to proceed  The patient was brought to the fluoroscopy suite and placed in the prone position  A procedural pause was conducted to verify:  correct patient identity, procedure to be performed and as applicable, correct side and site, correct patient position, and availability of implants, special equipment and special requirements  After identifying the C7-T1 space fluoroscopically, the skin was sterilely prepped and draped in the usual fashion using Chloraprep skin prep  The skin and subcutaneous tissue were anesthetized with 0 5 % lidocaine  Utilizing a loss of resistance technique and intermittent fluoroscopic guidance, a 3 5 20 gauge Tuohy needle was advanced into the epidural space  Proper needle positioning was confirmed using multiple fluoroscopic views    After negative aspiration, Omnipaque 300 contrast was injected confirming epidural spread without evidence of intravascular or intrathecal spread  A 4 ml solution consisting of 10 mg of dexamethasone in sterile saline was injected slowly and incrementally into the epidural space  Following the injection the needle was withdrawn slightly and flushed with 0 5 % lidocaine as it was fully extracted  The patient tolerated the procedure well and there were no apparent complications  After appropriate observation, the patient was dismissed from the clinic in good condition under their own power       I was present for the entire procedure    Patient Disposition:  hemodynamically stable    SIGNATURE: Rm Sanchez MD  DATE: October 17, 2019  TIME: 9:34 AM

## 2019-10-17 NOTE — DISCHARGE INSTRUCTIONS
Cigarette Smoking and Your Health   AMBULATORY CARE:   Risks to your health if you smoke:  Nicotine and other chemicals found in tobacco damage every cell in your body  Even if you are a light smoker, you have an increased risk for cancer, heart disease, and lung disease  If you are pregnant or have diabetes, smoking increases your risk for complications  Benefits to your health if you stop smoking:   · You decrease respiratory symptoms such as coughing, wheezing, and shortness of breath  · You reduce your risk for cancers of the lung, mouth, throat, kidney, bladder, pancreas, stomach, and cervix  If you already have cancer, you increase the benefits of chemotherapy  You also reduce your risk for cancer returning or a second cancer from developing  · You reduce your risk for heart disease, blood clots, heart attack, and stroke  · You reduce your risk for lung infections, and diseases such as pneumonia, asthma, chronic bronchitis, and emphysema  · Your circulation improves  More oxygen can be delivered to your body  If you have diabetes, you lower your risk for complications, such as kidney, artery, and eye diseases  You also lower your risk for nerve damage  Nerve damage can lead to amputations, poor vision, and blindness  · You improve your body's ability to heal and to fight infections  Benefits to the health of others if you stop smoking:  Tobacco is harmful to nonsmokers who breathe in your secondhand smoke  The following are ways the health of others around you may improve when you stop smoking:  · You lower the risks for lung cancer and heart disease in nonsmoking adults  · If you are pregnant, you lower the risk for miscarriage, early delivery, low birth weight, and stillbirth  You also lower your baby's risk for SIDS, obesity, developmental delay, and neurobehavioral problems, such as ADHD       · If you have children, you lower their risk for ear infections, colds, pneumonia, bronchitis, and asthma  For more information and support to stop smoking:   · Smokefree  gov  Phone: 3- 045 - 255-8398  Web Address: www smokefree  [employer]  Follow up with your healthcare provider as directed:  Write down your questions so you remember to ask them during your visits  © 2017 [employer] Information is for End User's use only and may not be sold, redistributed or otherwise used for commercial purposes  All illustrations and images included in CareNotes® are the copyrighted property of [employer]  or [employer]  The above information is an  only  It is not intended as medical advice for individual conditions or treatments  Talk to your doctor, nurse or pharmacist before following any medical regimen to see if it is safe and effective for you  Epidural Steroid Injection   WHAT YOU NEED TO KNOW:   An epidural steroid injection (GURDEEP) is a procedure to inject steroid medicine into the epidural space  The epidural space is between your spinal cord and vertebrae  Steroids reduce inflammation and fluid buildup in your spine that may be causing pain  You may be given pain medicine along with the steroids  ACTIVITY  · Do not drive or operate machinery today  · No strenuous activity today - bending, lifting, etc   · You may resume normal activites starting tomorrow - start slowly and as tolerated  · You may shower today, but no tub baths or hot tubs  · You may have numbness for several hours from the local anesthetic  Please use caution and common sense, especially with weight-bearing activities  CARE OF THE INJECTION SITE  · If you have soreness or pain, apply ice to the area today (20 minutes on/20 minutes off)  · Starting tomorrow, you may use warm, moist heat or ice if needed  · You may have an increase or change in your discomfort for 36-48 hours after your treatment    · Apply ice and continue with any pain medication you have been prescribed  · Notify the [facility] if you have any of the following: redness, drainage, swelling, headache, stiff neck or fever above 100°F     SPECIAL INSTRUCTIONS  · Our office will contact you in approximately 7 days for a progress report  MEDICATIONS  · Continue to take all routine medications  · Our office may have instructed you to hold some medications  If you have a problem specifically related to your procedure, please call our office at [phone]  Problems not related to your procedure should be directed to your primary care physician

## 2019-10-17 NOTE — H&P
Assessment:  1  Cervical radiculopathy    2  Cervicalgia    3  Facet arthropathy, cervical    4  Lumbar radiculopathy    5  Cervical myofascial pain syndrome    6  Chronic neck pain    7  Chronic pain syndrome          Plan:  Patient is a 23-year-old female with complaints of neck pain, bilateral arm pain, bilateral leg pain with a history significant for cervical facet arthropathy, lumbar radiculopathy presents to surgical center for procedure  1  We will perform a C7 interlaminar epidural steroid injection            [place] Prescription Drug Monitoring Program report was reviewed and was appropriate         History of Present Illness: The patient is a 37 y o  female who presents for consultation in regards to Neck Pain; Shoulder Pain; Hand Pain; Arm Pain; Knee Pain; Leg Pain; and Foot Pain  Symptoms have been present for 3 years  Symptoms began without any precipitating injury or trauma  Pain is reported to be 8 on the numeric rating scale  Symptoms are felt constantly and worst in the no typical pattern  Symptoms are characterized as cramping, shooting, sharp, numbing and tingling  Symptoms are associated with bilateral arm weakness and bilateral leg weakness  Aggravating factors include bending and exercise  Relieving factors include nothing  No change in symptoms with kneeling, lying down, standing, leaning forward, leaning bckward, sitting, walking, turning the head, relaxation, coughing/sneezing and bowel movements  Treatments that have been helpful include physical therapy and traction  home exercise, TENS unit and heat/ice have provided no relief  Medications tried to relieve symptoms temp sneezing, Lidoderm, Tylenol, ibuprofen, naproxen, Cymbalta, Paxil, gabapentin, Depakote without any alleviation of his symptoms      Review of Systems:     Review of Systems   Musculoskeletal: Positive for arthralgias  Neurological: Positive for numbness and headaches     All other systems reviewed and are negative            Medical History        Past Medical History:   Diagnosis Date    Anxiety      Bipolar disorder (HCC)      Chronic pain      Endometriosis      Hyperlipidemia      Urinary frequency       resolved: 6/14/2016    Urinary urgency       resolved: 6/14/2016            Surgical History         Past Surgical History:   Procedure Laterality Date    BLADDER SUSPENSION        CHOLECYSTECTOMY        HERNIA REPAIR        HYSTERECTOMY         partial    OVARIAN CYST REMOVAL        OR ARTHROSCOPY SHOULDER SURGICAL BICEPS TENODESIS Right 8/14/2019     Procedure: ARTHROSCOPY SHOULDER WITH BICEPS TENOTOMY AND SAD;  Surgeon: Lilia Flores MD;  Location: [facility];  Service: Orthopedics    OR LAP,CHOLECYSTECTOMY N/A 4/30/2018     Procedure: CHOLECYSTECTOMY LAPAROSCOPIC;  Surgeon: Shantanu Delgado DO;  Location: [facility];  Service: General    TONSILLECTOMY                      Family History   Problem Relation Age of Onset    Diabetes Mother      Heart disease Mother           rheumatic    Neuropathy Mother      COPD Mother      Diabetes type II Mother      Cancer Father      Lung cancer Father      Stroke Maternal Grandmother           CVA    Diabetes type II Maternal Grandmother           mellitus    Other Maternal Grandfather           esophageal abnormality    Diabetes type II Paternal Grandmother           mellitus    Diabetes type II Paternal Grandfather           mellitus    Depression Family      Esophageal cancer Family      Lung cancer Family      Stomach cancer Family           Social History            Occupational History    Not on file   Tobacco Use    Smoking status: Current Every Day Smoker       Packs/day: 1 50       Types: Cigarettes    Smokeless tobacco: Never Used   Substance and Sexual Activity    Alcohol use:  Yes       Frequency: 2-4 times a month       Drinks per session: 1 or 2       Binge frequency: Never       Comment: rare; consumes alcohol occasionally (as per allscripts); social alcohol use (As per allscripts)    Drug use: No    Sexual activity: Yes       Partners: Male            Current Outpatient Medications:     buPROPion (WELLBUTRIN SR) 100 mg 12 hr tablet, , Disp: , Rfl: 0    Cholecalciferol (VITAMIN D3) 2000 units TABS, Take 4,000 Units by mouth daily  , Disp: , Rfl:     citalopram (CeleXA) 20 mg tablet, Take 20 mg by mouth every morning, Disp: , Rfl: 0    cyclobenzaprine (FLEXERIL) 10 mg tablet, Take 1 tablet (10 mg total) by mouth daily at bedtime as needed for muscle spasms, Disp: 20 tablet, Rfl: 0    gabapentin (NEURONTIN) 300 mg capsule, take 1 capsule by mouth three times a day, Disp: 90 capsule, Rfl: 1    lamoTRIgine (LAMICTAL) 200 MG tablet, Take 1 tablet by mouth 2 (two) times a day, Disp: , Rfl:     lidocaine (XYLOCAINE) 5 % ointment, Apply topically as needed for mild pain, Disp: 35 44 g, Rfl: 0    LORazepam (ATIVAN) 0 5 mg tablet, Take 1 tablet by mouth daily at bedtime  , Disp: , Rfl:     Multiple Vitamin (DAILY VALUE MULTIVITAMIN) TABS, Take by mouth, Disp: , Rfl:     naproxen (NAPROSYN) 500 mg tablet, Take 1 tablet (500 mg total) by mouth 2 (two) times a day with meals, Disp: 60 tablet, Rfl: 1    Nerve Stimulator (STANDARD TENS) ISABELLA, by Does not apply route 3 (three) times a day as needed (pain), Disp: 1 Device, Rfl: 0    simvastatin (ZOCOR) 40 mg tablet, take 1 tablet by mouth once daily, Disp: 90 tablet, Rfl: 3    tiZANidine (ZANAFLEX) 4 mg tablet, Take 1 tablet (4 mg total) by mouth daily at bedtime, Disp: 30 tablet, Rfl: 0    traMADol (ULTRAM) 50 mg tablet, Take 1 tablet (50 mg total) by mouth every 6 (six) hours as needed for moderate pain, Disp: 20 tablet, Rfl: 0     No Known Allergies     Physical Exam:     Vitals:    10/17/19 0917   BP: 112/68   Pulse: 84   Resp: 18   Temp: (!) 97 3 °F (36 3 °C)   SpO2: 98%            Constitutional: normal, well developed, well nourished, alert, in no distress and non-toxic and no overt pain behavior  Eyes: anicteric  HEENT: grossly intact  Neck: supple, symmetric, trachea midline and no masses   Pulmonary:even and unlabored  Cardiovascular:No edema or pitting edema present  Skin:Normal without rashes or lesions and well hydrated  Psychiatric:Mood and affect appropriate  Neurologic:Cranial Nerves II-XII grossly intact  Musculoskeletal:antalgic      Cervical Spine examination demonstrates  Decreased ROM secondary to pain with lateral rotation to the left/right and bending to the left/right, in addition to neck flexion  5/5 upper extremity strength in all muscle groups bilaterally  Negative Spurling's maneuver to the b/l Ue, sensitivity to light touch intact b/l Ue          Lumbar/Sacral Spine examination demonstrates  Decreased range of motion lumbar spine with pain upon: flexion, lateral rotation to the left/right, and bending to the left/right  Bilateral lumbar paraspinals tender to palpation  Muscle spasms noted in the lumbar area bilaterally  5/5 lower extremity strength in all muscle groups bilaterally  Positive seated straight leg raise for bilateral lower extremities  Sensitivity to light touch intact bilateral lower extremities  2+ reflexes in the patella and Achilles    No ankle clonus

## 2019-10-22 ENCOUNTER — OFFICE VISIT (OUTPATIENT)
Dept: PHYSICAL THERAPY | Facility: CLINIC | Age: 39
End: 2019-10-22
Payer: COMMERCIAL

## 2019-10-22 DIAGNOSIS — M54.16 LUMBAR RADICULOPATHY: Primary | ICD-10-CM

## 2019-10-22 PROCEDURE — 97110 THERAPEUTIC EXERCISES: CPT

## 2019-10-22 PROCEDURE — 97140 MANUAL THERAPY 1/> REGIONS: CPT

## 2019-10-22 NOTE — PROGRESS NOTES
Daily Note     Today's date: 10/22/2019  Patient name: Maile Joy  : 1980  MRN: [id number]  Referring provider: Ethan Sim MD  Dx:   Encounter Diagnosis     ICD-10-CM    1  Lumbar radiculopathy M54 16                   Subjective: Increase LBP since Friday after my botox injection in my neck  Dont know if the decrease pain in my neck I am more aware of pain in my LB      Objective: See treatment diary below      Assessment: Tolerated treatment fairly well  Pt demonstrates soft tissue tightness/tenderness with pt indicating benefit with STM/DTM to BL LS spine region  PROM WFL into flex/scap and ER  > deficit into IR right shoulder  Patient would benefit from continued PT      Plan: Continue per plan of care        Precautions: recent R shld surgery(no precautions), cerv pain      Re-eval Date: 19    Date 10/2 10/7 10 10 19 10/15 10/22   Visit Count 1 2 3 4 5   FOTO yes    NV   Pain In  4 4/10 LB  7/10 R shoulder 5 LB  7 R shoulder 7 LB  6 R shoulder   Pain Out   3/10 LB  8/10 R shoulder 3 LB  7 R shoulder 4 LB  5 R shoulder     Manual  10/2 10/7 10 10 19 10 min* 10 min*   Ham stretch  4x30" B 4x30" B   2x 1 min Pt self  2x1 min   ITB stretch  4x30" B 4x30" B   2x 1 min 2x 1 min  Pt self   Hip flexor/ quad stretch   *B 3x/30" 1 x 1 min 2x1 min   Pt self   * STM / DTM to pt tolerance BL Lspine - seated    Exercise Diary  10/2 10/7 10 10 19     Nustep  L2 12' w/HP L2 12' w/HP   L2 12 min w/MH L1 10 min   AH 10x5" 10x5" 15x5"   15x 5" DC HEP   AH w/SLR 10 ea 10 ea 2x10   2x10  resume   AH w/ball lift  10 15x   15x resume   SKTC  [id number] 10x10"   3x30" ea DC HEP   DKTC  [id number] 10x10"   3x30" DC HEP   bridges  10x5  sec 10x5" 15x 5" review   LTRs  10x5  sec 10x5" 3x 30" review   Prone hip ext  10 ea 15x   15x  15x   Resisted amb in [place]  4 way        Standing hip ext/abd  foam  20 ea   0 HHA 25x ea   0 HHA 20x ea  1 HHA      Prone trunk ext  W/arms at side  10 10x3"     Incline calf stretch 4x30" 4x30" Review  @ home    R Shoulder Stretch   *All planes  10 min    Motion check with focus IR  5 min                             Sit on black disk/  AH with march     2x10, 5"   Sit on disk/  Trunk rot with ball        Sit disc  Hip ABD/ADD  W/ AH     Ball / Green  2x10, 5"                       Modalities    10 10 19     MHP   To LB while on NuStep @ beginning of session    10 Min LB   Cold Pk   R shoulder @ end of session  10 min  10 Min R shoulder

## 2019-10-23 ENCOUNTER — TELEPHONE (OUTPATIENT)
Dept: PAIN MEDICINE | Facility: MEDICAL CENTER | Age: 39
End: 2019-10-23

## 2019-10-23 NOTE — TELEPHONE ENCOUNTER
See below, S/P BLOCK / INJECTION EPIDURAL STEROID CERVICAL c7-t1 interlaminar on 10/17/19  Pt states that ever since this block, her LB has been bothering her  Not sure if it's because her neck feels better and now she is noticing her back more or not  States has had Back pain in the past, but heat would take care of it  Now pain is constant across LB and into right leg  Pt is requesting MRI to be done to "see what is going on in there "  Has been going to PT since 10/2/19  Advised pt would advise RM of symptoms and Cb with recommendation  Pt agreeable

## 2019-10-23 NOTE — TELEPHONE ENCOUNTER
S/W pt and advised of below  Pt states neck is much better, did not really help her shoulder  Pt wondering if there is anything else that could help her because she is in excruciating pain  Pt is taking Naproxen, Tizanidine, Gabapentin, using heating pad, doing stretches, etc   Any other recommendations?

## 2019-10-23 NOTE — PROGRESS NOTES
Assessment   1  Impingement syndrome of left shoulder (726 2) (R25 42)    Plan   Impingement syndrome of left shoulder    · *1 - [facility]  *  Status: Active  Requested for: [id number]  Care Summary provided  : Yes   · Follow-up visit in 2 months Evaluation and Treatment  Follow-up  Status: Hold For -    Scheduling  Requested for: [id number]    Chief Complaint   1  Shoulder Pain   2  Shoulder Problem  Left shoulder pain      Discussion/Summary      Left shoulder pain due to impingement syndrome and bicipital tendinitis  MRI of the left shoulder is within normal limits  Does not show any rotator cuff tear or any other pathology  Discussed treatment with the patient  Should continue with under the 6 weeks of physical therapy  If patient is no better with 6 weeks of physical therapy she might warrant arthroscopic subacromial decompression  History of Present Illness   Patient still complains of left shoulder pain  Patient had steroid injection and has had 1 week of physical therapy  Patient presents for repeat evaluation with an MRI    mainly present in the anterior shoulder and along the proximal biceps  Review of Systems        Constitutional: No fever, no chills, feels well, no tiredness, no recent weight gain or loss  Eyes: No complaints of eyesight problems, no red eyes  ENT: no loss of hearing, no nosebleeds, no sore throat  Cardiovascular: No complaints of chest pain, no palpitations, no leg claudication or lower extremity edema  Respiratory: no compliants of shortness of breath, no wheezing, no cough  Gastrointestinal: no complaints of abdominal pain, no constipation, no nausea or diarrhea, no vomiting, no bloody stools  Genitourinary: no complaints of dysuria, no incontinence  Musculoskeletal: as noted in HPI  Integumentary: no complaints of skin rash or lesion, no itching or dry skin, no skin wounds        Neurological: no complaints of headache, no confusion, no numbness or tingling, no dizziness  Endocrine: No complaints of muscle weakness, no feelings of weakness, no frequent urination, no excessive thirst       Psychiatric: No suicidal thoughts, no anxiety, no feelings of depression  Active Problems   1  Acute back pain (724 5) (M54 9)   2  Acute maxillary sinusitis (461 0) (J01 00)   3  Ankle pain (719 47) (M25 579)   4  Benign mole (216 9) (D22 9)   5  Bipolar disorder, unspecified (296 80) (F31 9)   6  Blurred vision (368 8) (H53 8)   7  Classic migraine (346 00) (G43 109)   8  Contusion of right ankle, initial encounter (924 21) (S90 01XA)   9  COPD (chronic obstructive pulmonary disease) (496) (J44 9)   10  Dizziness (780 4) (R42)   11  Generalized anxiety disorder (300 02) (F41 1)   12  Hypercholesterolemia (272 0) (E78 00)   13  Hyperglycemia (790 29) (R73 9)   14  Impingement syndrome of left shoulder (726 2) (M75 42)   15  Injury of right hand, initial encounter (959 4) (S69 91XA)   16  Left shoulder strain (840 9) (S46 912A)   17  Leg cramps (729 82) (R25 2)   18  Leg pain (729 5) (M79 606)   19  Leukocytosis, unspecified type (288 60) (D72 829)   20  Lower back pain (724 2) (M54 5)   21  Lung nodule (793 11) (R91 1)   22  Lymphadenitis, acute (683) (L04 9)   23  Major depression (296 20) (F32 9)   24  Need for influenza vaccination (V04 81) (Z23)   25  Pain, hand (729 5) (M79 643)   26  Paresthesia (782 0) (R20 2)   27  Pelvic pain in female (625 9) (R10 2)   28  Right foot pain (729 5) (M79 671)   29  Right lower quadrant abdominal pain (789 03) (R10 31)   30  Shingles (053 9) (B02 9)   31  Shortness of breath (786 05) (R06 02)   32  Skin rash (782 1) (R21)   33  Sore throat (462) (J02 9)   34  Sprain of right hand, initial encounter (842 10) (S63 91XA)   35  Sprain of right wrist, initial encounter (842 00) (S63 501A)   36   Unspecified ovarian cyst, unspecified side (620 2) (N83 209)   37  Upper limb weakness (383 89) (R29 898)   38  Vitamin D deficiency (268 9) (E55 9)   39  Weakness of both lower extremities (729 89) (R29 898)   40  Yeast infection of the vagina (112 1) (B37 3)    Past Medical History    · History of endometriosis (V13 29) (Z87 42)   · History of hyperlipidemia (V12 29) (Z86 39)   · History of low back pain (V13 59) (Z87 39)   · History of urinary frequency (V13 09) (V61 401)   · History of Prolapsed bladder   · History of Urinary urgency (788 63) (R39 15)    Surgical History    · History of Bladder Surgery   · History of Hernia Repair   · History of Hysterectomy (V88 01)   · History of Hysterectomy   · History of Ovarian Cystectomy   · History of Tonsillectomy    Family History   Mother    · Family history of chronic obstructive pulmonary disease (V17 6) (Z82 5)   · Family history of rheumatic heart disease (V17 49) (Z82 49)   · Family history of type 2 diabetes mellitus (V18 0) (Z83 3)  Father    · Family history of    · Family history of lung cancer (V16 1) (Z80 1)  Maternal Grandmother    · Family history of    · Family history of cerebrovascular accident (CVA) (V17 1) (Z82 3)   · Family history of type 2 diabetes mellitus (V18 0) (Z83 3)  Paternal Grandmother    · Family history of type 2 diabetes mellitus (V18 0) (Z83 3)  Maternal Grandfather    · Family history of Esophageal abnormality  Paternal Grandfather    · Family history of type 2 diabetes mellitus (V18 0) (Z83 3)  Family History    · Family history of Depression (311) (F32 9)   · Family history of Esophageal carcinoma (150 9) (C15 9)   · Family history of Lung cancer (162 9) (C34 90)   · Family history of Stomach cancer (151 9) (C16 9)    Social History    · Consumes alcohol occasionally (V49 89) (Z78 9)   · Disabled   · Drinks coffee   · Former smoker (B23 95) (C27 016)   ·    · Never a smoker   · One child   · Social alcohol use (Z78 9)    Current Meds    1   BuPROPion HCl - 100 MG Oral Tablet; TAKE 1 TABLET DAILY Leukocytosis Recorded   2  CeleXA 10 MG Oral Tablet (Citalopram Hydrobromide); TAKE 1 TABLET DAILY; Therapy: (Recorded:30Oct2017) to Recorded   3  Daily Multivitamin TABS; Therapy: (Recorded:06Jul2017) to Recorded   4  LaMICtal 200 MG Oral Tablet (LamoTRIgine); TAKE 1 TABLET TWICE DAILY; Therapy: (Recorded:13Jun2017) to Recorded   5  LORazepam 0 5 MG Oral Tablet; TAKE 1 TABLET EVERY 12 HOURS AS NEEDED; Therapy: (Recorded:13Jun2017) to Recorded   6  Simvastatin 40 MG Oral Tablet (Zocor); take 1 tablet by mouth once daily; Therapy: 24DWK1955 to (Eual Slate)  Requested for: 01TEQ8683; Last     Rx:[id number] Ordered   7  Vitamin D 2000 UNIT Oral Capsule; Therapy: (Recorded:04Kck2607) to Recorded    Allergies   1  No Known Drug Allergies    Vitals    Recorded: 54HUN7250 01:47PM   Heart Rate 81   Respiration 14   Systolic 250   Diastolic 81   Height 5 ft 6 in   Weight 200 lb 0 96 oz   BMI Calculated 32 29   BSA Calculated 2 01     Physical Exam      ROM: Full except as noted: Motor: Normal except as noted: 4/5 abduction  Special Tests: Negative except positive Painful Arc-- and-- positive Leung test  Left Shoulder Appearance[de-identified] Normal except  Tenderness: None except the greater tuberosity        Constitutional - General appearance: Normal       Musculoskeletal - Gait and station: Normal -- Digits and nails: Normal -- Muscle strength/tone: Normal       Cardiovascular - Pulses: Normal -- Examination of extremities for edema and/or varicosities: Normal       Skin - Skin and subcutaneous tissue: Normal       Neurologic - Sensation: Normal       Psychiatric - Orientation to person, place, and time: Normal -- Mood and affect: Normal       Eyes      Conjunctiva and lids: Normal        Pupils and irises: Normal        Future Appointments      Date/Time Provider Specialty Site   01/23/2018 11:30 AM Joce Reid MD Neurology [place] [address]     Signatures    Electronically signed by : Mika Dunbar LADY Dee ; Dec 29 2017  1:54PM EST                       (Author)

## 2019-10-23 NOTE — TELEPHONE ENCOUNTER
Pt called stating that she has been going to PT as per   And now her lower back is painful and what ever she tries it will not go away  Pt pain level is 7/10  Please advise      Pt can be reached at [phone]

## 2019-10-24 ENCOUNTER — TELEPHONE (OUTPATIENT)
Dept: PAIN MEDICINE | Facility: CLINIC | Age: 39
End: 2019-10-24

## 2019-10-24 DIAGNOSIS — S43.431D SUPERIOR GLENOID LABRUM LESION OF RIGHT SHOULDER, SUBSEQUENT ENCOUNTER: ICD-10-CM

## 2019-10-24 RX ORDER — NAPROXEN 500 MG/1
TABLET ORAL
Qty: 60 TABLET | Refills: 1 | Status: SHIPPED | OUTPATIENT
Start: 2019-10-24 | End: 2019-12-21 | Stop reason: SDUPTHER

## 2019-10-25 ENCOUNTER — OFFICE VISIT (OUTPATIENT)
Dept: PHYSICAL THERAPY | Facility: CLINIC | Age: 39
End: 2019-10-25
Payer: COMMERCIAL

## 2019-10-25 DIAGNOSIS — M54.16 LUMBAR RADICULOPATHY: Primary | ICD-10-CM

## 2019-10-25 PROCEDURE — 97110 THERAPEUTIC EXERCISES: CPT

## 2019-10-25 PROCEDURE — 97140 MANUAL THERAPY 1/> REGIONS: CPT

## 2019-10-25 RX ORDER — METHYLPREDNISOLONE 4 MG/1
TABLET ORAL
Qty: 21 TABLET | Refills: 0 | Status: SHIPPED | OUTPATIENT
Start: 2019-10-25 | End: 2019-11-07 | Stop reason: ALTCHOICE

## 2019-10-25 NOTE — TELEPHONE ENCOUNTER
HERNANDEZ    S/w pt and advised of Dr Garnica Speaker notation  Pt was advised do not take any NSAIDs or ASA while she is on medrol dosepak  Pt verbalized understanding  Pt also wanted to schedule OV w/ Dr Aj Platt for after her PT is completed  Pt states she has about 2 weeks left of PT  OV scheduled w/ Dr Aj Platt at [facility] office 11/18, arrival 730 am  Pt appreciative

## 2019-10-25 NOTE — PROGRESS NOTES
Daily Note     Today's date: 10/25/2019  Patient name: Darryl Ahn  : 1980  MRN: [id number]  Referring provider: Dre Robbins MD  Dx:   Encounter Diagnosis     ICD-10-CM    1  Lumbar radiculopathy M54 16                   Subjective: Spoke with MD office  They said I have to complete 6 weeks of PT prior to having MRI  Currently taking no rx meds for pain management for LBP/R shoulder pain  Just  aleeve which is not doing anything  LBP is constant  Pain down right LE to calf region that comes and goes      Objective: See treatment diary below      Assessment: Tolerated treatment fairly well  Denied any increase LBP with progression of LBP  Pt with noted reduce mm tightness BL L4-5 region   Patient would benefit from continued PT      Plan: Continue per plan of care        Precautions: recent R shld surgery(no precautions), cerv pain      Re-eval Date: 19    Date 10/25       Visit Count 6       FOTO Complete       Pain In 6 LB  5 R shoulder       Pain Out 4 LB  5 R shoulder         Manual  10 min *       Ham stretch        ITB stretch        Hip flexor/ quad stretch        * STM / DTM to pt tolerance BL Lspine - prone with 1 pillow under abs    Exercise Diary         Nustep L3 10 min       AH DC HEP       AH w/SLR resume       AH w/ball lift resume       SKTC DC HEP       DKTC DC HEP       bridges review       LTRs review       Prone hip ext 15x       Resisted amb in [place]  4 way 5 1 #  10x       Standing hip ext/abd  foam 2x10  Foam  1 HHA       Prone trunk ext  W/arms at side 10x 5"       Incline calf stretch Pt self stretch  HEP       R Shoulder Stretch Motion check with focus IR  5 min                               Sit on black disk/  AH with march 2x10, 5" NP       Sit on disk/  Trunk rot with ball W/o ball  5x w/4breaths  Ea dir       Sit disc  Hip ABD/ADD  W/ AH Ball / Green  2x10, 5"                           Modalities         MHP 10 Min LB       Cold Pk 10 Min R shoulder - NP

## 2019-10-29 ENCOUNTER — OFFICE VISIT (OUTPATIENT)
Dept: PHYSICAL THERAPY | Facility: CLINIC | Age: 39
End: 2019-10-29
Payer: COMMERCIAL

## 2019-10-29 DIAGNOSIS — M54.16 LUMBAR RADICULOPATHY: Primary | ICD-10-CM

## 2019-10-29 PROCEDURE — 97140 MANUAL THERAPY 1/> REGIONS: CPT

## 2019-10-29 PROCEDURE — 97110 THERAPEUTIC EXERCISES: CPT

## 2019-10-29 NOTE — PROGRESS NOTES
Daily Note     Today's date: 10/29/2019  Patient name: Betsey Falcon  : 1980  MRN: [id number]  Referring provider: Kee Hunter MD  Dx:   Encounter Diagnosis     ICD-10-CM    1  Lumbar radiculopathy M54 16                   Subjective: Increase LBP today but I did a lot of things over the weekend  Started summer/winter cleaning, carpet cleaning, wiping things down and moving things back  Dr put me on new medication, steroid for I believe for 5 days      Objective: See treatment diary below      Assessment: Tolerated treatment fairly well  Denied any increase LBP with progression of POC  Pt demonstrates soft tissue restrictions R Lspine/PSIS/piriformis mm  Patient would benefit from continued PT      Plan: Continue per plan of care        Precautions: recent R shld surgery(no precautions), cerv pain      Re-eval Date: 19    Date 10/25 10/29      Visit Count 6 5      FOTO Complete       Pain In 6 LB  5 R shoulder 7 LB  6 R shoulder      Pain Out 4 LB  5 R shoulder 5 LB  5 R shoulder        Manual  10 min * 10 min *      Ham stretch        ITB stretch        Hip flexor/ quad stretch        * STM / DTM to pt tolerance BL Lspine - prone with 1 pillow under abs    Exercise Diary         Nustep L3 10 min L3 1 min      AH DC HEP       AH w/SLR resume       AH w/ball lift resume       SKTC DC HEP       DKTC DC HEP       bridges review       LTRs review       Prone hip ext 15x       Resisted amb in [place]  4 way 5 1 #  10x 6#  10x      Standing hip ext/abd  foam 2x10  Foam  1 HHA 3x10 ABD  Foam  1 HHA    2x 10,5" ext  1x10, 5" UE/LE ext   foam      Prone trunk ext  W/arms at side 10x 5" 10x 5"      Incline calf stretch Pt self stretch  HEP       R Shoulder Stretch Motion check with focus IR  5 min Motion check                              Sit on black disk/  AH with march 2x10, 5" NP       Sit on disk/  Trunk rot with ball W/o ball  5x w/4breaths  Ea dir W/o ball  5x w/4breaths  Ea dir      Sit disc  Hip ABD/ADD  W/ AH Ball / Green  2x10, 5" Ball / Tommas Peppers  2x10, 5"                          Modalities         MHP 10 Min LB 10 min LB  W/home TENS      Cold Pk 10 Min R shoulder - NP

## 2019-10-31 ENCOUNTER — OFFICE VISIT (OUTPATIENT)
Dept: PHYSICAL THERAPY | Facility: CLINIC | Age: 39
End: 2019-10-31
Payer: COMMERCIAL

## 2019-10-31 DIAGNOSIS — M54.16 LUMBAR RADICULOPATHY: Primary | ICD-10-CM

## 2019-10-31 PROCEDURE — 97140 MANUAL THERAPY 1/> REGIONS: CPT

## 2019-10-31 PROCEDURE — 97110 THERAPEUTIC EXERCISES: CPT

## 2019-10-31 NOTE — PROGRESS NOTES
Daily Note     Today's date: 10/31/2019  Patient name: Janna Cortez  : 1980  MRN: [id number]  Referring provider: Liddie Soulier, MD  Dx:   Encounter Diagnosis     ICD-10-CM    1  Lumbar radiculopathy M54 16                   Subjective: My injection seems to be wearing off, increase neck pain today 3/10  Back today overall is better    Objective: See treatment diary below      Assessment: Tolerated treatment fairly well denied any increase LBP, neck/Rshoulder pain with TE completed this date   Noted soft tissue restrictions BL Lspine R>L  , PSIS region, and R SI region  Decrease PROM L shoulder all planes with pt encourage compliancy with HEP  Patient would benefit from continued PT      Plan: Continue per plan of care        Precautions: recent R shld surgery(no precautions), cerv pain      Re-eval Date: 19    Date 10/25 10/29 10/31     Visit Count 6 7 8     FOTO Complete       Pain In 6 LB  5 R shoulder 7 LB  6 R shoulder 5 LBP  5 R shoulder     Pain Out 4 LB  5 R shoulder 5 LB  5 R shoulder 4 LBP  5 R shoulder       Manual  10 min * 10 min * 20 min*     Ham stretch        ITB stretch        Hip flexor/ quad stretch        * STM / DTM to pt tolerance BL Lspine - prone with 1 pillow under abs  GISTM to BL LSpine/PSIS # 1/4 to pt tolerance with sweeping/fanning in prone  Pt educated potential bruising/encourage hydration  [place] consent signed 10/31/19      Exercise Diary         Nustep L3 10 min L3 10 min L3 10     AH DC HEP       AH w/SLR resume       AH w/ball lift resume       SKTC DC HEP       DKTC DC HEP       bridges review  SL x5, 5" ea     LTRs review  1 min ea dir     Prone hip ext 15x       Resisted amb in [place]  4 way 5 1 #  10x 6#  10x 7#  20x     Standing hip ext/abd  foam 2x10  Foam  1 HHA 3x10 ABD  Foam  1 HHA    2x 10,5" ext  1x10, 5" UE/LE ext   foam Hip 3 way  Foam  1 HHA  3x10 ea     Prone trunk ext  W/arms at side 10x 5" 10x 5" 20x 5"     Incline calf stretch Pt self stretch  HEP       R Shoulder Stretch Motion check with focus IR  5 min Motion check Motion check  Reviewed AAROM to dayne                             Sit on black disk/  AH with march 2x10, 5" NP  resume     Sit on disk/  Trunk rot with ball W/o ball  5x w/4breaths  Ea dir W/o ball  5x w/4breaths  Ea dir resume     Sit disc  Hip ABD/ADD  W/ AH Ball / [place]  2x10, 5" [place] / Bevelyn Slider  2x10, 5" Ball / Bevelyn Slider  2x10, 5"                         Modalities         MHP 10 Min LB 10 min LB  W/home TENS 10 min LB  W/home TENS     Cold Pk 10 Min R shoulder - NP

## 2019-11-04 ENCOUNTER — OFFICE VISIT (OUTPATIENT)
Dept: PHYSICAL THERAPY | Facility: CLINIC | Age: 39
End: 2019-11-04
Payer: COMMERCIAL

## 2019-11-04 ENCOUNTER — TELEPHONE (OUTPATIENT)
Dept: PAIN MEDICINE | Facility: CLINIC | Age: 39
End: 2019-11-04

## 2019-11-04 DIAGNOSIS — G89.4 CHRONIC PAIN SYNDROME: ICD-10-CM

## 2019-11-04 DIAGNOSIS — M54.16 LUMBAR RADICULOPATHY: Primary | ICD-10-CM

## 2019-11-04 DIAGNOSIS — E78.00 HYPERCHOLESTEROLEMIA: ICD-10-CM

## 2019-11-04 PROCEDURE — 97140 MANUAL THERAPY 1/> REGIONS: CPT

## 2019-11-04 PROCEDURE — 97110 THERAPEUTIC EXERCISES: CPT

## 2019-11-04 RX ORDER — SIMVASTATIN 40 MG
TABLET ORAL
Qty: 90 TABLET | Refills: 3 | Status: SHIPPED | OUTPATIENT
Start: 2019-11-04 | End: 2020-11-02

## 2019-11-04 NOTE — TELEPHONE ENCOUNTER
Pt stopped in office stating that Dr Emely Solano told her she needed to complete 6 weeks of PT before an MRI could be ordered  Per pt she has now completed those 6 weeks and wants to know if Dr Emely Solano could please order her the MRI  Pt has a f/u scheduled for 11/18, but doesn't want to wait that long before an   MRI is ordered  Please advise  Thank you

## 2019-11-04 NOTE — PROGRESS NOTES
Daily Note     Today's date: 2019  Patient name: Carolyn Estimable  : 1980  MRN: [id number]  Referring provider: Yasemin Harvey MD  Dx:   Encounter Diagnosis     ICD-10-CM    1  Lumbar radiculopathy M54 16                   Subjective: I get some pain relief for several hours after PT session  Doing my exercises seem to help temporarilyt    Objective: See treatment diary below      Assessment: Tolerated treatment well  Denied any increase LBP  Noted increase vasomotor response with GISTM  Reduce mm tightness indicated end of MT/ modalities  Demonstrates R> L LSpine soft tissue restriction  Patient would benefit from continued PT      Plan: Continue per plan of care    RS upcoming     Precautions: recent R shld surgery(no precautions), cerv pain      Re-eval Date: 19    Date 10/25 10/29 10/31 11/4    Visit Count 6 7 8 9    FOTO Complete       Pain In 6 LB  5 R shoulder 7 LB  6 R shoulder 5 LBP  5 R shoulder 6 LBP  6 R shoulder    Pain Out 4 LB  5 R shoulder 5 LB  5 R shoulder 4 LBP  5 R shoulder 4-5 LBP  6 R shoulder      Manual  10 min * 10 min * 20 min* 10 min*    Ham stretch        ITB stretch        Hip flexor/ quad stretch        * STM / DTM to pt tolerance BL Lspine - prone with 1 pillow under abs  GISTM to BL LSpine/PSIS # 1/4 to pt tolerance with sweeping/fanning in prone  Pt educated potential bruising/encourage hydration  [place] consent signed 10/31/19      Exercise Diary         Nustep L3 10 min L3 10 min L3 10 L3 12 min    AH DC HEP       AH w/SLR resume   2x10 ea supine    AH w/ball lift resume       SKTC DC HEP       DKTC DC HEP       bridges review  SL x5, 5" ea     LTRs review  1 min ea dir 1 min ea dir    Prone hip ext 15x       Resisted amb in [place]  4 way 5 1 #  10x 6#  10x 7#  20x 8# 20x      Standing hip ext/abd  foam 2x10  Foam  1 HHA 3x10 ABD  Foam  1 HHA    2x 10,5" ext  1x10, 5" UE/LE ext   foam Hip 3 way  Foam  1 HHA  3x10 ea 4 way  Red TB  Foam  10x BL ea dir Prone trunk ext  W/arms at side 10x 5" 10x 5" 20x 5" 20x 5"    Incline calf stretch Pt self stretch  HEP       R Shoulder Stretch Motion check with focus IR  5 min Motion check Motion check  Reviewed AAROM to dayne                             Sit on black disk/  AH with march 2x10, 5" NP  resume     Sit on disk/  Trunk rot with ball W/o ball  5x w/4breaths  Ea dir W/o ball  5x w/4breaths  Ea dir resume     Sit disc  Hip ABD/ADD  W/ AH Ball / Rosemarie Kettle  2x10, 5" [employer] / Rosemarie Kettle  2x10, 5" Ball / Rosemarie Kettle  2x10, 5" Ball / Blue  2x10, 5"                        Modalities         MHP 10 Min LB 10 min LB  W/home TENS 10 min LB  W/home TENS 10 min LB  W/home TENS    Cold Pk 10 Min R shoulder - NP

## 2019-11-04 NOTE — TELEPHONE ENCOUNTER
LMOM for return call  Please let patient know MRI order was placed in her chart  She may call central scheduling to schedule at her convenience  Thank you

## 2019-11-06 NOTE — PROGRESS NOTES
Patient ID: Dora Solorzano is a 37 y o  female  Assessment/Plan:   Charles Villatoro is a 49-year-old female with complaints of paresthesias, muscle cramps, weakness and stiffness throughout her body  Neurologic exam has been normal   Workup including MRI of the brain, EMG were unremarkable, cervical spine with evidence of lower doses, lumbar spine films unremarkable, MRI cervical spine with mild noncompressive degenerative changes  --  Continues on gabapentin 300 mg 3 times a day,  Can increase if needed for pain  -- continues on Zanaflex 4 mg at bedtime  -- patient found use of Lidoderm over-the-counter to be beneficial, unfortunately prescription not covered by insurance  -- patient has access to TENS unit  -- patient seen by PMR  Underwent trigger point injections, subsequently referred to spine and pain management  -- patient currently undergoing PT, continue as indicated  -- patient due to undergo MRI of lumbar spine next week    No problem-specific Assessment & Plan notes found for this encounter  Diagnoses and all orders for this visit:    Chronic pain syndrome    Paresthesia  -     gabapentin (NEURONTIN) 300 mg capsule; 1 tablet twice a day and 2 HS    Muscle pain  -     tiZANidine (ZANAFLEX) 4 mg tablet; Take 1 tablet (4 mg total) by mouth daily at bedtime    Lumbar radiculopathy    Cervical myofascial pain syndrome           Subjective:    HPI    Charles Villatoro is a 49-year-old female who presents today for neurologic follow-up regarding complaints of paresthesias, muscle cramps and weakness  She was Initially evaluated in June 2017  below maintain in patient's chart for complexity of case and review  Patient has h/o hypercholesterolemia and bipolar disorder and depression She initially noticed she had cramps in both lower extremities and right hand Felt her hand cramping was constant  She describes cramps as tightness/stiffness    She also has tingling in both the arms   MRI brain was normal  She also feels like bugs crawling sensation for few months    She tried Magnesium and it did not help  She has occasional migraine like headaches but they are tolerable  SHe did not have one in few months  Excedrin migraine helps her  Headache is all over the head, pounding headache, lasts few hours  Headache is worse with lights and sounds    She does not want any preventive medication as they are tolerable     ----CRP less than 3, ESR 34, rheumatoid factor negative, PK screen negative, Lyme negative, ESR 7  --- MRI brain with and without contrast on 5/27/17 showed no acute intracranial abnormality or pathologic enhancement, no significant white matter changes  ---X-ray of the hand on 5/11/17 showed no acute osseous abnormality  ---X-ray of the forearm right showed no acute osseous abnormality  --- X-ray foot on the right showed no acute osseous abnormality, prominent plantar calcaneal spur        today, she continues to describe ongoing neck discomfort, left greater than right  She has cramping in her hands bilaterally  This is more prominent at nighttime  She continues on a combination of gabapentin and tizanidine  It was suggested that she utilize Lidoderm cream , she did find the medication to be beneficial, unfortunately the prescription dosage was not covered by her insurance  She does have access to a TENS unit  Patient was subsequently referred to PMR, she underwent trigger point injections however continued with ongoing discomfort, [facility]  She underwent cervical epidural injections  With relief for only approximately 2 weeks  Her primary complaint has been in regards to low back pain, she underwent physical therapy for approximately 7 weeks however this provoked her symptoms, she denies any bowel or bladder issues, no interim falls  She is scheduled to undergo MRI of the lumbar spine next week          the interim, patient with complaints of right shoulder pain, underwent arthroscopic the with biceps tenotomy and subacromial decompression in August 2019  States that her pain actually increased, she had tightness  She subsequently underwent physical therapy           Visit History; 11/15/18      Overall, she feels her symptoms are fairly stable, her headaches are infrequent  Seen by PCP for c/o dizziness and lightheadedness with static positioning  BP noted to be on low side,   BP around 110  [place] ---ECHOLEFT VENTRICLE:  Systolic function was normal  Ejection fraction was estimated to be 60 %  There were no regional wall motion abnormalities  -- Carotid US: Bilateral There is no evidence of arterial disease throughout the extracranial carotid system  Vertebral artery flow is antegrade  There is no significant subclavian artery disease  -- Labs 7/19/18: T4=1 03, TSH=0 181, Vit D =37 7, CMP normal       history 5/17/18:  Pt continues with C/O pain in B/L posterior cervical region, notes  Paresthesia of UE She was undergoing PT with some benefit in her cervical but this was put on hold for her recent cholecystecotmy  She was seen by ortho for her left shoulder,  She was given a TEN's unit which she uses once a day   She has Gabapentin which she only uses at night time  --MRI cervical 2/17/18: Noncompressive degenerative changes of the cervical spine, present to a mild degree  Underwent laproscopic cholecystectomy     Interval history 01/23/2018:  Patient  With ongoing complaints of cramping She tried multivitamins and is taking it but did not help  Patient only on gabapentin 300mg without benefit    she is doing PT, radiates to the left shoulder    She had left shoulder pain, saw orthopedics and had MRI shoulder that was normal  her neck  X-ray in June showed some loss of lordosis in the neck     --HbA1c in October 2017 was 5 6, vitamin B12 751, TSH 0 827, vitamin-D 68  9  Ach receptor antibodies were normal  CPK 76    --MRI of the left shoulder without contrast on 12/26/2017 was normal  The --MRI brain was unremarkable   ---EMG performed in our lab on 10/06/2017 of right upper and lower extremity was normal in the was no evidence of peripheral neuropathy or myopathy or right cervical or lumbar radiculopathy or entrapment neuropathy         The following portions of the patient's history were reviewed and updated as appropriate: allergies, current medications, past family history, past medical history, past social history, past surgical history and problem list          Objective:  Current Outpatient Medications   Medication Sig Dispense Refill    buPROPion (WELLBUTRIN SR) 100 mg 12 hr tablet   0    Cholecalciferol (VITAMIN D3) 2000 units TABS Take 4,000 Units by mouth daily        citalopram (CeleXA) 20 mg tablet Take 20 mg by mouth every morning  0    gabapentin (NEURONTIN) 300 mg capsule 1 tablet twice a day and 2  capsule 1    lamoTRIgine (LAMICTAL) 200 MG tablet Take 1 tablet by mouth 2 (two) times a day      lidocaine (XYLOCAINE) 5 % ointment Apply topically as needed for mild pain 35 44 g 0    LORazepam (ATIVAN) 0 5 mg tablet Take 1 tablet by mouth daily at bedtime        Multiple Vitamin (DAILY VALUE MULTIVITAMIN) TABS Take by mouth      naproxen (NAPROSYN) 500 mg tablet take 1 tablet by mouth twice a day with meals 60 tablet 1    Nerve Stimulator (STANDARD TENS) ISABELLA by Does not apply route 3 (three) times a day as needed (pain) 1 Device 0    simvastatin (ZOCOR) 40 mg tablet take 1 tablet by mouth once daily 90 tablet 3    tiZANidine (ZANAFLEX) 4 mg tablet Take 1 tablet (4 mg total) by mouth daily at bedtime 30 tablet 1     No current facility-administered medications for this visit  Blood pressure 104/62, pulse 80, height 5' 6" (1 676 m), weight 80 7 kg (178 lb)  Physical Exam   Constitutional: She appears well-developed  Eyes: Pupils are equal, round, and reactive to light  Neck: Normal range of motion  Cardiovascular: Normal rate and regular rhythm  Pulmonary/Chest: Effort normal    Musculoskeletal: Normal range of motion  Neurological: She has normal strength and normal reflexes  Coordination normal    Skin: Skin is warm  Psychiatric: She has a normal mood and affect  Her speech is normal and behavior is normal  Judgment and thought content normal        Neurological Exam  Mental Status  Awake, alert and oriented to person, place and time  Speech is normal     Cranial Nerves  CN III, IV, VI: Extraocular movements intact bilaterally  Pupils equal round and reactive to light bilaterally  CN V: Facial sensation is normal   CN VII: Full and symmetric facial movement  CN XI: Shoulder shrug strength is normal     Motor  Normal muscle bulk throughout  Normal muscle tone  Strength is 5/5 throughout all four extremities  Mild right hip flexor weakness secondary to complaints of increased low back pain  Sensory  Temperature is normal in upper and lower extremities  Vibration abnormality:  Decreased vibration right knee  Reflexes  Deep tendon reflexes are 2+ and symmetric in all four extremities with downgoing toes bilaterally  Coordination  Finger-to-nose, rapid alternating movements and heel-to-shin normal bilaterally without dysmetria  Gait  Normal casual, toe, heel and tandem gait  ROS:   ROS updated and personally reviewed with patient at today's visit    Review of Systems  Negative  Negative for appetite change and fever  HENT: Negative  Negative for hearing loss, tinnitus, trouble swallowing and voice change  Eyes: Negative  Negative for photophobia and pain  Respiratory: Negative  Negative for shortness of breath  Cardiovascular: Negative  Negative for palpitations  Gastrointestinal: Negative  Negative for nausea and vomiting  Endocrine: Negative  Negative for cold intolerance and heat intolerance  Genitourinary: Negative  Negative for dysuria, frequency and urgency     Musculoskeletal: Positive for arthralgias, back pain, joint swelling, myalgias, neck pain and neck stiffness  Positive for cramping   Skin: Negative  Negative for rash  Allergic/Immunologic: Negative  Neurological: Negative for dizziness, tremors, seizures, syncope, facial asymmetry, speech difficulty, weakness, light-headedness, numbness and headaches  Positive for tingling   Hematological: Negative  Does not bruise/bleed easily  Psychiatric/Behavioral: Positive for sleep disturbance (trouble falling asleep)  Negative for confusion and hallucinations

## 2019-11-07 ENCOUNTER — OFFICE VISIT (OUTPATIENT)
Dept: NEUROLOGY | Facility: CLINIC | Age: 39
End: 2019-11-07
Payer: COMMERCIAL

## 2019-11-07 VITALS
SYSTOLIC BLOOD PRESSURE: 104 MMHG | HEIGHT: 66 IN | DIASTOLIC BLOOD PRESSURE: 62 MMHG | WEIGHT: 178 LBS | HEART RATE: 80 BPM | BODY MASS INDEX: 28.61 KG/M2

## 2019-11-07 DIAGNOSIS — M54.16 LUMBAR RADICULOPATHY: ICD-10-CM

## 2019-11-07 DIAGNOSIS — G89.4 CHRONIC PAIN SYNDROME: Primary | ICD-10-CM

## 2019-11-07 DIAGNOSIS — M79.10 MUSCLE PAIN: ICD-10-CM

## 2019-11-07 DIAGNOSIS — M79.18 CERVICAL MYOFASCIAL PAIN SYNDROME: ICD-10-CM

## 2019-11-07 DIAGNOSIS — R20.2 PARESTHESIA: ICD-10-CM

## 2019-11-07 PROCEDURE — 99214 OFFICE O/P EST MOD 30 MIN: CPT | Performed by: NURSE PRACTITIONER

## 2019-11-07 RX ORDER — TIZANIDINE 4 MG/1
4 TABLET ORAL
Qty: 30 TABLET | Refills: 1 | Status: SHIPPED | OUTPATIENT
Start: 2019-11-07 | End: 2020-03-03

## 2019-11-07 RX ORDER — GABAPENTIN 300 MG/1
CAPSULE ORAL
Qty: 150 CAPSULE | Refills: 1 | Status: SHIPPED | OUTPATIENT
Start: 2019-11-07 | End: 2020-02-16

## 2019-11-07 NOTE — PATIENT INSTRUCTIONS
Continue to follow up with Dr Jesus Alberto Lawler  Following with pain management  Continue on tizanidine  Can increase gabapentin if needed  Has TENS unit   PT   pt for MRI lumbar

## 2019-11-08 ENCOUNTER — OFFICE VISIT (OUTPATIENT)
Dept: PHYSICAL THERAPY | Facility: CLINIC | Age: 39
End: 2019-11-08
Payer: COMMERCIAL

## 2019-11-08 DIAGNOSIS — M54.16 LUMBAR RADICULOPATHY: Primary | ICD-10-CM

## 2019-11-08 PROCEDURE — 97140 MANUAL THERAPY 1/> REGIONS: CPT

## 2019-11-08 PROCEDURE — 97110 THERAPEUTIC EXERCISES: CPT

## 2019-11-08 NOTE — PROGRESS NOTES
Daily Note     Today's date: 2019  Patient name: Darryl Ahn  : 1980  MRN: [id number]  Referring provider: Dre Robbins MD  Dx:   Encounter Diagnosis     ICD-10-CM    1  Lumbar radiculopathy M54 16                   Subjective: The gr[place] and estim help to minimize LBP temporarily ~ 2 hours depending of level of activity afterwards  Have MRI scheduled  for lumbar area      Objective: See treatment diary below      Assessment: Tolerated treatment fairly well  Denied any increase LBP  Motion check R shoulder limited in all planes with no significant gains displayed  Reviewed AAROM prolong / low load static stretch as tolerated   Patient would benefit from continued PT      Plan: Continue per plan of care        Precautions: recent R shld surgery(no precautions), cerv pain      Re-eval Date: 19    Date 10/25 10/29 10/31 11/4 11/8   Visit Count 6 7 8 9 10   FOTO Complete       Pain In 6 LB  5 R shoulder 7 LB  6 R shoulder 5 LBP  5 R shoulder 6 LBP  6 R shoulder 5 LBP  5 R Shoulder   Pain Out 4 LB  5 R shoulder 5 LB  5 R shoulder 4 LBP  5 R shoulder 4-5 LBP  6 R shoulder 4 LBP  5 R shoulder     Manual  10 min * 10 min * 20 min* 10 min* 20 min *   Ham stretch        ITB stretch        Hip flexor/ quad stretch        * STM / DTM to pt tolerance BL Lspine - prone with 1 pillow under abs  GISTM to BL LSpine/PSIS # 4 to pt tolerance with sweeping/fanning in prone  Pt educated potential bruising/encourage hydration  [place] consent signed 10/31/19      Exercise Diary         Nustep L3 10 min L3 10 min L3 10 L3 12 min L3  12 min   AH DC HEP       AH w/SLR resume   2x10 ea supine 2x10 ea supine   AH w/ball lift resume       SKTC DC HEP    1 min after MT   DKTC DC HEP    1 min after MT   bridges review  SL x5, 5" ea  review   LTRs review  1 min ea dir 1 min ea dir 1 min ea dir   Prone hip ext 15x       Self Piriformis stretch     2 min BL  supine   Resisted amb in [place]  4 way 5 1 #  10x 6#  10x 7#  20x 8# 20x   9# 20x   Standing hip ext/abd  foam 2x10  Foam  1 HHA 3x10 ABD  Foam  1 HHA    2x 10,5" ext  1x10, 5" UE/LE ext   foam Hip 3 way  Foam  1 HHA  3x10 ea 4 way  Red TB  Foam  10x BL ea dir 4 way  Green TB  Foam  10x BL ea dir   Prone trunk ext  W/arms at side 10x 5" 10x 5" 20x 5" 20x 5" 20x 5"   Incline calf stretch Pt self stretch  HEP       R Shoulder Stretch Motion check with focus IR  5 min Motion check Motion check  Reviewed AAROM to dayne  Motion check  Reviewed AAROM to dayne                           Sit on black disk/  AH with march 2x10, 5" NP  resume     Sit on disk/  Trunk rot with ball W/o ball  5x w/4breaths  Ea dir W/o ball  5x w/4breaths  Ea dir resume     Sit disc  Hip ABD/ADD  W/ AH Ball / Janeice Lawrence  2x10, 5" [place] / Janeice Lawrence  2x10, 5" Ball / Janeice Lawrence  2x10, 5" Ball / Blue  2x10, 5" Ball / Blue  3x10, 5"                       Modalities         MHP 10 Min LB 10 min LB  W/home TENS 10 min LB  W/home TENS 10 min LB  W/home TENS 10 min LB  W/home TENS   Cold Pk 10 Min R shoulder - NP

## 2019-11-11 ENCOUNTER — EVALUATION (OUTPATIENT)
Dept: PHYSICAL THERAPY | Facility: CLINIC | Age: 39
End: 2019-11-11
Payer: COMMERCIAL

## 2019-11-11 DIAGNOSIS — M54.16 LUMBAR RADICULOPATHY: Primary | ICD-10-CM

## 2019-11-11 PROCEDURE — 97110 THERAPEUTIC EXERCISES: CPT

## 2019-11-11 PROCEDURE — 97164 PT RE-EVAL EST PLAN CARE: CPT

## 2019-11-11 NOTE — PROGRESS NOTES
PT Evaluation     Today's date: 2019  Patient name: Dora Solorzano  : 1980  MRN: [id number]  Referring provider: Rosa Aquino MD  Dx:   Encounter Diagnosis     ICD-10-CM    1  Lumbar radiculopathy M54 [id number]                   Assessment  Assessment details: Dora Solorzano is a 37 y o  female presenting to outpatient physical therapy with diagnosis of Lumbar radiculopathy  (primary encounter diagnosis)  Patient's current impairments include lumbar pain, reduced trunk  range of motion, reduced lumbar/core  strength, reduced postural awareness, and reduced activity tolerance  Patient's present functional limitations include difficulty with ADLs with increased need for assistance, reliance on medication and/or modalities for pain relief, poor tolerance for functional mobility and activity, and difficulty completing [place]  responsibilities  Patient to benefit from skilled outpatient physical therapy 2x/week for 4-6  weeks in order to reduce pain, maximize pain free range of motion, increase strength and stability, and improve functional mobility/functional activity in order to maximize return to prior level of function with reduced limitations  Thank you for your referral   UPDATE: Pt has had 11 PT visits consisting of ther ex, IASTM, and home TENS unit with HP  Pt reports up to 50% improvement, however, initial  FOTO score higher than subsequent 2 scores, Pt states she feels that her neck pain was masking her back pain and since cerv inj has decreased her neck pain, her LBP has become more pronounced  Pt also states she gets several hours of temporary relief after tx, but then pain returns to original intensity  Pt wishes to discuss cont'd PT vs DC with phys    Impairments: abnormal or restricted ROM, activity intolerance, impaired physical strength, lacks appropriate home exercise program and pain with function  Barriers to therapy: cerv pain, recent R shld surg  Understanding of Dx/Px/POC: good Prognosis: fair    Goals  STGs to be achieved in 4 weeks:  1  Pt to demonstrate reduced subjective pain rating "at worst" by at least 2-3 points from Initial Eval in order to allow for reduced pain with ADLs and improved functional activity tolerance  Goal cont'd  2  Pt to demonstrate increased AROM of trunk ext and L lat flex to min restriction in order to allow for greater ease and independence with ADLs and functional mobility  Goal partially met and ongoing  3  Pt to demonstrate increased strength of core and lumbar area in order to improve safety and stability with ADLs and functional mobility  Goal cont'd    LTGs to be achieved in 6-8 weeks:  1  Pt will be I with HEP in order to continue to improve quality of life and independence and reduce risk for re-injury  Goal cont'd  2  Pt to demonstrate return to min to no pain with activity  without limitations or restrictions  Goal cont'd  3  Pt to demonstrate improved function as noted by achieving or exceeding predicted score on FOTO outcomes assessment tool  Goal cont'd      Plan  Patient would benefit from: skilled physical therapy  Planned therapy interventions: manual therapy, self care, stretching, strengthening, therapeutic exercise, home exercise program and abdominal trunk stabilization  Frequency: 2x week  Duration in weeks: 4  Plan of Care beginning date: 11/11/2019  Plan of Care expiration date: 12/9/2019  Treatment plan discussed with: patient        Subjective Evaluation    History of Present Illness  Mechanism of injury: Began with cramping in legs 2-3 years ago and  parasthesia in arms and legs  Was treated for neck issues but never for low back  Had recent inj in neck and discussed back problems  at that time and was given Rx for OPPT  Current sx include cramping in legs occasionally, has fairly constant LBP  But less bothersome than other painful body parts includingneck and R shld  Recent R shld surgery for SLAP tear and bone spur R shld  Doreen Console  Pt has been [place] R shld at home  Pt feels back pain is masked by neck and shld pain                             UPDATE: Pt has had 11 PT visits consisting of ther ex, IASTM, and home TENS unit with HP  Pt reports up to 50% improvement, however, initial  FOTO score higher than subsequent 2 scores, Pt states she feels that her neck pain was masking her back pain and since cerv inj has decreased her neck pain, her LBP has become more pronounced  Pt also states she gets several hours of temporary relief after tx, but then pain returns to original intensity  Reported pain levels are unchanged or worse than IE  Pt wishes to discuss cont'd PT vs DC with phys  Recurrent probem    Quality of life: good    Pain  Current pain ratin  At best pain ratin  At worst pain ratin  Location: B LBP rad down RLE intermittently and seldomly down LLE  Quality: cramping, radiating, sharp, dull ache and needle-like  Relieving factors: change in position and medications (gabapentin, antiinflamm)  Aggravating factors: lifting (bending, exercising,prolonged sitting)  Progression: no change    Social Support  Steps to enter house: no  Stairs in house: yes (1 flight)   Lives in: multiple-level home  Lives with: adult children    Employment status: not working (disability)  Hand dominance: right      Diagnostic Tests  Abnormal x-ray: years ago  Abnormal MRI: only cerv  Treatments  Previous treatment: physical therapy, injection treatment, medication and massage  Current treatment: injection treatment, medication and physical therapy  Current treatment comments: cerv inj       Patient Goals  Patient goals for therapy: decreased pain, increased motion and increased strength  Patient goal: ret to gardening        Objective     Concurrent Complaints  Negative for disturbed sleep (shld and neck cause sleep disturbance), history of cancer, history of trauma and infection    Postural Observations  Seated posture: fair  Standing posture: fair  Correction of posture: makes symptoms worse    Additional Postural Observation Details  Fwd head, slight fwd trunk lean    Palpation   Left   Tenderness of the erector spinae and quadratus lumborum  Right   Tenderness of the erector spinae and quadratus lumborum  Tenderness     Left Hip   No tenderness in the PSIS  Right Hip   Tenderness in the PSIS       Neurological Testing     Sensation     Lumbar   Left   Intact: light touch    Right   Intact: light touch    Reflexes   Left   Patellar (L4): normal (2+)    Right   Patellar (L4): normal (2+)    Additional Neurological Details  B parasthesia strong in thighs and internittent but does travel down to calves at times    Active Range of Motion     Lumbar   Flexion:  WFL  Extension: Active lumbar extension: 50%  Restriction level: moderate  Left lateral flexion:  Restriction level: minimal  Right lateral flexion:  Restriction level: minimal  Left rotation:  WFL Restriction level: minimal  Right rotation:  WFL Restriction level: minimal    Additional Active Range of Motion Details  Ham length L 90, R 80    Strength/Myotome Testing     Left Hip   Planes of Motion   Flexion: 4  Extension: 5  Abduction: 4  Adduction: 5  External rotation: 4  Internal rotation: 4    Right Hip   Planes of Motion   Flexion: 4+  Extension: 5  Abduction: 4+  Adduction: 5  External rotation: 4  Internal rotation: 4    Left Knee   Flexion: 5  Extension: 5    Right Knee   Flexion: 5  Extension: 5    Left Ankle/Foot   Dorsiflexion: 4+    Right Ankle/Foot   Dorsiflexion: 5    Tests     Lumbar     Left   Negative passive SLR and slump test      Right   Negative passive SLR and slump test      General Comments:    Lower quarter screen   Knees: unremarkable  Foot/ankle: unremarkable    Hip Comments   L hip slightly weaker than R             Precautions: recent R shld surgery(no precautions), cerv pain      Re-eval Date: 11/13/19  Date 11/11 10/29 10/31 11/4 11/8   Visit Count 11 7 8 9 10   FOTO Completed       Pain In  LB  5 R shoulder 7 LB  6 R shoulder 5 LBP  5 R shoulder 6 LBP  6 R shoulder 5 LBP  5 R Shoulder   Pain Out 4 LB  5 R shoulder 5 LB  5 R shoulder 4 LBP  5 R shoulder 4-5 LBP  6 R shoulder 4 LBP  5 R shoulder     Manual  10 min * 10 min * 20 min* 10 min* 20 min *   Ham stretch        ITB stretch        Hip flexor/ quad stretch        * STM / DTM to pt tolerance BL Lspine - prone with 1 pillow under abs  GISTM to BL LSpine/PSIS # 4 to pt tolerance with sweeping/fanning in prone  Pt educated potential bruising/encourage hydration  Milka consent signed 10/    Exercise Diary  11/11       Nustep L3 12 min    L3  12 min   AH DC HEP       AH w/SLR 2x10    2x10 ea supine   AH w/ball lift 20x       SKTC     1 min after MT   DKTC     1 min after MT   bridges review    review   LTRs review    1 min ea dir   Prone hip ext 15x       Self Piriformis stretch     2 min BL  supine   Resisted amb in [place]  4 way 11 #  20x       Standing hip ext/abd  foam 4 way  Green TB  Foam  15x BL ea dir       Prone trunk ext  W/arms at side 20x 5"       Incline calf stretch Pt self stretch  HEP       R Shoulder Stretch Motion check with focus IR  5 min Motion check Motion check  Reviewed AAROM to dayne  Motion check  Reviewed AAROM to dayne                           Sit on black disk/  AH with march 2x10, 5" NP  resume     Sit on disk/  Trunk rot with ball W/ ball  5x        Sit disc  Hip ABD/ADD  W/ AH Ball / blue  3x10, 5"                           Modalities         MHP 10 Min LB 10 min LB  W/home TENS 10 min LB  W/home TENS 10 min LB  W/home TENS 10 min LB  W/home TENS   Cold Pk 10 Min R shoulder - NP

## 2019-11-17 ENCOUNTER — HOSPITAL ENCOUNTER (OUTPATIENT)
Dept: RADIOLOGY | Facility: HOSPITAL | Age: 39
Discharge: HOME | End: 2019-11-17
Attending: ANESTHESIOLOGY
Payer: COMMERCIAL

## 2019-11-17 DIAGNOSIS — G89.4 CHRONIC PAIN SYNDROME: ICD-10-CM

## 2019-11-17 DIAGNOSIS — M54.16 LUMBAR RADICULOPATHY: ICD-10-CM

## 2019-11-17 PROCEDURE — 72148 MRI LUMBAR SPINE W/O DYE: CPT

## 2019-11-18 ENCOUNTER — OFFICE VISIT (OUTPATIENT)
Dept: PAIN MEDICINE | Facility: CLINIC | Age: 39
End: 2019-11-18
Payer: COMMERCIAL

## 2019-11-18 VITALS
BODY MASS INDEX: 29.25 KG/M2 | DIASTOLIC BLOOD PRESSURE: 70 MMHG | SYSTOLIC BLOOD PRESSURE: 114 MMHG | WEIGHT: 182 LBS | HEIGHT: 66 IN

## 2019-11-18 DIAGNOSIS — G89.29 CHRONIC BILATERAL LOW BACK PAIN WITH RIGHT-SIDED SCIATICA: Primary | ICD-10-CM

## 2019-11-18 DIAGNOSIS — M54.2 CHRONIC NECK PAIN: ICD-10-CM

## 2019-11-18 DIAGNOSIS — G89.29 CHRONIC NECK PAIN: ICD-10-CM

## 2019-11-18 DIAGNOSIS — G89.4 CHRONIC PAIN SYNDROME: ICD-10-CM

## 2019-11-18 DIAGNOSIS — R25.2 LEG CRAMPS: ICD-10-CM

## 2019-11-18 DIAGNOSIS — M54.41 CHRONIC BILATERAL LOW BACK PAIN WITH RIGHT-SIDED SCIATICA: Primary | ICD-10-CM

## 2019-11-18 DIAGNOSIS — M79.604 PAIN OF RIGHT LOWER EXTREMITY: ICD-10-CM

## 2019-11-18 PROCEDURE — 99214 OFFICE O/P EST MOD 30 MIN: CPT | Performed by: ANESTHESIOLOGY

## 2019-11-18 NOTE — PROGRESS NOTES
Assessment:  1  Chronic bilateral low back pain with right-sided sciatica    2  Chronic neck pain    3  Pain of right lower extremity    4  Leg cramps    5  Chronic pain syndrome        Plan:  Patient is a 42-year-old female with complaints of neck pain, bilateral arm pain, bilateral leg pain with a history significant for cervical facet arthropathy, lumbar radiculopathy presents office for follow-up visit  Patient is status post cervical epidural steroid injection performed on 10/17/2019 to which reported 1 week of significant relief  MRI of lumbar spine from 11/17/2019 shows on a L4-5 disc desiccation with a small central disc protrusion without significant neural foraminal narrowing or spinal stenosis the possibility of epidural lipomatosis with indentation of the ventral thecal sac at L4-5 through L5-S1   1  We will await radiologist review of MRI of lumbar spine on to confirm with my initial diagnosis and then we will treat patient appropriately  If this is epidural fat she is not a candidate for epidural steroid injections and may need other remedies to alleviate those radicular symptoms  2  Patient's started increasing gabapentin by her neurologist from 300-600 mg at nighttime and notes a mild improvement of her symptoms  3  Patient does note some alleviation of her symptoms when taking tizanidine for her myofascial pain  4  There is a high suspicion of epidural lipomatosis which may be response for patient's radicular symptoms which she presents in the L4 nerve root distribution bilaterally in addition to the S1 nerve root distribution in the right lower extremity  If this is negative on the radiologist review we will most likely schedule patient for ultrasound-guided lumbosacral trigger point injection to alleviate any myofascial pathology that could correlate with radicular symptoms        Complete risks and benefits including bleeding, infection, tissue reaction, nerve injury and allergic reaction were discussed  The approach was demonstrated using models and literature was provided  Verbal and written consent was obtained  [place] Prescription Drug Monitoring Program report was reviewed and was appropriate       History of Present Illness: The patient is a 37 y o  female who presents for a follow up office visit in regards to Neck Pain; Shoulder Pain; Back Pain; Leg Pain; and Knee Pain  The patients current symptoms include 7/10 constant sharp, shooting, numbness, pins and needles in neck and low back which worse in the morning, evening, nighttime  Current pain medications includes:    The patient reports that this regimen is providing 0% pain relief  The patient is reporting no side effects from this pain medication regimen  I have personally reviewed and/or updated the patient's past medical history, past surgical history, family history, social history, current medications, allergies, and vital signs today  Review of Systems  Review of Systems   Gastrointestinal: Positive for nausea  Musculoskeletal: Positive for arthralgias  Pain in extremity - right leg   All other systems reviewed and are negative          Past Medical History:   Diagnosis Date    Anxiety     Bipolar disorder ([facility] [address] )     Chronic pain     Endometriosis     Hyperlipidemia     Urinary frequency     resolved: 6/14/2016    Urinary urgency     resolved: 6/14/2016       Past Surgical History:   Procedure Laterality Date    BLADDER SUSPENSION      CHOLECYSTECTOMY      EPIDURAL BLOCK INJECTION N/A 10/17/2019    Procedure: BLOCK / INJECTION EPIDURAL STEROID CERVICAL c7-t1 interlaminar;  Surgeon: Elaine Oquendo MD;  Location: [facility];  Service: Pain Management     FL GUIDED NEEDLE PLAC BX/ASP/INJ  10/17/2019    HERNIA REPAIR      HYSTERECTOMY      partial    OVARIAN CYST REMOVAL      NE ARTHROSCOPY SHOULDER SURGICAL BICEPS TENODESIS Right 8/14/2019    Procedure: ARTHROSCOPY SHOULDER WITH BICEPS TENOTOMY AND SAD;  Surgeon: Pinky Gallo MD;  Location: [facility];  Service: Orthopedics    SC LAP,CHOLECYSTECTOMY N/A 4/30/2018    Procedure: CHOLECYSTECTOMY LAPAROSCOPIC;  Surgeon: Beckie Rees DO;  Location: [facility];  Service: General    TONSILLECTOMY         Family History   Problem Relation Age of Onset    Diabetes Mother     Heart disease Mother         rheumatic    Neuropathy Mother     COPD Mother     Diabetes type II Mother     Cancer Father     Lung cancer Father     Stroke Maternal Grandmother         CVA    Diabetes type II Maternal Grandmother         mellitus    Other Maternal Grandfather         esophageal abnormality    Diabetes type II Paternal Grandmother         mellitus    Diabetes type II Paternal Grandfather         mellitus    Depression Family     Esophageal cancer Family     Lung cancer Family     Stomach cancer Family        Social History     Occupational History    Not on file   Tobacco Use    Smoking status: Current Every Day Smoker     Packs/day: 1 50     Types: Cigarettes    Smokeless tobacco: Never Used   Substance and Sexual Activity    Alcohol use: Yes     Frequency: 2-4 times a month     Drinks per session: 1 or 2     Binge frequency: Never     Comment: rare; consumes alcohol occasionally (as per allscripts); social alcohol use (As per allscripts)    Drug use: No    Sexual activity: Yes     Partners: Male         Current Outpatient Medications:     buPROPion (WELLBUTRIN SR) 100 mg 12 hr tablet, , Disp: , Rfl: 0    Cholecalciferol (VITAMIN D3) 2000 units TABS, Take 4,000 Units by mouth daily  , Disp: , Rfl:     citalopram (CeleXA) 20 mg tablet, Take 20 mg by mouth every morning, Disp: , Rfl: 0    gabapentin (NEURONTIN) 300 mg capsule, 1 tablet twice a day and 2 HS, Disp: 150 capsule, Rfl: 1    lamoTRIgine (LAMICTAL) 200 MG tablet, Take 1 tablet by mouth 2 (two) times a day, Disp: , Rfl:     lidocaine (XYLOCAINE) 5 % ointment, Apply topically as needed for mild pain, Disp: 35 44 g, Rfl: 0    LORazepam (ATIVAN) 0 5 mg tablet, Take 1 tablet by mouth daily at bedtime  , Disp: , Rfl:     Multiple Vitamin (DAILY VALUE MULTIVITAMIN) TABS, Take by mouth, Disp: , Rfl:     naproxen (NAPROSYN) 500 mg tablet, take 1 tablet by mouth twice a day with meals, Disp: 60 tablet, Rfl: 1    Nerve Stimulator (STANDARD TENS) ISABELLA, by Does not apply route 3 (three) times a day as needed (pain), Disp: 1 Device, Rfl: 0    simvastatin (ZOCOR) 40 mg tablet, take 1 tablet by mouth once daily, Disp: 90 tablet, Rfl: 3    tiZANidine (ZANAFLEX) 4 mg tablet, Take 1 tablet (4 mg total) by mouth daily at bedtime, Disp: 30 tablet, Rfl: 1    No Known Allergies    Physical Exam:    /70 (BP Location: Left arm, Patient Position: Sitting, Cuff Size: Large)   Ht 5' 6" (1 676 m)   Wt 82 6 kg (182 lb)   BMI 29 38 kg/m²     Constitutional:normal, well developed, well nourished, alert, in no distress and non-toxic and no overt pain behavior  Eyes:anicteric  HEENT:grossly intact  Neck:supple, symmetric, trachea midline and no masses   Pulmonary:even and unlabored  Cardiovascular:No edema or pitting edema present  Skin:Normal without rashes or lesions and well hydrated  Psychiatric:Mood and affect appropriate  Neurologic:Cranial Nerves II-XII grossly intact  Musculoskeletal:antalgic     Lumbar/Sacral Spine examination demonstrates  Decreased range of motion lumbar spine with pain upon: flexion, lateral rotation to the left/right, and bending to the left/right  Bilateral lumbar paraspinals tender to palpation  Muscle spasms noted in the lumbar area bilaterally  4/5 lower extremity strength in all muscle groups bilaterally  Positive seated straight leg raise for bilateral lower extremities  Sensitivity to light touch intact bilateral lower extremities  2+ reflexes in the patella and Achilles    No ankle clonus     Imaging  No orders to display       No orders of the defined types were placed in this encounter

## 2019-11-19 ENCOUNTER — TELEPHONE (OUTPATIENT)
Dept: PAIN MEDICINE | Facility: CLINIC | Age: 39
End: 2019-11-19

## 2019-11-19 ENCOUNTER — OFFICE VISIT (OUTPATIENT)
Dept: OBGYN CLINIC | Facility: CLINIC | Age: 39
End: 2019-11-19
Payer: COMMERCIAL

## 2019-11-19 VITALS
HEIGHT: 66 IN | SYSTOLIC BLOOD PRESSURE: 104 MMHG | BODY MASS INDEX: 29.09 KG/M2 | DIASTOLIC BLOOD PRESSURE: 64 MMHG | WEIGHT: 181 LBS

## 2019-11-19 DIAGNOSIS — M75.01 ADHESIVE CAPSULITIS OF RIGHT SHOULDER: Primary | ICD-10-CM

## 2019-11-19 PROCEDURE — 99213 OFFICE O/P EST LOW 20 MIN: CPT | Performed by: ORTHOPAEDIC SURGERY

## 2019-11-19 NOTE — TELEPHONE ENCOUNTER
Patient is requesting the results of her MRI  She will be unavailable at 1pm but we are able to leave a message on her machine   # [phone]

## 2019-11-19 NOTE — TELEPHONE ENCOUNTER
S/W pt  Advised pt of the same  Pt verbalized understanding and stated she will like to schedule the injection  Advised her the  will be calling her to schedule        - please schedule pt

## 2019-11-19 NOTE — PROGRESS NOTES
Assessment:     1  Adhesive capsulitis of right shoulder          Plan:     Problem List Items Addressed This Visit        Musculoskeletal and Integument    Adhesive capsulitis of right shoulder - Primary     17-year-old female with resolving right shoulder adhesive capsulitis  She shows progress  I emphasized again with her the importance of continued stretching and range of motion  She shows good understanding of this  I will see her back as needed  Patient ID: Grant Ren is a 37 y o  female  Chief Complaint:  Postop check    Subjective:  17-year-old female status post a right shoulder arthroscopy with biceps tenotomy and subacromial decompression on August 14, 2019  At her last visit she had significant increased pain which is felt to be secondary to adhesive capsulitis  She was given a subacromial injection and sent to physical therapy  She states she has been working on the exercises, primarily on her own at home as therapy has been working on her back  She feels like she is getting better  She is now able to lift buckle that she needs to lift  She is having significant loss difficulty putting her bra on  She is continuing to do the stretches  Allergy:  No Known Allergies    Medications:  all current active meds have been reviewed    ROS:  Review of Systems   All other systems reviewed and are negative  Objective:  BP Readings from Last 1 Encounters:   11/19/19 104/64      Wt Readings from Last 1 Encounters:   11/19/19 82 1 kg (181 lb)        Exam:   Physical Exam    Right Shoulder Exam     Comments:  Lacks 10 degrees of full forward flexion, lacks 10 degrees of full abduction, lacks 10 degrees of full external rotation, internal rotation is to the hip pocket  Improvement in overall range of motion

## 2019-11-19 NOTE — ASSESSMENT & PLAN NOTE
75-year-old female with resolving right shoulder adhesive capsulitis  She shows progress  I emphasized again with her the importance of continued stretching and range of motion  She shows good understanding of this  I will see her back as needed

## 2019-11-19 NOTE — TELEPHONE ENCOUNTER
Radiologist rate of MRI lumbar spine shows mild spinal canal stenosis with a central disc protrusion at L4-5 and disc bulge at L5-S1  We can schedule patient for a bilateral L4-5 transforaminal steroid injection to alleviate her radicular symptoms    No sign of epidural fat noted on the MRI

## 2019-11-21 PROBLEM — M54.41 BILATERAL LOW BACK PAIN WITH RIGHT-SIDED SCIATICA: Status: ACTIVE | Noted: 2019-11-21

## 2019-11-21 NOTE — TELEPHONE ENCOUNTER
Patient scheduled for L4-L5 TFESI on 01/16/2020 with RM  Reviewed procedure instructions with patient  Patient verbalized understanding and was appreciative of the call

## 2019-12-12 ENCOUNTER — PATIENT OUTREACH (OUTPATIENT)
Dept: FAMILY MEDICINE CLINIC | Facility: CLINIC | Age: 39
End: 2019-12-12

## 2019-12-12 NOTE — PROGRESS NOTES
Outpatient Care Management Note:  Surveillance episode closed  SNP patient handed back to [facility] SURGICAL [facility]

## 2019-12-16 ENCOUNTER — TELEPHONE (OUTPATIENT)
Dept: NEUROLOGY | Facility: CLINIC | Age: 39
End: 2019-12-16

## 2019-12-16 NOTE — TELEPHONE ENCOUNTER
Per Nelda Steen @ [facility] [place] not require an insurance referral  Call ref # - [id number]

## 2019-12-18 ENCOUNTER — OFFICE VISIT (OUTPATIENT)
Dept: NEUROLOGY | Facility: CLINIC | Age: 39
End: 2019-12-18
Payer: COMMERCIAL

## 2019-12-18 VITALS
RESPIRATION RATE: 12 BRPM | BODY MASS INDEX: 29.25 KG/M2 | HEIGHT: 66 IN | TEMPERATURE: 98.9 F | WEIGHT: 182 LBS | SYSTOLIC BLOOD PRESSURE: 115 MMHG | DIASTOLIC BLOOD PRESSURE: 69 MMHG | HEART RATE: 80 BPM

## 2019-12-18 DIAGNOSIS — M79.18 MYOFASCIAL PAIN DYSFUNCTION SYNDROME: Primary | ICD-10-CM

## 2019-12-18 DIAGNOSIS — E55.9 VITAMIN D DEFICIENCY: ICD-10-CM

## 2019-12-18 PROCEDURE — 99213 OFFICE O/P EST LOW 20 MIN: CPT | Performed by: PHYSICAL MEDICINE & REHABILITATION

## 2019-12-18 PROCEDURE — 20553 NJX 1/MLT TRIGGER POINTS 3/>: CPT | Performed by: PHYSICAL MEDICINE & REHABILITATION

## 2019-12-18 RX ORDER — LIDOCAINE HYDROCHLORIDE 10 MG/ML
8 INJECTION, SOLUTION INFILTRATION; PERINEURAL ONCE
Status: COMPLETED | OUTPATIENT
Start: 2019-12-18 | End: 2019-12-18

## 2019-12-18 RX ADMIN — LIDOCAINE HYDROCHLORIDE 8 ML: 10 INJECTION, SOLUTION INFILTRATION; PERINEURAL at 10:19

## 2019-12-18 NOTE — PROGRESS NOTES
Physical Medicine & Rehabilitation Procedure Note:  Gerardo Campos  37 y o  Diagnoses and all orders for this visit:    Myofascial pain dysfunction syndrome  -     lidocaine (XYLOCAINE) 1 % injection 8 mL    Vitamin D deficiency  -     Vitamin D 25 hydroxy; Future      Plan:  As part of her continued treatment plan, trigger point injections were administered today for both cervical musculature and lumbar musculature  Muscle spasms: continue Tizanidine as prescribed  Neuropathic pain: continue Gabapentin 300mg BID and 600mg QHS  Radicular vs facet mediated pain symptoms both neck and lower back: continue treatment plan as per pain management physician Dr Aj Platt  Patient reports she is due to [facility] shortly  RTC in 3-4 months  HPI/SUBJECTIVE:   34year old female with depression, bipolar, hyperlipidemia   Patient presents today for follow-up of her myofascial pain  She reports some relief after last injections done with lidocaine and improvement of spasm like symptoms, headaches, and neck ROM  She also states she has some pain in her right > left back  She has a recent MRI ordered by pain management and is due to [facility] with Dr Aj Platt  Patient presents today interested in injections for neck and interested in evaluation of for right lower back  ROS: neck pain/right lower back pain     OBJECTIVE:   /69 (BP Location: Left arm, Patient Position: Sitting, Cuff Size: Standard)   Pulse 80   Temp 98 9 °F (37 2 °C)   Resp 12   Ht 5' 6" (1 676 m)   Wt 82 6 kg (182 lb)   BMI 29 38 kg/m²   General: NAD  HEENT: AT/NC, EOMI, mmm  MSK: superficial tight musculature with several trigger points identified in both neck and right back  Neuro: AAOx3    Gait WNL     Procedure Note:     Procedure: Trigger point injections    Indication: Cervical & Lumbar myofascial pain syndrome    Informed consent was obtained, risks/benefits of procedure and medication were reviewed with the patient, and consent signed by patient prior to procedure and placed in the chart  Trigger and tender points were palpated, identified, and marked in the right QL, right lumbar superficial paraspinals, right upper gluteus, bilateral suboccipital muscles, bilateral upper trapezius  Patient's skin was prepped and cleaned with alcohol  Using a 27 gauge 0 5 & 1 25  inch needle, 0 5cc 1% Lidocaine was injected into each of 16 trigger/tender points marked in the muscles identified  No complications were experienced and patient tolerated procedure well  Post injection immediate reduction of pain: 25 %     Post procedure instructions were provided with patient understanding

## 2019-12-21 DIAGNOSIS — S43.431D SUPERIOR GLENOID LABRUM LESION OF RIGHT SHOULDER, SUBSEQUENT ENCOUNTER: ICD-10-CM

## 2019-12-23 RX ORDER — NAPROXEN 500 MG/1
TABLET ORAL
Qty: 60 TABLET | Refills: 0 | Status: SHIPPED | OUTPATIENT
Start: 2019-12-23 | End: 2020-01-21

## 2019-12-27 ENCOUNTER — HOSPITAL ENCOUNTER (EMERGENCY)
Facility: HOSPITAL | Age: 39
Discharge: HOME | End: 2019-12-27
Attending: EMERGENCY MEDICINE | Admitting: EMERGENCY MEDICINE
Payer: COMMERCIAL

## 2019-12-27 ENCOUNTER — APPOINTMENT (EMERGENCY)
Dept: CT IMAGING | Facility: HOSPITAL | Age: 39
End: 2019-12-27
Payer: COMMERCIAL

## 2019-12-27 VITALS
WEIGHT: 180 LBS | TEMPERATURE: 98.5 F | OXYGEN SATURATION: 99 % | HEIGHT: 66 IN | SYSTOLIC BLOOD PRESSURE: 134 MMHG | DIASTOLIC BLOOD PRESSURE: 83 MMHG | BODY MASS INDEX: 28.93 KG/M2 | RESPIRATION RATE: 16 BRPM | HEART RATE: 78 BPM

## 2019-12-27 DIAGNOSIS — N12 PYELONEPHRITIS: Primary | ICD-10-CM

## 2019-12-27 LAB
ALBUMIN SERPL BCP-MCNC: 4.4 G/DL (ref 3.5–5.7)
ALP SERPL-CCNC: 48 U/L (ref 40–150)
ALT SERPL W P-5'-P-CCNC: 9 U/L (ref 7–52)
ANION GAP SERPL CALCULATED.3IONS-SCNC: 7 MMOL/L (ref 4–13)
AST SERPL W P-5'-P-CCNC: 10 U/L (ref 13–39)
BACTERIA UR QL AUTO: ABNORMAL /HPF
BASOPHILS # BLD AUTO: 0.1 THOUSANDS/ΜL (ref 0–0.1)
BASOPHILS NFR BLD AUTO: 1 % (ref 0–2)
BILIRUB SERPL-MCNC: 0.3 MG/DL (ref 0.2–1)
BILIRUB UR QL STRIP: NEGATIVE
BUN SERPL-MCNC: 14 MG/DL (ref 7–25)
CALCIUM SERPL-MCNC: 9.6 MG/DL (ref 8.6–10.5)
CHLORIDE SERPL-SCNC: 105 MMOL/L (ref 98–107)
CLARITY UR: ABNORMAL
CO2 SERPL-SCNC: 25 MMOL/L (ref 21–31)
COLOR UR: YELLOW
CREAT SERPL-MCNC: 0.67 MG/DL (ref 0.6–1.2)
EOSINOPHIL # BLD AUTO: 0.1 THOUSAND/ΜL (ref 0–0.61)
EOSINOPHIL NFR BLD AUTO: 1 % (ref 0–5)
ERYTHROCYTE [DISTWIDTH] IN BLOOD BY AUTOMATED COUNT: 13.2 % (ref 11.5–14.5)
GFR SERPL CREATININE-BSD FRML MDRD: 110 ML/MIN/1.73SQ M
GLUCOSE SERPL-MCNC: 95 MG/DL (ref 65–99)
GLUCOSE UR STRIP-MCNC: NEGATIVE MG/DL
HCT VFR BLD AUTO: 39.9 % (ref 42–47)
HGB BLD-MCNC: 13.9 G/DL (ref 12–16)
HGB UR QL STRIP.AUTO: ABNORMAL
KETONES UR STRIP-MCNC: NEGATIVE MG/DL
LEUKOCYTE ESTERASE UR QL STRIP: ABNORMAL
LIPASE SERPL-CCNC: 31 U/L (ref 11–82)
LYMPHOCYTES # BLD AUTO: 4.1 THOUSANDS/ΜL (ref 0.6–4.47)
LYMPHOCYTES NFR BLD AUTO: 33 % (ref 21–51)
MCH RBC QN AUTO: 31.6 PG (ref 26–34)
MCHC RBC AUTO-ENTMCNC: 34.8 G/DL (ref 31–37)
MCV RBC AUTO: 91 FL (ref 81–99)
MONOCYTES # BLD AUTO: 0.7 THOUSAND/ΜL (ref 0.17–1.22)
MONOCYTES NFR BLD AUTO: 5 % (ref 2–12)
MUCOUS THREADS UR QL AUTO: ABNORMAL
NEUTROPHILS # BLD AUTO: 7.7 THOUSANDS/ΜL (ref 1.4–6.5)
NEUTS SEG NFR BLD AUTO: 61 % (ref 42–75)
NITRITE UR QL STRIP: NEGATIVE
NON-SQ EPI CELLS URNS QL MICRO: ABNORMAL /HPF
PH UR STRIP.AUTO: 6.5 [PH]
PLATELET # BLD AUTO: 292 THOUSANDS/UL (ref 149–390)
PMV BLD AUTO: 7.1 FL (ref 8.6–11.7)
POTASSIUM SERPL-SCNC: 3.8 MMOL/L (ref 3.5–5.5)
PROT SERPL-MCNC: 7.4 G/DL (ref 6.4–8.9)
PROT UR STRIP-MCNC: NEGATIVE MG/DL
RBC # BLD AUTO: 4.41 MILLION/UL (ref 3.9–5.2)
RBC #/AREA URNS AUTO: ABNORMAL /HPF
SODIUM SERPL-SCNC: 137 MMOL/L (ref 134–143)
SP GR UR STRIP.AUTO: 1.01 (ref 1–1.03)
UROBILINOGEN UR QL STRIP.AUTO: 0.2 E.U./DL
WBC # BLD AUTO: 12.7 THOUSAND/UL (ref 4.8–10.8)
WBC #/AREA URNS AUTO: ABNORMAL /HPF

## 2019-12-27 PROCEDURE — 87086 URINE CULTURE/COLONY COUNT: CPT | Performed by: EMERGENCY MEDICINE

## 2019-12-27 PROCEDURE — 96375 TX/PRO/DX INJ NEW DRUG ADDON: CPT

## 2019-12-27 PROCEDURE — 81001 URINALYSIS AUTO W/SCOPE: CPT | Performed by: EMERGENCY MEDICINE

## 2019-12-27 PROCEDURE — 99284 EMERGENCY DEPT VISIT MOD MDM: CPT

## 2019-12-27 PROCEDURE — 83690 ASSAY OF LIPASE: CPT | Performed by: EMERGENCY MEDICINE

## 2019-12-27 PROCEDURE — 85025 COMPLETE CBC W/AUTO DIFF WBC: CPT | Performed by: EMERGENCY MEDICINE

## 2019-12-27 PROCEDURE — 96374 THER/PROPH/DIAG INJ IV PUSH: CPT

## 2019-12-27 PROCEDURE — 74176 CT ABD & PELVIS W/O CONTRAST: CPT

## 2019-12-27 PROCEDURE — 99284 EMERGENCY DEPT VISIT MOD MDM: CPT | Performed by: EMERGENCY MEDICINE

## 2019-12-27 PROCEDURE — 36415 COLL VENOUS BLD VENIPUNCTURE: CPT | Performed by: EMERGENCY MEDICINE

## 2019-12-27 PROCEDURE — 80053 COMPREHEN METABOLIC PANEL: CPT | Performed by: EMERGENCY MEDICINE

## 2019-12-27 RX ORDER — CEPHALEXIN 500 MG/1
500 CAPSULE ORAL EVERY 12 HOURS SCHEDULED
Qty: 20 CAPSULE | Refills: 0 | Status: SHIPPED | OUTPATIENT
Start: 2019-12-27 | End: 2020-01-06

## 2019-12-27 RX ORDER — KETOROLAC TROMETHAMINE 30 MG/ML
30 INJECTION, SOLUTION INTRAMUSCULAR; INTRAVENOUS ONCE
Status: COMPLETED | OUTPATIENT
Start: 2019-12-27 | End: 2019-12-27

## 2019-12-27 RX ORDER — ONDANSETRON 2 MG/ML
4 INJECTION INTRAMUSCULAR; INTRAVENOUS ONCE
Status: COMPLETED | OUTPATIENT
Start: 2019-12-27 | End: 2019-12-27

## 2019-12-27 RX ORDER — CEPHALEXIN 500 MG/1
500 CAPSULE ORAL ONCE
Status: COMPLETED | OUTPATIENT
Start: 2019-12-27 | End: 2019-12-27

## 2019-12-27 RX ADMIN — CEPHALEXIN 500 MG: 500 CAPSULE ORAL at 17:51

## 2019-12-27 RX ADMIN — KETOROLAC TROMETHAMINE 30 MG: 30 INJECTION, SOLUTION INTRAMUSCULAR; INTRAVENOUS at 17:07

## 2019-12-27 RX ADMIN — ONDANSETRON 4 MG: 2 INJECTION INTRAMUSCULAR; INTRAVENOUS at 17:07

## 2019-12-27 NOTE — ED PROVIDER NOTES
History  Chief Complaint   Patient presents with    Urinary Urgency     started about three days ago, constantly feeling like she has to go     Painful Urination     intense urtheral pain yesterday and has to force urine out     Diarrhea     all day yesterday      This is a 68-year-old female chief complaint suprapubic and left flank pain  She states the pain has been present for 3 days but worsened until this morning when she thinks she may have passed a stone  She is continuing to have discomfort in that area though  She reports positive dysuria, incomplete emptying, increased vaginal discharge recently and she is sexually active  Patient reports that the pain is currently moderate but earlier was severe 10/10  Denies fevers, chills  She does report nausea but no vomiting  She states she had 1 episode of diarrhea yesterday  She no longer has periods due to a partial hysterectomy  Prior to Admission Medications   Prescriptions Last Dose Informant Patient Reported? Taking?    Cholecalciferol (VITAMIN D3) 2000 units TABS  Self Yes No   Sig: Take 4,000 Units by mouth daily     LORazepam (ATIVAN) 0 5 mg tablet  Self Yes No   Sig: Take 1 tablet by mouth daily at bedtime     Multiple Vitamin (DAILY VALUE MULTIVITAMIN) TABS  Self Yes No   Sig: Take by mouth   Nerve Stimulator (STANDARD TENS) ISABELLA  Self No No   Sig: by Does not apply route 3 (three) times a day as needed (pain)   buPROPion (WELLBUTRIN SR) 100 mg 12 hr tablet   Yes No   citalopram (CeleXA) 20 mg tablet   Yes No   Sig: Take 20 mg by mouth every morning   gabapentin (NEURONTIN) 300 mg capsule   No No   Si tablet twice a day and 2 HS   lamoTRIgine (LAMICTAL) 200 MG tablet  Self Yes No   Sig: Take 1 tablet by mouth 2 (two) times a day   naproxen (NAPROSYN) 500 mg tablet   No No   Sig: take 1 tablet by mouth twice a day with meals   simvastatin (ZOCOR) 40 mg tablet   No No   Sig: take 1 tablet by mouth once daily   tiZANidine (ZANAFLEX) 4 mg tablet   No No   Sig: Take 1 tablet (4 mg total) by mouth daily at bedtime      Facility-Administered Medications: None       Past Medical History:   Diagnosis Date    Anxiety     Bipolar disorder ([facility] [address] )     Chronic pain     Endometriosis     Hyperlipidemia     Urinary frequency     resolved: 6/14/2016    Urinary urgency     resolved: 6/14/2016       Past Surgical History:   Procedure Laterality Date    BLADDER SUSPENSION      CHOLECYSTECTOMY      EPIDURAL BLOCK INJECTION N/A 10/17/2019    Procedure: BLOCK / INJECTION EPIDURAL STEROID CERVICAL c7-t1 interlaminar;  Surgeon: Michelle Palomares MD;  [facility];  Service: Pain Management     FL GUIDED NEEDLE PLAC BX/ASP/INJ  10/17/2019    HERNIA REPAIR      HYSTERECTOMY      partial    OVARIAN CYST REMOVAL      [place] ARTHROSCOPY SHOULDER SURGICAL BICEPS TENODESIS Right 8/14/2019    Procedure: ARTHROSCOPY SHOULDER WITH BICEPS TENOTOMY AND SAD;  Surgeon: Janey Ahumada MD;  [facility];  Service: Orthopedics    [place] LAP,CHOLECYSTECTOMY N/A 4/30/2018    Procedure: CHOLECYSTECTOMY LAPAROSCOPIC;  Surgeon: Ayanna Bennett DO;  [facility];  Service: General    TONSILLECTOMY         Family History   Problem Relation Age of Onset    Diabetes Mother     Heart disease Mother         rheumatic    Neuropathy Mother     COPD Mother     Diabetes type II Mother     Cancer Father     Lung cancer Father     Stroke Maternal Grandmother         CVA    Diabetes type II Maternal Grandmother         mellitus    Other Maternal Grandfather         esophageal abnormality    Diabetes type II Paternal Grandmother         mellitus    Diabetes type II Paternal Grandfather         mellitus    Depression Family     Esophageal cancer Family     Lung cancer Family     Stomach cancer Family      I have reviewed and agree with the history as documented      Social History     Tobacco Use    Smoking status: Current Every Day Smoker     Packs/day: 1 50     Types: Cigarettes    Smokeless tobacco: Never Used   Substance Use Topics    Alcohol use: Yes     Frequency: 2-4 times a month     Drinks per session: 1 or 2     Binge frequency: Never     Comment: rare; consumes alcohol occasionally (as per allscripts); social alcohol use (As per allscripts)    Drug use: No        Review of Systems   Constitutional: Negative for activity change, chills, fatigue and fever  HENT: Negative for congestion  Eyes: Negative for visual disturbance  Respiratory: Negative for cough, chest tightness and shortness of breath  Cardiovascular: Negative for chest pain  Gastrointestinal: Positive for abdominal pain, diarrhea and nausea  Genitourinary: Positive for difficulty urinating, dysuria and flank pain  Skin: Negative for rash  Neurological: Negative for dizziness, weakness and numbness  Physical Exam  Physical Exam   Constitutional: She is oriented to person, place, and time  She appears well-developed and well-nourished  HENT:   Head: Normocephalic and atraumatic  Eyes: Pupils are equal, round, and reactive to light  Conjunctivae and EOM are normal    Neck: Normal range of motion  Neck supple  Cardiovascular: Normal rate, regular rhythm and normal heart sounds  Pulmonary/Chest: Effort normal and breath sounds normal  No respiratory distress  Abdominal: Soft  Bowel sounds are normal    Suprapubic discomfort, left CVA tenderness  No guarding or rebound or distension  Musculoskeletal: Normal range of motion  Neurological: She is alert and oriented to person, place, and time  Skin: Skin is warm and dry  Capillary refill takes less than 2 seconds  Psychiatric: She has a normal mood and affect   Her behavior is normal        Vital Signs  ED Triage Vitals [12/27/19 1637]   Temperature Pulse Respirations Blood Pressure SpO2   98 5 °F (36 9 °C) 90 16 134/83 99 %      Temp Source Heart Rate Source Patient Position - Orthostatic VS BP Location FiO2 (%)   Temporal Monitor Sitting Left arm --      Pain Score       4           Vitals:    12/27/19 1637 12/27/19 1756   BP: 134/83    Pulse: 90 78   Patient Position - Orthostatic VS: Sitting          Visual Acuity      ED Medications  Medications   ondansetron (ZOFRAN) injection 4 mg (4 mg Intravenous Given 12/27/19 1707)   ketorolac (TORADOL) injection 30 mg (30 mg Intravenous Given 12/27/19 1707)   cephalexin (KEFLEX) capsule 500 mg (500 mg Oral Given 12/27/19 1751)       Diagnostic Studies  Results Reviewed     Procedure Component Value Units Date/Time    CMP [[id number]]  (Abnormal) Collected:  12/27/19 1703    Lab Status:  Final result Specimen:  Blood from Arm, Right Updated:  12/27/19 1731     Sodium 137 mmol/L      Potassium 3 8 mmol/L      Chloride 105 mmol/L      CO2 25 mmol/L      ANION GAP 7 mmol/L      BUN 14 mg/dL      Creatinine 0 67 mg/dL      Glucose 95 mg/dL      Calcium 9 6 mg/dL      AST 10 U/L      ALT 9 U/L      Alkaline Phosphatase 48 U/L      Total Protein 7 4 g/dL      Albumin 4 4 g/dL      Total Bilirubin 0 30 mg/dL      eGFR 110 ml/min/1 73sq m     Narrative:       [place] guidelines for Chronic Kidney Disease (CKD):     Stage 1 with normal or high GFR (GFR > 90 mL/min/1 73 square meters)    Stage 2 Mild CKD (GFR = 60-89 mL/min/1 73 square meters)    Stage 3A Moderate CKD (GFR = 45-59 mL/min/1 73 square meters)    Stage 3B Moderate CKD (GFR = 30-44 mL/min/1 73 square meters)    Stage 4 Severe CKD (GFR = 15-29 mL/min/1 73 square meters)    Stage 5 End Stage CKD (GFR <15 mL/min/1 73 square meters)  Note: GFR calculation is accurate only with a steady state creatinine    Lipase [[id number]]  (Normal) Collected:  12/27/19 1703    Lab Status:  Final result Specimen:  Blood from Arm, Right Updated:  12/27/19 1731     Lipase 31 u/L     Urine Microscopic [[id number]]  (Abnormal) Collected:  12/27/19 1645    Lab Status:  Final result Specimen:  Urine, Clean Catch Updated:  12/27/19 1711     RBC, UA 1-2 /hpf      WBC, UA 10-20 /hpf      Epithelial Cells Occasional /hpf      Bacteria, UA Moderate /hpf      MUCUS THREADS Moderate    Urine culture [[id number]] Collected:  12/27/19 1645    Lab Status: In process Specimen:  Urine, Clean Catch Updated:  12/27/19 1711    CBC and differential [[id number]]  (Abnormal) Collected:  12/27/19 1703    Lab Status:  Final result Specimen:  Blood from Arm, Right Updated:  12/27/19 1710     WBC 12 70 Thousand/uL      RBC 4 41 Million/uL      Hemoglobin 13 9 g/dL      Hematocrit 39 9 %      MCV 91 fL      MCH 31 6 pg      MCHC 34 8 g/dL      RDW 13 2 %      MPV 7 1 fL      Platelets 028 Thousands/uL      Neutrophils Relative 61 %      Lymphocytes Relative 33 %      Monocytes Relative 5 %      Eosinophils Relative 1 %      Basophils Relative 1 %      Neutrophils Absolute 7 70 Thousands/µL      Lymphocytes Absolute 4 10 Thousands/µL      Monocytes Absolute 0 70 Thousand/µL      Eosinophils Absolute 0 10 Thousand/µL      Basophils Absolute 0 10 Thousands/µL     UA w Reflex to Microscopic w Reflex to Culture [[id number]]  (Abnormal) Collected:  12/27/19 1645    Lab Status:  Final result Specimen:  Urine, Clean Catch Updated:  12/27/19 1706     Color, UA Yellow     Clarity, UA Slightly Cloudy     Specific Gravity, UA 1 015     pH, UA 6 5     Leukocytes, UA 1+     Nitrite, UA Negative     Protein, UA Negative mg/dl      Glucose, UA Negative mg/dl      Ketones, UA Negative mg/dl      Urobilinogen, UA 0 2 E U /dl      Bilirubin, UA Negative     Blood, UA Trace-Intact                 CT abdomen pelvis without contrast   Final Result by Elzbieta Gaona MD (12/27 1731)      1  There is mild left perinephric soft tissue stranding without evidence of hydronephrosis or left ureteral stone  This appearance may be related to pyelonephritis, versus recent passage of a calculus on the left  2  There is a 2 mm left kidney upper pole calculus   3   No calculi on the right            Workstation performed: [id number]                    Procedures  Procedures         ED Course                               MDM  Number of Diagnoses or Management Options  Pyelonephritis: new and requires workup  Diagnosis management comments: This is a 57-year-old female with pyelonephritis  Concern for stones due to patient's going to groin presentation sharp onset and blood in the urine  No stone demonstrated in the ureter or bladder  Patient started on Keflex in the emergency department as well as scripts sent out  Is tolerating p o  Well and appears nontoxic  Discussed warning signs and symptoms with the patient as well as when to return to the emergency department versus follow up with [facility]  Patient states understanding and agreement with the plan  Amount and/or Complexity of Data Reviewed  Clinical lab tests: ordered and reviewed  Tests in the radiology section of CPT®: reviewed and ordered    Risk of Complications, Morbidity, and/or Mortality  Presenting problems: high  Diagnostic procedures: high  Management options: high          Disposition  Final diagnoses:   Pyelonephritis     Time reflects when diagnosis was documented in both MDM as applicable and the Disposition within this note     Time User Action Codes Description Comment    12/27/2019  5:38 PM Rima Paige Add [N12] Pyelonephritis       ED Disposition     ED Disposition Condition Date/Time Comment    Discharge Stable Fri Dec 27, 2019  5:38 PM Mary Coughlin discharge to home/self care              Follow-up Information     Follow up With Specialties Details Why [address], DO Family Medicine In 1 day  [facility] [address]  [phone]            Discharge Medication List as of 12/27/2019  5:40 PM      START taking these medications    Details   cephalexin (KEFLEX) 500 mg capsule Take 1 capsule (500 mg total) by mouth every 12 (twelve) hours for 10 days, Starting Fri 12/27/2019, Until Mon 1/6/2020, Normal         CONTINUE these medications which have NOT CHANGED    Details   buPROPion [facility] SR) 100 mg 12 hr tablet Starting Fri 5/17/2019, Historical Med      Cholecalciferol (VITAMIN D3) 2000 units TABS Take 4,000 Units by mouth daily  , Historical Med      citalopram (CeleXA) 20 mg tablet Take 20 mg by mouth every morning, Starting Wed 1/2/2019, Historical Med      gabapentin (NEURONTIN) 300 mg capsule 1 tablet twice a day and 2 HS, Normal      lamoTRIgine (LAMICTAL) 200 MG tablet Take 1 tablet by mouth 2 (two) times a day, Historical Med      LORazepam (ATIVAN) 0 5 mg tablet Take 1 tablet by mouth daily at bedtime  , Historical Med      Multiple Vitamin (DAILY VALUE MULTIVITAMIN) TABS Take by mouth, Historical Med      naproxen (NAPROSYN) 500 mg tablet take 1 tablet by mouth twice a day with meals, Normal      Nerve Stimulator (STANDARD TENS) ISABELLA by Does not apply route 3 (three) times a day as needed (pain), Starting Fri 3/16/2018, Normal      simvastatin (ZOCOR) 40 mg tablet take 1 tablet by mouth once daily, Normal      tiZANidine (ZANAFLEX) 4 mg tablet Take 1 tablet (4 mg total) by mouth daily at bedtime, Starting Thu 11/7/2019, Normal           No discharge procedures on file      ED Provider  Electronically Signed by           Kristian Lindquist MD  12/27/19 2009

## 2019-12-28 LAB — BACTERIA UR CULT: NORMAL

## 2019-12-31 ENCOUNTER — TELEPHONE (OUTPATIENT)
Dept: FAMILY MEDICINE CLINIC | Facility: CLINIC | Age: 39
End: 2019-12-31

## 2019-12-31 NOTE — TELEPHONE ENCOUNTER
Patient calling and asking that I place a message to dr Bhatti Hopes to let him know that she has gone to the hospital the other day and was prescribed an antibiotic that she isnt sure is working, her back pain is so bad from the kidney infection  I made her aware that he is not in office here until Friday so she may just decide to go back to the ER       HERNANDEZ

## 2020-01-03 ENCOUNTER — OFFICE VISIT (OUTPATIENT)
Dept: FAMILY MEDICINE CLINIC | Facility: CLINIC | Age: 40
End: 2020-01-03
Payer: COMMERCIAL

## 2020-01-03 VITALS
WEIGHT: 178 LBS | SYSTOLIC BLOOD PRESSURE: 118 MMHG | BODY MASS INDEX: 28.61 KG/M2 | HEART RATE: 67 BPM | OXYGEN SATURATION: 98 % | DIASTOLIC BLOOD PRESSURE: 64 MMHG | TEMPERATURE: 97.6 F | HEIGHT: 66 IN

## 2020-01-03 DIAGNOSIS — Z13.31 NEGATIVE DEPRESSION SCREENING: ICD-10-CM

## 2020-01-03 DIAGNOSIS — E66.3 OVERWEIGHT (BMI 25.0-29.9): ICD-10-CM

## 2020-01-03 DIAGNOSIS — N20.0 NEPHROLITHIASIS: ICD-10-CM

## 2020-01-03 DIAGNOSIS — N12 PYELONEPHRITIS: Primary | ICD-10-CM

## 2020-01-03 LAB
SL AMB  POCT GLUCOSE, UA: NORMAL
SL AMB LEUKOCYTE ESTERASE,UA: NORMAL
SL AMB POCT BILIRUBIN,UA: NORMAL
SL AMB POCT BLOOD,UA: NORMAL
SL AMB POCT CLARITY,UA: CLEAR
SL AMB POCT COLOR,UA: YELLOW
SL AMB POCT KETONES,UA: NORMAL
SL AMB POCT NITRITE,UA: NORMAL
SL AMB POCT PH,UA: 6.5
SL AMB POCT SPECIFIC GRAVITY,UA: 1
SL AMB POCT URINE PROTEIN: NORMAL
SL AMB POCT UROBILINOGEN: 0.2

## 2020-01-03 PROCEDURE — 3008F BODY MASS INDEX DOCD: CPT | Performed by: FAMILY MEDICINE

## 2020-01-03 PROCEDURE — 99213 OFFICE O/P EST LOW 20 MIN: CPT | Performed by: FAMILY MEDICINE

## 2020-01-03 PROCEDURE — 3725F SCREEN DEPRESSION PERFORMED: CPT | Performed by: FAMILY MEDICINE

## 2020-01-03 PROCEDURE — 81002 URINALYSIS NONAUTO W/O SCOPE: CPT | Performed by: FAMILY MEDICINE

## 2020-01-03 NOTE — PROGRESS NOTES
Assessment/Plan:    No problem-specific Assessment & Plan notes found for this encounter  Diagnoses and all orders for this visit:    Pyelonephritis  Comments:  resolved  Orders:  -     POCT urine dip    Nephrolithiasis  Comments:  non obstructing    Overweight (BMI 25 0-29  9)  Comments:  pt counseled on diet and exercise    Negative depression screening          PHQ-9 Depression Screening    PHQ-9:    Frequency of the following problems over the past two weeks:       Little interest or pleasure in doing things:  0 - not at all  Feeling down, depressed, or hopeless:  0 - not at all  Trouble falling or staying asleep, or sleeping too much:  0 - not at all  Feeling tired or having little energy:  0 - not at all  Poor appetite or overeatin - not at all  Feeling bad about yourself - or that you are a failure or have let yourself or your family down:  0 - not at all  Trouble concentrating on things, such as reading the newspaper or watching television:  0 - not at all  Moving or speaking so slowly that other people could have noticed  Or the opposite - being so fidgety or restless that you have been moving around a lot more than usual:  0 - not at all  Thoughts that you would be better off dead, or of hurting yourself in some way:  0 - not at all  PHQ-2 Score:  0  PHQ-9 Score:  0            Subjective:      Patient ID: Autumn Montana is a 37 y o  female  ER follow up for kidney stone with pyelonephritis on the left side, pt is improving but still has colicky left flank pain      The following portions of the patient's history were reviewed and updated as appropriate: allergies, current medications, past family history, past medical history, past social history, past surgical history and problem list     Review of Systems   Constitutional: Negative for chills and fever  Gastrointestinal: Negative for abdominal pain  Genitourinary: Negative for hematuria  BMI Counseling:  Body mass index is 28 73 kg/m²  The BMI is above normal  Nutrition recommendations include reducing portion sizes and 3-5 servings of fruits/vegetables daily  Exercise recommendations include moderate aerobic physical activity for 150 minutes/week and exercising 3-5 times per week  Objective:    /64   Pulse 67   Temp 97 6 °F (36 4 °C) (Tympanic)   Ht 5' 6" (1 676 m)   Wt 80 7 kg (178 lb)   SpO2 98%   BMI 28 73 kg/m²      Physical Exam   Constitutional: She is oriented to person, place, and time  She appears well-developed and well-nourished  No distress  HENT:   Head: Normocephalic and atraumatic  Eyes: Pupils are equal, round, and reactive to light  Conjunctivae and EOM are normal  No scleral icterus  Neck: Normal range of motion  Neck supple  Cardiovascular: Normal rate, regular rhythm and normal heart sounds  No murmur heard  Pulmonary/Chest: Effort normal and breath sounds normal  No respiratory distress  She has no wheezes  She has no rales  Abdominal: Soft  Bowel sounds are normal  She exhibits no distension and no mass  There is no tenderness  There is no rebound and no guarding  Musculoskeletal: She exhibits no edema  Lymphadenopathy:     She has no cervical adenopathy  Neurological: She is alert and oriented to person, place, and time  Skin: Skin is warm and dry  She is not diaphoretic  Psychiatric: She has a normal mood and affect  Her behavior is normal  Judgment and thought content normal    Nursing note and vitals reviewed

## 2020-01-16 ENCOUNTER — APPOINTMENT (OUTPATIENT)
Dept: RADIOLOGY | Facility: HOSPITAL | Age: 40
End: 2020-01-16
Payer: COMMERCIAL

## 2020-01-16 ENCOUNTER — HOSPITAL ENCOUNTER (OUTPATIENT)
Facility: HOSPITAL | Age: 40
Setting detail: OUTPATIENT SURGERY
Discharge: HOME | End: 2020-01-16
Attending: ANESTHESIOLOGY | Admitting: ANESTHESIOLOGY
Payer: COMMERCIAL

## 2020-01-16 VITALS
DIASTOLIC BLOOD PRESSURE: 67 MMHG | HEART RATE: 74 BPM | TEMPERATURE: 96.7 F | RESPIRATION RATE: 18 BRPM | SYSTOLIC BLOOD PRESSURE: 106 MMHG | OXYGEN SATURATION: 99 %

## 2020-01-16 PROCEDURE — 64483 NJX AA&/STRD TFRM EPI L/S 1: CPT | Performed by: ANESTHESIOLOGY

## 2020-01-16 RX ORDER — DEXAMETHASONE SODIUM PHOSPHATE 10 MG/ML
INJECTION, SOLUTION INTRAMUSCULAR; INTRAVENOUS AS NEEDED
Status: DISCONTINUED | OUTPATIENT
Start: 2020-01-16 | End: 2020-01-16 | Stop reason: HOSPADM

## 2020-01-16 RX ORDER — LIDOCAINE HYDROCHLORIDE 10 MG/ML
INJECTION, SOLUTION EPIDURAL; INFILTRATION; INTRACAUDAL; PERINEURAL AS NEEDED
Status: DISCONTINUED | OUTPATIENT
Start: 2020-01-16 | End: 2020-01-16 | Stop reason: HOSPADM

## 2020-01-16 NOTE — H&P
Assessment:  1  Chronic bilateral low back pain with right-sided sciatica    2  Chronic neck pain    3  Pain of right lower extremity    4  Leg cramps    5  Chronic pain syndrome          Plan:  Patient is a 69-year-old female with complaints of neck pain, bilateral arm pain, bilateral leg pain with a history significant for cervical facet arthropathy, lumbar radiculopathy presents surgical sent for procedure  1  We will perform a B/L L4-5 TFESI     Complete risks and benefits including bleeding, infection, tissue reaction, nerve injury and allergic reaction were discussed  The approach was demonstrated using models and literature was provided  Verbal and written consent was obtained      [place] Prescription Drug Monitoring Program report was reviewed and was appropriate         History of Present Illness: The patient is a 37 y o  female who presents for a follow up office visit in regards to Neck Pain; Shoulder Pain; Back Pain; Leg Pain; and Knee Pain  The patients current symptoms include 7/10 constant sharp, shooting, numbness, pins and needles in neck and low back which worse in the morning, evening, nighttime      Current pain medications includes:    The patient reports that this regimen is providing 0% pain relief  The patient is reporting no side effects from this pain medication regimen      I have personally reviewed and/or updated the patient's past medical history, past surgical history, family history, social history, current medications, allergies, and vital signs today             Review of Systems  Review of Systems   Gastrointestinal: Positive for nausea  Musculoskeletal: Positive for arthralgias          Pain in extremity - right leg   All other systems reviewed and are negative            Medical History        Past Medical History:   Diagnosis Date    Anxiety      Bipolar disorder (HCC)      Chronic pain      Endometriosis      Hyperlipidemia      Urinary frequency       resolved: 6/14/2016    Urinary urgency       resolved: 6/14/2016            Surgical History         Past Surgical History:   Procedure Laterality Date    BLADDER SUSPENSION        CHOLECYSTECTOMY        EPIDURAL BLOCK INJECTION N/A 10/17/2019     Procedure: BLOCK / INJECTION EPIDURAL STEROID CERVICAL c7-t1 interlaminar;  Surgeon: Taylor Melendrez MD;  [facility];  Service: Pain Management     FL GUIDED NEEDLE PLAC BX/ASP/INJ   10/17/2019    HERNIA REPAIR        HYSTERECTOMY         partial    OVARIAN CYST REMOVAL        [place] ARTHROSCOPY SHOULDER SURGICAL BICEPS TENODESIS Right 8/14/2019     Procedure: ARTHROSCOPY SHOULDER WITH BICEPS TENOTOMY AND SAD;  Surgeon: Purnima Stern MD;  [facility];  Service: Orthopedics    [place] LAP,CHOLECYSTECTOMY N/A 4/30/2018     Procedure: CHOLECYSTECTOMY LAPAROSCOPIC;  Surgeon: Sherry Quiñones DO;  [facility];  Service: General    TONSILLECTOMY                      Family History   Problem Relation Age of Onset    Diabetes Mother      Heart disease Mother           rheumatic    Neuropathy Mother      COPD Mother      Diabetes type II Mother      Cancer Father      Lung cancer Father      Stroke Maternal Grandmother           CVA    Diabetes type II Maternal Grandmother           mellitus    Other Maternal Grandfather           esophageal abnormality    Diabetes type II Paternal Grandmother           mellitus    Diabetes type II Paternal Grandfather           mellitus    Depression Family      Esophageal cancer Family      Lung cancer Family      Stomach cancer Family           Social History            Occupational History    Not on file   Tobacco Use    Smoking status: Current Every Day Smoker       Packs/day: 1 50       Types: Cigarettes    Smokeless tobacco: Never Used   Substance and Sexual Activity    Alcohol use:  Yes       Frequency: 2-4 times a month       Drinks per session: 1 or 2       Binge frequency: Never       Comment: rare; consumes alcohol occasionally (as per allscripts); social alcohol use (As per allscripts)    Drug use: No    Sexual activity: Yes       Partners: Male            Current Outpatient Medications:     buPROPion (WELLBUTRIN SR) 100 mg 12 hr tablet, , Disp: , Rfl: 0    Cholecalciferol (VITAMIN D3) 2000 units TABS, Take 4,000 Units by mouth daily  , Disp: , Rfl:     citalopram (CeleXA) 20 mg tablet, Take 20 mg by mouth every morning, Disp: , Rfl: 0    gabapentin (NEURONTIN) 300 mg capsule, 1 tablet twice a day and 2 HS, Disp: 150 capsule, Rfl: 1    lamoTRIgine (LAMICTAL) 200 MG tablet, Take 1 tablet by mouth 2 (two) times a day, Disp: , Rfl:     lidocaine (XYLOCAINE) 5 % ointment, Apply topically as needed for mild pain, Disp: 35 44 g, Rfl: 0    LORazepam (ATIVAN) 0 5 mg tablet, Take 1 tablet by mouth daily at bedtime  , Disp: , Rfl:     Multiple Vitamin (DAILY VALUE MULTIVITAMIN) TABS, Take by mouth, Disp: , Rfl:     naproxen (NAPROSYN) 500 mg tablet, take 1 tablet by mouth twice a day with meals, Disp: 60 tablet, Rfl: 1    Nerve Stimulator (STANDARD TENS) ISABELLA, by Does not apply route 3 (three) times a day as needed (pain), Disp: 1 Device, Rfl: 0    simvastatin (ZOCOR) 40 mg tablet, take 1 tablet by mouth once daily, Disp: 90 tablet, Rfl: 3    tiZANidine (ZANAFLEX) 4 mg tablet, Take 1 tablet (4 mg total) by mouth daily at bedtime, Disp: 30 tablet, Rfl: 1     No Known Allergies     Physical Exam:     Vitals:    01/16/20 0914   BP: 106/60   Pulse: 83   Resp: 18   Temp: (!) 96 7 °F (35 9 °C)   SpO2: 98%          Constitutional:normal, well developed, well nourished, alert, in no distress and non-toxic and no overt pain behavior    Eyes:anicteric  HEENT:grossly intact  Neck:supple, symmetric, trachea midline and no masses   Pulmonary:even and unlabored  Cardiovascular:No edema or pitting edema present  Skin:Normal without rashes or lesions and well hydrated  Psychiatric:Mood and affect appropriate  Neurologic:Cranial Nerves II-XII grossly intact  Musculoskeletal:antalgic      Lumbar/Sacral Spine examination demonstrates  Decreased range of motion lumbar spine with pain upon: flexion, lateral rotation to the left/right, and bending to the left/right  Bilateral lumbar paraspinals tender to palpation  Muscle spasms noted in the lumbar area bilaterally  4/5 lower extremity strength in all muscle groups bilaterally  Positive seated straight leg raise for bilateral lower extremities  Sensitivity to light touch intact bilateral lower extremities  2+ reflexes in the patella and Achilles    No ankle clonus

## 2020-01-16 NOTE — DISCHARGE INSTRUCTIONS
Epidural Steroid Injection   WHAT YOU NEED TO KNOW:   An epidural steroid injection (GURDEEP) is a procedure to inject steroid medicine into the epidural space  The epidural space is between your spinal cord and vertebrae  Steroids reduce inflammation and fluid buildup in your spine that may be causing pain  You may be given pain medicine along with the steroids  ACTIVITY  · Do not drive or operate machinery today  · No strenuous activity today - bending, lifting, etc   · You may resume normal activites starting tomorrow - start slowly and as tolerated  · You may shower today, but no tub baths or hot tubs  · You may have numbness for several hours from the local anesthetic  Please use caution and common sense, especially with weight-bearing activities  CARE OF THE INJECTION SITE  · If you have soreness or pain, apply ice to the area today (20 minutes on/20 minutes off)  · Starting tomorrow, you may use warm, moist heat or ice if needed  · You may have an increase or change in your discomfort for 36-48 hours after your treatment  · Apply ice and continue with any pain medication you have been prescribed  · Notify [facility] if you have any of the following: redness, drainage, swelling, headache, stiff neck or fever above 100°F     SPECIAL INSTRUCTIONS  · Our office will contact you in approximately 7 days for a progress report  MEDICATIONS  · Continue to take all routine medications  · Our office may have instructed you to hold some medications  If you have a problem specifically related to your procedure, please call our office at [phone]  Problems not related to your procedure should be directed to your primary care physician

## 2020-01-21 DIAGNOSIS — S43.431D SUPERIOR GLENOID LABRUM LESION OF RIGHT SHOULDER, SUBSEQUENT ENCOUNTER: ICD-10-CM

## 2020-01-21 RX ORDER — NAPROXEN 500 MG/1
TABLET ORAL
Qty: 60 TABLET | Refills: 0 | Status: SHIPPED | OUTPATIENT
Start: 2020-01-21 | End: 2021-06-03 | Stop reason: ALTCHOICE

## 2020-01-21 NOTE — OP NOTE
OPERATIVE REPORT  PATIENT NAME: Silvio Valle    :  1980  MRN: [id number]  Pt Location: [facility] 01    SURGERY DATE: 2020    Surgeon(s) and Role:     * Meron Duncan MD - Primary    Preop Diagnosis:  Bilateral low back pain with right-sided sciatica, unspecified chronicity [M54 41]    Post-Op Diagnosis Codes:     * Bilateral low back pain with right-sided sciatica, unspecified chronicity [M54 41]    Procedure(s) (LRB):  L4-L5 Transforaminal Epidural Steroid Injection (Bilateral)    Specimen(s):  * No specimens in log *    Estimated Blood Loss:   Minimal    Drains:  * No LDAs found *    Anesthesia Type:   Local    Operative Indications:  Bilateral low back pain with right-sided sciatica, unspecified chronicity [M54 41]      Operative Findings:  same    Complications:   None    Procedure and Technique:  Spinal Cord Stimulator Trial    The patient was identified and evaluated in the preoperative holing area  Risks, benefits, and alternatives, and a team approach were discussed with the patient, and the pertinent surgical site was verified and marked with initials  The patient had the opportunity to ask questions, and wished to proceed  The patient was transported to the procedure room, at which time they were placed in the prone position after appropriate IV placement  The patient received preoperative antibiotics in the form of 1 g Ancef  The patient was then prepped and draped with Chloraprep, Prevail, and sterile drapes  A procedural pause was conducted to verify: patient identification, patient allergies, completion of antibiotic prophylaxis, and site of entry  Under fluoroscopic guidance, the T12-L1 interspace was identified  The skin and underlying subcutaneous tissue was anesthetized by an infiltration of a 1:1 mixture of 1% lidocaine and 0 25% bupivacaine with 1:200,000 epinephrine additive   A 14-gauge Coude epidural needle was advanced into the interlaminar space at T12-L1 without difficulty  Epidural access was obtained using a loss of resistance technique to saline  The epidural spinal cord stimulation lead was advanced to the top of T8  The lead was midline  Impedance was checked and multiple electric combinations were utilized to provide paresthesia coverage of the patientâs area of pain  The needle was removed and he lead was secured to the back with the use of Steri-strips, StayFix dressing, and Tegaderm  The patient was taken to the recovery area in excellent condition  The patient tolerated the procedure well without evidence of complication  The patient was transferred from the procedure table without difficulty  After an appropriate amount of observation in the recovery area, the patient was dismissed home      "[employer] Trial Screening Lead Kit 60cm  Wireless External Neurostimulator   Serial [id number]         I was present for the entire procedure    Patient Disposition:  hemodynamically stable    SIGNATURE: Isabelle Scruggs MD  DATE: January 21, 2020  TIME: 4:01 PM

## 2020-01-21 NOTE — OP NOTE
OPERATIVE REPORT  PATIENT NAME: Tabitha Yoo    :  1980  MRN: [id number]  Pt Location: [facility]    SURGERY DATE: 2020    Surgeon(s) and Role:     * Areta Apgar, MD - Primary    Preop Diagnosis:  Bilateral low back pain with right-sided sciatica, unspecified chronicity [M54 41]    Post-Op Diagnosis Codes:     * Bilateral low back pain with right-sided sciatica, unspecified chronicity [M54 41]    Procedure(s) (LRB):  L4-L5 Transforaminal Epidural Steroid Injection (Bilateral)    Specimen(s):  * No specimens in log *    Estimated Blood Loss:   Minimal    Drains:  * No LDAs found *    Anesthesia Type:   Local    Operative Indications:  Bilateral low back pain with right-sided sciatica, unspecified chronicity [M54 41]      Operative Findings:  same    Complications:   None    Procedure and Technique:  Fluoroscopically-guided bilateral L4-5 transforaminal epidural steroid injection under fluoroscopy      After discussing the risks, benefits, and alternatives to the procedure, the patient expressed understanding and wished to proceed  The patient was brought to the fluoroscopy suite and placed in the prone position  A procedural pause was conducted to verify:  correct patient identity, procedure to be performed and as applicable, correct side and site, correct patient position, and availability of implants, special equipment and special requirements  After identifying the right L4 pedicle fluoroscopically with an oblique view, the skin was sterilely prepped and draped in the usual fashion using Chloraprep skin prep  The skin and subcutaneous tissue were anesthetized with 0 5% lidocaine  A 5 inch 22 gauge spinal needle was then advanced under fluoroscopic guidance to the posterior aspect of the right L4-5 neural foramen    Appropriate foraminal depth was determined with a lateral fluoroscopic view, and AP visualization confirmed needle positioning at approximately the 6 oclock position relative to the pedicle  After negative aspiration, 1 mL Omnipaque 300 contrast was injected using live fluoroscopy/digital subtraction angiography, confirming appropriate transforaminal spread without evidence of intravascular or intrathecal uptake  Next, a local anesthetic test dose consisting of 1 mL of 2% lidocaine was injected through the needle  After an appropriate period of observation, a directed neurological exam was performed which revealed no new neurologic deficits  Next, a 1 5 ml solution consisting of 7 5 mg of dexamethasone in sterile saline was injected slowly and incrementally into the epidural space  Following the injection the needle was withdrawn slightly and flushed with lidocaine as it was fully extracted  The same procedure was performed on the opposite side using the same technique and achieving the same results  The patient tolerated the procedure well and there were no apparent complications  The patient did not develop any new neurologic deficits  After appropriate observation, the patient was dismissed from the clinic in good condition under their own power  COMMENTS  The patient received a total steroid dose of 15 mg of dexamethasone     I was present for the entire procedure    Patient Disposition:  hemodynamically stable    SIGNATURE: Morgan Saini MD  DATE: January 21, 2020  TIME: 4:08 PM

## 2020-01-23 ENCOUNTER — TELEPHONE (OUTPATIENT)
Dept: PAIN MEDICINE | Facility: CLINIC | Age: 40
End: 2020-01-23

## 2020-02-10 ENCOUNTER — OFFICE VISIT (OUTPATIENT)
Dept: PAIN MEDICINE | Facility: CLINIC | Age: 40
End: 2020-02-10
Payer: COMMERCIAL

## 2020-02-10 VITALS
HEIGHT: 66 IN | WEIGHT: 177 LBS | BODY MASS INDEX: 28.45 KG/M2 | HEART RATE: 82 BPM | DIASTOLIC BLOOD PRESSURE: 77 MMHG | SYSTOLIC BLOOD PRESSURE: 118 MMHG

## 2020-02-10 DIAGNOSIS — M54.2 CHRONIC NECK PAIN: ICD-10-CM

## 2020-02-10 DIAGNOSIS — M53.3 SACROILIAC PAIN: ICD-10-CM

## 2020-02-10 DIAGNOSIS — G89.29 CHRONIC BILATERAL LOW BACK PAIN WITHOUT SCIATICA: ICD-10-CM

## 2020-02-10 DIAGNOSIS — M54.50 CHRONIC BILATERAL LOW BACK PAIN WITHOUT SCIATICA: ICD-10-CM

## 2020-02-10 DIAGNOSIS — G89.29 CHRONIC NECK PAIN: ICD-10-CM

## 2020-02-10 DIAGNOSIS — G89.4 CHRONIC PAIN SYNDROME: Primary | ICD-10-CM

## 2020-02-10 PROCEDURE — 3078F DIAST BP <80 MM HG: CPT | Performed by: NURSE PRACTITIONER

## 2020-02-10 PROCEDURE — 4004F PT TOBACCO SCREEN RCVD TLK: CPT | Performed by: NURSE PRACTITIONER

## 2020-02-10 PROCEDURE — 3074F SYST BP LT 130 MM HG: CPT | Performed by: NURSE PRACTITIONER

## 2020-02-10 PROCEDURE — 3008F BODY MASS INDEX DOCD: CPT | Performed by: NURSE PRACTITIONER

## 2020-02-10 PROCEDURE — 99214 OFFICE O/P EST MOD 30 MIN: CPT | Performed by: NURSE PRACTITIONER

## 2020-02-10 NOTE — PATIENT INSTRUCTIONS
Sacroiliac Joint Injection   WHAT YOU NEED TO KNOW:   What do I need to know about a sacroiliac joint injection? A sacroiliac (SI) joint injection is done to diagnose or treat pain from sacroiliac joint syndrome  The pain caused by this syndrome may be felt in your lower back, buttocks, groin, and your thigh  How do I prepare for an SI injection? Your healthcare provider will ask you to not take any pain medicine the day of the injection  Ask him if there are any other medicines you should not take  You will need to find someone to drive you home after your procedure  What will happen during the SI injection? You will be awake for your injection  You may be given calming medicine if you are anxious  · You will lie on your stomach with a pillow under your abdomen  Your healthcare provider will give you an injection of medicine to numb the area  He may use an x-ray, ultrasound, or CT scan to find the area to inject  You may also be given an injection of contrast material to help your SI joint show up better  Then, your healthcare provider will inject local anesthesia, antiinflammatory medicine, or both into your SI joint  · Healthcare providers will watch you closely for any problems for up to 30 minutes after your injection  Your healthcare provider will check to see if your pain decreases after the injection  What are the risks of an SI injection? You may have some weakness for a short time after your injection  The SI injection can cause bleeding, infection, and pain  It can also cause temporary weakness in your leg and problems urinating  You may have an allergic reaction to the medicine that is injected into your SI joint  Your pain may return and you may need more treatment  CARE AGREEMENT:   You have the right to help plan your care  Learn about your health condition and how it may be treated  Discuss treatment options with your caregivers to decide what care you want to receive   You always have the right to refuse treatment  The above information is an  only  It is not intended as medical advice for individual conditions or treatments  Talk to your doctor, nurse or pharmacist before following any medical regimen to see if it is safe and effective for you  © 2017 [employer] Information is for End User's use only and may not be sold, redistributed or otherwise used for commercial purposes  All illustrations and images included in CareNotes® are the copyrighted property of [employer]  or [employer]

## 2020-02-10 NOTE — PROGRESS NOTES
Assessment:  1  Chronic pain syndrome    2  Sacroiliac pain    3  Chronic bilateral low back pain without sciatica    4  Chronic neck pain        Plan:  While the patient was in the office today, I did have a thorough conversation regarding their chronic pain syndrome, medication management, and treatment plan options  With regards to her chronic low back pain, patient will be scheduled for a diagnostic / therapeutic bilateral sacroiliac joint injection in the near future  Complete risks and benefits including bleeding, infection, tissue reaction, nerve injury and allergic reaction were discussed  The approach was demonstrated using models and literature was provided  Verbal and written consent was obtained  I discussed the importance of lifelong commitment to performing lumbar strengthening and stretching exercises at home  The patient was agreeable and verbalized an understanding  patient verbalized a desire to see a chiropractor for her chronic neck and back pain  I gave her the name and number of a local chiropractor  History of Present Illness: The patient is a 41 y o  female who presents for a follow up office visit in regards to Neck Pain; Back Pain; and Hip Pain  The patients current symptoms include  Continued low back as well as neck pain  Patient recently underwent L4-5 transforaminal epidural steroid injection on 01/16/2020  She reports, at most, 20-25% improvement for 1-2 weeks  Most of her pain at this time is located in the middle of her low back  She reports occasional pain in her legs as well as tingling in her thighs  Current pain level is a 5/10  Pain is described as dull aching, sharp, throbbing, numbness, pins and needles  Current pain medications includes:  Tizanidine 4 mg at bedtime, gabapentin 300 mg, recently increased to 1 pill twice daily and 2 pills at bedtime (  Both medications prescribed elsewhere)      The patient reports that this regimen is providing 20% pain relief  The patient is reporting no side effects from this pain medication regimen  I have personally reviewed and/or updated the patient's past medical history, past surgical history, family history, social history, current medications, allergies, and vital signs today  Review of Systems  Review of Systems   Musculoskeletal:        Joint stiffness   All other systems reviewed and are negative          Past Medical History:   Diagnosis Date    Anxiety     Bipolar disorder ([facility] [address] )     Chronic pain     Endometriosis     Hyperlipidemia     Kidney stone     Urinary frequency     resolved: 6/14/2016    Urinary urgency     resolved: 6/14/2016       Past Surgical History:   Procedure Laterality Date    BLADDER SUSPENSION      CHOLECYSTECTOMY      EPIDURAL BLOCK INJECTION N/A 10/17/2019    Procedure: BLOCK / INJECTION EPIDURAL STEROID CERVICAL c7-t1 interlaminar;  Surgeon: Robert Smith MD;  Location: [facility];  Service: Pain Management     EPIDURAL BLOCK INJECTION Bilateral 1/16/2020    Procedure: L4-L5 Transforaminal Epidural Steroid Injection;  Surgeon: Robert Smith MD;  Location: [facility];  Service: Pain Management     FL GUIDED NEEDLE PLAC BX/ASP/INJ  10/17/2019    FL GUIDED NEEDLE PLAC BX/ASP/INJ  1/16/2020    HERNIA REPAIR      HYSTERECTOMY      partial    OVARIAN CYST REMOVAL      DC ARTHROSCOPY SHOULDER SURGICAL BICEPS TENODESIS Right 8/14/2019    Procedure: ARTHROSCOPY SHOULDER WITH BICEPS TENOTOMY AND SAD;  Surgeon: Demi Valdovinos MD;  Location: [facility];  Service: Orthopedics    DC LAP,CHOLECYSTECTOMY N/A 4/30/2018    Procedure: CHOLECYSTECTOMY LAPAROSCOPIC;  Surgeon: Jimmy Mcallister DO;  [facility]: [facility];  Service: General    TONSILLECTOMY         Family History   Problem Relation Age of Onset    Diabetes Mother     Heart disease Mother         rheumatic    Neuropathy Mother     COPD Mother     Diabetes type II Mother     Cancer Father  Lung cancer Father     Stroke Maternal Grandmother         CVA    Diabetes type II Maternal Grandmother         mellitus    Other Maternal Grandfather         esophageal abnormality    Diabetes type II Paternal Grandmother         mellitus    Diabetes type II Paternal Grandfather         mellitus    Depression Family     Esophageal cancer Family     Lung cancer Family     Stomach cancer Family        Social History     Occupational History    Not on file   Tobacco Use    Smoking status: Current Every Day Smoker     Packs/day: 1 50     Types: Cigarettes    Smokeless tobacco: Never Used   Substance and Sexual Activity    Alcohol use: Yes     Frequency: 2-4 times a month     Drinks per session: 1 or 2     Binge frequency: Never     Comment: rare; consumes alcohol occasionally (as per allscripts); social alcohol use (As per allscripts)    Drug use: No    Sexual activity: Yes     Partners: Male         Current Outpatient Medications:     buPROPion (WELLBUTRIN SR) 100 mg 12 hr tablet, , Disp: , Rfl: 0    Cholecalciferol (VITAMIN D3) 2000 units TABS, Take 4,000 Units by mouth daily  , Disp: , Rfl:     citalopram (CeleXA) 20 mg tablet, Take 20 mg by mouth every morning, Disp: , Rfl: 0    gabapentin (NEURONTIN) 300 mg capsule, 1 tablet twice a day and 2 HS, Disp: 150 capsule, Rfl: 1    lamoTRIgine (LAMICTAL) 200 MG tablet, Take 1 tablet by mouth 2 (two) times a day, Disp: , Rfl:     LORazepam (ATIVAN) 0 5 mg tablet, Take 1 tablet by mouth daily at bedtime  , Disp: , Rfl:     Multiple Vitamin (DAILY VALUE MULTIVITAMIN) TABS, Take by mouth, Disp: , Rfl:     naproxen (NAPROSYN) 500 mg tablet, take 1 tablet by mouth twice a day with meals, Disp: 60 tablet, Rfl: 0    Nerve Stimulator (STANDARD TENS) ISABELLA, by Does not apply route 3 (three) times a day as needed (pain), Disp: 1 Device, Rfl: 0    simvastatin (ZOCOR) 40 mg tablet, take 1 tablet by mouth once daily, Disp: 90 tablet, Rfl: 3    tiZANidine (ZANAFLEX) 4 mg tablet, Take 1 tablet (4 mg total) by mouth daily at bedtime, Disp: 30 tablet, Rfl: 1    No Known Allergies    Physical Exam:    /77 (BP Location: Right arm, Patient Position: Sitting, Cuff Size: Standard)   Pulse 82   Ht 5' 6" (1 676 m)   Wt 80 3 kg (177 lb)   BMI 28 57 kg/m²     Constitutional:normal, well developed, well nourished, alert, in no distress and non-toxic and no overt pain behavior  and overweight  Eyes:anicteric  HEENT:grossly intact  Neck:supple, symmetric, trachea midline and no masses   Pulmonary:even and unlabored  Cardiovascular:No edema or pitting edema present  Skin:Normal without rashes or lesions and well hydrated  Psychiatric:Mood and affect appropriate  Neurologic:Cranial Nerves II-XII grossly intact  Musculoskeletal:normal   Tenderness to palpation over bilateral sacroiliac joints  Xavier's maneuvers positive bilaterally  Imaging  No orders to display       No orders of the defined types were placed in this encounter

## 2020-02-12 ENCOUNTER — HOSPITAL ENCOUNTER (EMERGENCY)
Facility: HOSPITAL | Age: 40
Discharge: HOME | End: 2020-02-12
Attending: EMERGENCY MEDICINE | Admitting: EMERGENCY MEDICINE
Payer: COMMERCIAL

## 2020-02-12 ENCOUNTER — APPOINTMENT (EMERGENCY)
Dept: CT IMAGING | Facility: HOSPITAL | Age: 40
End: 2020-02-12
Payer: COMMERCIAL

## 2020-02-12 VITALS
HEIGHT: 66 IN | TEMPERATURE: 99.9 F | RESPIRATION RATE: 18 BRPM | SYSTOLIC BLOOD PRESSURE: 112 MMHG | HEART RATE: 70 BPM | WEIGHT: 177 LBS | BODY MASS INDEX: 28.45 KG/M2 | DIASTOLIC BLOOD PRESSURE: 65 MMHG | OXYGEN SATURATION: 95 %

## 2020-02-12 DIAGNOSIS — R10.31 RIGHT LOWER QUADRANT ABDOMINAL PAIN: Primary | ICD-10-CM

## 2020-02-12 LAB
ALBUMIN SERPL BCP-MCNC: 4.5 G/DL (ref 3.5–5.7)
ALP SERPL-CCNC: 49 U/L (ref 40–150)
ALT SERPL W P-5'-P-CCNC: 11 U/L (ref 7–52)
ANION GAP SERPL CALCULATED.3IONS-SCNC: 8 MMOL/L (ref 4–13)
AST SERPL W P-5'-P-CCNC: 12 U/L (ref 13–39)
BACTERIA UR QL AUTO: ABNORMAL /HPF
BASOPHILS # BLD AUTO: 0.1 THOUSANDS/ΜL (ref 0–0.1)
BASOPHILS NFR BLD AUTO: 1 % (ref 0–2)
BILIRUB DIRECT SERPL-MCNC: 0.1 MG/DL (ref 0–0.2)
BILIRUB SERPL-MCNC: 0.4 MG/DL (ref 0.2–1)
BILIRUB UR QL STRIP: NEGATIVE
BUN SERPL-MCNC: 14 MG/DL (ref 7–25)
CALCIUM SERPL-MCNC: 9.7 MG/DL (ref 8.6–10.5)
CHLORIDE SERPL-SCNC: 102 MMOL/L (ref 98–107)
CLARITY UR: CLEAR
CO2 SERPL-SCNC: 26 MMOL/L (ref 21–31)
COLOR UR: YELLOW
CREAT SERPL-MCNC: 0.75 MG/DL (ref 0.6–1.2)
EOSINOPHIL # BLD AUTO: 0.1 THOUSAND/ΜL (ref 0–0.61)
EOSINOPHIL NFR BLD AUTO: 1 % (ref 0–5)
ERYTHROCYTE [DISTWIDTH] IN BLOOD BY AUTOMATED COUNT: 13 % (ref 11.5–14.5)
GFR SERPL CREATININE-BSD FRML MDRD: 99 ML/MIN/1.73SQ M
GLUCOSE SERPL-MCNC: 96 MG/DL (ref 65–99)
GLUCOSE UR STRIP-MCNC: NEGATIVE MG/DL
HCT VFR BLD AUTO: 41.5 % (ref 42–47)
HGB BLD-MCNC: 13.9 G/DL (ref 12–16)
HGB UR QL STRIP.AUTO: NEGATIVE
KETONES UR STRIP-MCNC: NEGATIVE MG/DL
LEUKOCYTE ESTERASE UR QL STRIP: NEGATIVE
LYMPHOCYTES # BLD AUTO: 3.8 THOUSANDS/ΜL (ref 0.6–4.47)
LYMPHOCYTES NFR BLD AUTO: 32 % (ref 21–51)
MCH RBC QN AUTO: 31.1 PG (ref 26–34)
MCHC RBC AUTO-ENTMCNC: 33.4 G/DL (ref 31–37)
MCV RBC AUTO: 93 FL (ref 81–99)
MONOCYTES # BLD AUTO: 0.6 THOUSAND/ΜL (ref 0.17–1.22)
MONOCYTES NFR BLD AUTO: 5 % (ref 2–12)
NEUTROPHILS # BLD AUTO: 7.2 THOUSANDS/ΜL (ref 1.4–6.5)
NEUTS SEG NFR BLD AUTO: 61 % (ref 42–75)
NITRITE UR QL STRIP: NEGATIVE
NON-SQ EPI CELLS URNS QL MICRO: ABNORMAL /HPF
PH UR STRIP.AUTO: 6.5 [PH]
PLATELET # BLD AUTO: 308 THOUSANDS/UL (ref 149–390)
PMV BLD AUTO: 7.1 FL (ref 8.6–11.7)
POTASSIUM SERPL-SCNC: 4.2 MMOL/L (ref 3.5–5.5)
PROT SERPL-MCNC: 7.3 G/DL (ref 6.4–8.9)
PROT UR STRIP-MCNC: NEGATIVE MG/DL
RBC # BLD AUTO: 4.45 MILLION/UL (ref 3.9–5.2)
RBC #/AREA URNS AUTO: ABNORMAL /HPF
SODIUM SERPL-SCNC: 136 MMOL/L (ref 134–143)
SP GR UR STRIP.AUTO: <=1.005 (ref 1–1.03)
UROBILINOGEN UR QL STRIP.AUTO: 0.2 E.U./DL
WBC # BLD AUTO: 11.9 THOUSAND/UL (ref 4.8–10.8)
WBC #/AREA URNS AUTO: ABNORMAL /HPF

## 2020-02-12 PROCEDURE — 96374 THER/PROPH/DIAG INJ IV PUSH: CPT

## 2020-02-12 PROCEDURE — 85025 COMPLETE CBC W/AUTO DIFF WBC: CPT | Performed by: EMERGENCY MEDICINE

## 2020-02-12 PROCEDURE — 96375 TX/PRO/DX INJ NEW DRUG ADDON: CPT

## 2020-02-12 PROCEDURE — 96361 HYDRATE IV INFUSION ADD-ON: CPT

## 2020-02-12 PROCEDURE — 80076 HEPATIC FUNCTION PANEL: CPT | Performed by: EMERGENCY MEDICINE

## 2020-02-12 PROCEDURE — 81001 URINALYSIS AUTO W/SCOPE: CPT | Performed by: EMERGENCY MEDICINE

## 2020-02-12 PROCEDURE — 36415 COLL VENOUS BLD VENIPUNCTURE: CPT | Performed by: EMERGENCY MEDICINE

## 2020-02-12 PROCEDURE — 99284 EMERGENCY DEPT VISIT MOD MDM: CPT | Performed by: EMERGENCY MEDICINE

## 2020-02-12 PROCEDURE — 99284 EMERGENCY DEPT VISIT MOD MDM: CPT

## 2020-02-12 PROCEDURE — 81003 URINALYSIS AUTO W/O SCOPE: CPT | Performed by: EMERGENCY MEDICINE

## 2020-02-12 PROCEDURE — 80048 BASIC METABOLIC PNL TOTAL CA: CPT | Performed by: EMERGENCY MEDICINE

## 2020-02-12 PROCEDURE — 74176 CT ABD & PELVIS W/O CONTRAST: CPT

## 2020-02-12 RX ORDER — IBUPROFEN 600 MG/1
600 TABLET ORAL EVERY 6 HOURS PRN
Qty: 20 TABLET | Refills: 0 | Status: SHIPPED | OUTPATIENT
Start: 2020-02-12 | End: 2021-06-03 | Stop reason: ALTCHOICE

## 2020-02-12 RX ORDER — HYDROMORPHONE HCL/PF 1 MG/ML
1 SYRINGE (ML) INJECTION ONCE
Status: COMPLETED | OUTPATIENT
Start: 2020-02-12 | End: 2020-02-12

## 2020-02-12 RX ORDER — KETOROLAC TROMETHAMINE 30 MG/ML
15 INJECTION, SOLUTION INTRAMUSCULAR; INTRAVENOUS ONCE
Status: COMPLETED | OUTPATIENT
Start: 2020-02-12 | End: 2020-02-12

## 2020-02-12 RX ADMIN — HYDROMORPHONE HYDROCHLORIDE 1 MG: 1 INJECTION, SOLUTION INTRAMUSCULAR; INTRAVENOUS; SUBCUTANEOUS at 08:03

## 2020-02-12 RX ADMIN — SODIUM CHLORIDE 1000 ML: 0.9 INJECTION, SOLUTION INTRAVENOUS at 07:59

## 2020-02-12 RX ADMIN — KETOROLAC TROMETHAMINE 15 MG: 30 INJECTION, SOLUTION INTRAMUSCULAR; INTRAVENOUS at 08:01

## 2020-02-12 NOTE — ED PROVIDER NOTES
History  Chief Complaint   Patient presents with    Abdominal Pain     sudden onset right sided abdominal pain yesterday    Nausea     77-year-old female who presents emergency department with right lower quadrant pain  Sudden onset yesterday morning  Waxes and wanes but never goes away  Currently 6/10  Nonradiating  Status post partial hysterectomy  Cholecystectomy 2 years ago  Patient does have a history of kidney stones  Last seen approximately 1 month ago for same  Does not recall which side this was on  No dysuria frequency urgency  No hematuria  She does note some chills but no fevers  No aggravating or alleviating factors  Positive nausea  No vomiting  No diarrhea  Differential diagnosis includes urinary tract infection, ureterolithiasis, appendicitis although history is much more abrupt in onset than typical           Prior to Admission Medications   Prescriptions Last Dose Informant Patient Reported? Taking?    Cholecalciferol (VITAMIN D3) 2000 units TABS  Self Yes No   Sig: Take 4,000 Units by mouth daily     LORazepam (ATIVAN) 0 5 mg tablet  Self Yes No   Sig: Take 1 tablet by mouth daily at bedtime     Multiple Vitamin (DAILY VALUE MULTIVITAMIN) TABS  Self Yes No   Sig: Take by mouth   Nerve Stimulator (STANDARD TENS) ISABELLA  Self No No   Sig: by Does not apply route 3 (three) times a day as needed (pain)   buPROPion (WELLBUTRIN SR) 100 mg 12 hr tablet   Yes No   citalopram (CeleXA) 20 mg tablet   Yes No   Sig: Take 20 mg by mouth every morning   gabapentin (NEURONTIN) 300 mg capsule   No No   Si tablet twice a day and 2 HS   lamoTRIgine (LAMICTAL) 200 MG tablet  Self Yes No   Sig: Take 1 tablet by mouth 2 (two) times a day   naproxen (NAPROSYN) 500 mg tablet   No No   Sig: take 1 tablet by mouth twice a day with meals   simvastatin (ZOCOR) 40 mg tablet   No No   Sig: take 1 tablet by mouth once daily   tiZANidine (ZANAFLEX) 4 mg tablet 2020 at Unknown time  No Yes   Sig: Take 1 tablet (4 mg total) by mouth daily at bedtime      Facility-Administered Medications: None       Past Medical History:   Diagnosis Date    Anxiety     Bipolar disorder ([facility] [address] )     Chronic pain     Endometriosis     Hyperlipidemia     Kidney stone     Urinary frequency     resolved: 6/14/2016    Urinary urgency     resolved: 6/14/2016       Past Surgical History:   Procedure Laterality Date    BLADDER SUSPENSION      CHOLECYSTECTOMY      EPIDURAL BLOCK INJECTION N/A 10/17/2019    Procedure: BLOCK / INJECTION EPIDURAL STEROID CERVICAL c7-t1 interlaminar;  Surgeon: Michelle Palomares MD;  [facility]: [facility];  Service: Pain Management     EPIDURAL BLOCK INJECTION Bilateral 1/16/2020    Procedure: L4-L5 Transforaminal Epidural Steroid Injection;  Surgeon: Michelle Palomares MD;  Location: [facility];  Service: Pain Management     FL GUIDED NEEDLE PLAC BX/ASP/INJ  10/17/2019    FL GUIDED NEEDLE PLAC BX/ASP/INJ  1/16/2020    HERNIA REPAIR      HYSTERECTOMY      partial    OVARIAN CYST REMOVAL      ME ARTHROSCOPY SHOULDER SURGICAL BICEPS TENODESIS Right 8/14/2019    Procedure: ARTHROSCOPY SHOULDER WITH BICEPS TENOTOMY AND SAD;  Surgeon: Janey Ahumada MD;  Location: [facility];  Service: Orthopedics    ME LAP,CHOLECYSTECTOMY N/A 4/30/2018    Procedure: CHOLECYSTECTOMY LAPAROSCOPIC;  Surgeon: Ayanna Bennett DO;  Location: [facility];  Service: General    TONSILLECTOMY         Family History   Problem Relation Age of Onset    Diabetes Mother     Heart disease Mother         rheumatic    Neuropathy Mother     COPD Mother     Diabetes type II Mother     Cancer Father     Lung cancer Father     Stroke Maternal Grandmother         CVA    Diabetes type II Maternal Grandmother         mellitus    Other Maternal Grandfather         esophageal abnormality    Diabetes type II Paternal Grandmother         mellitus    Diabetes type II Paternal Grandfather         mellitus    Depression Family     Esophageal cancer Family     Lung cancer Family     Stomach cancer Family      I have reviewed and agree with the history as documented  Social History     Tobacco Use    Smoking status: Current Every Day Smoker     Packs/day: 1 50     Types: Cigarettes    Smokeless tobacco: Never Used   Substance Use Topics    Alcohol use: Yes     Frequency: 2-4 times a month     Drinks per session: 1 or 2     Binge frequency: Never     Comment: rare; consumes alcohol occasionally (as per allscripts); social alcohol use (As per allscripts)    Drug use: No       Review of Systems   Constitutional: Positive for chills  Negative for activity change, appetite change and fever  HENT: Negative for congestion and sore throat  Eyes: Negative for pain and redness  Respiratory: Negative for cough, shortness of breath and wheezing  Cardiovascular: Negative for chest pain and palpitations  Gastrointestinal: Positive for abdominal pain and nausea  Negative for blood in stool, constipation, diarrhea and vomiting  Endocrine: Negative for polyuria  Genitourinary: Negative for decreased urine volume, difficulty urinating, dysuria, flank pain, frequency, hematuria and urgency  Musculoskeletal: Negative for arthralgias and myalgias  Skin: Negative for color change and rash  Allergic/Immunologic: Negative for immunocompromised state  Neurological: Negative for dizziness and light-headedness  Hematological: Does not bruise/bleed easily  Psychiatric/Behavioral: Negative for confusion  All other systems reviewed and are negative  Physical Exam  Physical Exam   Constitutional: She is oriented to person, place, and time  She appears well-developed  No distress  HENT:   Head: Normocephalic and atraumatic  Nose: Nose normal    Eyes: Conjunctivae are normal  No scleral icterus  Neck: Normal range of motion  Neck supple     Cardiovascular: Normal rate, regular rhythm, normal heart sounds and intact distal pulses  Pulmonary/Chest: Effort normal and breath sounds normal  No stridor  No respiratory distress  She has no wheezes  Abdominal: Soft  She exhibits no distension  There is tenderness in the right lower quadrant  There is no rebound and no guarding  No hernia  Musculoskeletal: She exhibits no edema or deformity  Neurological: She is alert and oriented to person, place, and time  Skin: Skin is warm and dry  No rash noted  Psychiatric: She has a normal mood and affect  Thought content normal    Nursing note and vitals reviewed        Vital Signs  ED Triage Vitals [02/12/20 0740]   Temperature Pulse Respirations Blood Pressure SpO2   99 9 °F (37 7 °C) 82 16 122/73 96 %      Temp Source Heart Rate Source Patient Position - Orthostatic VS BP Location FiO2 (%)   Temporal Monitor Sitting Left arm --      Pain Score       Worst Possible Pain           Vitals:    02/12/20 0740 02/12/20 0800   BP: 122/73    Pulse: 82 81   Patient Position - Orthostatic VS: Sitting          Visual Acuity      ED Medications  Medications   sodium chloride 0 9 % bolus 1,000 mL (1,000 mL Intravenous New Bag 2/12/20 0759)   HYDROmorphone (DILAUDID) injection 1 mg (1 mg Intravenous Given 2/12/20 0803)   ketorolac (TORADOL) injection 15 mg (15 mg Intravenous Given 2/12/20 0801)       Diagnostic Studies  Results Reviewed     Procedure Component Value Units Date/Time    Urine Microscopic [[id number]]  (Abnormal) Collected:  02/12/20 0841    Lab Status:  Final result Specimen:  Urine, Clean Catch Updated:  02/12/20 0913     RBC, UA 0-1 /hpf      WBC, UA 0-1 /hpf      Epithelial Cells None Seen /hpf      Bacteria, UA None Seen /hpf     UA w Reflex to Microscopic w Reflex to Culture [[id number]]  (Abnormal) Collected:  02/12/20 0841    Lab Status:  Final result Specimen:  Urine, Clean Catch Updated:  02/12/20 0911     Color, UA Yellow     Clarity, UA Clear     Specific Gravity, UA <=1 005     pH, UA 6 5     Leukocytes, UA Negative Nitrite, UA Negative     Protein, UA Negative mg/dl      Glucose, UA Negative mg/dl      Ketones, UA Negative mg/dl      Urobilinogen, UA 0 2 E U /dl      Bilirubin, UA Negative     Blood, UA Negative    Basic metabolic panel [[id number]] Collected:  02/12/20 0751    Lab Status:  Final result Specimen:  Blood from Arm, Left Updated:  02/12/20 9207     Sodium 136 mmol/L      Potassium 4 2 mmol/L      Chloride 102 mmol/L      CO2 26 mmol/L      ANION GAP 8 mmol/L      BUN 14 mg/dL      Creatinine 0 75 mg/dL      Glucose 96 mg/dL      Calcium 9 7 mg/dL      eGFR 99 ml/min/1 73sq m     Narrative:       National Kidney Disease Foundation guidelines for Chronic Kidney Disease (CKD):     Stage 1 with normal or high GFR (GFR > 90 mL/min/1 73 square meters)    Stage 2 Mild CKD (GFR = 60-89 mL/min/1 73 square meters)    Stage 3A Moderate CKD (GFR = 45-59 mL/min/1 73 square meters)    Stage 3B Moderate CKD (GFR = 30-44 mL/min/1 73 square meters)    Stage 4 Severe CKD (GFR = 15-29 mL/min/1 73 square meters)    Stage 5 End Stage CKD (GFR <15 mL/min/1 73 square meters)  Note: GFR calculation is accurate only with a steady state creatinine    Hepatic function panel [[id number]]  (Abnormal) Collected:  02/12/20 0751    Lab Status:  Final result Specimen:  Blood from Arm, Left Updated:  02/12/20 0823     Total Bilirubin 0 40 mg/dL      Bilirubin, Direct 0 10 mg/dL      Alkaline Phosphatase 49 U/L      AST 12 U/L      ALT 11 U/L      Total Protein 7 3 g/dL      Albumin 4 5 g/dL     CBC and differential [[id number]]  (Abnormal) Collected:  02/12/20 0751    Lab Status:  Final result Specimen:  Blood from Arm, Left Updated:  02/12/20 0802     WBC 11 90 Thousand/uL      RBC 4 45 Million/uL      Hemoglobin 13 9 g/dL      Hematocrit 41 5 %      MCV 93 fL      MCH 31 1 pg      MCHC 33 4 g/dL      RDW 13 0 %      MPV 7 1 fL      Platelets 814 Thousands/uL      Neutrophils Relative 61 %      Lymphocytes Relative 32 %      Monocytes Relative 5 %      Eosinophils Relative 1 %      Basophils Relative 1 %      Neutrophils Absolute 7 20 Thousands/µL      Lymphocytes Absolute 3 80 Thousands/µL      Monocytes Absolute 0 60 Thousand/µL      Eosinophils Absolute 0 10 Thousand/µL      Basophils Absolute 0 10 Thousands/µL                  CT renal stone study abdomen pelvis without contrast   Final Result by Jessy Smith MD (02/12 1658)      Stable punctate nonobstructing calculus within the left renal upper pole  No other urinary calculi  No obstructive uropathy  Workstation performed: [id number]                    Procedures  Procedures         ED Course  ED Course as of Feb 12 1009 Wed Feb 12, 2020   1005 Patient does have reproducible symptoms with engagement of abdominal muscles  MDM  Number of Diagnoses or Management Options  Right lower quadrant abdominal pain:   Diagnosis management comments: No UTI, no ureteral stone, appendix is visualized and is normal   Ovaries are visualized and unremarkable  Discussed with patient that this may be musculoskeletal in nature as she does have reproducible symptoms with engagement of her abdominal muscles  Advised ibuprofen as needed and heating pad for pain  Follow-up PCP if not improved in 5 days  Amount and/or Complexity of Data Reviewed  Clinical lab tests: ordered and reviewed  Tests in the radiology section of CPT®: ordered and reviewed          Disposition  Final diagnoses:   Right lower quadrant abdominal pain     Time reflects when diagnosis was documented in both MDM as applicable and the Disposition within this note     Time User Action Codes Description Comment    2/12/2020 10:07 AM Dominique Lynne Add [R10 31] Right lower quadrant abdominal pain       ED Disposition     ED Disposition Condition Date/Time Comment    Discharge Stable Wed Feb 12, 2020 10:07 AM Autumn [place] discharge to home/self care              Follow-up Information     Follow up With Specialties Details Why Contact Info    Midge Duverney, DO Family Medicine In 5 days if not improved  [address] [facility]  [phone]            Patient's Medications   Discharge Prescriptions    IBUPROFEN (MOTRIN) 600 MG TABLET    Take 1 tablet (600 mg total) by mouth every 6 (six) hours as needed for mild pain for up to 20 doses       Start Date: 2/12/2020 End Date: --       Order Dose: 600 mg       Quantity: 20 tablet    Refills: 0     No discharge procedures on file      PDMP Review     None          ED Provider  Electronically Signed by           Anita Hicks MD  02/12/20 2335

## 2020-02-15 DIAGNOSIS — R20.2 PARESTHESIA: ICD-10-CM

## 2020-02-15 DIAGNOSIS — S43.431D SUPERIOR GLENOID LABRUM LESION OF RIGHT SHOULDER, SUBSEQUENT ENCOUNTER: ICD-10-CM

## 2020-02-16 RX ORDER — GABAPENTIN 300 MG/1
CAPSULE ORAL
Qty: 150 CAPSULE | Refills: 1 | Status: SHIPPED | OUTPATIENT
Start: 2020-02-16 | End: 2020-03-30

## 2020-02-17 RX ORDER — NAPROXEN 500 MG/1
TABLET ORAL
Qty: 60 TABLET | Refills: 0 | OUTPATIENT
Start: 2020-02-17

## 2020-02-24 ENCOUNTER — TELEPHONE (OUTPATIENT)
Dept: PAIN MEDICINE | Facility: CLINIC | Age: 40
End: 2020-02-24

## 2020-02-24 NOTE — TELEPHONE ENCOUNTER
Pt would like to cancel her procedure on 3/4/20  She says her pain hasn't been too bad  She says will call back if she needs the injection

## 2020-02-29 DIAGNOSIS — M79.10 MUSCLE PAIN: ICD-10-CM

## 2020-03-03 RX ORDER — TIZANIDINE 4 MG/1
TABLET ORAL
Qty: 30 TABLET | Refills: 1 | Status: SHIPPED | OUTPATIENT
Start: 2020-03-03 | End: 2020-04-08

## 2020-03-16 ENCOUNTER — OFFICE VISIT (OUTPATIENT)
Dept: FAMILY MEDICINE CLINIC | Facility: CLINIC | Age: 40
End: 2020-03-16
Payer: COMMERCIAL

## 2020-03-16 VITALS
OXYGEN SATURATION: 99 % | HEART RATE: 85 BPM | DIASTOLIC BLOOD PRESSURE: 68 MMHG | TEMPERATURE: 98 F | BODY MASS INDEX: 28.45 KG/M2 | WEIGHT: 177 LBS | SYSTOLIC BLOOD PRESSURE: 118 MMHG | HEIGHT: 66 IN

## 2020-03-16 DIAGNOSIS — F32.A DEPRESSION, UNSPECIFIED DEPRESSION TYPE: ICD-10-CM

## 2020-03-16 DIAGNOSIS — Z13.6 SCREENING FOR CARDIOVASCULAR CONDITION: ICD-10-CM

## 2020-03-16 DIAGNOSIS — Z23 ENCOUNTER FOR IMMUNIZATION: ICD-10-CM

## 2020-03-16 DIAGNOSIS — R91.1 LUNG NODULE: Primary | ICD-10-CM

## 2020-03-16 PROCEDURE — 99213 OFFICE O/P EST LOW 20 MIN: CPT | Performed by: FAMILY MEDICINE

## 2020-03-16 PROCEDURE — 4004F PT TOBACCO SCREEN RCVD TLK: CPT | Performed by: FAMILY MEDICINE

## 2020-03-16 PROCEDURE — 3008F BODY MASS INDEX DOCD: CPT | Performed by: NURSE PRACTITIONER

## 2020-03-16 PROCEDURE — 3078F DIAST BP <80 MM HG: CPT | Performed by: FAMILY MEDICINE

## 2020-03-16 PROCEDURE — 3074F SYST BP LT 130 MM HG: CPT | Performed by: FAMILY MEDICINE

## 2020-03-16 NOTE — PROGRESS NOTES
Assessment/Plan:    No problem-specific Assessment & Plan notes found for this encounter  Diagnoses and all orders for this visit:    Lung nodule    Depression, unspecified depression type    Encounter for immunization          PHQ-9 Depression Screening    PHQ-9:    Frequency of the following problems over the past two weeks:       Little interest or pleasure in doing things:  0 - not at all  Feeling down, depressed, or hopeless:  0 - not at all  PHQ-2 Score:  0            Subjective:      Patient ID: Diana Jasmine is a 41 y o  female      HPI    The following portions of the patient's history were reviewed and updated as appropriate: allergies, current medications, past family history, past medical history, past social history, past surgical history and problem list     Review of Systems      Objective:    /68   Pulse 85   Temp 98 °F (36 7 °C)   Ht 5' 6" (1 676 m)   Wt 80 3 kg (177 lb)   SpO2 99%   BMI 28 57 kg/m²      Physical Exam Eyes with no visual disturbances.  Ears clean and dry and no hearing difficulties. Nose with pink mucosa and no drainage.  Mouth mucous membranes moist and pink.  No tenderness or swelling to throat or neck.

## 2020-03-27 DIAGNOSIS — R20.2 PARESTHESIA: ICD-10-CM

## 2020-03-28 ENCOUNTER — HOSPITAL ENCOUNTER (OUTPATIENT)
Dept: CT IMAGING | Facility: HOSPITAL | Age: 40
Discharge: HOME | End: 2020-03-28
Attending: FAMILY MEDICINE
Payer: COMMERCIAL

## 2020-03-28 DIAGNOSIS — R91.1 LUNG NODULE: ICD-10-CM

## 2020-03-28 PROCEDURE — 71250 CT THORAX DX C-: CPT

## 2020-03-30 RX ORDER — GABAPENTIN 300 MG/1
CAPSULE ORAL
Qty: 150 CAPSULE | Refills: 1 | Status: SHIPPED | OUTPATIENT
Start: 2020-03-30 | End: 2020-04-27

## 2020-04-02 ENCOUNTER — TELEPHONE (OUTPATIENT)
Dept: OTHER | Facility: OTHER | Age: 40
End: 2020-04-02

## 2020-04-02 ENCOUNTER — TELEPHONE (OUTPATIENT)
Dept: NEUROLOGY | Facility: CLINIC | Age: 40
End: 2020-04-02

## 2020-04-02 ENCOUNTER — TELEMEDICINE (OUTPATIENT)
Dept: FAMILY MEDICINE CLINIC | Facility: CLINIC | Age: 40
End: 2020-04-02
Payer: COMMERCIAL

## 2020-04-02 DIAGNOSIS — R21 SKIN RASH: Primary | ICD-10-CM

## 2020-04-02 PROBLEM — Z23 NEED FOR INFLUENZA VACCINATION: Status: ACTIVE | Noted: 2020-04-02

## 2020-04-02 PROCEDURE — 99213 OFFICE O/P EST LOW 20 MIN: CPT | Performed by: NURSE PRACTITIONER

## 2020-04-02 RX ORDER — METHYLPREDNISOLONE 4 MG/1
TABLET ORAL
Qty: 21 EACH | Refills: 0 | Status: SHIPPED | OUTPATIENT
Start: 2020-04-02 | End: 2021-06-03 | Stop reason: ALTCHOICE

## 2020-04-07 DIAGNOSIS — M79.10 MUSCLE PAIN: ICD-10-CM

## 2020-04-08 RX ORDER — TIZANIDINE 4 MG/1
TABLET ORAL
Qty: 30 TABLET | Refills: 1 | Status: SHIPPED | OUTPATIENT
Start: 2020-04-08 | End: 2020-04-30

## 2020-04-27 DIAGNOSIS — R20.2 PARESTHESIA: ICD-10-CM

## 2020-04-27 RX ORDER — GABAPENTIN 300 MG/1
CAPSULE ORAL
Qty: 150 CAPSULE | Refills: 1 | Status: SHIPPED | OUTPATIENT
Start: 2020-04-27 | End: 2020-05-18 | Stop reason: SDUPTHER

## 2020-04-30 DIAGNOSIS — M79.10 MUSCLE PAIN: ICD-10-CM

## 2020-04-30 RX ORDER — TIZANIDINE 4 MG/1
TABLET ORAL
Qty: 30 TABLET | Refills: 1 | Status: SHIPPED | OUTPATIENT
Start: 2020-04-30 | End: 2020-05-18 | Stop reason: SDUPTHER

## 2020-05-01 DIAGNOSIS — M75.01 ADHESIVE CAPSULITIS OF RIGHT SHOULDER: Primary | ICD-10-CM

## 2020-05-18 DIAGNOSIS — S43.431D SUPERIOR GLENOID LABRUM LESION OF RIGHT SHOULDER, SUBSEQUENT ENCOUNTER: ICD-10-CM

## 2020-05-18 DIAGNOSIS — R20.2 PARESTHESIA: ICD-10-CM

## 2020-05-18 DIAGNOSIS — M79.10 MUSCLE PAIN: ICD-10-CM

## 2020-05-18 RX ORDER — TIZANIDINE 4 MG/1
4 TABLET ORAL
Qty: 30 TABLET | Refills: 0 | Status: SHIPPED | OUTPATIENT
Start: 2020-05-18 | End: 2020-11-04 | Stop reason: SDUPTHER

## 2020-05-18 RX ORDER — NAPROXEN 500 MG/1
500 TABLET ORAL 2 TIMES DAILY WITH MEALS
Qty: 60 TABLET | Refills: 0 | OUTPATIENT
Start: 2020-05-18

## 2020-05-19 RX ORDER — GABAPENTIN 300 MG/1
CAPSULE ORAL
Qty: 150 CAPSULE | Refills: 3 | Status: SHIPPED | OUTPATIENT
Start: 2020-05-19 | End: 2020-11-02 | Stop reason: SDUPTHER

## 2020-06-10 ENCOUNTER — EVALUATION (OUTPATIENT)
Dept: PHYSICAL THERAPY | Facility: CLINIC | Age: 40
End: 2020-06-10
Payer: COMMERCIAL

## 2020-06-10 DIAGNOSIS — M25.511 RIGHT SHOULDER PAIN, UNSPECIFIED CHRONICITY: Primary | ICD-10-CM

## 2020-06-10 PROCEDURE — 97162 PT EVAL MOD COMPLEX 30 MIN: CPT | Performed by: PHYSICAL THERAPIST

## 2020-06-10 PROCEDURE — 97140 MANUAL THERAPY 1/> REGIONS: CPT | Performed by: PHYSICAL THERAPIST

## 2020-06-12 ENCOUNTER — OFFICE VISIT (OUTPATIENT)
Dept: PHYSICAL THERAPY | Facility: CLINIC | Age: 40
End: 2020-06-12
Payer: COMMERCIAL

## 2020-06-12 DIAGNOSIS — M25.511 RIGHT SHOULDER PAIN, UNSPECIFIED CHRONICITY: Primary | ICD-10-CM

## 2020-06-12 PROCEDURE — 97140 MANUAL THERAPY 1/> REGIONS: CPT | Performed by: PHYSICAL THERAPIST

## 2020-06-12 PROCEDURE — 97110 THERAPEUTIC EXERCISES: CPT | Performed by: PHYSICAL THERAPIST

## 2020-06-15 ENCOUNTER — OFFICE VISIT (OUTPATIENT)
Dept: PHYSICAL THERAPY | Facility: CLINIC | Age: 40
End: 2020-06-15
Payer: COMMERCIAL

## 2020-06-15 DIAGNOSIS — M25.511 RIGHT SHOULDER PAIN, UNSPECIFIED CHRONICITY: Primary | ICD-10-CM

## 2020-06-15 PROCEDURE — 97140 MANUAL THERAPY 1/> REGIONS: CPT

## 2020-06-17 ENCOUNTER — OFFICE VISIT (OUTPATIENT)
Dept: PHYSICAL THERAPY | Facility: CLINIC | Age: 40
End: 2020-06-17
Payer: COMMERCIAL

## 2020-06-17 DIAGNOSIS — M25.511 RIGHT SHOULDER PAIN, UNSPECIFIED CHRONICITY: Primary | ICD-10-CM

## 2020-06-17 PROCEDURE — 97140 MANUAL THERAPY 1/> REGIONS: CPT

## 2020-06-19 ENCOUNTER — TRANSCRIBE ORDERS (OUTPATIENT)
Dept: PHYSICAL THERAPY | Facility: CLINIC | Age: 40
End: 2020-06-19

## 2020-06-19 DIAGNOSIS — M25.511 ACUTE PAIN OF RIGHT SHOULDER: Primary | ICD-10-CM

## 2020-06-23 ENCOUNTER — OFFICE VISIT (OUTPATIENT)
Dept: PHYSICAL THERAPY | Facility: CLINIC | Age: 40
End: 2020-06-23
Payer: COMMERCIAL

## 2020-06-23 DIAGNOSIS — M25.511 RIGHT SHOULDER PAIN, UNSPECIFIED CHRONICITY: Primary | ICD-10-CM

## 2020-06-23 PROCEDURE — 97140 MANUAL THERAPY 1/> REGIONS: CPT

## 2020-07-06 ENCOUNTER — EVALUATION (OUTPATIENT)
Dept: PHYSICAL THERAPY | Facility: CLINIC | Age: 40
End: 2020-07-06
Payer: COMMERCIAL

## 2020-07-06 DIAGNOSIS — M25.511 RIGHT SHOULDER PAIN, UNSPECIFIED CHRONICITY: Primary | ICD-10-CM

## 2020-07-06 PROCEDURE — 97140 MANUAL THERAPY 1/> REGIONS: CPT

## 2020-07-06 NOTE — PROGRESS NOTES
PT Re-Evaluation     Today's date: 2020  Patient name: Austin Holland  : 1980  MRN: [id number]  Referring provider: Sammie Peabody, MD  Dx:   Encounter Diagnosis     ICD-10-CM    1  Right shoulder pain, unspecified chronicity M25 511 CANCELED: PT plan of care cert/re-cert       Start Time: 0001  Stop Time: 1400  Total time in clinic (min): 15 minutes    Assessment  Assessment details: Austin Holland is a 41 y o  female presenting to outpatient physical therapy with diagnosis of right shoulder manipulation  Patient continues with pain limited movements  Guarding continues  She demonstrates limited ROM compared to when nerve block was active  Reviewed self stretching program   Patient does have HEP to continue and this program was reviewed and encouraged for daily performance  Also encouraged to increase self stretch to >1x per day  Will continue to monitor  Impairments: abnormal movement, activity intolerance, impaired physical strength and lacks appropriate home exercise program  Understanding of Dx/Px/POC: good   Prognosis: good    Goals  STGs to be achieved in 4 weeks:  1  Pt to demonstrate reduced subjective pain rating "at worst" by at least 2-3 points from Initial Eval in order to allow for reduced pain with ADLs and improved functional activity tolerance  - MET  2  Pt to demonstrate increased AROM of right UE by at least 5-10 degrees in order to allow for greater ease and independence with ADLs and functional mobility  - MET  3  Pt to demonstrate full PROM of right UE in order to maximize joint mobility and function and allow for progression of exercise program and achievement of goals  - NOT MET  4   Pt to demonstrate increased MMT of right UE by at least 1/2-1 grade in order to improve safety and stability with ADLs and functional mobility  - ONGOING    LTGs in 2-4 weeks:   Pt will demonstrate the ability to perform effective self ROM of right GHJ in all planes - NOT MET    Plan  Patient would benefit from: skilled physical therapy  Other planned modality interventions: Pt has home TENS unit  Planned therapy interventions: manual therapy, neuromuscular re-education, therapeutic activities, therapeutic exercise and home exercise program  Frequency: 2x week  Duration in visits: 8  Duration in weeks: 2  Plan of Care beginning date: 7/6/2020  Plan of Care expiration date: 8/5/2020  Treatment plan discussed with: patient and PTA        Subjective Evaluation    History of Present Illness  Date of surgery: 6/9/2020  Mechanism of injury: UPDATE:  Patient reports decrease in movement in arm, difficulty tolerating stretching at home  Reports  not stretching her very well  PT/PTA team discussed several options to continue with self stretch at home  Patient s/p closed manipulation of right GHJ on 6/9/2020 by NINO  Presents to OPPT for evaluation and ROM today  Presently with right UE nerve block  Denies pain  Noted with min finger movement, mild increase in hand edema, however, sling adjusted for fit  Does note some UT/cervical spine irritation from sling placement  Voices "tingling" into fingers/hand/forearm  Quality of life: good    Pain  No pain reported    Treatments  Previous treatment: physical therapy, medication and injection treatment  Current treatment: injection treatment and physical therapy  Patient Goals  Patient goals for therapy: increased strength, independence with ADLs/IADLs, return to sport/leisure activities, increased motion, improved balance, decreased pain and decreased edema          Objective     Postural Observations  Seated posture: good  Standing posture: good    Additional Postural Observation Details  (+) sling forward head and slouched/rounded shoulders - discontinued    Observations     Right Shoulder  Negative for edema       Cervical/Thoracic Screen   Cervical range of motion within normal limits with the following exceptions: Guarded cervical ROM  Thoracic range of motion within normal limits with the following exceptions: Increased kyphosis    Neurological Testing     Sensation     Shoulder     Right Shoulder   Intact: light touch and proprioception    Passive Range of Motion   Left Shoulder   External rotation 90°: WFL  Internal rotation 90°: WFL  Horizontal abduction: WFL  Horizontal adduction: WFL    Right Shoulder   Flexion: 154 degrees   Extension: 25 degrees   Abduction: 103 degrees   External rotation 45°: 54 degrees   External rotation 90°: 38 degrees with pain  Internal rotation 90°: 20 degrees with pain    Additional Passive Range of Motion Details        Strength/Myotome Testing     Right Shoulder     Planes of Motion   Flexion: 4+   Extension: 4+   Abduction: 4   Adduction: 4+   External rotation at 0°: 4+   External rotation at 45°: 4   External rotation at 90°: 4-   Internal rotation at 0°: 4+   Internal rotation at 45°: 4   Internal rotation at 90°: 4-     Isolated Muscles   Biceps: 5   Lower trapezius: 4   Middle trapezius: 3+   Supraspinatus: 3   Triceps: 4   Upper trapezius: 4-

## 2020-07-06 NOTE — LETTER
2020    Jerald Lucas MD  [address] [address]    Patient: Zane Friday   YOB: 1980   Date of Visit: 2020     Encounter Diagnosis     ICD-10-CM    1  Right shoulder pain, unspecified chronicity M25 511 CANCELED: PT plan of care cert/re-cert       Dear Dr Aguilar Dietrich:    Thank you for your recent referral of Zane Friday  Please review the attached evaluation summary from Rasta's recent visit  Please verify that you agree with the plan of care by signing the attached order  If you have any questions or concerns, please do not hesitate to call  I sincerely appreciate the opportunity to share in the care of one of your patients and hope to have another opportunity to work with you in the near future  Sincerely,    Marcy Paz Handler      Referring Provider:      I certify that I have read the below Plan of Care and certify the need for these services furnished under this plan of treatment while under my care  Jerald Lucas MD  [address]: [phone]          PT Re-Evaluation     Today's date: 2020  Patient name: Zane Friday  : 1980  MRN: [id number]  Referring provider: Krysten Olivo MD  Dx:   Encounter Diagnosis     ICD-10-CM    1  Right shoulder pain, unspecified chronicity M25 511 CANCELED: PT plan of care cert/re-cert       Start Time: 1506  Stop Time: 1400  Total time in clinic (min): 15 minutes    Assessment  Assessment details: Zane Friday is a 41 y o  female presenting to outpatient physical therapy with diagnosis of right shoulder manipulation  Patient continues with pain limited movements  Guarding continues  She demonstrates limited ROM compared to when nerve block was active  Reviewed self stretching program   Patient does have HEP to continue and this program was reviewed and encouraged for daily performance    Also encouraged to increase self stretch to >1x per day  Will continue to monitor  Impairments: abnormal movement, activity intolerance, impaired physical strength and lacks appropriate home exercise program  Understanding of Dx/Px/POC: good   Prognosis: good    Goals  STGs to be achieved in 4 weeks:  1  Pt to demonstrate reduced subjective pain rating "at worst" by at least 2-3 points from Initial Eval in order to allow for reduced pain with ADLs and improved functional activity tolerance  - MET  2  Pt to demonstrate increased AROM of right UE by at least 5-10 degrees in order to allow for greater ease and independence with ADLs and functional mobility  - MET  3  Pt to demonstrate full PROM of right UE in order to maximize joint mobility and function and allow for progression of exercise program and achievement of goals  - NOT MET  4  Pt to demonstrate increased MMT of right UE by at least 1/2-1 grade in order to improve safety and stability with ADLs and functional mobility  - ONGOING    LTGs in 2-4 weeks:   Pt will demonstrate the ability to perform effective self ROM of right GHJ in all planes - NOT MET    Plan  Patient would benefit from: skilled physical therapy  Other planned modality interventions: Pt has home TENS unit  Planned therapy interventions: manual therapy, neuromuscular re-education, therapeutic activities, therapeutic exercise and home exercise program  Frequency: 2x week  Duration in visits: 8  Duration in weeks: 2  Plan of Care beginning date: 7/6/2020  Plan of Care expiration date: 8/5/2020  Treatment plan discussed with: patient and PTA        Subjective Evaluation    History of Present Illness  Date of surgery: 6/9/2020  Mechanism of injury: UPDATE:  Patient reports decrease in movement in arm, difficulty tolerating stretching at home  Reports  not stretching her very well  PT/PTA team discussed several options to continue with self stretch at home        Patient s/p closed manipulation of right GHJ on 6/9/2020 by [facility]  Presents to OPPT for evaluation and ROM today  Presently with right UE nerve block  Denies pain  Noted with min finger movement, mild increase in hand edema, however, sling adjusted for fit  Does note some UT/cervical spine irritation from sling placement  Voices "tingling" into fingers/hand/forearm  Quality of life: good    Pain  No pain reported    Treatments  Previous treatment: physical therapy, medication and injection treatment  Current treatment: injection treatment and physical therapy  Patient Goals  Patient goals for therapy: increased strength, independence with ADLs/IADLs, return to sport/leisure activities, increased motion, improved balance, decreased pain and decreased edema          Objective     Postural Observations  Seated posture: good  Standing posture: good    Additional Postural Observation Details  (+) sling forward head and slouched/rounded shoulders - discontinued    Observations     Right Shoulder  Negative for edema       Cervical/Thoracic Screen   Cervical range of motion within normal limits with the following exceptions: Guarded cervical ROM  Thoracic range of motion within normal limits with the following exceptions: Increased kyphosis    Neurological Testing     Sensation     Shoulder     Right Shoulder   Intact: light touch and proprioception    Passive Range of Motion   Left Shoulder   External rotation 90°:  WFL  Internal rotation 90°:  WFL  Horizontal abduction: WFL  Horizontal adduction: WFL    Right Shoulder   Flexion: 154 degrees   Extension: 25 degrees   Abduction: 103 degrees   External rotation 45°:  54 degrees   External rotation 90°:  38 degrees with pain  Internal rotation 90°:  20 degrees with pain    Additional Passive Range of Motion Details        Strength/Myotome Testing     Right Shoulder     Planes of Motion   Flexion: 4+   Extension: 4+   Abduction: 4   Adduction: 4+   External rotation at 0°:  4+   External rotation at 45°:  4   External rotation at 90°:  4-   Internal rotation at 0°:  4+   Internal rotation at 45°:  4   Internal rotation at 90°:  4-     Isolated Muscles   Biceps: 5   Lower trapezius: 4   Middle trapezius: 3+   Supraspinatus: 3   Triceps: 4   Upper trapezius: 4-                  Daily Note     Today's date: 2020  Patient name: Austin Holland  : 1980  MRN: [id number]  Referring provider: Sammie Peabody, MD  Dx:   Encounter Diagnosis     ICD-10-CM    1   Right shoulder pain, unspecified chronicity M25 511 CANCELED: PT plan of care cert/re-cert       Start Time: 9051  Stop Time: 1400  Total time in clinic (min): 15 minutes    Subjective: See Re-eval      Objective: See treatment diary below      Assessment: See Re-eval      Plan: See Re-eval     Precautions: Falls  Re-eval Date: 2020    Date        Visit Count 6       FOTO        Pain In        Pain Out            Manuals    PROM right GHJ 45 min                 Neuro Re-Ed                                Ther Ex    HEP with handout and instruction to  Review IR self stretch, static stretches                               Ther Activity            Gait Training            Modalities

## 2020-07-07 ENCOUNTER — HOSPITAL ENCOUNTER (EMERGENCY)
Facility: HOSPITAL | Age: 40
Discharge: HOME | End: 2020-07-07
Attending: EMERGENCY MEDICINE
Payer: COMMERCIAL

## 2020-07-07 ENCOUNTER — APPOINTMENT (EMERGENCY)
Dept: ULTRASOUND IMAGING | Facility: HOSPITAL | Age: 40
End: 2020-07-07
Payer: COMMERCIAL

## 2020-07-07 ENCOUNTER — APPOINTMENT (EMERGENCY)
Dept: CT IMAGING | Facility: HOSPITAL | Age: 40
End: 2020-07-07
Payer: COMMERCIAL

## 2020-07-07 ENCOUNTER — TRANSCRIBE ORDERS (OUTPATIENT)
Dept: PHYSICAL THERAPY | Facility: CLINIC | Age: 40
End: 2020-07-07

## 2020-07-07 VITALS
DIASTOLIC BLOOD PRESSURE: 89 MMHG | RESPIRATION RATE: 18 BRPM | BODY MASS INDEX: 27.44 KG/M2 | WEIGHT: 170 LBS | OXYGEN SATURATION: 100 % | HEART RATE: 87 BPM | TEMPERATURE: 97.9 F | SYSTOLIC BLOOD PRESSURE: 139 MMHG

## 2020-07-07 DIAGNOSIS — M25.511 RIGHT SHOULDER PAIN, UNSPECIFIED CHRONICITY: Primary | ICD-10-CM

## 2020-07-07 DIAGNOSIS — R10.32 LEFT LOWER QUADRANT ABDOMINAL PAIN: Primary | ICD-10-CM

## 2020-07-07 DIAGNOSIS — R11.0 NAUSEA: ICD-10-CM

## 2020-07-07 LAB
ALBUMIN SERPL BCP-MCNC: 4.4 G/DL (ref 3.5–5.7)
ALP SERPL-CCNC: 53 U/L (ref 40–150)
ALT SERPL W P-5'-P-CCNC: 10 U/L (ref 7–52)
ANION GAP SERPL CALCULATED.3IONS-SCNC: 8 MMOL/L (ref 4–13)
AST SERPL W P-5'-P-CCNC: 11 U/L (ref 13–39)
BACTERIA UR QL AUTO: ABNORMAL /HPF
BASOPHILS # BLD AUTO: 0 THOUSANDS/ΜL (ref 0–0.1)
BASOPHILS NFR BLD AUTO: 0 % (ref 0–2)
BILIRUB SERPL-MCNC: 0.4 MG/DL (ref 0.2–1)
BILIRUB UR QL STRIP: NEGATIVE
BUN SERPL-MCNC: 10 MG/DL (ref 7–25)
CALCIUM SERPL-MCNC: 9.6 MG/DL (ref 8.6–10.5)
CHLORIDE SERPL-SCNC: 105 MMOL/L (ref 98–107)
CLARITY UR: CLEAR
CO2 SERPL-SCNC: 23 MMOL/L (ref 21–31)
COLOR UR: ABNORMAL
CREAT SERPL-MCNC: 0.62 MG/DL (ref 0.6–1.2)
EOSINOPHIL # BLD AUTO: 0.1 THOUSAND/ΜL (ref 0–0.61)
EOSINOPHIL NFR BLD AUTO: 1 % (ref 0–5)
ERYTHROCYTE [DISTWIDTH] IN BLOOD BY AUTOMATED COUNT: 13.1 % (ref 11.5–14.5)
EXT PREG TEST URINE: NEGATIVE
EXT. CONTROL ED NAV: NORMAL
GFR SERPL CREATININE-BSD FRML MDRD: 112 ML/MIN/1.73SQ M
GLUCOSE SERPL-MCNC: 87 MG/DL (ref 65–99)
GLUCOSE UR STRIP-MCNC: NEGATIVE MG/DL
HCT VFR BLD AUTO: 41.7 % (ref 42–47)
HGB BLD-MCNC: 14.2 G/DL (ref 12–16)
HGB UR QL STRIP.AUTO: NEGATIVE
KETONES UR STRIP-MCNC: NEGATIVE MG/DL
LEUKOCYTE ESTERASE UR QL STRIP: ABNORMAL
LIPASE SERPL-CCNC: 28 U/L (ref 11–82)
LYMPHOCYTES # BLD AUTO: 3.9 THOUSANDS/ΜL (ref 0.6–4.47)
LYMPHOCYTES NFR BLD AUTO: 33 % (ref 21–51)
MCH RBC QN AUTO: 31.9 PG (ref 26–34)
MCHC RBC AUTO-ENTMCNC: 34.2 G/DL (ref 31–37)
MCV RBC AUTO: 93 FL (ref 81–99)
MONOCYTES # BLD AUTO: 0.7 THOUSAND/ΜL (ref 0.17–1.22)
MONOCYTES NFR BLD AUTO: 5 % (ref 2–12)
NEUTROPHILS # BLD AUTO: 7.3 THOUSANDS/ΜL (ref 1.4–6.5)
NEUTS SEG NFR BLD AUTO: 61 % (ref 42–75)
NITRITE UR QL STRIP: NEGATIVE
NON-SQ EPI CELLS URNS QL MICRO: ABNORMAL /HPF
PH UR STRIP.AUTO: 6.5 [PH]
PLATELET # BLD AUTO: 269 THOUSANDS/UL (ref 149–390)
PMV BLD AUTO: 7.6 FL (ref 8.6–11.7)
POTASSIUM SERPL-SCNC: 4 MMOL/L (ref 3.5–5.5)
PROT SERPL-MCNC: 7.4 G/DL (ref 6.4–8.9)
PROT UR STRIP-MCNC: NEGATIVE MG/DL
RBC # BLD AUTO: 4.47 MILLION/UL (ref 3.9–5.2)
RBC #/AREA URNS AUTO: ABNORMAL /HPF
SODIUM SERPL-SCNC: 136 MMOL/L (ref 134–143)
SP GR UR STRIP.AUTO: <=1.005 (ref 1–1.03)
UROBILINOGEN UR QL STRIP.AUTO: 0.2 E.U./DL
WBC # BLD AUTO: 12 THOUSAND/UL (ref 4.8–10.8)
WBC #/AREA URNS AUTO: ABNORMAL /HPF

## 2020-07-07 PROCEDURE — 99284 EMERGENCY DEPT VISIT MOD MDM: CPT

## 2020-07-07 PROCEDURE — 96374 THER/PROPH/DIAG INJ IV PUSH: CPT

## 2020-07-07 PROCEDURE — 81003 URINALYSIS AUTO W/O SCOPE: CPT | Performed by: EMERGENCY MEDICINE

## 2020-07-07 PROCEDURE — 36415 COLL VENOUS BLD VENIPUNCTURE: CPT | Performed by: EMERGENCY MEDICINE

## 2020-07-07 PROCEDURE — 85025 COMPLETE CBC W/AUTO DIFF WBC: CPT | Performed by: EMERGENCY MEDICINE

## 2020-07-07 PROCEDURE — 74177 CT ABD & PELVIS W/CONTRAST: CPT

## 2020-07-07 PROCEDURE — 76856 US EXAM PELVIC COMPLETE: CPT

## 2020-07-07 PROCEDURE — 81025 URINE PREGNANCY TEST: CPT | Performed by: EMERGENCY MEDICINE

## 2020-07-07 PROCEDURE — 81001 URINALYSIS AUTO W/SCOPE: CPT | Performed by: EMERGENCY MEDICINE

## 2020-07-07 PROCEDURE — 83690 ASSAY OF LIPASE: CPT | Performed by: EMERGENCY MEDICINE

## 2020-07-07 PROCEDURE — 96361 HYDRATE IV INFUSION ADD-ON: CPT

## 2020-07-07 PROCEDURE — 99285 EMERGENCY DEPT VISIT HI MDM: CPT | Performed by: EMERGENCY MEDICINE

## 2020-07-07 PROCEDURE — 80053 COMPREHEN METABOLIC PANEL: CPT | Performed by: EMERGENCY MEDICINE

## 2020-07-07 PROCEDURE — 96375 TX/PRO/DX INJ NEW DRUG ADDON: CPT

## 2020-07-07 PROCEDURE — 96376 TX/PRO/DX INJ SAME DRUG ADON: CPT

## 2020-07-07 RX ORDER — ONDANSETRON 2 MG/ML
4 INJECTION INTRAMUSCULAR; INTRAVENOUS ONCE
Status: COMPLETED | OUTPATIENT
Start: 2020-07-07 | End: 2020-07-07

## 2020-07-07 RX ORDER — MORPHINE SULFATE 4 MG/ML
4 INJECTION, SOLUTION INTRAMUSCULAR; INTRAVENOUS ONCE AS NEEDED
Status: COMPLETED | OUTPATIENT
Start: 2020-07-07 | End: 2020-07-07

## 2020-07-07 RX ORDER — ONDANSETRON 4 MG/1
4 TABLET, ORALLY DISINTEGRATING ORAL EVERY 6 HOURS PRN
Qty: 15 TABLET | Refills: 0 | Status: SHIPPED | OUTPATIENT
Start: 2020-07-07 | End: 2021-06-03 | Stop reason: ALTCHOICE

## 2020-07-07 RX ORDER — KETOROLAC TROMETHAMINE 30 MG/ML
15 INJECTION, SOLUTION INTRAMUSCULAR; INTRAVENOUS ONCE
Status: COMPLETED | OUTPATIENT
Start: 2020-07-07 | End: 2020-07-07

## 2020-07-07 RX ADMIN — KETOROLAC TROMETHAMINE 15 MG: 30 INJECTION, SOLUTION INTRAMUSCULAR at 16:59

## 2020-07-07 RX ADMIN — ONDANSETRON 4 MG: 2 INJECTION INTRAMUSCULAR; INTRAVENOUS at 15:50

## 2020-07-07 RX ADMIN — ONDANSETRON 4 MG: 2 INJECTION INTRAMUSCULAR; INTRAVENOUS at 19:35

## 2020-07-07 RX ADMIN — MORPHINE SULFATE 4 MG: 4 INJECTION INTRAVENOUS at 17:30

## 2020-07-07 RX ADMIN — SODIUM CHLORIDE 1000 ML: 0.9 INJECTION, SOLUTION INTRAVENOUS at 16:59

## 2020-07-07 RX ADMIN — IOHEXOL 100 ML: 350 INJECTION, SOLUTION INTRAVENOUS at 16:49

## 2020-07-07 RX ADMIN — SODIUM CHLORIDE 500 ML: 0.9 INJECTION, SOLUTION INTRAVENOUS at 15:56

## 2020-07-07 NOTE — PROGRESS NOTES
Daily Note     Today's date: 2020  Patient name: Warden Short  : 1980  MRN: [id number]  Referring provider: Haider Cantor MD  Dx:   Encounter Diagnosis     ICD-10-CM    1   Right shoulder pain, unspecified chronicity M25 511 CANCELED: PT plan of care cert/re-cert       Start Time: 6001  Stop Time: 1400  Total time in clinic (min): 15 minutes    Subjective: See Re-eval      Objective: See treatment diary below      Assessment: See Re-eval      Plan: See Re-eval     Precautions: Falls  Re-eval Date: 2020    Date        Visit Count 6       FOTO        Pain In        Pain Out            Manuals    PROM right GHJ 45 min                 Neuro Re-Ed                                Ther Ex    HEP with handout and instruction to  Review IR self stretch, static stretches                               Ther Activity            Gait Training            Modalities

## 2020-07-07 NOTE — ED PROVIDER NOTES
History  Chief Complaint   Patient presents with    Abdominal Pain     left sided abd pain, started suddenly this afternoon, hx of ovarian cysts and kidney stones     HPI    42 yo F hxo f bipolar kidney stones and endometriosis presents tot he ED for eval of Left lower quad abd pain  Onset: Started three hours ago  Duration: constant  Pain currently: yes sharp  Pain meds taken: none  Prior similar pain: yes, when she had ovarian cyst several years ago  Where: [facility]  Radiation: no  Associated N/V: nausea, no emesis  Associated with food: did not eat today  Last BM: today, soft normal  Urinary complaints:  no    LMP: hysterectomy,  Vaginal Discharge no    Prior Surgeries   Hysterectomy, still has ovaries  cholecystectomy    Previous Imaging   Dec 2019:   CT abd pelvis  1  There is mild left perinephric soft tissue stranding without evidence of hydronephrosis or left ureteral stone  This appearance may be related to pyelonephritis, versus recent passage of a calculus on the left  2  There is a 2 mm left kidney upper pole calculus  3  No calculi on the right       Last Colonsocopy/Endoscopy/GI visit          Prior to Admission Medications   Prescriptions Last Dose Informant Patient Reported? Taking?    Cholecalciferol (VITAMIN D3) 2000 units TABS  Self Yes No   Sig: Take 4,000 Units by mouth daily     LORazepam (ATIVAN) 0 5 mg tablet  Self Yes No   Sig: Take 1 tablet by mouth daily at bedtime     Multiple Vitamin (DAILY VALUE MULTIVITAMIN) TABS  Self Yes No   Sig: Take by mouth   Nerve Stimulator (STANDARD TENS) ISABELLA  Self No No   Sig: by Does not apply route 3 (three) times a day as needed (pain)   buPROPion (WELLBUTRIN SR) 100 mg 12 hr tablet   Yes No   citalopram (CeleXA) 20 mg tablet   Yes No   Sig: Take 20 mg by mouth every morning   fluocinonide (LIDEX) 0 05 % cream   No No   Sig: Apply topically 2 (two) times a day   gabapentin (NEURONTIN) 300 mg capsule   No No   Si tab twice a day and 2 at bedtime ibuprofen (MOTRIN) 600 mg tablet   No No   Sig: Take 1 tablet (600 mg total) by mouth every 6 (six) hours as needed for mild pain for up to 20 doses   lamoTRIgine (LAMICTAL) 200 MG tablet  Self Yes No   Sig: Take 1 tablet by mouth 2 (two) times a day   methylPREDNISolone 4 MG tablet therapy pack   No No   Sig: Use as directed on package   naproxen (NAPROSYN) 500 mg tablet   No No   Sig: take 1 tablet by mouth twice a day with meals   simvastatin (ZOCOR) 40 mg tablet   No No   Sig: take 1 tablet by mouth once daily   tiZANidine (ZANAFLEX) 4 mg tablet   No No   Sig: Take 1 tablet (4 mg total) by mouth daily at bedtime      Facility-Administered Medications: None       Past Medical History:   Diagnosis Date    Anxiety     Bipolar disorder ([facility] [address] )     Chronic pain     Endometriosis     Hyperlipidemia     Kidney stone     Urinary frequency     resolved: 6/14/2016    Urinary urgency     resolved: 6/14/2016       Past Surgical History:   Procedure Laterality Date    BLADDER SUSPENSION      CHOLECYSTECTOMY      EPIDURAL BLOCK INJECTION N/A 10/17/2019    Procedure: BLOCK / INJECTION EPIDURAL STEROID CERVICAL c7-t1 interlaminar;  Surgeon: Anel Voss MD;  [facility];  Service: Pain Management     EPIDURAL BLOCK INJECTION Bilateral 1/16/2020    Procedure: L4-L5 Transforaminal Epidural Steroid Injection;  Surgeon: Anel Voss MD;  [facility];  Service: Pain Management     FL GUIDED NEEDLE PLAC BX/ASP/INJ  10/17/2019    FL GUIDED NEEDLE PLAC BX/ASP/INJ  1/16/2020    HERNIA REPAIR      HYSTERECTOMY      partial    OVARIAN CYST REMOVAL      CA ARTHROSCOPY SHOULDER SURGICAL BICEPS TENODESIS Right 8/14/2019    Procedure: ARTHROSCOPY SHOULDER WITH BICEPS TENOTOMY AND SAD;  Surgeon: Elsy Heard MD;  [facility]: [facility];  Service: Orthopedics    CA LAP,CHOLECYSTECTOMY N/A 4/30/2018    Procedure: CHOLECYSTECTOMY LAPAROSCOPIC;  Surgeon: Bandar Miner DO;  [facility]: [facility]; Service: General    TONSILLECTOMY         Family History   Problem Relation Age of Onset    Diabetes Mother     Heart disease Mother         rheumatic    Neuropathy Mother     COPD Mother     Diabetes type II Mother     Cancer Father     Lung cancer Father     Stroke Maternal Grandmother         CVA    Diabetes type II Maternal Grandmother         mellitus    Other Maternal Grandfather         esophageal abnormality    Diabetes type II Paternal Grandmother         mellitus    Diabetes type II Paternal Grandfather         mellitus    Depression Family     Esophageal cancer Family     Lung cancer Family     Stomach cancer Family      I have reviewed and agree with the history as documented  E-Cigarette/Vaping     E-Cigarette/Vaping Substances     Social History     Tobacco Use    Smoking status: Current Every Day Smoker     Packs/day: 1 50     Types: Cigarettes    Smokeless tobacco: Never Used   Substance Use Topics    Alcohol use: Yes     Frequency: 2-4 times a month     Drinks per session: 1 or 2     Binge frequency: Never     Comment: rare; consumes alcohol occasionally (as per allscripts); social alcohol use (As per allscripts)    Drug use: No       Review of Systems   Constitutional: Negative for chills, fatigue and fever  HENT: Negative for sore throat  Eyes: Negative for redness and visual disturbance  Respiratory: Negative for cough and shortness of breath  Cardiovascular: Negative for chest pain  Gastrointestinal: Positive for abdominal pain and nausea  Negative for diarrhea  Genitourinary: Negative for difficulty urinating, dysuria and pelvic pain  Musculoskeletal: Negative for back pain  Skin: Negative for rash  Neurological: Negative for syncope, weakness and headaches  All other systems reviewed and are negative  Physical Exam  Physical Exam   Constitutional: She is oriented to person, place, and time  She appears well-developed and well-nourished   No distress  HENT:   Head: Normocephalic and atraumatic  Eyes: Conjunctivae are normal    Neck: Normal range of motion  Cardiovascular: Normal rate, regular rhythm and normal heart sounds  Pulmonary/Chest: Effort normal and breath sounds normal  No respiratory distress  Abdominal: Soft  Bowel sounds are normal  There is tenderness in the left lower quadrant  Musculoskeletal: Normal range of motion  Neurological: She is alert and oriented to person, place, and time  No cranial nerve deficit or sensory deficit  She exhibits normal muscle tone  Coordination normal    Skin: Skin is warm and dry  No rash noted  Psychiatric: She has a normal mood and affect  Nursing note and vitals reviewed        Vital Signs  ED Triage Vitals [07/07/20 1535]   Temperature Pulse Respirations Blood Pressure SpO2   97 9 °F (36 6 °C) 87 18 139/89 100 %      Temp Source Heart Rate Source Patient Position - Orthostatic VS BP Location FiO2 (%)   Temporal Monitor Sitting Left arm --      Pain Score       8           Vitals:    07/07/20 1535   BP: 139/89   Pulse: 87   Patient Position - Orthostatic VS: Sitting         Visual Acuity      ED Medications  Medications   ondansetron (ZOFRAN) injection 4 mg (4 mg Intravenous Given 7/7/20 1550)   sodium chloride 0 9 % bolus 500 mL (0 mL Intravenous Stopped 7/7/20 1659)   ketorolac (TORADOL) injection 15 mg (15 mg Intravenous Given 7/7/20 1659)   sodium chloride 0 9 % bolus 1,000 mL (0 mL Intravenous Stopped 7/7/20 1935)   morphine (PF) 4 mg/mL injection 4 mg (4 mg Intravenous Given 7/7/20 1730)   iohexol (OMNIPAQUE) 350 MG/ML injection (MULTI-DOSE) 100 mL (100 mL Intravenous Given 7/7/20 1649)   ondansetron (ZOFRAN) injection 4 mg (4 mg Intravenous Given 7/7/20 1935)       Diagnostic Studies  Results Reviewed     Procedure Component Value Units Date/Time    Urine Microscopic [[id number]]  (Abnormal) Collected:  07/07/20 1556    Lab Status:  Final result Specimen:  Urine, Clean Catch Updated:  07/07/20 1636     RBC, UA None Seen /hpf      WBC, UA 4-10 /hpf      Epithelial Cells Occasional /hpf      Bacteria, UA None Seen /hpf     POCT pregnancy, urine [[id number]]  (Normal) Resulted:  07/07/20 1624    Lab Status:  Final result Updated:  07/07/20 1624     EXT PREG TEST UR (Ref: Negative) negative     Control valid    Lipase [[id number]]  (Normal) Collected:  07/07/20 1549    Lab Status:  Final result Specimen:  Blood from Arm, Right Updated:  07/07/20 1617     Lipase 28 u/L     Comprehensive metabolic panel [[id number]]  (Abnormal) Collected:  07/07/20 1549    Lab Status:  Final result Specimen:  Blood from Arm, Right Updated:  07/07/20 1616     Sodium 136 mmol/L      Potassium 4 0 mmol/L      Chloride 105 mmol/L      CO2 23 mmol/L      ANION GAP 8 mmol/L      BUN 10 mg/dL      Creatinine 0 62 mg/dL      Glucose 87 mg/dL      Calcium 9 6 mg/dL      AST 11 U/L      ALT 10 U/L      Alkaline Phosphatase 53 U/L      Total Protein 7 4 g/dL      Albumin 4 4 g/dL      Total Bilirubin 0 40 mg/dL      eGFR 112 ml/min/1 73sq m     Narrative:       [place] guidelines for Chronic Kidney Disease (CKD):     Stage 1 with normal or high GFR (GFR > 90 mL/min/1 73 square meters)    Stage 2 Mild CKD (GFR = 60-89 mL/min/1 73 square meters)    Stage 3A Moderate CKD (GFR = 45-59 mL/min/1 73 square meters)    Stage 3B Moderate CKD (GFR = 30-44 mL/min/1 73 square meters)    Stage 4 Severe CKD (GFR = 15-29 mL/min/1 73 square meters)    Stage 5 End Stage CKD (GFR <15 mL/min/1 73 square meters)  Note: GFR calculation is accurate only with a steady state creatinine    UA w Reflex to Microscopic w Reflex to Culture [[id number]]  (Abnormal) Collected:  07/07/20 1556    Lab Status:  Final result Specimen:  Urine, Clean Catch Updated:  07/07/20 1608     Color, UA Straw     Clarity, UA Clear     Specific Gravity, UA <=1 005     pH, UA 6 5     Leukocytes, UA 1+     Nitrite, UA Negative     Protein, UA Negative mg/dl      Glucose, UA Negative mg/dl      Ketones, UA Negative mg/dl      Urobilinogen, UA 0 2 E U /dl      Bilirubin, UA Negative     Blood, UA Negative    CBC and differential [[id number]]  (Abnormal) Collected:  07/07/20 1549    Lab Status:  Final result Specimen:  Blood from Arm, Right Updated:  07/07/20 1603     WBC 12 00 Thousand/uL      RBC 4 47 Million/uL      Hemoglobin 14 2 g/dL      Hematocrit 41 7 %      MCV 93 fL      MCH 31 9 pg      MCHC 34 2 g/dL      RDW 13 1 %      MPV 7 6 fL      Platelets 165 Thousands/uL      Neutrophils Relative 61 %      Lymphocytes Relative 33 %      Monocytes Relative 5 %      Eosinophils Relative 1 %      Basophils Relative 0 %      Neutrophils Absolute 7 30 Thousands/µL      Lymphocytes Absolute 3 90 Thousands/µL      Monocytes Absolute 0 70 Thousand/µL      Eosinophils Absolute 0 10 Thousand/µL      Basophils Absolute 0 00 Thousands/µL                  US pelvis complete non OB   Final Result by Duke Barboza MD (07/07 1855)       Normal transabdominal ultrasound post hysterectomy  Workstation performed: [id number]         CT abdomen pelvis with contrast   Final Result by Pernell Valentino MD (07/07 1702)      No acute inflammatory findings in the abdomen or pelvis  Workstation performed: [id number]                    Procedures  Procedures         ED Course  ED Course as of Jul 07 1953   Tue Jul 07, 2020   1631 PREGNANCY TEST URINE: negative       US AUDIT      Most Recent Value   Initial Alcohol Screen: US AUDIT-C    1  How often do you have a drink containing alcohol?  0 Filed at: 07/07/2020 1536   Audit-C Score  0 Filed at: 07/07/2020 1536                  LEANDRO/DAST-10      Most Recent Value   How many times in the past year have you    Used an illegal drug or used a prescription medication for non-medical reasons?   Never Filed at: 07/07/2020 1536              MDM    57-year-old female who presents to the ER for evaluation of left lower quadrant abdominal pain  Given her tenderness on examination, will do a CT scan ruling out any acute abdominal pathology  Labs were sent, looking for any acute leukocytosis, electrolyte abnormalities  CBC showed no leukocytosis suggesting infection, no elevation LFTs, no elevation of lipase  Urinalysis was negative for UTI  CT scan showed no acute pathology, did have bilateral adnexal follicles, proceed with the ultrasound looking for torsion  Ultrasound was read to be normal as well  The patient has significant relief of her symptoms with analgesia, as well as Zofran for nausea  Patient has a follow-up appoint with OBGYN tomorrow, she will also follow-up with GI this week  The patient was given symptomatic treatments and discharged home with GI OBGYN follow-up  The patient was instructed to follow up as documented  Strict return precautions were discussed with the patient and the patient was instructed to return to the emergency department immediately if symptoms worsen  The patient/patient family member acknowledged and were in agreement with plan  Disposition  Final diagnoses:   Left lower quadrant abdominal pain   Nausea     Time reflects when diagnosis was documented in both MDM as applicable and the Disposition within this note     Time User Action Codes Description Comment    7/7/2020  7:26 PM Lissette Lockett Add [R10 32] Left lower quadrant abdominal pain     7/7/2020  7:27 PM Lissette Lockett Add [R11 0] Nausea       ED Disposition     ED Disposition Condition Date/Time Comment    Discharge Stable Tu Jul 7, 2020  7:26 PM Margot Brito discharge to home/self care              Follow-up Information     Follow up With Specialties Details Why Contact Info Additional [address] [facility] Ob[facility]necology Schedule an appointment as soon as possible for a visit in 1 week For follow up regarding your symptoms and recheck [address] [facility] [id number] [address] [place] [address], [address] [place] [address], [facility] WITH [place], [place], [address]    [facility] WITH [facility] G[facility] Schedule an appointment as soon as possible for a visit in 1 week For follow up regarding your symptoms and recheck [address]  [phone]             Discharge Medication List as of 7/7/2020  7:30 PM      START taking these medications    Details   ondansetron (ZOFRAN-ODT) 4 mg disintegrating tablet Take 1 tablet (4 mg total) by mouth every 6 (six) hours as needed for nausea or vomiting, Starting Tue 7/7/2020, Normal         CONTINUE these medications which have NOT CHANGED    Details   buPROPion (WELLBUTRIN SR) 100 mg 12 hr tablet Starting Fri 5/17/2019, Historical Med      Cholecalciferol (VITAMIN D3) 2000 units TABS Take 4,000 Units by mouth daily  , Historical Med      citalopram (CeleXA) 20 mg tablet Take 20 mg by mouth every morning, Starting Wed 1/2/2019, Historical Med      fluocinonide (LIDEX) 0 05 % cream Apply topically 2 (two) times a day, Starting Thu 4/2/2020, Normal      gabapentin (NEURONTIN) 300 mg capsule 1 tab twice a day and 2 at bedtime, Normal      ibuprofen (MOTRIN) 600 mg tablet Take 1 tablet (600 mg total) by mouth every 6 (six) hours as needed for mild pain for up to 20 doses, Starting Wed 2/12/2020, Normal      lamoTRIgine (LAMICTAL) 200 MG tablet Take 1 tablet by mouth 2 (two) times a day, Historical Med      LORazepam (ATIVAN) 0 5 mg tablet Take 1 tablet by mouth daily at bedtime  , Historical Med      methylPREDNISolone 4 MG tablet therapy pack Use as directed on package, Normal      Multiple Vitamin (DAILY VALUE MULTIVITAMIN) TABS Take by mouth, Historical Med      naproxen (NAPROSYN) 500 mg tablet take 1 tablet by mouth twice a day with meals, Normal      Nerve Stimulator (STANDARD TENS) ISABELLA by Does not apply route 3 (three) times a day as needed (pain), Starting Fri 3/16/2018, Normal      simvastatin (ZOCOR) 40 mg tablet take 1 tablet by mouth once daily, Normal      tiZANidine (ZANAFLEX) 4 mg tablet Take 1 tablet (4 mg total) by mouth daily at bedtime, Starting Mon 5/18/2020, Normal           No discharge procedures on file      PDMP Review     None          ED Provider  Electronically Signed by           Ankita Reeves MD  07/07/20 6204

## 2020-07-07 NOTE — ED NOTES
Patient transported to [address] [place] [address],[address] Floor, [address] [facility]  07/07/20 3499

## 2020-07-10 ENCOUNTER — DOCUMENTATION (OUTPATIENT)
Dept: OTHER | Facility: HOSPITAL | Age: 40
End: 2020-07-10

## 2020-07-10 ENCOUNTER — OFFICE VISIT (OUTPATIENT)
Dept: PHYSICAL THERAPY | Facility: CLINIC | Age: 40
End: 2020-07-10
Payer: COMMERCIAL

## 2020-07-10 DIAGNOSIS — Z20.822 ENCOUNTER FOR LABORATORY TESTING FOR COVID-19 VIRUS: Primary | ICD-10-CM

## 2020-07-10 DIAGNOSIS — M25.511 RIGHT SHOULDER PAIN, UNSPECIFIED CHRONICITY: Primary | ICD-10-CM

## 2020-07-10 PROCEDURE — 97140 MANUAL THERAPY 1/> REGIONS: CPT

## 2020-07-10 PROCEDURE — 97110 THERAPEUTIC EXERCISES: CPT

## 2020-07-10 NOTE — PROGRESS NOTES
Daily Note     Today's date: 7/10/2020  Patient name: Lake Villanueva  : 1980  MRN: [id number]  Referring provider: Lui Desai MD  Dx:   Encounter Diagnosis     ICD-10-CM    1  Right shoulder pain, unspecified chronicity M25 511                   Subjective: R shoulder pain not too bad today  I was in ER other day but for left lower abdominal pain        Objective: See treatment diary below      Assessment: Tolerated treatment well  Progression of POC added this date per discussion with MW/PT  Pt continues with Limited PROM R shoulder with > deficit into IR  Pt provided [employer]se, issued green theratubing for carryover with LTP/MTP exercises at home  Patient would benefit from continued PT      Plan: Continue per plan of care        Precautions: Falls  Re-eval Date: 2020    Date 7/6 7/10      Visit Count 6 7      FOTO        Pain In  3      Pain Out  same          Manuals        PROM right GHJ 45 min   30 min                              Neuro Re-Ed                        Ther Ex        HEP with handout and instruction to  Review IR self stretch, static stretches       Pullies  Flex/Scap  2 min ea dir      LTP/MTP  Green        Black burns  #1-6  5x 5" ea  cues      Body Blade  - @ side  - @ 90* / horizon up/down and push/pull  30" ea dir                                                              Ther Activity                        Gait Training                        Modalities  Pt def

## 2020-07-14 ENCOUNTER — OFFICE VISIT (OUTPATIENT)
Dept: PHYSICAL THERAPY | Facility: CLINIC | Age: 40
End: 2020-07-14
Payer: COMMERCIAL

## 2020-07-14 DIAGNOSIS — M25.511 RIGHT SHOULDER PAIN, UNSPECIFIED CHRONICITY: Primary | ICD-10-CM

## 2020-07-14 PROCEDURE — 97140 MANUAL THERAPY 1/> REGIONS: CPT

## 2020-07-14 PROCEDURE — 97110 THERAPEUTIC EXERCISES: CPT

## 2020-07-14 NOTE — PROGRESS NOTES
Daily Note     Today's date: 2020  Patient name: Sushma Juárez  : 1980  MRN: [id number]  Referring provider: Marco Lopes MD  Dx:   Encounter Diagnosis     ICD-10-CM    1  Right shoulder pain, unspecified chronicity M25 511                   Subjective: R shoulder feeling achy and tight today        Objective: See treatment diary below      Assessment: Tolerated treatment well  Denied increase pain R shoulder, pt encourage to verbalize increase sx's t/o rx   Pt remains limited PROM R shoulder with > deficit into IR  Progress POC per discussion with PT/MW  Demonstrates scap/ER RTC weakness    Patient would benefit from continued PT      Plan: Continue per plan of care        Precautions: Falls  Re-eval Date: 2020    Date 7/6 7/10 7/14     Visit Count 6 7 8     FOTO        Pain In  3 3-4     Pain Out  same 3         Manuals        PROM right GHJ 45 min   30 min 25 min                             Neuro Re-Ed                        Ther Ex        HEP with handout and instruction to  Review IR self stretch, static stretches       Pullies  Flex/Scap  2 min ea dir 2 min ea dir     LTP/MTP  Green    10x 5" ea Green    15x 5" ea     Black burns  #1-6  5x 5" ea  cues 5x 5" ea  cues     Body Blade  - @ side  - @ 90* horizon up/down and push/pull  30" ea dir 30" ea dir     Horizontal ABD 90* @wall   10x                                                     Ther Activity                        Gait Training                        Modalities  Pt def Pt def

## 2020-07-15 ENCOUNTER — TRANSCRIBE ORDERS (OUTPATIENT)
Dept: ADMINISTRATIVE | Facility: HOSPITAL | Age: 40
End: 2020-07-15

## 2020-07-15 DIAGNOSIS — N83.291 OTHER OVARIAN CYST, RIGHT SIDE: Primary | ICD-10-CM

## 2020-07-16 ENCOUNTER — HOSPITAL ENCOUNTER (OUTPATIENT)
Dept: ULTRASOUND IMAGING | Facility: HOSPITAL | Age: 40
Discharge: HOME | End: 2020-07-16
Payer: COMMERCIAL

## 2020-07-16 DIAGNOSIS — N83.291 OTHER OVARIAN CYST, RIGHT SIDE: ICD-10-CM

## 2020-07-16 PROCEDURE — 76856 US EXAM PELVIC COMPLETE: CPT

## 2020-07-16 PROCEDURE — 76830 TRANSVAGINAL US NON-OB: CPT

## 2020-07-17 ENCOUNTER — OFFICE VISIT (OUTPATIENT)
Dept: PHYSICAL THERAPY | Facility: CLINIC | Age: 40
End: 2020-07-17
Payer: COMMERCIAL

## 2020-07-17 DIAGNOSIS — M25.511 RIGHT SHOULDER PAIN, UNSPECIFIED CHRONICITY: Primary | ICD-10-CM

## 2020-07-17 PROCEDURE — 97110 THERAPEUTIC EXERCISES: CPT

## 2020-07-17 PROCEDURE — 97140 MANUAL THERAPY 1/> REGIONS: CPT

## 2020-07-17 NOTE — PROGRESS NOTES
Daily Note     Today's date: 2020  Patient name: Shi Angel  : 1980  MRN: [id number]  Referring provider: Thor Harvey MD  Dx:   Encounter Diagnosis     ICD-10-CM    1  Right shoulder pain, unspecified chronicity M25 511                   Subjective: I feel I am getting stronger slowly  Still have trouble reaching behind my back R shoulder  I think I slept on it wrong last night increase pain R shoulder today    Objective: See treatment diary below      Assessment: Tolerated treatment well Denied any increase pain with TE   Progression of POC as per discussion with PT / MW   Trial PNF with cues for proper form/technique  Pt with noted slight gains with PROM with limitations > IR/ER  RTC weakness Patient would benefit from continued PT  X 1 visit with possible transition to DC to HEP      Plan: Continue per plan of care        Precautions: Falls  Re-eval Date: 2020    Date 7/6 7/10 7/14 7/17    Visit Count 6 7 8 9    FOTO        Pain In  3 3-4 3    Pain Out  same 3 4        Manuals        PROM right GHJ 45 min   30 min 25 min 25 min                            Neuro Re-Ed                        Ther Ex        HEP with handout and instruction to  Review IR self stretch, static stretches       Pullies  Flex/Scap  2 min ea dir 2 min ea dir 2 min ea dir  UBE 90RPM 5 min alt    LTP/MTP  Green    10x 5" ea Green    15x 5" ea Green  3x10, 5" ea    Black burns  #1-6  5x 5" ea  cues 5x 5" ea  cues 5x 5" ea    Body Blade  - @ side  - @ 90* horizon up/down and push/pull  30" ea dir 30" ea dir 50-60" ea    Horizontal ABD 90* @wall   10x review    PNF 1 / 2    Green   10x ea  cues                                            Ther Activity                        Gait Training                        Modalities  Pt def Pt def Pt def

## 2020-07-20 ENCOUNTER — OFFICE VISIT (OUTPATIENT)
Dept: PHYSICAL THERAPY | Facility: CLINIC | Age: 40
End: 2020-07-20
Payer: COMMERCIAL

## 2020-07-20 DIAGNOSIS — M25.511 RIGHT SHOULDER PAIN, UNSPECIFIED CHRONICITY: Primary | ICD-10-CM

## 2020-07-20 DIAGNOSIS — Z20.822 ENCOUNTER FOR LABORATORY TESTING FOR COVID-19 VIRUS: ICD-10-CM

## 2020-07-20 PROCEDURE — U0003 INFECTIOUS AGENT DETECTION BY NUCLEIC ACID (DNA OR RNA); SEVERE ACUTE RESPIRATORY SYNDROME CORONAVIRUS 2 (SARS-COV-2) (CORONAVIRUS DISEASE [COVID-19]), AMPLIFIED PROBE TECHNIQUE, MAKING USE OF HIGH THROUGHPUT TECHNOLOGIES AS DESCRIBED BY CMS-2020-01-R: HCPCS

## 2020-07-20 PROCEDURE — 97140 MANUAL THERAPY 1/> REGIONS: CPT

## 2020-07-20 PROCEDURE — 97110 THERAPEUTIC EXERCISES: CPT

## 2020-07-20 NOTE — PROGRESS NOTES
Daily Note     Today's date: 2020  Patient name: Ade Gregory  : 1980  MRN: [id number]  Referring provider: Sindhu Quintanilla MD  Dx:   Encounter Diagnosis     ICD-10-CM    1  Right shoulder pain, unspecified chronicity M25 511                   Subjective: R shoulder overall is achy   I mowed the grass takes me about an hour with a break        Objective: See treatment diary below    PROM R shoulder:  Flexion 155*  Scap 123*  *  ER 58*  IR 32*      Assessment: Tolerated treatment well   Denied any increase pain with progression of resistance with TE   Noted reduce PROM measurements today with increase tightness R shoulder in all planes  Pt provided handouts for PNF pattern with review of self stretch/AAROM/strengthening TE as pt transitions to DC today  See last RA for additional findings        Plan: DC to HEP per PT/MW last RA note     Precautions: Falls  Re-eval Date: 2020    Date 7/6 7/10 7/14 7/17 7/20   Visit Count 6 7 8 9 10   FOTO        Pain In  3 3-4 3 2   Pain Out  same 3 4        Manuals        PROM right GHJ 45 min   30 min 25 min 25 min 15 min                           Neuro Re-Ed                        Ther Ex        HEP with handout and instruction to  Review IR self stretch, static stretches       Pullies  Flex/Scap  2 min ea dir 2 min ea dir 2 min ea dir  UBE 90RPM 5 min alt 2 min ea dir  UBE 70RPM 6 min alt   LTP/MTP  Green    10x 5" ea Green    15x 5" ea Green  3x10, 5" ea Janny  10#  2x10  5"   Black burns  #1-6  5x 5" ea  cues 5x 5" ea  cues 5x 5" ea 10x 5"   IR/ER     [place]  5#  2x10 IR  10x ER   Body Blade  - @ side  - @ 90* horizon up/down and push/pull  30" ea dir 30" ea dir 50-60" ea 60"ea   Horizontal ABD 90* @wall   10x review    PNF 1 / 2    Green   10x ea  cues Green  10x ea cues                                           Ther Activity                        Gait Training                        Modalities  Pt def Pt def Pt def Pt def

## 2020-07-21 ENCOUNTER — APPOINTMENT (OUTPATIENT)
Dept: PHYSICAL THERAPY | Facility: CLINIC | Age: 40
End: 2020-07-21
Payer: COMMERCIAL

## 2020-07-22 LAB
INPATIENT: NORMAL
SARS-COV-2 RNA SPEC QL NAA+PROBE: NOT DETECTED

## 2020-07-29 ENCOUNTER — ANESTHESIA EVENT (OUTPATIENT)
Dept: GASTROENTEROLOGY | Facility: HOSPITAL | Age: 40
End: 2020-07-29

## 2020-07-30 ENCOUNTER — ANESTHESIA (OUTPATIENT)
Dept: GASTROENTEROLOGY | Facility: HOSPITAL | Age: 40
End: 2020-07-30

## 2020-07-30 ENCOUNTER — HOSPITAL ENCOUNTER (OUTPATIENT)
Dept: GASTROENTEROLOGY | Facility: HOSPITAL | Age: 40
Setting detail: OUTPATIENT SURGERY
Discharge: HOME | End: 2020-07-30
Attending: INTERNAL MEDICINE | Admitting: INTERNAL MEDICINE
Payer: COMMERCIAL

## 2020-07-30 VITALS
HEART RATE: 89 BPM | BODY MASS INDEX: 27.32 KG/M2 | DIASTOLIC BLOOD PRESSURE: 79 MMHG | RESPIRATION RATE: 18 BRPM | SYSTOLIC BLOOD PRESSURE: 132 MMHG | WEIGHT: 170 LBS | HEIGHT: 66 IN | TEMPERATURE: 97.8 F | OXYGEN SATURATION: 98 %

## 2020-07-30 DIAGNOSIS — Z86.010 PERSONAL HISTORY OF COLONIC POLYPS: ICD-10-CM

## 2020-07-30 DIAGNOSIS — R10.814 LEFT LOWER QUADRANT ABDOMINAL TENDERNESS: ICD-10-CM

## 2020-07-30 DIAGNOSIS — R10.816 EPIGASTRIC ABDOMINAL TENDERNESS: ICD-10-CM

## 2020-07-30 PROCEDURE — 88305 TISSUE EXAM BY PATHOLOGIST: CPT | Performed by: PATHOLOGY

## 2020-07-30 RX ORDER — SODIUM CHLORIDE, SODIUM LACTATE, POTASSIUM CHLORIDE, CALCIUM CHLORIDE 600; 310; 30; 20 MG/100ML; MG/100ML; MG/100ML; MG/100ML
125 INJECTION, SOLUTION INTRAVENOUS CONTINUOUS
Status: CANCELLED | OUTPATIENT
Start: 2020-07-30

## 2020-07-30 RX ORDER — SODIUM CHLORIDE, SODIUM LACTATE, POTASSIUM CHLORIDE, CALCIUM CHLORIDE 600; 310; 30; 20 MG/100ML; MG/100ML; MG/100ML; MG/100ML
125 INJECTION, SOLUTION INTRAVENOUS CONTINUOUS
Status: DISCONTINUED | OUTPATIENT
Start: 2020-07-30 | End: 2020-08-03 | Stop reason: HOSPADM

## 2020-07-30 RX ORDER — LIDOCAINE HYDROCHLORIDE 10 MG/ML
INJECTION, SOLUTION EPIDURAL; INFILTRATION; INTRACAUDAL; PERINEURAL AS NEEDED
Status: DISCONTINUED | OUTPATIENT
Start: 2020-07-30 | End: 2020-07-30 | Stop reason: SURG

## 2020-07-30 RX ORDER — PROPOFOL 10 MG/ML
INJECTION, EMULSION INTRAVENOUS AS NEEDED
Status: DISCONTINUED | OUTPATIENT
Start: 2020-07-30 | End: 2020-07-30 | Stop reason: SURG

## 2020-07-30 RX ADMIN — PROPOFOL 30 MG: 10 INJECTION, EMULSION INTRAVENOUS at 10:33

## 2020-07-30 RX ADMIN — SODIUM CHLORIDE, SODIUM LACTATE, POTASSIUM CHLORIDE, AND CALCIUM CHLORIDE 125 ML/HR: .6; .31; .03; .02 INJECTION, SOLUTION INTRAVENOUS at 09:07

## 2020-07-30 RX ADMIN — PROPOFOL 150 MG: 10 INJECTION, EMULSION INTRAVENOUS at 09:56

## 2020-07-30 RX ADMIN — PROPOFOL 50 MG: 10 INJECTION, EMULSION INTRAVENOUS at 10:06

## 2020-07-30 RX ADMIN — PROPOFOL 50 MG: 10 INJECTION, EMULSION INTRAVENOUS at 09:59

## 2020-07-30 RX ADMIN — PROPOFOL 50 MG: 10 INJECTION, EMULSION INTRAVENOUS at 10:16

## 2020-07-30 RX ADMIN — LIDOCAINE HYDROCHLORIDE 100 MG: 10 INJECTION, SOLUTION EPIDURAL; INFILTRATION; INTRACAUDAL; PERINEURAL at 09:56

## 2020-07-30 RX ADMIN — PROPOFOL 50 MG: 10 INJECTION, EMULSION INTRAVENOUS at 10:23

## 2020-07-30 RX ADMIN — PROPOFOL 50 MG: 10 INJECTION, EMULSION INTRAVENOUS at 10:09

## 2020-07-30 RX ADMIN — PROPOFOL 50 MG: 10 INJECTION, EMULSION INTRAVENOUS at 10:04

## 2020-07-30 RX ADMIN — PROPOFOL 50 MG: 10 INJECTION, EMULSION INTRAVENOUS at 10:02

## 2020-07-30 NOTE — DISCHARGE INSTRUCTIONS
Colonoscopy   WHAT YOU NEED TO KNOW:   What do I need to know about a colonoscopy? A colonoscopy is a procedure to examine the inside of your colon (intestine) with a scope  A scope is a flexible tube with a small light and camera on the end  Polyps or tissue growths may be removed during your colonoscopy  What do I need to do the week before my colonoscopy? You will need to stop taking medicines that contain aspirin or iron for 7 days before your colonoscopy  If you take anticoagulants, such as warfarin, ask when you should stop taking it  Make plans for someone to drive you home after your procedure  How do I prepare for my colonoscopy? Your healthcare provider will have you prepare your bowels before your procedure  Your bowels will need to be empty before your procedure to allow him to clearly see your colon  You will need to do the following the day before your procedure:  · Have only clear liquids  for the entire day before your colonoscopy  Clear liquid diet includes clear fruit juices and broths, clear flavored gelatin, and hard candy  It also includes coffee, tea, carbonated beverages, and clear sports drinks  · Follow your bowel prep as directed  There are many different preparations that can be given before a colonoscopy  Some are given over 2 hours and others over 6 hours  Some are given earlier in the afternoon the day before the colonoscopy  Others are given the day before and then the morning of the colonoscopy  With any bowel prep, stay close to the bathroom  This liquid will cause your bowels to move frequently  · An enema  may be needed  Your healthcare provider may tell you to use an enema to help clean out your bowels  · Do not eat or drink anything after midnight  This will help prevent problems that can happen if you vomit while under anesthesia  What will happen during my colonoscopy? · You will be given medicine to help you relax   You will lie on your left side and raise one or both knees toward your chest  Your healthcare provider will examine your anus and use a finger to check your rectum  You may need another enema if your bowel is not empty  The scope will be lubricated and gently placed into your anus  It will then be passed through your rectum and into your colon  Water or air will be put into your colon to help clean or expand it  This is done so your healthcare provider can see your colon clearly  · Tissue samples may be taken from the walls of your bowel and sent to a lab for tests  If you have a polyp, your healthcare provider will pass a wire loop through the scope and use it to hold the polyp  The polyp is then burned or cut off the wall of your colon  Removed polyps are sent to a lab for tests  Pictures of your colon may be taken during the procedure  The scope will be removed when the procedure is done  What will happen after my colonoscopy? · Rest after your procedure  You may feel bloated, have some gas and abdominal discomfort  You may need to lie on your right side with a heating pad on your abdomen  You may need to take short walks to help move the gas out  Eat small meals, if you feel bloated  Do not drive or make important decisions until the day after your procedure  · You may have polyps removed  Do not take aspirin or go on long car trips for 7 days after your procedure  Ask your healthcare provider about any other limits after your procedure  What are the risks of a colonoscopy? You may have pain or bleeding after the scope or polyps are removed  You may also have a slow heartbeat, decreased blood pressure, or increased sweating  Your colon may tear due to the increased pressure from the scope and other instruments  This may cause bowel contents to leak out of your colon and into your abdomen  If this happens, you will need to stay in the hospital and have surgery on your colon     CARE AGREEMENT:   You have the right to help plan your care  Learn about your health condition and how it may be treated  Discuss treatment options with your caregivers to decide what care you want to receive  You always have the right to refuse treatment  The above information is an  only  It is not intended as medical advice for individual conditions or treatments  Talk to your doctor, nurse or pharmacist before following any medical regimen to see if it is safe and effective for you  © 2017 [address] Information is for End User's use only and may not be sold, redistributed or otherwise used for commercial purposes  All illustrations and images included in CareNotes® are the copyrighted property of [employer]  or [employer]  Upper Endoscopy   WHAT YOU NEED TO KNOW:   What do I need to know about an upper endoscopy? An upper endoscopy is also called an upper gastrointestinal (GI) endoscopy, or an esophagogastroduodenoscopy (EGD)  A scope (thin, flexible tube with a light and camera) is used to examine the walls of your upper intestines  The upper intestines include the esophagus, stomach, and duodenum (first part of the small intestine)  An upper endoscopy is used to look for problems, such as bleeding, polyps, ulcers, or infection  How do I prepare for an upper endoscopy? Your healthcare provider will talk to you about how to prepare for your procedure  You may need to not eat or drink anything except water for 6 to 12 hours before the procedure  He will tell you what medicines to take or not take on the day of your procedure  Arrange to have someone drive you home  What will happen during an upper endoscopy? · You will be given medicine through your IV to help you relax and make you drowsy  You will also be given medicine to numb your throat  You may need to wear a plastic mouthpiece to help hold your mouth open and protect your teeth and tongue   Your healthcare provider will gently insert the endoscope through your mouth and down into your throat  You may be asked to swallow once to help move the scope into your upper intestines  You may feel pressure in your throat but you should not feel pain  The endoscope does not restrict your breathing  · Your healthcare provider will watch the scope on a monitor  He will take pictures with the scope  He may gently inject air so he can see your digestive tract clearly  Your healthcare provider may take tissue samples and send them to the lab for tests  He may remove foreign objects, tumors, or polyps that may be blocking your upper intestines  Your healthcare provider may also insert tools with the scope to treat bleeding or place a stent (tube)  When the procedure is finished, the endoscope will be slowly removed  What will happen after an upper endoscopy? You may feel bloated, gassy, or have some abdominal discomfort  Your throat may be sore for 24 to 36 hours after the procedure  You may burp or pass gas from air that is still inside your body after your procedure  You may need to take short walks to help move the gas out  Eat small meals, if you feel bloated  Do not drive or make important decisions until the day after your procedure  What are the risks of an upper endoscopy? Your esophagus, stomach, or duodenum may be punctured or torn during the procedure  This is because of increased pressure as the scope and air are passing through  You may bleed more than expected or get an infection  You may have a slow or irregular heartbeat, or low blood pressure  This can cause sweating and fainting  Fluid may enter your lungs and you may have trouble breathing  These problems can be life-threatening  CARE AGREEMENT:   You have the right to help plan your care  Learn about your health condition and how it may be treated  Discuss treatment options with your caregivers to decide what care you want to receive   You always have the right to refuse treatment  The above information is an  only  It is not intended as medical advice for individual conditions or treatments  Talk to your doctor, nurse or pharmacist before following any medical regimen to see if it is safe and effective for you  © 2017 [address] Information is for End User's use only and may not be sold, redistributed or otherwise used for commercial purposes  All illustrations and images included in CareNotes® are the copyrighted property of [employer]  or [employer]  Gastroesophageal Reflux Disease   WHAT YOU NEED TO KNOW:   What is gastroesophageal reflux disease? Gastroesophageal reflux occurs when acid and food in the stomach back up into the esophagus  Gastroesophageal reflux disease (GERD) is reflux that occurs more than twice a week for a few weeks  It usually causes heartburn and other symptoms  GERD can cause other health problems over time if it is not treated  What causes GERD? GERD often occurs when the lower muscle (sphincter) of the esophagus does not close properly  The sphincter normally opens to let food into the stomach  It then closes to keep food and stomach acid in the stomach  If the sphincter does not close properly, stomach acid and food back up (reflux) into the esophagus  The following may increase your risk for GERD:  · Certain foods such as spicy foods, chocolate, foods that contain caffeine, peppermint, and fried foods    · Hiatal hernia    · Certain medicines such as calcium channel blockers (used to treat high blood pressure), allergy medicines, sedatives, or antidepressants    · Pregnancy or obesity    · Lying down after a meal    · Drinking alcohol or smoking  What are the signs and symptoms of GERD? Heartburn is the most common symptom of GERD  You may feel burning pain in your chest or below the breast bone  This usually occurs after meals and spreads to your neck, jaw, or shoulder   The pain gets better when you change positions  You may also have any of the following:  · Bitter or acid taste in your mouth    · Dry cough    · Trouble swallowing or pain with swallowing    · Hoarseness or sore throat    · Frequent burping or hiccups    · Feeling of fullness soon after you start eating  How is GERD diagnosed? Your healthcare provider will ask about your symptoms and when they started  Tell him or her about other medical conditions you have, your eating habits, and your activities  You may also need any of the following:  · Esophageal pH monitoring  is used to place a small probe inside your esophagus and stomach to check the amount of acid  · An endoscopy  is a procedure used to look at the inside of your esophagus and stomach  An endoscope is a bendable tube with a light and camera on the end  Your healthcare provider may remove a small sample of tissue and send it to a lab for tests  · Upper GI x-rays  are done to take pictures of your stomach and intestines (bowel)  You may be given a chalky liquid to drink before the pictures are taken  This liquid helps your stomach and intestines show up better on the x-rays  · Esophageal manometry  is a test that shows how your esophagus pushes food and fluid to your stomach  It also shows the pressures in your esophagus and stomach  It may show a hiatal hernia  How is GERD treated? · Medicines  are used to decrease stomach acid  Medicine may also be used to help your lower esophageal sphincter and stomach contract (tighten) more  · Surgery  is done to wrap the upper part of the stomach around the esophageal sphincter  This will strengthen the sphincter and prevent reflux  How can I help manage GERD? · Do not have foods or drinks that may increase heartburn  These include chocolate, peppermint, fried or fatty foods, drinks that contain caffeine, or carbonated drinks (soda)  Other foods include spicy foods, onions, tomatoes, and tomato-based foods  Do not have foods or drinks that can irritate your esophagus, such as citrus fruits, juices, and alcohol  · Do not eat large meals  When you eat a lot of food at one time, your stomach needs more acid to digest it  Eat 6 small meals each day instead of 3 large ones, and eat slowly  Do not eat meals 2 to 3 hours before bedtime  · Elevate the head of your bed  Place 6-inch blocks under the head of your bed frame  You may also use more than one pillow under your head and shoulders while you sleep  · Maintain a healthy weight  If you are overweight, weight loss may help relieve symptoms of GERD  · Do not smoke  Smoking weakens the lower esophageal sphincter and increases the risk of GERD  Ask your healthcare provider for information if you currently smoke and need help to quit  E-cigarettes or smokeless tobacco still contain nicotine  Talk to your healthcare provider before you use these products  · Do not wear clothing that is tight around your waist   Tight clothing can put pressure on your stomach and cause or worsen GERD symptoms  When should I seek immediate care? · You feel full and cannot burp or vomit  · You have severe chest pain and sudden trouble breathing  · Your bowel movements are black, bloody, or tarry-looking  · Your vomit looks like coffee grounds or has blood in it  When should I contact my healthcare provider? · You vomit large amounts, or you vomit often  · You have trouble breathing after you vomit  · You have trouble swallowing, or pain with swallowing  · You are losing weight without trying  · Your symptoms get worse or do not improve with treatment  · You have questions or concerns about your condition or care  CARE AGREEMENT:   You have the right to help plan your care  Learn about your health condition and how it may be treated  Discuss treatment options with your caregivers to decide what care you want to receive   You always have the right to refuse treatment  The above information is an  only  It is not intended as medical advice for individual conditions or treatments  Talk to your doctor, nurse or pharmacist before following any medical regimen to see if it is safe and effective for you  © 2017 [address] Information is for End User's use only and may not be sold, redistributed or otherwise used for commercial purposes  All illustrations and images included in CareNotes® are the copyrighted property of [employer]  or [employer]  Diet for Stomach Ulcers and Gastritis   WHAT YOU NEED TO KNOW:   What is a diet for stomach ulcers and gastritis? A diet for ulcers and gastritis is a meal plan that limits foods that irritate your stomach  Certain foods may worsen symptoms such as stomach pain, bloating, heartburn, or indigestion  Which foods should I limit or avoid? You may need to avoid acidic, spicy, or high-fat foods  Not all foods affect everyone the same way  You will need to learn which foods worsen your symptoms and limit those foods  The following are some foods that may worsen ulcer or gastritis symptoms:  · Beverages:      ¨ Whole milk and chocolate milk    ¨ Hot cocoa and cola    ¨ Any beverage with caffeine    ¨ Regular and decaffeinated coffee    ¨ Peppermint and spearmint tea    ¨ Green and black tea, with or without caffeine    ¨ Orange and grapefruit juices    ¨ Drinks that contain alcohol    · Spices and seasonings:      ¨ Black and red pepper    ¨ Chili powder    ¨ Mustard seed and nutmeg    · Other foods:      ¨ Dairy foods made from whole milk or cream    ¨ Chocolate    ¨ Spicy or strongly flavored cheeses, such as jalapeno or black pepper    ¨ Highly seasoned, high-fat meats, such as sausage, salami, dunham, ham, and cold cuts    ¨ Hot chiles and peppers    ¨ Tomato products, such as tomato paste, tomato sauce, or tomato juice  Which foods can I eat and drink?   Eat a variety of healthy foods from all the food groups  Eat fruits, vegetables, whole grains, and fat-free or low-fat dairy foods  Whole grains include whole-wheat breads, cereals, pasta, and brown rice  Choose lean meats, poultry (chicken and turkey), fish, beans, eggs, and nuts  A healthy meal plan is low in unhealthy fats, salt, and added sugar  Healthy fats include olive oil and canola oil  Ask your dietitian for more information about a healthy meal plan  What other guidelines may be helpful? · Do not eat right before bedtime  Stop eating at least 2 hours before bedtime  · Eat small, frequent meals  Your stomach may tolerate small, frequent meals better than large meals  CARE AGREEMENT:   You have the right to help plan your care  Discuss treatment options with your caregivers to decide what care you want to receive  You always have the right to refuse treatment  The above information is an  only  It is not intended as medical advice for individual conditions or treatments  Talk to your doctor, nurse or pharmacist before following any medical regimen to see if it is safe and effective for you  © 2017 [facility] Information is for End User's use only and may not be sold, redistributed or otherwise used for commercial purposes  All illustrations and images included in CareNotes® are the copyrighted property of [employer]  or [employer]  Diverticulosis   WHAT YOU NEED TO KNOW:   What is diverticulosis? Diverticulosis is a condition that causes small pockets called diverticula to form in your intestine  These pockets make it difficult for bowel movements to pass through your digestive system  What causes diverticulosis? Diverticula form when muscles have to work hard to move bowel movements through the intestine  The force causes bulges to form at weak areas in the intestine  This may happen if you eat foods that are low in fiber   Fiber helps give your bowel movements more bulk so they are larger and easier to move through your colon  The following may increase your risk of diverticulosis:  · A history of constipation    · Age 36 or older    · Obesity    · Lack of exercise  What are the signs and symptoms of diverticulosis? Diverticulosis usually does not cause any signs or symptoms  It may cause any of the following in some people:  · Pain or discomfort in your lower abdomen    · Abdominal bloating    · Constipation or diarrhea  How is diverticulosis diagnosed? Your healthcare provider will examine you and ask about your bowel movements, diet, and symptoms  He or she will also ask about any medical conditions you have or medicines you take  You may need any of the following:  · Blood tests  may be done to check for signs of inflammation  · A barium enema  is an x-ray of your colon that may show diverticula  A tube is put into your anus, and a liquid called barium is put through the tube  Barium is used so that healthcare providers can see your colon more clearly  · Flexible sigmoidoscopy  is a test to look for any changes in your lower intestines and rectum  It may also show the cause of any bleeding or pain  A soft, bendable tube with a light on the end will be put into your anus  It will then be moved forward into your intestine  · A colonoscopy  is used to look at your whole colon  A scope (long bendable tube with a light on the end) is used to take pictures  This test may show diverticula  · A CT scan , or CAT scan, may show diverticula  You may be given contrast liquid before the scan  Tell the healthcare provider if you have ever had an allergic reaction to contrast liquid  How is diverticulosis managed? The goal of treatment is to manage any symptoms you have and prevent other problems such as diverticulitis  Diverticulitis is swelling or infection of the diverticula   Your healthcare provider may recommend any of the following:  · Eat a variety of high-fiber foods  High-fiber foods help you have regular bowel movements  High-fiber foods include cooked beans, fruits, vegetables, and some cereals  Most adults need 25 to 35 grams of fiber each day  Your healthcare provider may recommend that you have more  Ask your healthcare provider how much fiber you need  Increase fiber slowly  You may have abdominal discomfort, bloating, and gas if you add fiber to your diet too quickly  You may need to take a fiber supplement if you are not getting enough fiber from food  · Medicines  to soften your bowel movements may be given  You may also need medicines to treat symptoms such as bloating and pain  · Drink liquids as directed  You may need to drink 2 to 3 liters (8 to 12 cups) of liquids every day  Ask your healthcare provider how much liquid to drink each day and which liquids are best for you  · Apply heat  on your abdomen for 20 to 30 minutes every 2 hours for as many days as directed  Heat helps decrease pain and muscle spasms  How can I help prevent diverticulitis or other symptoms? The following may help decrease your risk for diverticulitis or symptoms, such as bleeding  Talk to your provider about these or other things you can do to prevent problems that may occur with diverticulosis  · Exercise regularly  Ask your healthcare provider about the best exercise plan for you  Exercise can help you have regular bowel movements  Get 30 minutes of exercise on most days of the week  · Maintain a healthy weight  Ask your healthcare provider how much you should weigh  Ask him or her to help you create a weight loss plan if you are overweight  · Do not smoke  Nicotine and other chemicals in cigarettes increase your risk for diverticulitis  Ask your healthcare provider for information if you currently smoke and need help to quit  E-cigarettes or smokeless tobacco still contain nicotine   Talk to your healthcare provider before you use these products  · Ask your healthcare provider if it is safe to take NSAIDs  NSAIDs may increase your risk of diverticulitis  When should I seek immediate care? · You have severe pain on the left side of your lower abdomen  · Your bowel movements are bright or dark red  When should I contact my healthcare provider? · You have a fever and chills  · You feel dizzy or lightheaded  · You have nausea, or you are vomiting  · You have a change in your bowel movements  · You have questions or concerns about your condition or care  CARE AGREEMENT:   You have the right to help plan your care  Learn about your health condition and how it may be treated  Discuss treatment options with your caregivers to decide what care you want to receive  You always have the right to refuse treatment  The above information is an  only  It is not intended as medical advice for individual conditions or treatments  Talk to your doctor, nurse or pharmacist before following any medical regimen to see if it is safe and effective for you  © 2017 [address] Information is for End User's use only and may not be sold, redistributed or otherwise used for commercial purposes  All illustrations and images included in CareNotes® are the copyrighted property of [employer]  or [employer]  Diverticulitis Diet   WHAT YOU NEED TO KNOW:   What is a diverticulitis diet? A diverticulitis diet includes foods that allow your intestines to rest while you have diverticulitis  Diverticulitis is a condition that causes diverticula (small pockets) along your intestine to become inflamed or infected  This is caused by hard bowel movement, food, or bacteria that get stuck in the pockets  What foods can I eat while I have diverticulitis? · A clear liquid diet may be recommended for 2 to 3 days    A clear liquid diet is made up of clear liquids and foods that are liquid at room temperature  Your healthcare provider will tell you when you can start eating solid foods  Examples of clear liquids include the following:     ¨ Water and clear juices (such as apple, cranberry, or grape), strained citrus juices or fruit punch    ¨ Coffee or tea (without cream or milk)    ¨ Clear sports drinks or soft drinks, such as ginger ale, lemon-lime soda, or club soda (no cola or root beer)    ¨ Clear broth, bouillon, or consommé    ¨ Plain popsicles (no popsicles with pureed fruit or fiber)    ¨ Flavored gelatin without fruit    · A low-fiber diet may be recommended until your symptoms improve  Your healthcare provider will tell you when you can slowly add high-fiber foods back into your diet  ¨ Cream of wheat and finely ground grits    ¨ White bread, white pasta, and white rice    ¨ Canned and well-cooked fruit without skins or seeds, and juice without pulp    ¨ Canned and well-cooked vegetables without skins or seeds, and vegetable juice    ¨ Cow's milk, lactose-free milk, soy milk, and rice milk    ¨ Yogurt, cottage cheese, and sherbet    ¨ Eggs, poultry (such as chicken and turkey), fish, and tender, ground, well-cooked beef     ¨ Tofu and smooth nut butters, such as peanut butter    ¨ Broth and strained soups made of low-fiber foods  What foods should I avoid while I have diverticulitis? Avoid foods that are high in fiber while you have symptoms of diverticulitis  Examples of high-fiber foods include the following:  · Whole grains and breads, and cereals made with whole grains    · Dried fruit, fresh fruit with skin, and fruit pulp    · Raw vegetables    · Cooked greens, such as spinach    · Tough meat and meat with gristle    · Legumes, such as wharton beans and lentils  When should I contact my healthcare provider? · Your symptoms get worse or do not go away  · You have questions about the foods you should eat  · You have questions or concerns about your condition or care    CARE AGREEMENT:   You have the right to help plan your care  Learn about your health condition and how it may be treated  Discuss treatment options with your caregivers to decide what care you want to receive  You always have the right to refuse treatment  The above information is an  only  It is not intended as medical advice for individual conditions or treatments  Talk to your doctor, nurse or pharmacist before following any medical regimen to see if it is safe and effective for you  © 2017 [facility] Information is for End User's use only and may not be sold, redistributed or otherwise used for commercial purposes  All illustrations and images included in CareNotes® are the copyrighted property of A D A M , [employer]  or [employer]

## 2020-07-30 NOTE — ANESTHESIA PREPROCEDURE EVALUATION
Review of Systems/Medical History  Patient summary reviewed        Cardiovascular  Hyperlipidemia,    Pulmonary  Smoker cigarette smoker  , COPD , Shortness of breath,        GI/Hepatic       Kidney stones,        Endo/Other     GYN       Hematology   Musculoskeletal       Neurology   Psychology   Anxiety,   Chronic pain,   Comment: Nerve stim         Physical Exam    Airway    Mallampati score: III  TM Distance: >3 FB  Neck ROM: full     Dental       Cardiovascular      Pulmonary      Other Findings        Anesthesia Plan  ASA Score- 3     Anesthesia Type- IV sedation with anesthesia with ASA Monitors  Additional Monitors:   Airway Plan:         Plan Factors-    Induction- intravenous  Postoperative Plan-     Informed Consent- Anesthetic plan and risks discussed with patient  I personally reviewed this patient with the CRNA  Discussed and agreed on the Anesthesia Plan with the CLAYTON Lucero

## 2020-07-30 NOTE — INTERVAL H&P NOTE
H&P reviewed  After examining the patient I find no changes in the patients condition since the H&P had been written      Vitals:    07/30/20 0845   BP: 112/63   Pulse: 79   Resp: 18   Temp: 97 8 °F (36 6 °C)   SpO2: 99%

## 2020-07-30 NOTE — ANESTHESIA POSTPROCEDURE EVALUATION
Post-Op Assessment Note    CV Status:  Stable  Pain Score: 0    Pain management: adequate     Mental Status:  Alert and awake   Hydration Status:  Euvolemic   PONV Controlled:  Controlled   Airway Patency:  Patent   Post Op Vitals Reviewed: Yes      Staff: CRNA           BP   103/62   Temp      Pulse  70   Resp   16   SpO2   98

## 2020-08-03 NOTE — PROGRESS NOTES
PT Discharge    Today's date: 8/3/2020  Patient name: Gal Ashton  : 1980  MRN: [id number]  Referring provider: Shaw Viveros MD  Dx:   Encounter Diagnosis     ICD-10-CM    1  Right shoulder pain, unspecified chronicity  M25 [id number]        Start Time: 1500  Stop Time: 1600  Total time in clinic (min): 60 minutes    Assessment  Assessment details: Gal Ashton is a 41 y o  female presenting to outpatient physical therapy with diagnosis of right shoulder manipulation  Independent with self stretch and HEP at this time  Measurements as noted  Impairments: abnormal movement, activity intolerance and impaired physical strength  Understanding of Dx/Px/POC: good   Prognosis: good    Goals  STGs to be achieved in 4 weeks: All MET as listed  1  Pt to demonstrate reduced subjective pain rating "at worst" by at least 2-3 points from Initial Eval in order to allow for reduced pain with ADLs and improved functional activity tolerance  - MET  2  Pt to demonstrate increased AROM of right UE by at least 5-10 degrees in order to allow for greater ease and independence with ADLs and functional mobility  - MET  3  Pt to demonstrate full PROM of right UE in order to maximize joint mobility and function and allow for progression of exercise program and achievement of goals  - MET  4   Pt to demonstrate increased MMT of right UE by at least 1/2-1 grade in order to improve safety and stability with ADLs and functional mobility  - MET    LTGs in 2-4 weeks:   Pt will demonstrate the ability to perform effective self ROM of right GHJ in all planes - MET    Plan  Patient would benefit from: skilled physical therapy  Other planned modality interventions: Pt has home TENS unit  Planned therapy interventions: manual therapy, neuromuscular re-education, therapeutic activities, therapeutic exercise and home exercise program  Frequency: 2x week  Duration in visits: 8  Duration in weeks: 2  Plan of Care beginning date: 7/6/2020  Plan of Care expiration date: 8/5/2020  Treatment plan discussed with: patient and PTA        Subjective Evaluation    History of Present Illness  Date of surgery: 6/9/2020  Mechanism of injury: UPDATE:  Patient reports able to mow grass, does note ongoing achy feeling, but overall doing well  Patient s/p closed manipulation of right GHJ on 6/9/2020 by NINO  Presents to OPPT for evaluation and ROM today  Presently with right UE nerve block  Denies pain  Noted with min finger movement, mild increase in hand edema, however, sling adjusted for fit  Does note some UT/cervical spine irritation from sling placement  Voices "tingling" into fingers/hand/forearm  Quality of life: good    Pain  No pain reported    Treatments  Previous treatment: physical therapy, medication and injection treatment  Current treatment: injection treatment and physical therapy  Patient Goals  Patient goals for therapy: increased strength, independence with ADLs/IADLs, return to sport/leisure activities, increased motion, improved balance, decreased pain and decreased edema          Objective     Postural Observations  Seated posture: good  Standing posture: good    Additional Postural Observation Details  (+) sling forward head and slouched/rounded shoulders - discontinued    Observations     Right Shoulder  Negative for edema       Cervical/Thoracic Screen   Cervical range of motion within normal limits with the following exceptions: Guarded cervical ROM  Thoracic range of motion within normal limits with the following exceptions: Increased kyphosis    Neurological Testing     Sensation     Shoulder     Right Shoulder   Intact: light touch and proprioception    Active Range of Motion     Right Shoulder   Flexion: 155 degrees   Abduction: 102 degrees   External rotation 90°: 58 degrees  Internal rotation 90°: 32 degrees     Additional Active Range of Motion Details  Scaption 123 degrees    Passive Range of Motion   Left Shoulder   External rotation 90°: [facility]  Internal rotation 90°: WFL  Horizontal abduction: WFL  Horizontal adduction: WFL    Right Shoulder   Flexion: 154 degrees   Extension: 25 degrees   Abduction: 103 degrees   External rotation 45°: 54 degrees   External rotation 90°: 38 degrees with pain  Internal rotation 90°: 20 degrees with pain    Additional Passive Range of Motion Details        Strength/Myotome Testing     Right Shoulder     Planes of Motion   Flexion: 4+   Extension: 4+   Abduction: 4   Adduction: 4+   External rotation at 0°: 4+   External rotation at 45°: 4   External rotation at 90°: 4-   Internal rotation at 0°: 4+   Internal rotation at 45°: 4   Internal rotation at 90°: 4-     Isolated Muscles   Biceps: 5   Lower trapezius: 4+   Middle trapezius: 4   Supraspinatus: 3   Triceps: 4   Upper trapezius: 4-       Flowsheet Rows      Most Recent Value   PT/OT G-Codes   Current Score  61   Projected Score  52

## 2020-09-04 NOTE — PROGRESS NOTES
Review of Systems   Constitutional: Negative  Negative for appetite change and fever  HENT: Negative  Negative for hearing loss, tinnitus, trouble swallowing and voice change  Eyes: Negative  Negative for photophobia and pain  Respiratory: Negative  Negative for shortness of breath  Cardiovascular: Negative  Negative for palpitations  Gastrointestinal: Negative  Negative for nausea and vomiting  Endocrine: Negative  Negative for cold intolerance and heat intolerance  Genitourinary: Negative  Negative for dysuria, frequency and urgency  Musculoskeletal: Positive for arthralgias, back pain, joint swelling, myalgias, neck pain and neck stiffness  Positive for cramping     Skin: Negative  Negative for rash  Allergic/Immunologic: Negative  Neurological: Negative for dizziness, tremors, seizures, syncope, facial asymmetry, speech difficulty, weakness, light-headedness, numbness and headaches  Positive for tingling     Hematological: Negative  Does not bruise/bleed easily  Psychiatric/Behavioral: Positive for sleep disturbance (trouble falling asleep)  Negative for confusion and hallucinations  chest pain, neck pain, tingling, buttock pain/HEADACHE

## 2020-09-11 DIAGNOSIS — M79.10 MUSCLE PAIN: ICD-10-CM

## 2020-09-11 RX ORDER — TIZANIDINE 4 MG/1
TABLET ORAL
Qty: 30 TABLET | Refills: 0 | OUTPATIENT
Start: 2020-09-11

## 2020-10-05 ENCOUNTER — LAB (OUTPATIENT)
Dept: LAB | Facility: CLINIC | Age: 40
End: 2020-10-05
Payer: COMMERCIAL

## 2020-10-05 DIAGNOSIS — E55.9 VITAMIN D DEFICIENCY: ICD-10-CM

## 2020-10-05 DIAGNOSIS — Z13.6 SCREENING FOR CARDIOVASCULAR CONDITION: ICD-10-CM

## 2020-10-05 LAB
25(OH)D3 SERPL-MCNC: 61.8 NG/ML (ref 30–100)
ALBUMIN SERPL BCP-MCNC: 3.9 G/DL (ref 3.5–5)
ALP SERPL-CCNC: 54 U/L (ref 46–116)
ALT SERPL W P-5'-P-CCNC: 19 U/L (ref 12–78)
ANION GAP SERPL CALCULATED.3IONS-SCNC: 4 MMOL/L (ref 4–13)
AST SERPL W P-5'-P-CCNC: 9 U/L (ref 5–45)
BASOPHILS # BLD AUTO: 0.04 THOUSANDS/ΜL (ref 0–0.1)
BASOPHILS NFR BLD AUTO: 0 % (ref 0–1)
BILIRUB SERPL-MCNC: 0.5 MG/DL (ref 0.2–1)
BUN SERPL-MCNC: 9 MG/DL (ref 5–25)
CALCIUM SERPL-MCNC: 9 MG/DL (ref 8.3–10.1)
CHLORIDE SERPL-SCNC: 110 MMOL/L (ref 100–108)
CHOLEST SERPL-MCNC: 159 MG/DL (ref 50–200)
CO2 SERPL-SCNC: 24 MMOL/L (ref 21–32)
CREAT SERPL-MCNC: 0.66 MG/DL (ref 0.6–1.3)
EOSINOPHIL # BLD AUTO: 0.1 THOUSAND/ΜL (ref 0–0.61)
EOSINOPHIL NFR BLD AUTO: 1 % (ref 0–6)
ERYTHROCYTE [DISTWIDTH] IN BLOOD BY AUTOMATED COUNT: 12.8 % (ref 11.6–15.1)
GFR SERPL CREATININE-BSD FRML MDRD: 109 ML/MIN/1.73SQ M
GLUCOSE P FAST SERPL-MCNC: 94 MG/DL (ref 65–99)
HCT VFR BLD AUTO: 42.9 % (ref 34.8–46.1)
HDLC SERPL-MCNC: 42 MG/DL
HGB BLD-MCNC: 14.1 G/DL (ref 11.5–15.4)
IMM GRANULOCYTES # BLD AUTO: 0.04 THOUSAND/UL (ref 0–0.2)
IMM GRANULOCYTES NFR BLD AUTO: 0 % (ref 0–2)
LDLC SERPL CALC-MCNC: 95 MG/DL (ref 0–100)
LYMPHOCYTES # BLD AUTO: 3.36 THOUSANDS/ΜL (ref 0.6–4.47)
LYMPHOCYTES NFR BLD AUTO: 34 % (ref 14–44)
MCH RBC QN AUTO: 31.3 PG (ref 26.8–34.3)
MCHC RBC AUTO-ENTMCNC: 32.9 G/DL (ref 31.4–37.4)
MCV RBC AUTO: 95 FL (ref 82–98)
MONOCYTES # BLD AUTO: 0.52 THOUSAND/ΜL (ref 0.17–1.22)
MONOCYTES NFR BLD AUTO: 5 % (ref 4–12)
NEUTROPHILS # BLD AUTO: 5.91 THOUSANDS/ΜL (ref 1.85–7.62)
NEUTS SEG NFR BLD AUTO: 60 % (ref 43–75)
NONHDLC SERPL-MCNC: 117 MG/DL
NRBC BLD AUTO-RTO: 0 /100 WBCS
PLATELET # BLD AUTO: 249 THOUSANDS/UL (ref 149–390)
PMV BLD AUTO: 10 FL (ref 8.9–12.7)
POTASSIUM SERPL-SCNC: 4.3 MMOL/L (ref 3.5–5.3)
PROT SERPL-MCNC: 7.2 G/DL (ref 6.4–8.2)
RBC # BLD AUTO: 4.5 MILLION/UL (ref 3.81–5.12)
SODIUM SERPL-SCNC: 138 MMOL/L (ref 136–145)
TRIGL SERPL-MCNC: 111 MG/DL
TSH SERPL DL<=0.05 MIU/L-ACNC: 1.03 UIU/ML (ref 0.36–3.74)
WBC # BLD AUTO: 9.97 THOUSAND/UL (ref 4.31–10.16)

## 2020-10-05 PROCEDURE — 80053 COMPREHEN METABOLIC PANEL: CPT

## 2020-10-05 PROCEDURE — 80061 LIPID PANEL: CPT

## 2020-10-05 PROCEDURE — 36415 COLL VENOUS BLD VENIPUNCTURE: CPT

## 2020-10-05 PROCEDURE — 85025 COMPLETE CBC W/AUTO DIFF WBC: CPT

## 2020-10-05 PROCEDURE — 82306 VITAMIN D 25 HYDROXY: CPT

## 2020-10-05 PROCEDURE — 84443 ASSAY THYROID STIM HORMONE: CPT

## 2020-11-01 DIAGNOSIS — E78.00 HYPERCHOLESTEROLEMIA: ICD-10-CM

## 2020-11-02 DIAGNOSIS — M79.10 MUSCLE PAIN: ICD-10-CM

## 2020-11-02 DIAGNOSIS — R20.2 PARESTHESIA: ICD-10-CM

## 2020-11-02 RX ORDER — GABAPENTIN 300 MG/1
CAPSULE ORAL
Qty: 150 CAPSULE | Refills: 0 | Status: SHIPPED | OUTPATIENT
Start: 2020-11-02 | End: 2020-12-06

## 2020-11-02 RX ORDER — SIMVASTATIN 40 MG
TABLET ORAL
Qty: 90 TABLET | Refills: 3 | Status: SHIPPED | OUTPATIENT
Start: 2020-11-02 | End: 2021-04-12 | Stop reason: SDUPTHER

## 2020-11-04 RX ORDER — TIZANIDINE 4 MG/1
4 TABLET ORAL
Qty: 30 TABLET | Refills: 0 | Status: SHIPPED | OUTPATIENT
Start: 2020-11-04 | End: 2020-11-19 | Stop reason: SDUPTHER

## 2020-11-04 RX ORDER — TIZANIDINE 4 MG/1
4 TABLET ORAL
Qty: 30 TABLET | Refills: 0 | OUTPATIENT
Start: 2020-11-04

## 2020-11-11 RX ORDER — OXYCODONE HYDROCHLORIDE 5 MG/1
TABLET ORAL
COMMUNITY
End: 2021-06-03 | Stop reason: ALTCHOICE

## 2020-11-12 DIAGNOSIS — G89.4 CHRONIC PAIN SYNDROME: Primary | ICD-10-CM

## 2020-11-12 DIAGNOSIS — M79.10 MUSCLE PAIN: ICD-10-CM

## 2020-11-12 DIAGNOSIS — M79.18 CHRONIC MUSCULOSKELETAL PAIN: ICD-10-CM

## 2020-11-12 DIAGNOSIS — G89.29 CHRONIC MUSCULOSKELETAL PAIN: ICD-10-CM

## 2020-11-12 DIAGNOSIS — M54.16 PAIN, RADICULAR, LUMBAR: ICD-10-CM

## 2020-11-14 ENCOUNTER — OFFICE VISIT (OUTPATIENT)
Dept: FAMILY MEDICINE CLINIC | Facility: CLINIC | Age: 40
End: 2020-11-14
Payer: COMMERCIAL

## 2020-11-14 VITALS
HEART RATE: 78 BPM | BODY MASS INDEX: 26.26 KG/M2 | DIASTOLIC BLOOD PRESSURE: 64 MMHG | TEMPERATURE: 97.5 F | OXYGEN SATURATION: 97 % | HEIGHT: 66 IN | WEIGHT: 163.4 LBS | SYSTOLIC BLOOD PRESSURE: 120 MMHG

## 2020-11-14 DIAGNOSIS — Z23 ENCOUNTER FOR IMMUNIZATION: ICD-10-CM

## 2020-11-14 DIAGNOSIS — E78.00 HYPERCHOLESTEROLEMIA: ICD-10-CM

## 2020-11-14 DIAGNOSIS — Z11.4 SCREENING FOR HIV WITHOUT PRESENCE OF RISK FACTORS: ICD-10-CM

## 2020-11-14 DIAGNOSIS — Z00.00 MEDICARE ANNUAL WELLNESS VISIT, SUBSEQUENT: Primary | ICD-10-CM

## 2020-11-14 PROCEDURE — 3008F BODY MASS INDEX DOCD: CPT | Performed by: FAMILY MEDICINE

## 2020-11-14 PROCEDURE — 4004F PT TOBACCO SCREEN RCVD TLK: CPT | Performed by: FAMILY MEDICINE

## 2020-11-14 PROCEDURE — G0439 PPPS, SUBSEQ VISIT: HCPCS | Performed by: FAMILY MEDICINE

## 2020-11-14 PROCEDURE — 3725F SCREEN DEPRESSION PERFORMED: CPT | Performed by: FAMILY MEDICINE

## 2020-11-14 PROCEDURE — 90686 IIV4 VACC NO PRSV 0.5 ML IM: CPT | Performed by: FAMILY MEDICINE

## 2020-11-14 PROCEDURE — 99213 OFFICE O/P EST LOW 20 MIN: CPT | Performed by: FAMILY MEDICINE

## 2020-11-14 PROCEDURE — G0008 ADMIN INFLUENZA VIRUS VAC: HCPCS | Performed by: FAMILY MEDICINE

## 2020-11-19 ENCOUNTER — OFFICE VISIT (OUTPATIENT)
Dept: NEUROLOGY | Facility: CLINIC | Age: 40
End: 2020-11-19
Payer: COMMERCIAL

## 2020-11-19 VITALS
RESPIRATION RATE: 16 BRPM | WEIGHT: 162.38 LBS | DIASTOLIC BLOOD PRESSURE: 76 MMHG | SYSTOLIC BLOOD PRESSURE: 124 MMHG | BODY MASS INDEX: 26.21 KG/M2 | HEART RATE: 76 BPM | TEMPERATURE: 97.2 F

## 2020-11-19 DIAGNOSIS — M79.10 MUSCLE PAIN: ICD-10-CM

## 2020-11-19 DIAGNOSIS — M79.18 CERVICAL MYOFASCIAL PAIN SYNDROME: Primary | ICD-10-CM

## 2020-11-19 DIAGNOSIS — M54.16 PAIN, RADICULAR, LUMBAR: ICD-10-CM

## 2020-11-19 DIAGNOSIS — M54.2 CERVICALGIA: ICD-10-CM

## 2020-11-19 PROCEDURE — 99214 OFFICE O/P EST MOD 30 MIN: CPT | Performed by: NURSE PRACTITIONER

## 2020-11-19 RX ORDER — TIZANIDINE 2 MG/1
TABLET ORAL
Qty: 90 TABLET | Refills: 1 | Status: SHIPPED | OUTPATIENT
Start: 2020-11-19 | End: 2021-03-03 | Stop reason: SDUPTHER

## 2020-11-19 RX ORDER — TIZANIDINE 4 MG/1
TABLET ORAL
Qty: 30 TABLET | Refills: 0 | Status: SHIPPED | OUTPATIENT
Start: 2020-11-19 | End: 2020-11-19 | Stop reason: DRUGHIGH

## 2020-11-27 DIAGNOSIS — M79.10 MUSCLE PAIN: ICD-10-CM

## 2020-11-30 RX ORDER — TIZANIDINE 4 MG/1
TABLET ORAL
Qty: 30 TABLET | Refills: 0 | OUTPATIENT
Start: 2020-11-30

## 2020-12-04 DIAGNOSIS — R20.2 PARESTHESIA: ICD-10-CM

## 2020-12-06 RX ORDER — GABAPENTIN 300 MG/1
CAPSULE ORAL
Qty: 150 CAPSULE | Refills: 0 | Status: SHIPPED | OUTPATIENT
Start: 2020-12-06 | End: 2021-01-11

## 2020-12-29 ENCOUNTER — TELEPHONE (OUTPATIENT)
Dept: FAMILY MEDICINE CLINIC | Facility: CLINIC | Age: 40
End: 2020-12-29

## 2020-12-30 ENCOUNTER — HOSPITAL ENCOUNTER (EMERGENCY)
Facility: HOSPITAL | Age: 40
Discharge: HOME | End: 2020-12-30
Attending: EMERGENCY MEDICINE | Admitting: EMERGENCY MEDICINE
Payer: COMMERCIAL

## 2020-12-30 ENCOUNTER — APPOINTMENT (EMERGENCY)
Dept: ULTRASOUND IMAGING | Facility: HOSPITAL | Age: 40
End: 2020-12-30
Payer: COMMERCIAL

## 2020-12-30 ENCOUNTER — APPOINTMENT (EMERGENCY)
Dept: CT IMAGING | Facility: HOSPITAL | Age: 40
End: 2020-12-30
Payer: COMMERCIAL

## 2020-12-30 VITALS
TEMPERATURE: 97.8 F | WEIGHT: 164.24 LBS | HEART RATE: 73 BPM | SYSTOLIC BLOOD PRESSURE: 111 MMHG | DIASTOLIC BLOOD PRESSURE: 70 MMHG | HEIGHT: 66 IN | BODY MASS INDEX: 26.4 KG/M2 | OXYGEN SATURATION: 98 % | RESPIRATION RATE: 18 BRPM

## 2020-12-30 DIAGNOSIS — N83.201 CYSTS OF BOTH OVARIES: Primary | ICD-10-CM

## 2020-12-30 DIAGNOSIS — N83.202 CYSTS OF BOTH OVARIES: Primary | ICD-10-CM

## 2020-12-30 DIAGNOSIS — R10.31 RLQ ABDOMINAL PAIN: ICD-10-CM

## 2020-12-30 LAB
ALBUMIN SERPL BCP-MCNC: 4.3 G/DL (ref 3.5–5)
ALP SERPL-CCNC: 77 U/L (ref 46–116)
ALT SERPL W P-5'-P-CCNC: 19 U/L (ref 12–78)
ANION GAP SERPL CALCULATED.3IONS-SCNC: 9 MMOL/L (ref 4–13)
APTT PPP: 34 SECONDS (ref 23–37)
AST SERPL W P-5'-P-CCNC: 13 U/L (ref 5–45)
BACTERIA UR QL AUTO: NORMAL /HPF
BASOPHILS # BLD AUTO: 0.03 THOUSANDS/ΜL (ref 0–0.1)
BASOPHILS NFR BLD AUTO: 0 % (ref 0–1)
BILIRUB SERPL-MCNC: 0.4 MG/DL (ref 0.2–1)
BILIRUB UR QL STRIP: NEGATIVE
BUN SERPL-MCNC: 12 MG/DL (ref 5–25)
CALCIUM SERPL-MCNC: 9.5 MG/DL (ref 8.3–10.1)
CHLORIDE SERPL-SCNC: 104 MMOL/L (ref 100–108)
CLARITY UR: CLEAR
CO2 SERPL-SCNC: 26 MMOL/L (ref 21–32)
COLOR UR: YELLOW
CREAT SERPL-MCNC: 0.67 MG/DL (ref 0.6–1.3)
EOSINOPHIL # BLD AUTO: 0.06 THOUSAND/ΜL (ref 0–0.61)
EOSINOPHIL NFR BLD AUTO: 1 % (ref 0–6)
ERYTHROCYTE [DISTWIDTH] IN BLOOD BY AUTOMATED COUNT: 12.4 % (ref 11.6–15.1)
GFR SERPL CREATININE-BSD FRML MDRD: 109 ML/MIN/1.73SQ M
GLUCOSE SERPL-MCNC: 88 MG/DL (ref 65–140)
GLUCOSE UR STRIP-MCNC: NEGATIVE MG/DL
HCT VFR BLD AUTO: 46.3 % (ref 34.8–46.1)
HGB BLD-MCNC: 14.9 G/DL (ref 11.5–15.4)
HGB UR QL STRIP.AUTO: ABNORMAL
IMM GRANULOCYTES # BLD AUTO: 0.04 THOUSAND/UL (ref 0–0.2)
IMM GRANULOCYTES NFR BLD AUTO: 0 % (ref 0–2)
INR PPP: 0.96 (ref 0.84–1.19)
KETONES UR STRIP-MCNC: NEGATIVE MG/DL
LEUKOCYTE ESTERASE UR QL STRIP: ABNORMAL
LIPASE SERPL-CCNC: 122 U/L (ref 73–393)
LYMPHOCYTES # BLD AUTO: 3.21 THOUSANDS/ΜL (ref 0.6–4.47)
LYMPHOCYTES NFR BLD AUTO: 31 % (ref 14–44)
MCH RBC QN AUTO: 31 PG (ref 26.8–34.3)
MCHC RBC AUTO-ENTMCNC: 32.2 G/DL (ref 31.4–37.4)
MCV RBC AUTO: 96 FL (ref 82–98)
MONOCYTES # BLD AUTO: 0.5 THOUSAND/ΜL (ref 0.17–1.22)
MONOCYTES NFR BLD AUTO: 5 % (ref 4–12)
NEUTROPHILS # BLD AUTO: 6.37 THOUSANDS/ΜL (ref 1.85–7.62)
NEUTS SEG NFR BLD AUTO: 63 % (ref 43–75)
NITRITE UR QL STRIP: NEGATIVE
NON-SQ EPI CELLS URNS QL MICRO: NORMAL /HPF
NRBC BLD AUTO-RTO: 0 /100 WBCS
PH UR STRIP.AUTO: 6.5 [PH]
PLATELET # BLD AUTO: 262 THOUSANDS/UL (ref 149–390)
PMV BLD AUTO: 8.7 FL (ref 8.9–12.7)
POTASSIUM SERPL-SCNC: 4.1 MMOL/L (ref 3.5–5.3)
PROT SERPL-MCNC: 8.1 G/DL (ref 6.4–8.2)
PROT UR STRIP-MCNC: NEGATIVE MG/DL
PROTHROMBIN TIME: 12.6 SECONDS (ref 11.6–14.5)
RBC # BLD AUTO: 4.81 MILLION/UL (ref 3.81–5.12)
RBC #/AREA URNS AUTO: NORMAL /HPF
SODIUM SERPL-SCNC: 139 MMOL/L (ref 136–145)
SP GR UR STRIP.AUTO: 1 (ref 1–1.03)
TROPONIN I SERPL-MCNC: <0.02 NG/ML
UROBILINOGEN UR QL STRIP.AUTO: 0.2 E.U./DL
WBC # BLD AUTO: 10.21 THOUSAND/UL (ref 4.31–10.16)
WBC #/AREA URNS AUTO: NORMAL /HPF

## 2020-12-30 PROCEDURE — 85025 COMPLETE CBC W/AUTO DIFF WBC: CPT | Performed by: PHYSICIAN ASSISTANT

## 2020-12-30 PROCEDURE — 83690 ASSAY OF LIPASE: CPT | Performed by: PHYSICIAN ASSISTANT

## 2020-12-30 PROCEDURE — 76856 US EXAM PELVIC COMPLETE: CPT

## 2020-12-30 PROCEDURE — G1004 CDSM NDSC: HCPCS

## 2020-12-30 PROCEDURE — 93005 ELECTROCARDIOGRAM TRACING: CPT

## 2020-12-30 PROCEDURE — 99285 EMERGENCY DEPT VISIT HI MDM: CPT | Performed by: PHYSICIAN ASSISTANT

## 2020-12-30 PROCEDURE — 96361 HYDRATE IV INFUSION ADD-ON: CPT

## 2020-12-30 PROCEDURE — 80053 COMPREHEN METABOLIC PANEL: CPT | Performed by: PHYSICIAN ASSISTANT

## 2020-12-30 PROCEDURE — 99284 EMERGENCY DEPT VISIT MOD MDM: CPT

## 2020-12-30 PROCEDURE — 85610 PROTHROMBIN TIME: CPT | Performed by: PHYSICIAN ASSISTANT

## 2020-12-30 PROCEDURE — 84484 ASSAY OF TROPONIN QUANT: CPT | Performed by: PHYSICIAN ASSISTANT

## 2020-12-30 PROCEDURE — 96374 THER/PROPH/DIAG INJ IV PUSH: CPT

## 2020-12-30 PROCEDURE — 81001 URINALYSIS AUTO W/SCOPE: CPT | Performed by: PHYSICIAN ASSISTANT

## 2020-12-30 PROCEDURE — 76830 TRANSVAGINAL US NON-OB: CPT

## 2020-12-30 PROCEDURE — 85730 THROMBOPLASTIN TIME PARTIAL: CPT | Performed by: PHYSICIAN ASSISTANT

## 2020-12-30 PROCEDURE — 74177 CT ABD & PELVIS W/CONTRAST: CPT

## 2020-12-30 PROCEDURE — 96375 TX/PRO/DX INJ NEW DRUG ADDON: CPT

## 2020-12-30 PROCEDURE — 36415 COLL VENOUS BLD VENIPUNCTURE: CPT | Performed by: PHYSICIAN ASSISTANT

## 2020-12-30 RX ORDER — ONDANSETRON 2 MG/ML
4 INJECTION INTRAMUSCULAR; INTRAVENOUS ONCE
Status: COMPLETED | OUTPATIENT
Start: 2020-12-30 | End: 2020-12-30

## 2020-12-30 RX ORDER — HYDROCODONE BITARTRATE AND ACETAMINOPHEN 5; 325 MG/1; MG/1
1 TABLET ORAL EVERY 6 HOURS PRN
Qty: 6 TABLET | Refills: 0 | Status: SHIPPED | OUTPATIENT
Start: 2020-12-30 | End: 2021-08-09

## 2020-12-30 RX ORDER — MORPHINE SULFATE 4 MG/ML
4 INJECTION, SOLUTION INTRAMUSCULAR; INTRAVENOUS ONCE
Status: COMPLETED | OUTPATIENT
Start: 2020-12-30 | End: 2020-12-30

## 2020-12-30 RX ORDER — NAPROXEN 500 MG/1
500 TABLET ORAL 2 TIMES DAILY WITH MEALS
Qty: 30 TABLET | Refills: 0 | Status: SHIPPED | OUTPATIENT
Start: 2020-12-30 | End: 2021-10-21 | Stop reason: ALTCHOICE

## 2020-12-30 RX ORDER — KETOROLAC TROMETHAMINE 30 MG/ML
15 INJECTION, SOLUTION INTRAMUSCULAR; INTRAVENOUS ONCE
Status: COMPLETED | OUTPATIENT
Start: 2020-12-30 | End: 2020-12-30

## 2020-12-30 RX ADMIN — MORPHINE SULFATE 4 MG: 4 INJECTION, SOLUTION INTRAMUSCULAR; INTRAVENOUS at 12:11

## 2020-12-30 RX ADMIN — ONDANSETRON 4 MG: 2 INJECTION INTRAMUSCULAR; INTRAVENOUS at 10:43

## 2020-12-30 RX ADMIN — SODIUM CHLORIDE 1000 ML: 0.9 INJECTION, SOLUTION INTRAVENOUS at 10:43

## 2020-12-30 RX ADMIN — KETOROLAC TROMETHAMINE 15 MG: 30 INJECTION, SOLUTION INTRAMUSCULAR; INTRAVENOUS at 10:43

## 2020-12-30 RX ADMIN — IOHEXOL 100 ML: 350 INJECTION, SOLUTION INTRAVENOUS at 12:06

## 2020-12-31 LAB
ATRIAL RATE: 75 BPM
P AXIS: 71 DEGREES
PR INTERVAL: 136 MS
QRS AXIS: 4 DEGREES
QRSD INTERVAL: 104 MS
QT INTERVAL: 412 MS
QTC INTERVAL: 460 MS
T WAVE AXIS: 52 DEGREES
VENTRICULAR RATE: 75 BPM

## 2020-12-31 PROCEDURE — 93010 ELECTROCARDIOGRAM REPORT: CPT | Performed by: INTERNAL MEDICINE

## 2021-01-09 DIAGNOSIS — R20.2 PARESTHESIA: ICD-10-CM

## 2021-01-11 RX ORDER — GABAPENTIN 300 MG/1
CAPSULE ORAL
Qty: 150 CAPSULE | Refills: 1 | Status: SHIPPED | OUTPATIENT
Start: 2021-01-11 | End: 2021-03-03 | Stop reason: SDUPTHER

## 2021-03-03 DIAGNOSIS — M79.10 MUSCLE PAIN: ICD-10-CM

## 2021-03-03 DIAGNOSIS — M54.2 CERVICALGIA: ICD-10-CM

## 2021-03-03 DIAGNOSIS — M79.18 CERVICAL MYOFASCIAL PAIN SYNDROME: ICD-10-CM

## 2021-03-03 DIAGNOSIS — R20.2 PARESTHESIA: ICD-10-CM

## 2021-03-06 RX ORDER — GABAPENTIN 300 MG/1
CAPSULE ORAL
Qty: 150 CAPSULE | Refills: 0 | Status: SHIPPED | OUTPATIENT
Start: 2021-03-06 | End: 2021-04-12 | Stop reason: SDUPTHER

## 2021-03-06 RX ORDER — TIZANIDINE 2 MG/1
TABLET ORAL
Qty: 90 TABLET | Refills: 0 | Status: SHIPPED | OUTPATIENT
Start: 2021-03-06 | End: 2021-04-12 | Stop reason: SDUPTHER

## 2021-04-12 DIAGNOSIS — Z23 ENCOUNTER FOR IMMUNIZATION: ICD-10-CM

## 2021-04-12 DIAGNOSIS — E78.00 HYPERCHOLESTEROLEMIA: ICD-10-CM

## 2021-04-12 DIAGNOSIS — M54.2 NECK PAIN ON LEFT SIDE: ICD-10-CM

## 2021-04-12 DIAGNOSIS — M79.10 MUSCLE PAIN: ICD-10-CM

## 2021-04-12 DIAGNOSIS — M54.2 CERVICALGIA: ICD-10-CM

## 2021-04-12 DIAGNOSIS — R20.2 PARESTHESIA: ICD-10-CM

## 2021-04-12 DIAGNOSIS — M79.18 CERVICAL MYOFASCIAL PAIN SYNDROME: ICD-10-CM

## 2021-04-12 RX ORDER — TIZANIDINE 2 MG/1
TABLET ORAL
Qty: 90 TABLET | Refills: 0 | Status: SHIPPED | OUTPATIENT
Start: 2021-04-12 | End: 2021-06-14 | Stop reason: SDUPTHER

## 2021-04-12 RX ORDER — SIMVASTATIN 40 MG
40 TABLET ORAL DAILY
Qty: 90 TABLET | Refills: 0 | Status: SHIPPED | OUTPATIENT
Start: 2021-04-12 | End: 2021-07-12

## 2021-04-12 RX ORDER — GABAPENTIN 300 MG/1
CAPSULE ORAL
Qty: 150 CAPSULE | Refills: 0 | Status: SHIPPED | OUTPATIENT
Start: 2021-04-12 | End: 2021-06-10

## 2021-06-03 ENCOUNTER — TELEPHONE (OUTPATIENT)
Dept: ADMINISTRATIVE | Facility: OTHER | Age: 41
End: 2021-06-03

## 2021-06-03 NOTE — TELEPHONE ENCOUNTER
----- Message from Soraya Smith sent at 6/3/2021 10:32 AM EDT -----  06/03/21 10:32 AM    Hello, our patient Zane Friday has had Pap Smear (HPV) aka Cervical Cancer Screening completed/performed  Please assist in updating the patient chart by pulling the Care Everywhere (CE) document  The date of service is 12/1/2016       Thank you,  [facility]

## 2021-06-03 NOTE — TELEPHONE ENCOUNTER
Upon review of the In Basket request we looked into the pap in care everywhere and  It states reults canceled I have no pap report - when I search for pap in the chart - no result come up  Any additional questions or concerns should be emailed to the Practice Liaisons via [email or web address] email, please do not reply via In Basket      Thank you  Anitra Barbosa MA

## 2021-06-07 ENCOUNTER — OFFICE VISIT (OUTPATIENT)
Dept: FAMILY MEDICINE CLINIC | Facility: CLINIC | Age: 41
End: 2021-06-07
Payer: COMMERCIAL

## 2021-06-07 VITALS
HEART RATE: 89 BPM | HEIGHT: 66 IN | BODY MASS INDEX: 23.78 KG/M2 | WEIGHT: 148 LBS | OXYGEN SATURATION: 98 % | SYSTOLIC BLOOD PRESSURE: 102 MMHG | TEMPERATURE: 98.6 F | DIASTOLIC BLOOD PRESSURE: 62 MMHG

## 2021-06-07 DIAGNOSIS — R91.1 LUNG NODULE: ICD-10-CM

## 2021-06-07 DIAGNOSIS — R63.4 WEIGHT LOSS: Primary | ICD-10-CM

## 2021-06-07 PROCEDURE — 4004F PT TOBACCO SCREEN RCVD TLK: CPT | Performed by: FAMILY MEDICINE

## 2021-06-07 PROCEDURE — 3008F BODY MASS INDEX DOCD: CPT | Performed by: FAMILY MEDICINE

## 2021-06-07 PROCEDURE — 99213 OFFICE O/P EST LOW 20 MIN: CPT | Performed by: FAMILY MEDICINE

## 2021-06-07 PROCEDURE — 3725F SCREEN DEPRESSION PERFORMED: CPT | Performed by: FAMILY MEDICINE

## 2021-06-07 NOTE — PROGRESS NOTES
Assessment/Plan:    No problem-specific Assessment & Plan notes found for this encounter  Diagnoses and all orders for this visit:    Weight loss  -     Comprehensive metabolic panel; Future  -     CBC and differential; Future  -     TSH, 3rd generation with Free T4 reflex; Future  -     CT chest wo contrast; Future    Lung nodule          PHQ-9 Depression Screening    PHQ-9:   Frequency of the following problems over the past two weeks:      Little interest or pleasure in doing things: 0 - not at all  Feeling down, depressed, or hopeless: 0 - not at all  Trouble falling or staying asleep, or sleeping too much: 0 - not at all  Feeling tired or having little energy: 0 - not at all  Poor appetite or overeatin - not at all  Feeling bad about yourself - or that you are a failure or have let yourself or your family down: 0 - not at all  Trouble concentrating on things, such as reading the newspaper or watching television: 0 - not at all  Moving or speaking so slowly that other people could have noticed  Or the opposite - being so fidgety or restless that you have been moving around a lot more than usual: 0 - not at all  Thoughts that you would be better off dead, or of hurting yourself in some way: 0 - not at all  PHQ-2 Score: 0  PHQ-9 Score: 0            Subjective:      Patient ID: Hanna Raphael is a 35 y o  female  Pt complains of losing weight about 16 lbs over the last 2 months without trying, however pt is watching what she eats      The following portions of the patient's history were reviewed and updated as appropriate: allergies, current medications, past family history, past medical history, past social history, past surgical history and problem list     Review of Systems   Constitutional: Negative for chills, fatigue and fever  Respiratory: Negative for shortness of breath and wheezing  Cardiovascular: Negative for chest pain and palpitations     Gastrointestinal: Negative for blood in stool, constipation, diarrhea, nausea and vomiting  Endocrine: Negative for cold intolerance and heat intolerance           Objective:    /62 (BP Location: Left arm, Patient Position: Sitting, Cuff Size: Standard)   Pulse 89   Temp 98 6 °F (37 °C) (Tympanic)   Ht 5' 6" (1 676 m)   Wt 67 1 kg (148 lb)   SpO2 98%   BMI 23 89 kg/m²      Physical Exam

## 2021-06-10 ENCOUNTER — HOSPITAL ENCOUNTER (OUTPATIENT)
Dept: CT IMAGING | Facility: HOSPITAL | Age: 41
Discharge: HOME | End: 2021-06-10
Attending: FAMILY MEDICINE
Payer: COMMERCIAL

## 2021-06-10 DIAGNOSIS — R20.2 PARESTHESIA: ICD-10-CM

## 2021-06-10 DIAGNOSIS — R63.4 WEIGHT LOSS: ICD-10-CM

## 2021-06-10 PROCEDURE — 71250 CT THORAX DX C-: CPT

## 2021-06-10 RX ORDER — GABAPENTIN 300 MG/1
CAPSULE ORAL
Qty: 150 CAPSULE | Refills: 0 | Status: SHIPPED | OUTPATIENT
Start: 2021-06-10 | End: 2021-07-13

## 2021-06-14 DIAGNOSIS — M79.18 CERVICAL MYOFASCIAL PAIN SYNDROME: ICD-10-CM

## 2021-06-14 DIAGNOSIS — M79.10 MUSCLE PAIN: ICD-10-CM

## 2021-06-14 DIAGNOSIS — M54.2 CERVICALGIA: ICD-10-CM

## 2021-06-14 RX ORDER — TIZANIDINE 2 MG/1
TABLET ORAL
Qty: 90 TABLET | Refills: 0 | Status: SHIPPED | OUTPATIENT
Start: 2021-06-14 | End: 2021-08-12 | Stop reason: DRUGHIGH

## 2021-06-18 ENCOUNTER — APPOINTMENT (OUTPATIENT)
Dept: LAB | Facility: CLINIC | Age: 41
End: 2021-06-18
Payer: COMMERCIAL

## 2021-06-18 DIAGNOSIS — Z11.4 SCREENING FOR HIV WITHOUT PRESENCE OF RISK FACTORS: ICD-10-CM

## 2021-06-18 DIAGNOSIS — R63.4 WEIGHT LOSS: ICD-10-CM

## 2021-06-18 LAB
ALBUMIN SERPL BCP-MCNC: 4 G/DL (ref 3.5–5)
ALP SERPL-CCNC: 60 U/L (ref 46–116)
ALT SERPL W P-5'-P-CCNC: 20 U/L (ref 12–78)
ANION GAP SERPL CALCULATED.3IONS-SCNC: 5 MMOL/L (ref 4–13)
AST SERPL W P-5'-P-CCNC: 14 U/L (ref 5–45)
BASOPHILS # BLD AUTO: 0.05 THOUSANDS/ΜL (ref 0–0.1)
BASOPHILS NFR BLD AUTO: 1 % (ref 0–1)
BILIRUB SERPL-MCNC: 0.58 MG/DL (ref 0.2–1)
BUN SERPL-MCNC: 15 MG/DL (ref 5–25)
CALCIUM SERPL-MCNC: 9.7 MG/DL (ref 8.3–10.1)
CHLORIDE SERPL-SCNC: 108 MMOL/L (ref 100–108)
CO2 SERPL-SCNC: 28 MMOL/L (ref 21–32)
CREAT SERPL-MCNC: 0.67 MG/DL (ref 0.6–1.3)
EOSINOPHIL # BLD AUTO: 0.09 THOUSAND/ΜL (ref 0–0.61)
EOSINOPHIL NFR BLD AUTO: 1 % (ref 0–6)
ERYTHROCYTE [DISTWIDTH] IN BLOOD BY AUTOMATED COUNT: 12.3 % (ref 11.6–15.1)
GFR SERPL CREATININE-BSD FRML MDRD: 108 ML/MIN/1.73SQ M
GLUCOSE P FAST SERPL-MCNC: 82 MG/DL (ref 65–99)
HCT VFR BLD AUTO: 44.6 % (ref 34.8–46.1)
HGB BLD-MCNC: 14.7 G/DL (ref 11.5–15.4)
IMM GRANULOCYTES # BLD AUTO: 0.02 THOUSAND/UL (ref 0–0.2)
IMM GRANULOCYTES NFR BLD AUTO: 0 % (ref 0–2)
LYMPHOCYTES # BLD AUTO: 3.52 THOUSANDS/ΜL (ref 0.6–4.47)
LYMPHOCYTES NFR BLD AUTO: 37 % (ref 14–44)
MCH RBC QN AUTO: 31.5 PG (ref 26.8–34.3)
MCHC RBC AUTO-ENTMCNC: 33 G/DL (ref 31.4–37.4)
MCV RBC AUTO: 96 FL (ref 82–98)
MONOCYTES # BLD AUTO: 0.51 THOUSAND/ΜL (ref 0.17–1.22)
MONOCYTES NFR BLD AUTO: 5 % (ref 4–12)
NEUTROPHILS # BLD AUTO: 5.42 THOUSANDS/ΜL (ref 1.85–7.62)
NEUTS SEG NFR BLD AUTO: 56 % (ref 43–75)
NRBC BLD AUTO-RTO: 0 /100 WBCS
PLATELET # BLD AUTO: 267 THOUSANDS/UL (ref 149–390)
PMV BLD AUTO: 9.8 FL (ref 8.9–12.7)
POTASSIUM SERPL-SCNC: 4.1 MMOL/L (ref 3.5–5.3)
PROT SERPL-MCNC: 7.5 G/DL (ref 6.4–8.2)
RBC # BLD AUTO: 4.66 MILLION/UL (ref 3.81–5.12)
SODIUM SERPL-SCNC: 141 MMOL/L (ref 136–145)
TSH SERPL DL<=0.05 MIU/L-ACNC: 0.77 UIU/ML (ref 0.36–3.74)
WBC # BLD AUTO: 9.61 THOUSAND/UL (ref 4.31–10.16)

## 2021-06-18 PROCEDURE — 87389 HIV-1 AG W/HIV-1&-2 AB AG IA: CPT

## 2021-06-18 PROCEDURE — 80053 COMPREHEN METABOLIC PANEL: CPT

## 2021-06-18 PROCEDURE — 36415 COLL VENOUS BLD VENIPUNCTURE: CPT

## 2021-06-18 PROCEDURE — 84443 ASSAY THYROID STIM HORMONE: CPT

## 2021-06-18 PROCEDURE — 85025 COMPLETE CBC W/AUTO DIFF WBC: CPT

## 2021-06-21 LAB — HIV 1+2 AB+HIV1 P24 AG SERPL QL IA: NORMAL

## 2021-07-09 DIAGNOSIS — E78.00 HYPERCHOLESTEROLEMIA: ICD-10-CM

## 2021-07-12 ENCOUNTER — OFFICE VISIT (OUTPATIENT)
Dept: FAMILY MEDICINE CLINIC | Facility: CLINIC | Age: 41
End: 2021-07-12
Payer: COMMERCIAL

## 2021-07-12 VITALS
TEMPERATURE: 98.5 F | WEIGHT: 146 LBS | OXYGEN SATURATION: 98 % | SYSTOLIC BLOOD PRESSURE: 102 MMHG | DIASTOLIC BLOOD PRESSURE: 60 MMHG | HEIGHT: 66 IN | BODY MASS INDEX: 23.46 KG/M2 | HEART RATE: 86 BPM

## 2021-07-12 DIAGNOSIS — R00.2 HEART PALPITATIONS: ICD-10-CM

## 2021-07-12 DIAGNOSIS — R63.4 UNINTENTIONAL WEIGHT LOSS: Primary | ICD-10-CM

## 2021-07-12 PROCEDURE — 3008F BODY MASS INDEX DOCD: CPT | Performed by: FAMILY MEDICINE

## 2021-07-12 PROCEDURE — 3725F SCREEN DEPRESSION PERFORMED: CPT | Performed by: FAMILY MEDICINE

## 2021-07-12 PROCEDURE — 99213 OFFICE O/P EST LOW 20 MIN: CPT | Performed by: FAMILY MEDICINE

## 2021-07-12 PROCEDURE — 4004F PT TOBACCO SCREEN RCVD TLK: CPT | Performed by: FAMILY MEDICINE

## 2021-07-12 RX ORDER — SIMVASTATIN 40 MG
TABLET ORAL
Qty: 90 TABLET | Refills: 0 | Status: SHIPPED | OUTPATIENT
Start: 2021-07-12 | End: 2021-12-09

## 2021-07-12 NOTE — PROGRESS NOTES
Assessment/Plan:    No problem-specific Assessment & Plan notes found for this encounter  Diagnoses and all orders for this visit:    Unintentional weight loss  -     Thyroid Antibodies Panel; Future  -     US thyroid; Future  -     TSH, 3rd generation; Future  -     T4, free; Future  -     T3, free; Future    Heart palpitations  -     Thyroid Antibodies Panel; Future  -     US thyroid; Future  -     TSH, 3rd generation; Future  -     T4, free; Future  -     T3, free; Future          PHQ-9 Depression Screening    PHQ-9:   Frequency of the following problems over the past two weeks:      Little interest or pleasure in doing things: 0 - not at all  Feeling down, depressed, or hopeless: 0 - not at all  Trouble falling or staying asleep, or sleeping too much: 0 - not at all  Feeling tired or having little energy: 0 - not at all  Poor appetite or overeatin - not at all  Feeling bad about yourself - or that you are a failure or have let yourself or your family down: 0 - not at all  Trouble concentrating on things, such as reading the newspaper or watching television: 0 - not at all  Moving or speaking so slowly that other people could have noticed  Or the opposite - being so fidgety or restless that you have been moving around a lot more than usual: 0 - not at all  Thoughts that you would be better off dead, or of hurting yourself in some way: 0 - not at all  PHQ-2 Score: 0  PHQ-9 Score: 0            Subjective:      Patient ID: Aidan Pulse is a 35 y o  female      Follow up for weight loss pt has lost 2 lbs since last visit, pt has not changed her diet, the weight loss began about 4 months ago, pt has periodic sweating with heart racing      The following portions of the patient's history were reviewed and updated as appropriate: allergies, current medications, past family history, past medical history, past social history, past surgical history and problem list     Review of Systems   Constitutional: Positive for unexpected weight change  Negative for chills and fever  Respiratory: Negative for shortness of breath and wheezing  Cardiovascular: Positive for palpitations  Negative for chest pain  Gastrointestinal: Negative for abdominal pain, diarrhea, nausea and vomiting  Hematological: Negative for adenopathy  Objective:    /60 (BP Location: Left arm, Patient Position: Sitting, Cuff Size: Standard)   Pulse 86   Temp 98 5 °F (36 9 °C) (Tympanic)   Ht 5' 6" (1 676 m)   Wt 66 2 kg (146 lb)   SpO2 98%   BMI 23 57 kg/m²      Physical Exam  Vitals and nursing note reviewed  Constitutional:       General: She is not in acute distress  Appearance: Normal appearance  She is not ill-appearing, toxic-appearing or diaphoretic  HENT:      Head: Normocephalic and atraumatic  Right Ear: Tympanic membrane, ear canal and external ear normal  There is no impacted cerumen  Left Ear: Tympanic membrane, ear canal and external ear normal  There is no impacted cerumen  Nose: Nose normal  No congestion or rhinorrhea  Eyes:      Conjunctiva/sclera: Conjunctivae normal    Cardiovascular:      Rate and Rhythm: Normal rate and regular rhythm  Heart sounds: Normal heart sounds  No murmur heard  Pulmonary:      Effort: Pulmonary effort is normal  No respiratory distress  Breath sounds: Normal breath sounds  No wheezing, rhonchi or rales  Abdominal:      General: There is no distension  Palpations: Abdomen is soft  There is no mass  Tenderness: There is no abdominal tenderness  There is no guarding or rebound  Musculoskeletal:      Cervical back: Normal range of motion and neck supple  No rigidity  Right lower leg: No edema  Left lower leg: No edema  Lymphadenopathy:      Cervical: No cervical adenopathy  Neurological:      Mental Status: She is alert and oriented to person, place, and time     Psychiatric:         Mood and Affect: Mood normal  Behavior: Behavior normal          Thought Content:  Thought content normal          Judgment: Judgment normal

## 2021-07-13 ENCOUNTER — APPOINTMENT (OUTPATIENT)
Dept: LAB | Facility: CLINIC | Age: 41
End: 2021-07-13
Payer: COMMERCIAL

## 2021-07-13 DIAGNOSIS — R00.2 HEART PALPITATIONS: ICD-10-CM

## 2021-07-13 DIAGNOSIS — R63.4 UNINTENTIONAL WEIGHT LOSS: ICD-10-CM

## 2021-07-13 LAB
T3FREE SERPL-MCNC: 2.94 PG/ML (ref 2.3–4.2)
T4 FREE SERPL-MCNC: 0.92 NG/DL (ref 0.76–1.46)
TSH SERPL DL<=0.05 MIU/L-ACNC: 0.71 UIU/ML (ref 0.36–3.74)

## 2021-07-13 PROCEDURE — 86376 MICROSOMAL ANTIBODY EACH: CPT

## 2021-07-13 PROCEDURE — 84443 ASSAY THYROID STIM HORMONE: CPT

## 2021-07-13 PROCEDURE — 86800 THYROGLOBULIN ANTIBODY: CPT

## 2021-07-13 PROCEDURE — 36415 COLL VENOUS BLD VENIPUNCTURE: CPT

## 2021-07-13 PROCEDURE — 84439 ASSAY OF FREE THYROXINE: CPT

## 2021-07-13 PROCEDURE — 84481 FREE ASSAY (FT-3): CPT

## 2021-07-14 ENCOUNTER — HOSPITAL ENCOUNTER (OUTPATIENT)
Dept: ULTRASOUND IMAGING | Facility: HOSPITAL | Age: 41
Discharge: HOME | End: 2021-07-14
Payer: COMMERCIAL

## 2021-07-14 DIAGNOSIS — R63.4 UNINTENTIONAL WEIGHT LOSS: ICD-10-CM

## 2021-07-14 DIAGNOSIS — R00.2 HEART PALPITATIONS: ICD-10-CM

## 2021-07-14 LAB
THYROGLOB AB SERPL-ACNC: <1 IU/ML (ref 0–0.9)
THYROPEROXIDASE AB SERPL-ACNC: <8 IU/ML (ref 0–34)

## 2021-07-14 PROCEDURE — 76536 US EXAM OF HEAD AND NECK: CPT

## 2021-08-06 DIAGNOSIS — M79.10 MUSCLE PAIN: ICD-10-CM

## 2021-08-09 ENCOUNTER — OFFICE VISIT (OUTPATIENT)
Dept: FAMILY MEDICINE CLINIC | Facility: CLINIC | Age: 41
End: 2021-08-09
Payer: COMMERCIAL

## 2021-08-09 VITALS
OXYGEN SATURATION: 98 % | SYSTOLIC BLOOD PRESSURE: 98 MMHG | HEIGHT: 66 IN | WEIGHT: 146 LBS | BODY MASS INDEX: 23.46 KG/M2 | DIASTOLIC BLOOD PRESSURE: 60 MMHG | HEART RATE: 80 BPM | TEMPERATURE: 97.8 F

## 2021-08-09 DIAGNOSIS — R63.4 UNINTENTIONAL WEIGHT LOSS: Primary | ICD-10-CM

## 2021-08-09 LAB — SL AMB POCT HEMOGLOBIN AIC: 5.5 (ref ?–6.5)

## 2021-08-09 PROCEDURE — 83036 HEMOGLOBIN GLYCOSYLATED A1C: CPT | Performed by: FAMILY MEDICINE

## 2021-08-09 PROCEDURE — 99213 OFFICE O/P EST LOW 20 MIN: CPT | Performed by: FAMILY MEDICINE

## 2021-08-09 PROCEDURE — 4004F PT TOBACCO SCREEN RCVD TLK: CPT | Performed by: FAMILY MEDICINE

## 2021-08-09 PROCEDURE — 3008F BODY MASS INDEX DOCD: CPT | Performed by: FAMILY MEDICINE

## 2021-08-09 PROCEDURE — 3725F SCREEN DEPRESSION PERFORMED: CPT | Performed by: FAMILY MEDICINE

## 2021-08-09 NOTE — TELEPHONE ENCOUNTER
Can you please call and check on the prescription, it looks like I sent a refill in on 08/06  for the tizanidine 30 tablets

## 2021-08-09 NOTE — PROGRESS NOTES
Assessment/Plan:    No problem-specific Assessment & Plan notes found for this encounter  Diagnoses and all orders for this visit:    Unintentional weight loss  Comments:  stablized  Orders:  -     CT abdomen pelvis w wo contrast; Future  -     POCT hemoglobin A1c          PHQ-9 Depression Screening    PHQ-9:   Frequency of the following problems over the past two weeks:      Little interest or pleasure in doing things: 0 - not at all  Feeling down, depressed, or hopeless: 0 - not at all  Trouble falling or staying asleep, or sleeping too much: 0 - not at all  Feeling tired or having little energy: 0 - not at all  Poor appetite or overeatin - not at all  Feeling bad about yourself - or that you are a failure or have let yourself or your family down: 0 - not at all  Trouble concentrating on things, such as reading the newspaper or watching television: 0 - not at all  Moving or speaking so slowly that other people could have noticed  Or the opposite - being so fidgety or restless that you have been moving around a lot more than usual: 0 - not at all  Thoughts that you would be better off dead, or of hurting yourself in some way: 0 - not at all  PHQ-2 Score: 0  PHQ-9 Score: 0            Subjective:      Patient ID: Lopez Casiano is a 35 y o  female  Follow up for unintentional weight loss, pt had thyroid labs and U/S both of which were negative, pt has lost no further weight since last visit, pt had negative work up for diabetes, otherwise pt feels well      The following portions of the patient's history were reviewed and updated as appropriate: allergies, current medications, past family history, past medical history, past social history, past surgical history and problem list     Review of Systems   Constitutional: Negative for chills, fatigue and fever  Respiratory: Negative for shortness of breath and wheezing  Cardiovascular: Negative for chest pain and palpitations     Gastrointestinal: Negative for abdominal distention, abdominal pain, anal bleeding, blood in stool, constipation, diarrhea, nausea and vomiting  Endocrine: Negative for cold intolerance and heat intolerance  Genitourinary: Negative for dysuria and hematuria  Objective:    BP 98/60   Pulse 80   Temp 97 8 °F (36 6 °C)   Ht 5' 6" (1 676 m)   Wt 66 2 kg (146 lb)   SpO2 98%   BMI 23 57 kg/m²      Physical Exam  Vitals and nursing note reviewed  Constitutional:       General: She is not in acute distress  Appearance: Normal appearance  She is well-developed  She is not ill-appearing, toxic-appearing or diaphoretic  HENT:      Head: Normocephalic and atraumatic  Right Ear: Tympanic membrane, ear canal and external ear normal  There is no impacted cerumen  Left Ear: Tympanic membrane, ear canal and external ear normal  There is no impacted cerumen  Nose: Nose normal  No congestion or rhinorrhea  Mouth/Throat:      Mouth: Mucous membranes are moist       Pharynx: Oropharynx is clear  No oropharyngeal exudate or posterior oropharyngeal erythema  Eyes:      General: No scleral icterus  Right eye: No discharge  Left eye: No discharge  Conjunctiva/sclera: Conjunctivae normal       Pupils: Pupils are equal, round, and reactive to light  Cardiovascular:      Rate and Rhythm: Normal rate and regular rhythm  Pulses: Normal pulses  Heart sounds: Normal heart sounds  No murmur heard  Pulmonary:      Effort: Pulmonary effort is normal  No respiratory distress  Breath sounds: Normal breath sounds  No wheezing, rhonchi or rales  Abdominal:      General: Bowel sounds are normal  There is no distension  Palpations: Abdomen is soft  There is no mass  Tenderness: There is no abdominal tenderness  There is no guarding or rebound  Musculoskeletal:         General: Normal range of motion  Cervical back: Normal range of motion and neck supple  No rigidity  Right lower leg: No edema  Left lower leg: No edema  Lymphadenopathy:      Cervical: No cervical adenopathy  Skin:     General: Skin is warm and dry  Findings: No rash  Neurological:      General: No focal deficit present  Mental Status: She is alert and oriented to person, place, and time  Sensory: No sensory deficit  Motor: No weakness or abnormal muscle tone  Coordination: Coordination normal       Gait: Gait normal       Deep Tendon Reflexes: Reflexes are normal and symmetric  Psychiatric:         Mood and Affect: Mood normal          Behavior: Behavior normal          Thought Content:  Thought content normal          Judgment: Judgment normal

## 2021-08-11 NOTE — TELEPHONE ENCOUNTER
I don't see any script that you sent on 8/6/21  Tizanidine 2 mg was last sent on 6/14/21  The script pending below is for 4 mg       Called and left a message on pt's answering machine for a call back

## 2021-08-12 RX ORDER — TIZANIDINE 4 MG/1
TABLET ORAL
Qty: 30 TABLET | Refills: 1 | Status: SHIPPED | OUTPATIENT
Start: 2021-08-12 | End: 2021-10-11

## 2021-08-12 NOTE — TELEPHONE ENCOUNTER
My mistake,  do not see refill  Will refill but pt does not have a follow up  She reallly needs to be seen and followed by either PMR or PPM at this point  Last seen by PMR Dr Darlene Alexander  We can refer her to one of them

## 2021-08-13 ENCOUNTER — HOSPITAL ENCOUNTER (OUTPATIENT)
Dept: CT IMAGING | Facility: HOSPITAL | Age: 41
Discharge: HOME | End: 2021-08-13
Attending: FAMILY MEDICINE
Payer: COMMERCIAL

## 2021-08-13 DIAGNOSIS — R63.4 UNINTENTIONAL WEIGHT LOSS: ICD-10-CM

## 2021-08-13 PROCEDURE — 74177 CT ABD & PELVIS W/CONTRAST: CPT

## 2021-08-13 RX ADMIN — IOHEXOL 100 ML: 350 INJECTION, SOLUTION INTRAVENOUS at 07:49

## 2021-08-16 NOTE — TELEPHONE ENCOUNTER
Called and Left a message on pt's answering machine for a call back      Second attempt  Letter sent  Soo herndon - we have attempted to reach patient to schedule an appointment  A letter has been sent today

## 2021-09-01 ENCOUNTER — APPOINTMENT (OUTPATIENT)
Dept: URGENT CARE | Facility: CLINIC | Age: 41
End: 2021-09-01
Payer: OTHER MISCELLANEOUS

## 2021-09-01 PROCEDURE — 99213 OFFICE O/P EST LOW 20 MIN: CPT

## 2021-09-02 ENCOUNTER — APPOINTMENT (OUTPATIENT)
Dept: URGENT CARE | Facility: CLINIC | Age: 41
End: 2021-09-02
Payer: OTHER MISCELLANEOUS

## 2021-09-02 PROCEDURE — 99213 OFFICE O/P EST LOW 20 MIN: CPT

## 2021-09-07 DIAGNOSIS — R20.2 PARESTHESIA: ICD-10-CM

## 2021-09-07 RX ORDER — GABAPENTIN 300 MG/1
CAPSULE ORAL
Qty: 130 CAPSULE | Refills: 0 | Status: SHIPPED | OUTPATIENT
Start: 2021-09-07 | End: 2021-10-13

## 2021-09-07 NOTE — TELEPHONE ENCOUNTER
Received request to refill 1 of patient's medication, last seen in November, does not have a follow-up  Needs to have 1 to get more refills    She might be a [facility]"patient

## 2021-09-08 NOTE — TELEPHONE ENCOUNTER
Message left for patient to call the office back to schedule a follow up with Jessica in [place] office as per provider last visit in November and for any additional refills needs a follow up

## 2021-09-09 ENCOUNTER — APPOINTMENT (OUTPATIENT)
Dept: URGENT CARE | Facility: CLINIC | Age: 41
End: 2021-09-09
Payer: OTHER MISCELLANEOUS

## 2021-09-09 PROCEDURE — 99213 OFFICE O/P EST LOW 20 MIN: CPT

## 2021-09-14 ENCOUNTER — TELEPHONE (OUTPATIENT)
Dept: NEUROLOGY | Facility: CLINIC | Age: 41
End: 2021-09-14

## 2021-09-20 ENCOUNTER — OFFICE VISIT (OUTPATIENT)
Dept: FAMILY MEDICINE CLINIC | Facility: CLINIC | Age: 41
End: 2021-09-20
Payer: COMMERCIAL

## 2021-09-20 VITALS
WEIGHT: 143 LBS | OXYGEN SATURATION: 96 % | SYSTOLIC BLOOD PRESSURE: 116 MMHG | DIASTOLIC BLOOD PRESSURE: 64 MMHG | HEIGHT: 66 IN | BODY MASS INDEX: 22.98 KG/M2 | TEMPERATURE: 96.2 F | HEART RATE: 84 BPM

## 2021-09-20 DIAGNOSIS — F17.200 SMOKING: ICD-10-CM

## 2021-09-20 DIAGNOSIS — R63.4 UNINTENTIONAL WEIGHT LOSS: Primary | ICD-10-CM

## 2021-09-20 PROCEDURE — 3008F BODY MASS INDEX DOCD: CPT | Performed by: FAMILY MEDICINE

## 2021-09-20 PROCEDURE — 99213 OFFICE O/P EST LOW 20 MIN: CPT | Performed by: FAMILY MEDICINE

## 2021-09-20 PROCEDURE — 4004F PT TOBACCO SCREEN RCVD TLK: CPT | Performed by: FAMILY MEDICINE

## 2021-09-20 PROCEDURE — 3725F SCREEN DEPRESSION PERFORMED: CPT | Performed by: FAMILY MEDICINE

## 2021-09-20 NOTE — PROGRESS NOTES
Assessment/Plan:    No problem-specific Assessment & Plan notes found for this encounter  Diagnoses and all orders for this visit:    Unintentional weight loss  Comments:  negative work up thus far  Orders:  -     CT head wo contrast; Future  -     NM pet ct tumor imaging whole body; Future    Smoking  Comments:  pt advised to quit smoking          PHQ-9 Depression Screening    PHQ-9:   Frequency of the following problems over the past two weeks:      Little interest or pleasure in doing things: 0 - not at all  Feeling down, depressed, or hopeless: 0 - not at all  Trouble falling or staying asleep, or sleeping too much: 0 - not at all  Feeling tired or having little energy: 0 - not at all  Poor appetite or overeatin - not at all  Feeling bad about yourself - or that you are a failure or have let yourself or your family down: 0 - not at all  Trouble concentrating on things, such as reading the newspaper or watching television: 0 - not at all  Moving or speaking so slowly that other people could have noticed  Or the opposite - being so fidgety or restless that you have been moving around a lot more than usual: 0 - not at all  Thoughts that you would be better off dead, or of hurting yourself in some way: 0 - not at all  PHQ-2 Score: 0  PHQ-9 Score: 0            Subjective:      Patient ID: Fernie Marroquin is a 35 y o  female  Follow up for unintentional weight loss, pt has lost 3 lbs since last visit, pt had a CT of the abdomen and pelvis which failed to find anything significant, pt states that she has not modified her diet and is not trying to lose weight      The following portions of the patient's history were reviewed and updated as appropriate: allergies, current medications, past family history, past medical history, past social history, past surgical history and problem list     Review of Systems   Constitutional: Positive for unexpected weight change  Negative for chills, fatigue and fever  Respiratory: Negative for shortness of breath and wheezing  Cardiovascular: Positive for palpitations  Negative for chest pain  Gastrointestinal: Negative for abdominal pain, blood in stool, constipation, diarrhea, nausea and vomiting  Endocrine: Positive for heat intolerance  Negative for cold intolerance  Objective:    /64 (BP Location: Left arm, Patient Position: Sitting)   Pulse 84   Temp (!) 96 2 °F (35 7 °C) (Tympanic)   Ht 5' 6" (1 676 m)   Wt 64 9 kg (143 lb)   SpO2 96%   BMI 23 08 kg/m²      Physical Exam  Vitals and nursing note reviewed  Constitutional:       General: She is not in acute distress  Appearance: Normal appearance  She is not ill-appearing or diaphoretic  HENT:      Head: Normocephalic and atraumatic  Eyes:      Conjunctiva/sclera: Conjunctivae normal    Cardiovascular:      Rate and Rhythm: Normal rate and regular rhythm  Heart sounds: Normal heart sounds  No murmur heard  Pulmonary:      Effort: Pulmonary effort is normal  No respiratory distress  Breath sounds: Normal breath sounds  No wheezing, rhonchi or rales  Abdominal:      General: There is no distension  Palpations: Abdomen is soft  There is no mass  Tenderness: There is no abdominal tenderness  There is no guarding or rebound  Musculoskeletal:      Cervical back: Normal range of motion and neck supple  No rigidity  Right lower leg: No edema  Left lower leg: No edema  Lymphadenopathy:      Cervical: No cervical adenopathy  Neurological:      Mental Status: She is alert and oriented to person, place, and time  Psychiatric:         Mood and Affect: Mood normal          Behavior: Behavior normal          Thought Content:  Thought content normal          Judgment: Judgment normal

## 2021-09-28 NOTE — PROGRESS NOTES
Daily Note     Today's date: 3/1/2018  Patient name: Jame Hicks  : 1980  MRN: [id number]  Referring provider: Dahiana Sierra MD  Dx:   Encounter Diagnosis     ICD-10-CM    1  Impingement syndrome of left shoulder M75 42    2  Cervical spondylosis without myelopathy M47 812      Precautions:  Neck and GHJ discomfort  Re-eval Date: 3/14/18     Date 2/15/18 2/21/18 2/22/18 2/27/18 3/1/18   Visit Count 11 12 13 14 15   FOTO completed       Pain In See RE 1/10 4/10 4/10 2/10   Pain Out See RE  2/10 1/10    Time In/Out 9-10:15 8-9:15 2-126 8915      Subjective:  Patient reports she had a follow up with her ortho  She is noted to have new diagnosis involving neck  She has another follow up to be scheduled with spine and pain        Objective: See treatment diary below    MANUAL 15 mins 15 min 15 min 15 15 mins     Manual Therapy:   PROM to left GHJ all planes - IR and ER only  Grade III mobs Inferior and posterior glides - NP  STM to left Ut, scap mm and deltoid - NP  Gentle SOR with AP mobs to C-spine in supine  PROM with gentle overpressure UTs  GISTM #4 to BL UT mm/ CS gentle sweeping/fanning to pt tolerance - seated - NP  BL Scalenes STM to pt tolerance - NP    Exercise Diary        UBE    10' alt ea dir @1'    Pec stretch (81-) 2x30" Pt review in supine all planes      Bicep curls 5# resume   5#  2x10, 5" ea   Tricep extension Orange  2 x 10    Blue  2x10, 5" ea   MTP/LTP Orange  2 x 10 Blue  2x10, 5" Orange  2x10, 5" Orange  2x10, 5" Blue  2x10, 5" ea   Tband IR/ER Orange  2 x 10 Grey  2x10 Orange  2x10 Blue  3x10 Blue  2x10, 5" ea   SL ER 5#  NP 5# 2x10, 5" 3# 2x10,5" BL ER Green  2x10, 5" BL ER Orange  2x10, 5"   Prone shoulder flex, abd, ext 0#  NP resume   3#  1x 10 ea BL   Bent over row 0#  NP resume      Arm raises flex/scap 1#  NP resume      Pullies (warm up) 3 mins ea    3 min warm up ea   Nustep  L3 10' L3 10' No available Not available   ZITA with cervical retractions w/TB  Red 2x10, 5" Reviewed discharge instructions including information on wearing a teal band for 6 wks postpartum to signal to healthcare providers she may be at an increase risk of complications following her delivery.  Discussed resources available after discharge home, Red 2x10,5" Green 2x10, 5"   Green 2x10, 5"   ZITA  3x 30" Review/DC HEP NV     Cervical isometric w/ball @ wall   10x 5" flex only 20x 5" flex only 20x 5" flex,extn,SB     Modalities 15 mins 15' 15'  15 mins     IFC & MH to Cervical and left GHJ in semi-reclined, roll under knees -end rx session  No adverse affects noted post rx  Assessment: Tolerated treatment well  Patient demonstrated fatigue post treatment and would benefit from continued PT  Progress with cervical stabilization during GHJ program   Trial of mechanical traction next session  Plan: Continue per plan of care

## 2021-10-01 ENCOUNTER — HOSPITAL ENCOUNTER (OUTPATIENT)
Dept: NUCLEAR MEDICINE | Facility: HOSPITAL | Age: 41
Discharge: HOME | End: 2021-10-01
Payer: COMMERCIAL

## 2021-10-01 DIAGNOSIS — R91.8 LUNG NODULES: ICD-10-CM

## 2021-10-01 LAB — GLUCOSE SERPL-MCNC: 81 MG/DL (ref 65–140)

## 2021-10-01 PROCEDURE — 78815 PET IMAGE W/CT SKULL-THIGH: CPT

## 2021-10-01 PROCEDURE — 82948 REAGENT STRIP/BLOOD GLUCOSE: CPT

## 2021-10-01 PROCEDURE — A9552 F18 FDG: HCPCS

## 2021-10-04 ENCOUNTER — HOSPITAL ENCOUNTER (OUTPATIENT)
Dept: CT IMAGING | Facility: HOSPITAL | Age: 41
Discharge: HOME | End: 2021-10-04
Attending: FAMILY MEDICINE
Payer: COMMERCIAL

## 2021-10-04 DIAGNOSIS — R63.4 UNINTENTIONAL WEIGHT LOSS: ICD-10-CM

## 2021-10-04 PROCEDURE — 70450 CT HEAD/BRAIN W/O DYE: CPT

## 2021-10-10 DIAGNOSIS — M79.10 MUSCLE PAIN: ICD-10-CM

## 2021-10-11 RX ORDER — TIZANIDINE 4 MG/1
TABLET ORAL
Qty: 30 TABLET | Refills: 1 | Status: SHIPPED | OUTPATIENT
Start: 2021-10-11 | End: 2021-12-09

## 2021-10-13 DIAGNOSIS — R20.2 PARESTHESIA: ICD-10-CM

## 2021-10-13 RX ORDER — GABAPENTIN 300 MG/1
CAPSULE ORAL
Qty: 130 CAPSULE | Refills: 0 | Status: SHIPPED | OUTPATIENT
Start: 2021-10-13 | End: 2021-11-16

## 2021-10-20 ENCOUNTER — RA CDI HCC (OUTPATIENT)
Dept: OTHER | Facility: HOSPITAL | Age: 41
End: 2021-10-20

## 2021-10-25 ENCOUNTER — OFFICE VISIT (OUTPATIENT)
Dept: FAMILY MEDICINE CLINIC | Facility: CLINIC | Age: 41
End: 2021-10-25
Payer: COMMERCIAL

## 2021-10-25 VITALS
TEMPERATURE: 98 F | DIASTOLIC BLOOD PRESSURE: 60 MMHG | WEIGHT: 142 LBS | HEIGHT: 66 IN | BODY MASS INDEX: 22.82 KG/M2 | OXYGEN SATURATION: 98 % | SYSTOLIC BLOOD PRESSURE: 104 MMHG | HEART RATE: 77 BPM

## 2021-10-25 DIAGNOSIS — Z23 ENCOUNTER FOR IMMUNIZATION: ICD-10-CM

## 2021-10-25 DIAGNOSIS — R63.4 UNINTENTIONAL WEIGHT LOSS: Primary | ICD-10-CM

## 2021-10-25 DIAGNOSIS — Z11.59 NEED FOR HEPATITIS C SCREENING TEST: ICD-10-CM

## 2021-10-25 PROCEDURE — 4004F PT TOBACCO SCREEN RCVD TLK: CPT | Performed by: FAMILY MEDICINE

## 2021-10-25 PROCEDURE — 3008F BODY MASS INDEX DOCD: CPT | Performed by: FAMILY MEDICINE

## 2021-10-25 PROCEDURE — 99213 OFFICE O/P EST LOW 20 MIN: CPT | Performed by: FAMILY MEDICINE

## 2021-10-26 ENCOUNTER — TELEPHONE (OUTPATIENT)
Dept: FAMILY MEDICINE CLINIC | Facility: CLINIC | Age: 41
End: 2021-10-26

## 2021-10-26 DIAGNOSIS — R00.0 RACING HEART BEAT: Primary | ICD-10-CM

## 2021-10-26 DIAGNOSIS — R53.82 CHRONIC FATIGUE, UNSPECIFIED: ICD-10-CM

## 2021-10-26 DIAGNOSIS — M79.10 MUSCLE PAIN: ICD-10-CM

## 2021-10-26 DIAGNOSIS — R41.0 CONFUSION: ICD-10-CM

## 2021-11-15 ENCOUNTER — APPOINTMENT (OUTPATIENT)
Dept: LAB | Facility: CLINIC | Age: 41
End: 2021-11-15
Payer: COMMERCIAL

## 2021-11-15 DIAGNOSIS — R00.0 RACING HEART BEAT: ICD-10-CM

## 2021-11-15 DIAGNOSIS — M79.10 MUSCLE PAIN: ICD-10-CM

## 2021-11-15 DIAGNOSIS — R41.0 CONFUSION: ICD-10-CM

## 2021-11-15 DIAGNOSIS — R53.82 CHRONIC FATIGUE, UNSPECIFIED: ICD-10-CM

## 2021-11-15 DIAGNOSIS — Z11.59 NEED FOR HEPATITIS C SCREENING TEST: ICD-10-CM

## 2021-11-15 LAB — HCV AB SER QL: NORMAL

## 2021-11-15 PROCEDURE — 36415 COLL VENOUS BLD VENIPUNCTURE: CPT

## 2021-11-15 PROCEDURE — 86618 LYME DISEASE ANTIBODY: CPT

## 2021-11-15 PROCEDURE — 86803 HEPATITIS C AB TEST: CPT

## 2021-11-16 ENCOUNTER — TELEPHONE (OUTPATIENT)
Dept: NEUROLOGY | Facility: CLINIC | Age: 41
End: 2021-11-16

## 2021-11-16 ENCOUNTER — TELEMEDICINE (OUTPATIENT)
Dept: NEUROLOGY | Facility: CLINIC | Age: 41
End: 2021-11-16
Payer: COMMERCIAL

## 2021-11-16 DIAGNOSIS — R20.2 PARESTHESIA: ICD-10-CM

## 2021-11-16 DIAGNOSIS — M54.2 CHRONIC NECK PAIN: ICD-10-CM

## 2021-11-16 DIAGNOSIS — M79.18 CERVICAL MYOFASCIAL PAIN SYNDROME: Primary | ICD-10-CM

## 2021-11-16 DIAGNOSIS — G89.29 CHRONIC NECK PAIN: ICD-10-CM

## 2021-11-16 LAB — B BURGDOR IGG+IGM SER-ACNC: 21

## 2021-11-16 PROCEDURE — 99212 OFFICE O/P EST SF 10 MIN: CPT | Performed by: NURSE PRACTITIONER

## 2021-11-18 ENCOUNTER — CONSULT (OUTPATIENT)
Dept: GASTROENTEROLOGY | Facility: CLINIC | Age: 41
End: 2021-11-18
Payer: COMMERCIAL

## 2021-11-18 ENCOUNTER — OFFICE VISIT (OUTPATIENT)
Dept: FAMILY MEDICINE CLINIC | Facility: CLINIC | Age: 41
End: 2021-11-18
Payer: COMMERCIAL

## 2021-11-18 ENCOUNTER — TELEPHONE (OUTPATIENT)
Dept: HEMATOLOGY ONCOLOGY | Facility: CLINIC | Age: 41
End: 2021-11-18

## 2021-11-18 VITALS
BODY MASS INDEX: 22.79 KG/M2 | DIASTOLIC BLOOD PRESSURE: 62 MMHG | SYSTOLIC BLOOD PRESSURE: 102 MMHG | RESPIRATION RATE: 16 BRPM | TEMPERATURE: 98 F | WEIGHT: 141.8 LBS | HEIGHT: 66 IN | HEART RATE: 75 BPM

## 2021-11-18 VITALS
HEIGHT: 66 IN | DIASTOLIC BLOOD PRESSURE: 62 MMHG | BODY MASS INDEX: 22.66 KG/M2 | HEART RATE: 79 BPM | OXYGEN SATURATION: 97 % | TEMPERATURE: 96.6 F | WEIGHT: 141 LBS | SYSTOLIC BLOOD PRESSURE: 112 MMHG

## 2021-11-18 DIAGNOSIS — Z00.00 MEDICARE ANNUAL WELLNESS VISIT, SUBSEQUENT: Primary | ICD-10-CM

## 2021-11-18 DIAGNOSIS — K42.9 UMBILICAL HERNIA WITHOUT OBSTRUCTION AND WITHOUT GANGRENE: ICD-10-CM

## 2021-11-18 DIAGNOSIS — Z71.89 ADVICE GIVEN ABOUT COVID-19 VIRUS INFECTION: ICD-10-CM

## 2021-11-18 DIAGNOSIS — E78.00 HYPERCHOLESTEROLEMIA: ICD-10-CM

## 2021-11-18 DIAGNOSIS — R63.4 UNINTENTIONAL WEIGHT LOSS: Primary | ICD-10-CM

## 2021-11-18 DIAGNOSIS — Z86.010 HISTORY OF COLON POLYPS: ICD-10-CM

## 2021-11-18 DIAGNOSIS — K31.A0 INTESTINAL METAPLASIA OF STOMACH: ICD-10-CM

## 2021-11-18 PROBLEM — Z86.0100 HISTORY OF COLON POLYPS: Status: ACTIVE | Noted: 2021-11-18

## 2021-11-18 PROCEDURE — 3725F SCREEN DEPRESSION PERFORMED: CPT | Performed by: FAMILY MEDICINE

## 2021-11-18 PROCEDURE — G0439 PPPS, SUBSEQ VISIT: HCPCS | Performed by: FAMILY MEDICINE

## 2021-11-18 PROCEDURE — 4004F PT TOBACCO SCREEN RCVD TLK: CPT | Performed by: INTERNAL MEDICINE

## 2021-11-18 PROCEDURE — 99205 OFFICE O/P NEW HI 60 MIN: CPT | Performed by: INTERNAL MEDICINE

## 2021-11-18 PROCEDURE — 3008F BODY MASS INDEX DOCD: CPT | Performed by: FAMILY MEDICINE

## 2021-11-18 PROCEDURE — 99213 OFFICE O/P EST LOW 20 MIN: CPT | Performed by: FAMILY MEDICINE

## 2021-11-18 RX ORDER — LIDOCAINE HYDROCHLORIDE 20 MG/ML
15 SOLUTION OROPHARYNGEAL ONCE
Status: CANCELLED | OUTPATIENT
Start: 2021-11-18 | End: 2021-11-18

## 2021-11-19 ENCOUNTER — LAB (OUTPATIENT)
Dept: LAB | Facility: CLINIC | Age: 41
End: 2021-11-19
Payer: COMMERCIAL

## 2021-11-19 DIAGNOSIS — R63.4 UNINTENTIONAL WEIGHT LOSS: ICD-10-CM

## 2021-11-19 PROCEDURE — 82705 FATS/LIPIDS FECES QUAL: CPT

## 2021-11-19 PROCEDURE — 82656 EL-1 FECAL QUAL/SEMIQ: CPT

## 2021-11-24 LAB — ELASTASE PANC STL-MCNT: 325 UG ELAST./G

## 2021-11-25 LAB
FAT STL QL: NORMAL
NEUTRAL FAT STL QL: NORMAL

## 2021-11-29 ENCOUNTER — TELEPHONE (OUTPATIENT)
Dept: GASTROENTEROLOGY | Facility: CLINIC | Age: 41
End: 2021-11-29

## 2021-12-06 ENCOUNTER — OFFICE VISIT (OUTPATIENT)
Dept: FAMILY MEDICINE CLINIC | Facility: CLINIC | Age: 41
End: 2021-12-06
Payer: COMMERCIAL

## 2021-12-06 VITALS
SYSTOLIC BLOOD PRESSURE: 100 MMHG | BODY MASS INDEX: 22.68 KG/M2 | DIASTOLIC BLOOD PRESSURE: 62 MMHG | HEIGHT: 66 IN | TEMPERATURE: 97.5 F | HEART RATE: 82 BPM | WEIGHT: 141.13 LBS | OXYGEN SATURATION: 95 %

## 2021-12-06 DIAGNOSIS — M25.531 RIGHT WRIST PAIN: Primary | ICD-10-CM

## 2021-12-06 PROCEDURE — 99213 OFFICE O/P EST LOW 20 MIN: CPT | Performed by: FAMILY MEDICINE

## 2021-12-07 ENCOUNTER — APPOINTMENT (OUTPATIENT)
Dept: RADIOLOGY | Facility: CLINIC | Age: 41
End: 2021-12-07
Payer: COMMERCIAL

## 2021-12-07 DIAGNOSIS — M25.531 RIGHT WRIST PAIN: ICD-10-CM

## 2021-12-07 PROCEDURE — 73110 X-RAY EXAM OF WRIST: CPT

## 2021-12-09 DIAGNOSIS — E78.00 HYPERCHOLESTEROLEMIA: ICD-10-CM

## 2021-12-09 DIAGNOSIS — M79.10 MUSCLE PAIN: ICD-10-CM

## 2021-12-09 RX ORDER — TIZANIDINE 4 MG/1
TABLET ORAL
Qty: 30 TABLET | Refills: 1 | Status: SHIPPED | OUTPATIENT
Start: 2021-12-09 | End: 2022-02-04

## 2021-12-09 RX ORDER — SIMVASTATIN 40 MG
TABLET ORAL
Qty: 90 TABLET | Refills: 0 | Status: SHIPPED | OUTPATIENT
Start: 2021-12-09 | End: 2022-03-08

## 2021-12-22 ENCOUNTER — CONSULT (OUTPATIENT)
Dept: HEMATOLOGY ONCOLOGY | Facility: CLINIC | Age: 41
End: 2021-12-22
Payer: COMMERCIAL

## 2021-12-22 VITALS
BODY MASS INDEX: 22.66 KG/M2 | OXYGEN SATURATION: 99 % | HEIGHT: 66 IN | WEIGHT: 141 LBS | TEMPERATURE: 97.8 F | RESPIRATION RATE: 18 BRPM | SYSTOLIC BLOOD PRESSURE: 104 MMHG | DIASTOLIC BLOOD PRESSURE: 64 MMHG | HEART RATE: 62 BPM

## 2021-12-22 DIAGNOSIS — R63.4 UNINTENTIONAL WEIGHT LOSS: ICD-10-CM

## 2021-12-22 PROCEDURE — 99204 OFFICE O/P NEW MOD 45 MIN: CPT | Performed by: INTERNAL MEDICINE

## 2021-12-22 PROCEDURE — 4004F PT TOBACCO SCREEN RCVD TLK: CPT | Performed by: INTERNAL MEDICINE

## 2021-12-22 PROCEDURE — 3008F BODY MASS INDEX DOCD: CPT | Performed by: INTERNAL MEDICINE

## 2021-12-28 DIAGNOSIS — R09.81 CONGESTION OF NASAL SINUS: ICD-10-CM

## 2021-12-28 DIAGNOSIS — Z20.822 CLOSE EXPOSURE TO COVID-19 VIRUS: Primary | ICD-10-CM

## 2021-12-28 PROCEDURE — 0241U HB NFCT DS VIR RESP RNA 4 TRGT: CPT | Performed by: FAMILY MEDICINE

## 2022-01-06 ENCOUNTER — OFFICE VISIT (OUTPATIENT)
Dept: FAMILY MEDICINE CLINIC | Facility: CLINIC | Age: 42
End: 2022-01-06
Payer: COMMERCIAL

## 2022-01-06 ENCOUNTER — HOSPITAL ENCOUNTER (OUTPATIENT)
Dept: GASTROENTEROLOGY | Facility: HOSPITAL | Age: 42
Setting detail: OUTPATIENT SURGERY
Discharge: HOME | End: 2022-01-06
Attending: INTERNAL MEDICINE
Payer: COMMERCIAL

## 2022-01-06 ENCOUNTER — ANESTHESIA (OUTPATIENT)
Dept: GASTROENTEROLOGY | Facility: HOSPITAL | Age: 42
End: 2022-01-06

## 2022-01-06 ENCOUNTER — ANESTHESIA EVENT (OUTPATIENT)
Dept: GASTROENTEROLOGY | Facility: HOSPITAL | Age: 42
End: 2022-01-06

## 2022-01-06 VITALS
WEIGHT: 141.5 LBS | HEART RATE: 81 BPM | OXYGEN SATURATION: 98 % | BODY MASS INDEX: 22.74 KG/M2 | HEIGHT: 66 IN | TEMPERATURE: 97.3 F | DIASTOLIC BLOOD PRESSURE: 62 MMHG | SYSTOLIC BLOOD PRESSURE: 110 MMHG

## 2022-01-06 VITALS
RESPIRATION RATE: 18 BRPM | OXYGEN SATURATION: 98 % | SYSTOLIC BLOOD PRESSURE: 124 MMHG | TEMPERATURE: 97.2 F | HEART RATE: 78 BPM | DIASTOLIC BLOOD PRESSURE: 80 MMHG

## 2022-01-06 DIAGNOSIS — R63.4 UNINTENTIONAL WEIGHT LOSS: Primary | ICD-10-CM

## 2022-01-06 DIAGNOSIS — R63.4 UNINTENTIONAL WEIGHT LOSS: ICD-10-CM

## 2022-01-06 DIAGNOSIS — Z13.31 NEGATIVE DEPRESSION SCREENING: ICD-10-CM

## 2022-01-06 DIAGNOSIS — M25.531 RIGHT WRIST PAIN: ICD-10-CM

## 2022-01-06 DIAGNOSIS — K31.A0 INTESTINAL METAPLASIA OF STOMACH: ICD-10-CM

## 2022-01-06 PROCEDURE — 99213 OFFICE O/P EST LOW 20 MIN: CPT | Performed by: FAMILY MEDICINE

## 2022-01-06 PROCEDURE — 88342 IMHCHEM/IMCYTCHM 1ST ANTB: CPT | Performed by: PATHOLOGY

## 2022-01-06 PROCEDURE — 88305 TISSUE EXAM BY PATHOLOGIST: CPT | Performed by: PATHOLOGY

## 2022-01-06 PROCEDURE — 3008F BODY MASS INDEX DOCD: CPT | Performed by: FAMILY MEDICINE

## 2022-01-06 PROCEDURE — 3725F SCREEN DEPRESSION PERFORMED: CPT | Performed by: FAMILY MEDICINE

## 2022-01-06 PROCEDURE — 88313 SPECIAL STAINS GROUP 2: CPT | Performed by: PATHOLOGY

## 2022-01-06 PROCEDURE — 4004F PT TOBACCO SCREEN RCVD TLK: CPT | Performed by: FAMILY MEDICINE

## 2022-01-06 PROCEDURE — 43239 EGD BIOPSY SINGLE/MULTIPLE: CPT | Performed by: INTERNAL MEDICINE

## 2022-01-06 RX ORDER — SODIUM CHLORIDE, SODIUM LACTATE, POTASSIUM CHLORIDE, CALCIUM CHLORIDE 600; 310; 30; 20 MG/100ML; MG/100ML; MG/100ML; MG/100ML
125 INJECTION, SOLUTION INTRAVENOUS CONTINUOUS
Status: DISCONTINUED | OUTPATIENT
Start: 2022-01-06 | End: 2022-01-10 | Stop reason: HOSPADM

## 2022-01-06 RX ORDER — LIDOCAINE HYDROCHLORIDE 20 MG/ML
INJECTION, SOLUTION EPIDURAL; INFILTRATION; INTRACAUDAL; PERINEURAL AS NEEDED
Status: DISCONTINUED | OUTPATIENT
Start: 2022-01-06 | End: 2022-01-06

## 2022-01-06 RX ORDER — LIDOCAINE HYDROCHLORIDE 20 MG/ML
15 SOLUTION OROPHARYNGEAL ONCE
Status: DISCONTINUED | OUTPATIENT
Start: 2022-01-06 | End: 2022-01-10 | Stop reason: HOSPADM

## 2022-01-06 RX ORDER — LIDOCAINE HYDROCHLORIDE 20 MG/ML
SOLUTION OROPHARYNGEAL CODE/TRAUMA/SEDATION MEDICATION
Status: COMPLETED | OUTPATIENT
Start: 2022-01-06 | End: 2022-01-06

## 2022-01-06 RX ORDER — PROPOFOL 10 MG/ML
INJECTION, EMULSION INTRAVENOUS AS NEEDED
Status: DISCONTINUED | OUTPATIENT
Start: 2022-01-06 | End: 2022-01-06

## 2022-01-06 RX ADMIN — PROPOFOL 120 MG: 10 INJECTION, EMULSION INTRAVENOUS at 09:56

## 2022-01-06 RX ADMIN — SODIUM CHLORIDE, SODIUM LACTATE, POTASSIUM CHLORIDE, AND CALCIUM CHLORIDE 125 ML/HR: .6; .31; .03; .02 INJECTION, SOLUTION INTRAVENOUS at 09:22

## 2022-01-06 RX ADMIN — PROPOFOL 50 MG: 10 INJECTION, EMULSION INTRAVENOUS at 10:01

## 2022-01-06 RX ADMIN — PROPOFOL 30 MG: 10 INJECTION, EMULSION INTRAVENOUS at 09:59

## 2022-01-06 RX ADMIN — LIDOCAINE HYDROCHLORIDE 15 ML: 20 SOLUTION ORAL; TOPICAL at 09:53

## 2022-01-06 RX ADMIN — PROPOFOL 50 MG: 10 INJECTION, EMULSION INTRAVENOUS at 10:04

## 2022-01-06 RX ADMIN — LIDOCAINE HYDROCHLORIDE 80 MG: 20 INJECTION, SOLUTION EPIDURAL; INFILTRATION; INTRACAUDAL; PERINEURAL at 09:56

## 2022-01-06 NOTE — PROGRESS NOTES
Assessment/Plan:    No problem-specific Assessment & Plan notes found for this encounter  Diagnoses and all orders for this visit:    Unintentional weight loss  Comments:  stablized, await full GI work up    Negative depression screening    Right wrist pain          PHQ-2/9 Depression Screening    Little interest or pleasure in doing things: 0 - not at all  Feeling down, depressed, or hopeless: 0 - not at all  Trouble falling or staying asleep, or sleeping too much: 0 - not at all  Feeling tired or having little energy: 0 - not at all  Poor appetite or overeatin - not at all  Feeling bad about yourself - or that you are a failure or have let yourself or your family down: 0 - not at all  Trouble concentrating on things, such as reading the newspaper or watching television: 0 - not at all  Moving or speaking so slowly that other people could have noticed  Or the opposite - being so fidgety or restless that you have been moving around a lot more than usual: 0 - not at all  Thoughts that you would be better off dead, or of hurting yourself in some way: 0 - not at all  PHQ-9 Score: 0   PHQ-9 Interpretation: No or Minimal depression             Subjective:      Patient ID: Es Clement is a 35 y o  female  Follow up for unintentional weight loss, pt has had an extensive negative work up and has recently seen heme/onc who do not feel there is any malignant process to attribute the weight loss to, pt has not lost anymore weight since last visit, pt had a recent EGD with bx but results are pending  Follow up for right hand pain in the distal, lateral, dorsal wrist which unchanged, pt tried motrin 800 mg which did not help, the pain is worse with use, it started about 2 months ago   Pt had x-rays which showed      The following portions of the patient's history were reviewed and updated as appropriate: allergies, current medications, past family history, past medical history, past social history, past surgical history and problem list     Review of Systems   Constitutional: Negative for fatigue  Respiratory: Negative for shortness of breath and wheezing  Cardiovascular: Negative for chest pain and palpitations  Gastrointestinal: Negative for abdominal pain, blood in stool, diarrhea, nausea and vomiting  Objective:    /62 (BP Location: Left arm, Patient Position: Sitting, Cuff Size: Standard)   Pulse 81   Temp (!) 97 3 °F (36 3 °C) (Tympanic)   Ht 5' 6" (1 676 m)   Wt 64 2 kg (141 lb 8 oz)   SpO2 98%   BMI 22 84 kg/m²      Physical Exam  Vitals and nursing note reviewed  Constitutional:       General: She is not in acute distress  Appearance: Normal appearance  She is not ill-appearing or diaphoretic  HENT:      Head: Normocephalic and atraumatic  Eyes:      Conjunctiva/sclera: Conjunctivae normal    Cardiovascular:      Rate and Rhythm: Normal rate and regular rhythm  Heart sounds: Normal heart sounds  No murmur heard  Pulmonary:      Effort: Pulmonary effort is normal  No respiratory distress  Breath sounds: Normal breath sounds  No wheezing, rhonchi or rales  Abdominal:      General: There is no distension  Palpations: Abdomen is soft  There is no mass  Tenderness: There is no abdominal tenderness  There is no guarding or rebound  Musculoskeletal:      Cervical back: Normal range of motion and neck supple  No rigidity  Right lower leg: No edema  Left lower leg: No edema  Lymphadenopathy:      Cervical: No cervical adenopathy  Neurological:      Mental Status: She is alert and oriented to person, place, and time  Psychiatric:         Mood and Affect: Mood normal          Behavior: Behavior normal          Thought Content:  Thought content normal          Judgment: Judgment normal

## 2022-01-06 NOTE — DISCHARGE INSTRUCTIONS
Upper Endoscopy   WHAT YOU NEED TO KNOW:   An upper endoscopy is also called an upper gastrointestinal (GI) endoscopy, or an esophagogastroduodenoscopy (EGD)  It is a procedure to examine the inside of your esophagus, stomach, and duodenum (first part of the small intestine) with a scope  You may feel bloated, gassy, or have some abdominal discomfort after your procedure  Your throat may be sore for 24 to 36 hours  You may burp or pass gas from air that is still inside your body  DISCHARGE INSTRUCTIONS:   Seek care immediately if:    You have sudden, severe abdominal pain   You have problems swallowing   You have a large amount of black, sticky bowel movements or blood in your bowel movements   You have sudden trouble breathing   You feel weak, lightheaded, or faint or your heart beats faster than normal for you  Contact your healthcare provider if:    You have a fever and chills   You have nausea or are vomiting   Your abdomen is bloated or feels full and hard   You have abdominal pain   You have black, sticky bowel movements or blood in your bowel movements   You have not had a bowel movement for 3 days after your procedure   You have rash or hives   You have questions or concerns about your procedure  Activity:    Do not lift, strain, or run for 24 hours after your procedure   Rest after your procedure  You have been given medicine to relax you  Do not drive or make important decisions until the day after your procedure  Return to your normal activity as directed   Relieve gas and discomfort from bloating by lying on your right side with a heating pad on your abdomen  You may need to take short walks to help the gas move out  Eat small meals until bloating is relieved  Follow up with your healthcare provider as directed: Write down your questions so you remember to ask them during your visits       If you take a blood thinner, please review the specific instructions from your endoscopist about when you should resume it  These can be found in the Recommendation and Your Medication list sections of this After Visit Summary  Gastritis   WHAT YOU NEED TO KNOW:   What is gastritis? Gastritis is inflammation or irritation of the lining of your stomach  What increases my risk for gastritis? · Infection with bacteria, a virus, or a parasite    · NSAIDs, aspirin, or steroid medicine    · Use of tobacco products or alcohol    · Trauma such as an injury to your stomach or intestine    · Autoimmune disorders such as diabetes, thyroid disease, or Crohn disease    · Stress    · Age older than 60 years    · Illegal drugs, such as cocaine    What are the signs and symptoms of gastritis? · Stomach pain, burning, or tenderness when you press on it    · Stomach fullness or tightness    · Nausea or vomiting    · Loss of appetite, or feeling full quickly when you eat    · Bad breath    · Fatigue or feeling more tired than usual    · Heartburn    How is gastritis diagnosed? Your healthcare provider will ask about your signs and symptoms and examine you  You may need any of the following:  · Blood tests  may be used to show an infection, dehydration, or anemia (low red blood cell levels)  · A bowel movement sample  may be tested for blood or the germ that may be causing your gastritis  · A breath test  may show if H pylori is causing your gastritis  You will be given a liquid to drink  Then you will breathe into a bag  Your healthcare provider will measure the amount of carbon dioxide in your breath  Extra amounts of carbon dioxide may mean you have an H pylori infection  · An endoscopy  may be used to look for irritation or bleeding in your stomach  Your healthcare provider will use an endoscope (tube with a light and camera on the end) during the procedure  He or she may take a sample from your stomach to be tested      How is gastritis treated? Your symptoms may go away without treatment  Treatment will depend on what is causing your gastritis  Your healthcare provider may recommend changes to the medicines you take  Medicines may be given to help treat a bacterial infection or decrease stomach acid  How can I manage or prevent gastritis? · Do not smoke  Nicotine and other chemicals in cigarettes and cigars can make your symptoms worse and cause lung damage  Ask your healthcare provider for information if you currently smoke and need help to quit  E-cigarettes or smokeless tobacco still contain nicotine  Talk to your healthcare provider before you use these products  · Do not drink alcohol  Alcohol can prevent healing and make your gastritis worse  Talk to your healthcare provider if you need help to stop drinking  · Do not take NSAIDs or aspirin unless directed  These and similar medicines can cause irritation of your stomach lining  If your healthcare provider says it is okay to take NSAIDs, take them with food  · Do not eat foods that cause irritation  Foods such as oranges and salsa can cause burning or pain  Eat a variety of healthy foods  Examples include fruits (not citrus), vegetables, low-fat dairy products, beans, whole-grain breads, and lean meats and fish  Try to eat small meals, and drink water with your meals  Do not eat for at least 3 hours before you go to bed  · Find ways to relax and decrease stress  Stress can increase stomach acid and make gastritis worse  Activities such as yoga, meditation, or listening to music can help you relax  Spend time with friends, or do things you enjoy  Call 911 for any of the following:   · You develop chest pain or shortness of breath  When should I seek immediate care? · You vomit blood  · You have black or bloody bowel movements  · You have severe stomach or back pain  When should I contact my healthcare provider? · You have a fever      · You have new or worsening symptoms, even after treatment  · You have questions or concerns about your condition or care  CARE AGREEMENT:   You have the right to help plan your care  Learn about your health condition and how it may be treated  Discuss treatment options with your healthcare providers to decide what care you want to receive  You always have the right to refuse treatment  The above information is an  only  It is not intended as medical advice for individual conditions or treatments  Talk to your doctor, nurse or pharmacist before following any medical regimen to see if it is safe and effective for you  © Copyright [employer] 2021 Information is for End User's use only and may not be sold, redistributed or otherwise used for commercial purposes   All illustrations and images included in CareNotes® are the copyrighted property of A D A M , Inc  or [address] [place], [place] [address]

## 2022-01-06 NOTE — ANESTHESIA PREPROCEDURE EVALUATION
Procedure:  EGD    Relevant Problems   CARDIO   (+) Classic migraine   (+) Hypercholesterolemia   (+) Hyperlipidemia      MUSCULOSKELETAL   (+) Acute back pain   (+) Adhesive capsulitis of right shoulder   (+) Bilateral low back pain with right-sided sciatica   (+) Cervical myofascial pain syndrome   (+) Chronic bilateral low back pain without sciatica   (+) Lower back pain      NEURO/PSYCH   (+) Cervical myofascial pain syndrome   (+) Chronic neck pain   (+) Chronic pain syndrome   (+) Classic migraine   (+) Generalized anxiety disorder   (+) History of colon polyps   (+) Major depression   (+) Paresthesia   (+) Upper limb weakness      PULMONARY   (+) COPD (chronic obstructive pulmonary disease) (HCC)   (+) Shortness of breath   (+) Smoking      Other   (+) Bipolar disorder (HCC)   (+) Lymphadenitis, acute      Bipolar disorder (HCC)    Hyperlipidemia    Anxiety    Chronic pain    Endometriosis    Urinary frequency resolved: 6/14/2016   Urinary urgency resolved: 6/14/2016   Kidney stone    Colon polyp        Physical Exam    Airway    Mallampati score: II  TM Distance: >3 FB  Neck ROM: full     Dental       Cardiovascular  Cardiovascular exam normal    Pulmonary  Pulmonary exam normal     Other Findings        Anesthesia Plan  ASA Score- 2     Anesthesia Type- IV sedation with anesthesia with ASA Monitors  Additional Monitors:   Airway Plan:           Plan Factors-Exercise tolerance (METS): >4 METS  Chart reviewed  EKG reviewed  Imaging results reviewed  Existing labs reviewed  Patient summary reviewed  Induction- intravenous  Postoperative Plan-     Informed Consent- Anesthetic plan and risks discussed with patient  I personally reviewed this patient with the CRNA  Discussed and agreed on the Anesthesia Plan with the CRNA  Rosmery Madden

## 2022-01-06 NOTE — H&P
History and Physical - [facility]  Rachel Lowry 35 y o  female MRN: [id number]                  HPI: Rachel Lowry is a 35y o  year old female who presents for EGD to follow-up on intestinal metaplasia of the stomach in addition to a family history of esophageal cancer and stomach cancer  She also had had unintentional weight loss  Source of that was identified  Did see Hematology and an extensive workup was ordered  She is waiting to have this done  Her fecal fat fecal elastase were negative  At this point denies any heartburn gesture dysphagia nausea vomiting  She has any abdominal pain nausea vomiting heartburn indigestion dysphagia or early satiety  Her bowel habits are fairly regular denies any diarrhea constipation bleeding or black stools    Please refer to my extensive consultative note from November 18, 2021      REVIEW OF SYSTEMS: Per the HPI, and otherwise unremarkable      Historical Information   Past Medical History:   Diagnosis Date    Anxiety     Bipolar disorder ([facility] [address] )     Chronic pain     Colon polyp     Endometriosis     Hyperlipidemia     Kidney stone     Urinary frequency     resolved: 6/14/2016    Urinary urgency     resolved: 6/14/2016     Past Surgical History:   Procedure Laterality Date    BLADDER SUSPENSION      CHOLECYSTECTOMY      COLONOSCOPY      EPIDURAL BLOCK INJECTION N/A 10/17/2019    Procedure: BLOCK / INJECTION EPIDURAL STEROID CERVICAL c7-t1 interlaminar;  Surgeon: Shantanu Pickering MD;  Location: [facility];  Service: Pain Management     EPIDURAL BLOCK INJECTION Bilateral 1/16/2020    Procedure: L4-L5 Transforaminal Epidural Steroid Injection;  Surgeon: Shantanu Pickering MD;  Location: [facility];  Service: Pain Management     FL GUIDED NEEDLE PLAC BX/ASP/INJ  10/17/2019    FL GUIDED NEEDLE PLAC BX/ASP/INJ  1/16/2020    HERNIA REPAIR      HYSTERECTOMY      full     OVARIAN CYST REMOVAL      FL ARTHROSCOPY SHOULDER SURGICAL BICEPS TENODESIS Right 8/14/2019    Procedure: ARTHROSCOPY SHOULDER WITH BICEPS TENOTOMY AND SAD;  Surgeon: Severiano Overland, MD;  Location: [facility];  Service: Orthopedics    AL LAP,CHOLECYSTECTOMY N/A 4/30/2018    Procedure: CHOLECYSTECTOMY LAPAROSCOPIC;  Surgeon: Camryn Romero DO;  Location: [facility];  Service: General    TONSILLECTOMY      UPPER GASTROINTESTINAL ENDOSCOPY       Social History   Social History     Substance and Sexual Activity   Alcohol Use Yes    Comment: rare; consumes alcohol occasionally (as per allscripts); social alcohol use (As per allscripts)     Social History     Substance and Sexual Activity   Drug Use No     Social History     Tobacco Use   Smoking Status Current Every Day Smoker    Packs/day: 1 00    Types: Cigarettes   Smokeless Tobacco Never Used     Family History   Problem Relation Age of Onset    Diabetes Mother     Heart disease Mother         rheumatic    Neuropathy Mother     COPD Mother     Diabetes type II Mother     Cancer Father     Lung cancer Father     Stroke Maternal Grandmother         CVA    Diabetes type II Maternal Grandmother         mellitus    Other Maternal Grandfather         esophageal abnormality    Diabetes type II Paternal Grandmother         mellitus    Diabetes type II Paternal Grandfather         mellitus    Depression Family     Esophageal cancer Family     Lung cancer Family     Stomach cancer Family        Meds/Allergies       Current Outpatient Medications:     buPROPion (WELLBUTRIN SR) 100 mg 12 hr tablet    Cholecalciferol (VITAMIN D3) 2000 units TABS    citalopram (CeleXA) 20 mg tablet    gabapentin (NEURONTIN) 300 mg capsule    lamoTRIgine (LAMICTAL) 200 MG tablet    LORazepam (ATIVAN) 0 5 mg tablet    Multiple Vitamin (DAILY VALUE MULTIVITAMIN) TABS    Nerve Stimulator (Standard TENS) ISABELLA    simvastatin (ZOCOR) 40 mg tablet    tiZANidine (ZANAFLEX) 4 mg tablet    Current Facility-Administered Medications:     lactated ringers infusion, 125 mL/hr, Intravenous, Continuous, Continue from Pre-op at 01/06/22 0930    Lidocaine Viscous HCl (XYLOCAINE) 2 % mucosal solution 15 mL, 15 mL, Swish & Spit, Once    No Known Allergies    Objective     /66   Pulse 75   Temp 98 4 °F (36 9 °C) (Temporal)   Resp 18   SpO2 99%       PHYSICAL EXAM    Gen: NAD  Head: NCAT  CV: RRR  CHEST: Clear  ABD: soft, NT/ND  EXT: no edema      ASSESSMENT/PLAN:  This is a 35y o  year old female here for EGD, and she is stable and optimized for her procedure

## 2022-01-06 NOTE — ANESTHESIA POSTPROCEDURE EVALUATION
Post-Op Assessment Note    CV Status:  Stable    Pain management: adequate     Mental Status:  Alert and awake   Hydration Status:  Euvolemic   PONV Controlled:  Controlled   Airway Patency:  Patent      Post Op Vitals Reviewed: Yes      Staff: CRNA         No complications documented      /79 (01/06/22 1019)    Temp (!) 97 2 °F (36 2 °C) (01/06/22 1019)    Pulse 83 (01/06/22 1019)   Resp 18 (01/06/22 1019)    SpO2 98 % (01/06/22 1019)

## 2022-01-13 NOTE — RESULT ENCOUNTER NOTE
Please inform patient that biopsies of the duodenum were negative for celiac disease  Biopsy of pre-pyloric region did reveal some intestinal metaplasia but negative for any dysplasia  Negative for bacteria called H pylori  Biopsies obtained throughout were made her stomach was negative for any additional intestinal metaplasia  All biopsies negative for H pylori  I would recommend repeating upper endoscopy in 2 years rather 3 years follow-up in my office in about 4 months or sooner if she develops any problems    Thank you

## 2022-02-04 DIAGNOSIS — M79.10 MUSCLE PAIN: ICD-10-CM

## 2022-02-04 RX ORDER — TIZANIDINE 4 MG/1
TABLET ORAL
Qty: 30 TABLET | Refills: 1 | Status: SHIPPED | OUTPATIENT
Start: 2022-02-04 | End: 2022-04-05

## 2022-02-09 ENCOUNTER — APPOINTMENT (OUTPATIENT)
Dept: LAB | Facility: CLINIC | Age: 42
End: 2022-02-09
Payer: COMMERCIAL

## 2022-02-09 DIAGNOSIS — R63.4 UNINTENTIONAL WEIGHT LOSS: ICD-10-CM

## 2022-02-09 LAB
25(OH)D3 SERPL-MCNC: 83.9 NG/ML (ref 30–100)
ALBUMIN SERPL BCP-MCNC: 4.3 G/DL (ref 3.5–5)
ALP SERPL-CCNC: 82 U/L (ref 46–116)
ALT SERPL W P-5'-P-CCNC: 23 U/L (ref 12–78)
ANION GAP SERPL CALCULATED.3IONS-SCNC: 6 MMOL/L (ref 4–13)
AST SERPL W P-5'-P-CCNC: 13 U/L (ref 5–45)
BASOPHILS # BLD AUTO: 0.04 THOUSANDS/ΜL (ref 0–0.1)
BASOPHILS NFR BLD AUTO: 0 % (ref 0–1)
BILIRUB SERPL-MCNC: 0.45 MG/DL (ref 0.2–1)
BUN SERPL-MCNC: 12 MG/DL (ref 5–25)
CALCIUM SERPL-MCNC: 10.1 MG/DL (ref 8.3–10.1)
CHLORIDE SERPL-SCNC: 109 MMOL/L (ref 100–108)
CO2 SERPL-SCNC: 24 MMOL/L (ref 21–32)
CREAT SERPL-MCNC: 0.69 MG/DL (ref 0.6–1.3)
CRP SERPL QL: <3 MG/L
EOSINOPHIL # BLD AUTO: 0.1 THOUSAND/ΜL (ref 0–0.61)
EOSINOPHIL NFR BLD AUTO: 1 % (ref 0–6)
ERYTHROCYTE [DISTWIDTH] IN BLOOD BY AUTOMATED COUNT: 12.6 % (ref 11.6–15.1)
ERYTHROCYTE [SEDIMENTATION RATE] IN BLOOD: 24 MM/HOUR (ref 0–19)
FERRITIN SERPL-MCNC: 68 NG/ML (ref 8–388)
GFR SERPL CREATININE-BSD FRML MDRD: 106 ML/MIN/1.73SQ M
GLUCOSE SERPL-MCNC: 95 MG/DL (ref 65–140)
HCT VFR BLD AUTO: 43.6 % (ref 34.8–46.1)
HGB BLD-MCNC: 14.2 G/DL (ref 11.5–15.4)
IGA SERPL-MCNC: 221 MG/DL (ref 70–400)
IGG SERPL-MCNC: 858 MG/DL (ref 700–1600)
IGM SERPL-MCNC: 223 MG/DL (ref 40–230)
IMM GRANULOCYTES # BLD AUTO: 0.02 THOUSAND/UL (ref 0–0.2)
IMM GRANULOCYTES NFR BLD AUTO: 0 % (ref 0–2)
IRON SATN MFR SERPL: 30 % (ref 15–50)
IRON SERPL-MCNC: 99 UG/DL (ref 50–170)
LDH SERPL-CCNC: 187 U/L (ref 81–234)
LYMPHOCYTES # BLD AUTO: 3.84 THOUSANDS/ΜL (ref 0.6–4.47)
LYMPHOCYTES NFR BLD AUTO: 38 % (ref 14–44)
MCH RBC QN AUTO: 30.5 PG (ref 26.8–34.3)
MCHC RBC AUTO-ENTMCNC: 32.6 G/DL (ref 31.4–37.4)
MCV RBC AUTO: 94 FL (ref 82–98)
MONOCYTES # BLD AUTO: 0.45 THOUSAND/ΜL (ref 0.17–1.22)
MONOCYTES NFR BLD AUTO: 5 % (ref 4–12)
NEUTROPHILS # BLD AUTO: 5.54 THOUSANDS/ΜL (ref 1.85–7.62)
NEUTS SEG NFR BLD AUTO: 56 % (ref 43–75)
NRBC BLD AUTO-RTO: 0 /100 WBCS
PLATELET # BLD AUTO: 271 THOUSANDS/UL (ref 149–390)
PMV BLD AUTO: 9.8 FL (ref 8.9–12.7)
POTASSIUM SERPL-SCNC: 4 MMOL/L (ref 3.5–5.3)
PROT SERPL-MCNC: 8.2 G/DL (ref 6.4–8.2)
RBC # BLD AUTO: 4.65 MILLION/UL (ref 3.81–5.12)
SODIUM SERPL-SCNC: 139 MMOL/L (ref 136–145)
TIBC SERPL-MCNC: 325 UG/DL (ref 250–450)
TSH SERPL DL<=0.05 MIU/L-ACNC: 0.84 UIU/ML (ref 0.36–3.74)
VIT B12 SERPL-MCNC: 415 PG/ML (ref 100–900)
WBC # BLD AUTO: 9.99 THOUSAND/UL (ref 4.31–10.16)

## 2022-02-09 PROCEDURE — 84443 ASSAY THYROID STIM HORMONE: CPT

## 2022-02-09 PROCEDURE — 82784 ASSAY IGA/IGD/IGG/IGM EACH: CPT

## 2022-02-09 PROCEDURE — 86258 DGP ANTIBODY EACH IG CLASS: CPT

## 2022-02-09 PROCEDURE — 86231 EMA EACH IG CLASS: CPT

## 2022-02-09 PROCEDURE — 86140 C-REACTIVE PROTEIN: CPT

## 2022-02-09 PROCEDURE — 83550 IRON BINDING TEST: CPT

## 2022-02-09 PROCEDURE — 82607 VITAMIN B-12: CPT

## 2022-02-09 PROCEDURE — 85652 RBC SED RATE AUTOMATED: CPT

## 2022-02-09 PROCEDURE — 86364 TISS TRNSGLTMNASE EA IG CLAS: CPT

## 2022-02-09 PROCEDURE — 36415 COLL VENOUS BLD VENIPUNCTURE: CPT

## 2022-02-09 PROCEDURE — 83735 ASSAY OF MAGNESIUM: CPT

## 2022-02-09 PROCEDURE — 82306 VITAMIN D 25 HYDROXY: CPT

## 2022-02-09 PROCEDURE — 85025 COMPLETE CBC W/AUTO DIFF WBC: CPT

## 2022-02-09 PROCEDURE — 82728 ASSAY OF FERRITIN: CPT

## 2022-02-09 PROCEDURE — 83540 ASSAY OF IRON: CPT

## 2022-02-09 PROCEDURE — 80053 COMPREHEN METABOLIC PANEL: CPT

## 2022-02-09 PROCEDURE — 83521 IG LIGHT CHAINS FREE EACH: CPT

## 2022-02-09 PROCEDURE — 83615 LACTATE (LD) (LDH) ENZYME: CPT

## 2022-02-09 PROCEDURE — 84165 PROTEIN E-PHORESIS SERUM: CPT | Performed by: PATHOLOGY

## 2022-02-09 PROCEDURE — 84165 PROTEIN E-PHORESIS SERUM: CPT

## 2022-02-10 LAB
ALBUMIN SERPL ELPH-MCNC: 5.04 G/DL (ref 3.5–5)
ALBUMIN SERPL ELPH-MCNC: 63 % (ref 52–65)
ALPHA1 GLOB SERPL ELPH-MCNC: 0.3 G/DL (ref 0.1–0.4)
ALPHA1 GLOB SERPL ELPH-MCNC: 3.8 % (ref 2.5–5)
ALPHA2 GLOB SERPL ELPH-MCNC: 0.76 G/DL (ref 0.4–1.2)
ALPHA2 GLOB SERPL ELPH-MCNC: 9.5 % (ref 7–13)
BETA GLOB ABNORMAL SERPL ELPH-MCNC: 0.42 G/DL (ref 0.4–0.8)
BETA1 GLOB SERPL ELPH-MCNC: 5.3 % (ref 5–13)
BETA2 GLOB SERPL ELPH-MCNC: 4.9 % (ref 2–8)
BETA2+GAMMA GLOB SERPL ELPH-MCNC: 0.39 G/DL (ref 0.2–0.5)
GAMMA GLOB ABNORMAL SERPL ELPH-MCNC: 1.08 G/DL (ref 0.5–1.6)
GAMMA GLOB SERPL ELPH-MCNC: 13.5 % (ref 12–22)
IGG/ALB SER: 1.7 {RATIO} (ref 1.1–1.8)
KAPPA LC FREE SER-MCNC: 18.6 MG/L (ref 3.3–19.4)
KAPPA LC FREE/LAMBDA FREE SER: 1.32 {RATIO} (ref 0.26–1.65)
LAMBDA LC FREE SERPL-MCNC: 14.1 MG/L (ref 5.7–26.3)
MAGNESIUM SERPL-MCNC: 2.1 MG/DL (ref 1.6–2.6)
PROT PATTERN SERPL ELPH-IMP: ABNORMAL
PROT SERPL-MCNC: 8 G/DL (ref 6.4–8.2)

## 2022-02-11 LAB
ENDOMYSIUM IGA SER QL: NEGATIVE
GLIADIN PEPTIDE IGA SER-ACNC: 3 UNITS (ref 0–19)
GLIADIN PEPTIDE IGG SER-ACNC: 2 UNITS (ref 0–19)
IGA SERPL-MCNC: 242 MG/DL (ref 87–352)
TTG IGA SER-ACNC: <2 U/ML (ref 0–3)
TTG IGG SER-ACNC: <2 U/ML (ref 0–5)

## 2022-02-15 ENCOUNTER — OFFICE VISIT (OUTPATIENT)
Dept: HEMATOLOGY ONCOLOGY | Facility: CLINIC | Age: 42
End: 2022-02-15
Payer: COMMERCIAL

## 2022-02-15 VITALS
TEMPERATURE: 96.6 F | HEIGHT: 66 IN | BODY MASS INDEX: 22.42 KG/M2 | WEIGHT: 139.5 LBS | SYSTOLIC BLOOD PRESSURE: 122 MMHG | RESPIRATION RATE: 18 BRPM | HEART RATE: 71 BPM | DIASTOLIC BLOOD PRESSURE: 78 MMHG | OXYGEN SATURATION: 99 %

## 2022-02-15 DIAGNOSIS — J98.59 MEDIASTINAL ABNORMALITY: ICD-10-CM

## 2022-02-15 DIAGNOSIS — R63.4 UNINTENTIONAL WEIGHT LOSS: Primary | ICD-10-CM

## 2022-02-15 PROCEDURE — 99214 OFFICE O/P EST MOD 30 MIN: CPT | Performed by: NURSE PRACTITIONER

## 2022-02-15 PROCEDURE — 4004F PT TOBACCO SCREEN RCVD TLK: CPT | Performed by: NURSE PRACTITIONER

## 2022-02-15 PROCEDURE — 3008F BODY MASS INDEX DOCD: CPT | Performed by: NURSE PRACTITIONER

## 2022-02-15 NOTE — PROGRESS NOTES
Hematology/Oncology Outpatient Follow-up  Hanna Raphael 38 y o  female 1980 [id number]    Date:  2/15/2022      Assessment and Plan:  1  Unintentional weight loss  Patient had extensive workup done for her significant weight loss which has been unrevealing including PET-CT scan and upper endoscopy  No malignant process has been found  Her weight has been rather stable over the past 6 months or so around 140 lb  She did weigh 207 lb around April 2018  It is possible that 1 of her medications is causing her weight loss since other etiologies have been ruled out  Is reported that about 14-28% of patients on Wellbutrin experience weight loss, Lamictal reported 2-5% and Celexa less common around 1%  The patient states that she has been on Wellbutrin for many years but is not exactly sure exact start date  Did ask her to check with her psychiatrist to see if he knew exact date  We will continue to monitor her and her laboratory studies for now  Will check autoimmune markers and check a m  Cortisol/ACTH before her next office visit  Request she follow up again in 3 months with labs/imaging prior  - CT chest w contrast; Future  - CBC and differential; Future  - Comprehensive metabolic panel; Future  - LD,Blood; Future  - C-reactive protein; Future  - Sedimentation rate, automated; Future  - ACTH; Future  - Cortisol; Future  - PK Screen w/ Reflex to Titer/Pattern; Future  - RF Screen w/ Reflex to Titer; Future    2  Mediastinal abnormality  Patient's recent PET-CT scan October 2021 noted mild increased prominence of the anterior mediastinal soft tissue which likely represents residual thymic tissue  Radiology recommended 6-12 month follow-up CT scan for close monitoring  Will order the six-month follow-up CT scan to be done before her next office visit      - CT chest w contrast; Future      HPI:  This is a 20-year-old female with multiple comorbid conditions including bipolar disorder, anxiety, hyperlipidemia, chronic cholecystitis status post cholecystectomy, tobacco use, etc  She also seems to have a history of multiple benign colon polyps which was removed by her GI doctor on different occasions  The patient reported significant weight loss which was reported to be around 60-65 lb since 2018  She was recently seen by the GI team from the [facility] group who is planning to pursue upper endoscopy for further evaluation  She had multiple imaging studies of her chest abdomen pelvis within the last 6 months including a CT scan of the chest without contrast and CT scan of the abdomen and pelvis with contrast which did not reveal any obvious hint of malignant process  A PET scan was also done on 10/01/2021 which showed:  IMPRESSION:  1   Subcentimeter nodule along the right major fissure does not demonstrate significant FDG uptake, and is unchanged from prior CTs dating back to at least 2015, most suggestive of a benign process such as an intrafissural node  2  Vince Whitaker increased prominence of anterior mediastinal soft tissue likely representing residual thymic tissue   This may be reactive but may be reassessed on follow-up low dose CT in 6-12 months  3   Otherwise there are no hypermetabolic lesions that are concerning for malignancy/metastases  Interval history:  Patient presents today for a follow-up visit  She has not lost a significant amount of weight since her last office visit only 1-2 lb  Weight has been rather stable around 140 over the past 6 months or so  She reports that her appetite is good and she is eating well in fact eating more than usual   Denies any other constitutional symptoms other than weight loss  She denies any drug or alcohol use  States that she used to drink on occasion socially but has stopped drinking altogether after the weight loss  She does continue to smoke cigarettes on a daily basis but has cut down slightly      She did have upper endoscopy done by her GI team recently 01/06/2022 which showed mild prominent fold versus polyp in the pre pyloric region with overlying erosion which was biopsied, mild antral erythema with mild snake skin appearance, normal appearing esophagus  Her pathology of her stomach/pre-pyloric fold erosion showed antral mucosa with mild chronic inactive gastritis and intestinal metaplasia otherwise no significant finding, no malignant process found  Her most recent laboratory studies from 02/09/2022 showed normal CBC and CMP, hemoglobin 14 2  LDH is normal   Her sed rate is slightly elevated 24 with normal C-reactive protein  Celiac antibody profile came back negative  TSH is normal 0 837  B12 and iron panel are appropriate  She does not have any hint of monoclonal gammopathy  ROS: Review of Systems   Constitutional: Positive for fatigue (mild) and unexpected weight change  Negative for activity change, appetite change, chills and fever  HENT: Negative for congestion, mouth sores, nosebleeds, sore throat and trouble swallowing  Eyes: Negative  Respiratory: Negative for cough, chest tightness and shortness of breath  Cardiovascular: Negative for chest pain, palpitations and leg swelling  Gastrointestinal: Negative for abdominal distention, abdominal pain, blood in stool, constipation, diarrhea, nausea and vomiting  Genitourinary: Negative for difficulty urinating, dysuria, frequency, hematuria and urgency  Musculoskeletal: Negative for arthralgias, back pain, gait problem, joint swelling and myalgias  Skin: Positive for color change  Negative for pallor and rash  Neurological: Positive for dizziness (mild) and numbness  Negative for weakness, light-headedness and headaches  Hematological: Negative for adenopathy  Does not bruise/bleed easily  Psychiatric/Behavioral: Positive for sleep disturbance  Negative for dysphoric mood  The patient is nervous/anxious          Past Medical History:   Diagnosis Date  Anxiety     Bipolar disorder (HCC)     Chronic pain     Colon polyp     Endometriosis     Hyperlipidemia     Kidney stone     Urinary frequency     resolved: 6/14/2016    Urinary urgency     resolved: 6/14/2016       Past Surgical History:   Procedure Laterality Date    BLADDER SUSPENSION      CHOLECYSTECTOMY      COLONOSCOPY      EPIDURAL BLOCK INJECTION N/A 10/17/2019    Procedure: BLOCK / INJECTION EPIDURAL STEROID CERVICAL c7-t1 interlaminar;  Surgeon: Jazmine Wasserman MD;  Location: [facility];  Service: Pain Management     EPIDURAL BLOCK INJECTION Bilateral 1/16/2020    Procedure: L4-L5 Transforaminal Epidural Steroid Injection;  Surgeon: Jazmine Wasserman MD;  Location: [facility];  Service: Pain Management     FL GUIDED NEEDLE PLAC BX/ASP/INJ  10/17/2019    FL GUIDED NEEDLE PLAC BX/ASP/INJ  1/16/2020    HERNIA REPAIR      HYSTERECTOMY      full     OVARIAN CYST REMOVAL      ME ARTHROSCOPY SHOULDER SURGICAL BICEPS TENODESIS Right 8/14/2019    Procedure: ARTHROSCOPY SHOULDER WITH BICEPS TENOTOMY AND SAD;  Surgeon: Norma Miller MD;  Location: [facility];  Service: Orthopedics    ME LAP,CHOLECYSTECTOMY N/A 4/30/2018    Procedure: CHOLECYSTECTOMY LAPAROSCOPIC;  Surgeon: Hugh Wharton DO;  Location: [facility];  Service: General    TONSILLECTOMY      UPPER GASTROINTESTINAL ENDOSCOPY         Social History     Socioeconomic History    Marital status: /Civil Union     Spouse name: None    Number of children: 1    Years of education: None    Highest education level: GED or equivalent   Occupational History    None   Tobacco Use    Smoking status: Current Every Day Smoker     Packs/day: 1 00     Types: Cigarettes    Smokeless tobacco: Never Used   Vaping Use    Vaping Use: Never used   Substance and Sexual Activity    Alcohol use: Yes     Comment: rare; consumes alcohol occasionally (as per allscripts); social alcohol use (As per allscripts)    Drug use: No    Sexual activity: Yes     Partners: Male   Other Topics Concern    None   Social History Narrative    Disabled    Drinks coffee    One child-age 7     Social Determinants of Health     Financial Resource Strain: Low Risk     Difficulty of Paying Living Expenses: Not hard at all   Food Insecurity: No Food Insecurity    Worried About Running Out of Food in the Last Year: Never true    [address] in the Last Year: Never true   Transportation Needs: No Transportation Needs    Lack of Transportation (Medical): No    Lack of Transportation (Non-Medical):  No   Physical Activity: Not on file   Stress: Not on file   Social Connections: Not on file   Intimate Partner Violence: Not on file   Housing Stability: Not on file       Family History   Problem Relation Age of Onset    Diabetes Mother     Heart disease Mother         rheumatic    Neuropathy Mother     COPD Mother     Diabetes type II Mother     Cancer Father     Lung cancer Father     Stroke Maternal Grandmother         CVA    Diabetes type II Maternal Grandmother         mellitus    Other Maternal Grandfather         esophageal abnormality    Diabetes type II Paternal Grandmother         mellitus    Diabetes type II Paternal Grandfather         mellitus    Depression Family     Esophageal cancer Family     Lung cancer Family     Stomach cancer Family        No Known Allergies      Current Outpatient Medications:     buPROPion (WELLBUTRIN SR) 100 mg 12 hr tablet, 100 mg daily , Disp: , Rfl: 0    Cholecalciferol (VITAMIN D3) 2000 units TABS, Take 4,000 Units by mouth daily  , Disp: , Rfl:     citalopram (CeleXA) 20 mg tablet, Take 10 mg by mouth every morning , Disp: , Rfl: 0    gabapentin (NEURONTIN) 300 mg capsule, take 1 capsule by mouth twice a day and take 2 capsules by mouth at bedtime, Disp: 120 capsule, Rfl: 2    lamoTRIgine (LAMICTAL) 200 MG tablet, Take 1 tablet by mouth 2 (two) times a day, Disp: , Rfl:     LORazepam (ATIVAN) 0 5 mg tablet, Take 1 tablet by mouth daily at bedtime  , Disp: , Rfl:     Multiple Vitamin (DAILY VALUE MULTIVITAMIN) TABS, Take by mouth, Disp: , Rfl:     Nerve Stimulator (Standard TENS) ISABELLA, Use 3 (three) times a day as needed (pain), Disp: 1 Device, Rfl: 0    simvastatin (ZOCOR) 40 mg tablet, take 1 tablet by mouth once daily, Disp: 90 tablet, Rfl: 0    tiZANidine (ZANAFLEX) 4 mg tablet, take 1 tablet by mouth once daily at bedtime, Disp: 30 tablet, Rfl: 1      Physical Exam:  /78 (BP Location: Left arm, Patient Position: Sitting, Cuff Size: Adult)   Pulse 71   Temp (!) 96 6 °F (35 9 °C) (Tympanic)   Resp 18   Ht 5' 6" (1 676 m)   Wt 63 3 kg (139 lb 8 oz)   SpO2 99%   BMI 22 52 kg/m²     Physical Exam  Vitals reviewed  Constitutional:       General: She is not in acute distress  Appearance: She is well-developed  She is not diaphoretic  HENT:      Head: Normocephalic and atraumatic  Eyes:      General: No scleral icterus  Conjunctiva/sclera: Conjunctivae normal       Pupils: Pupils are equal, round, and reactive to light  Neck:      Thyroid: No thyromegaly  Cardiovascular:      Rate and Rhythm: Normal rate and regular rhythm  Heart sounds: Normal heart sounds  No murmur heard  Pulmonary:      Effort: Pulmonary effort is normal  No respiratory distress  Breath sounds: Normal breath sounds  Chest:   Breasts:      Right: No axillary adenopathy  Left: No axillary adenopathy  Abdominal:      General: There is no distension  Palpations: Abdomen is soft  There is no hepatomegaly or splenomegaly  Tenderness: There is no abdominal tenderness  Musculoskeletal:         General: No swelling  Normal range of motion  Cervical back: Normal range of motion and neck supple  Lymphadenopathy:      Cervical: No cervical adenopathy  Upper Body:      Right upper body: No axillary adenopathy  Left upper body: No axillary adenopathy     Skin: General: Skin is warm and dry  Findings: No erythema or rash  Neurological:      General: No focal deficit present  Mental Status: She is alert and oriented to person, place, and time  Psychiatric:         Mood and Affect: Mood is anxious  Affect is blunt  Behavior: Behavior normal  Behavior is cooperative  Thought Content: Thought content normal          Judgment: Judgment normal            Labs:  Lab Results   Component Value Date    WBC 9 99 02/09/2022    HGB 14 2 02/09/2022    HCT 43 6 02/09/2022    MCV 94 02/09/2022     02/09/2022     Lab Results   Component Value Date     04/08/2018    K 4 0 02/09/2022     (H) 02/09/2022    CO2 24 02/09/2022    ANIONGAP 12 0 04/08/2018    BUN 12 02/09/2022    CREATININE 0 69 02/09/2022    GLUCOSE 82 12/07/2015    GLUF 82 06/18/2021    CALCIUM 10 1 02/09/2022    AST 13 02/09/2022    ALT 23 02/09/2022    ALKPHOS 82 02/09/2022    PROT 7 2 04/08/2018    BILITOT 0 2 (L) 04/08/2018    EGFR 106 02/09/2022       Patient voiced understanding and agreement in the above discussion  Aware to contact our office with questions/symptoms in the interim  This note has been generated by voice recognition software system  Therefore, there may be spelling, grammar, and or syntax errors  Please contact if questions arise

## 2022-02-20 DIAGNOSIS — R20.2 PARESTHESIA: ICD-10-CM

## 2022-02-21 RX ORDER — GABAPENTIN 300 MG/1
CAPSULE ORAL
Qty: 120 CAPSULE | Refills: 2 | Status: SHIPPED | OUTPATIENT
Start: 2022-02-21 | End: 2022-06-06

## 2022-03-08 DIAGNOSIS — E78.00 HYPERCHOLESTEROLEMIA: ICD-10-CM

## 2022-03-08 RX ORDER — SIMVASTATIN 40 MG
TABLET ORAL
Qty: 90 TABLET | Refills: 0 | Status: SHIPPED | OUTPATIENT
Start: 2022-03-08 | End: 2022-06-08

## 2022-03-24 ENCOUNTER — OFFICE VISIT (OUTPATIENT)
Dept: FAMILY MEDICINE CLINIC | Facility: CLINIC | Age: 42
End: 2022-03-24
Payer: COMMERCIAL

## 2022-03-24 ENCOUNTER — APPOINTMENT (OUTPATIENT)
Dept: LAB | Facility: CLINIC | Age: 42
End: 2022-03-24
Payer: COMMERCIAL

## 2022-03-24 VITALS
SYSTOLIC BLOOD PRESSURE: 98 MMHG | WEIGHT: 141 LBS | OXYGEN SATURATION: 97 % | BODY MASS INDEX: 22.66 KG/M2 | HEIGHT: 66 IN | TEMPERATURE: 97.7 F | HEART RATE: 71 BPM | DIASTOLIC BLOOD PRESSURE: 62 MMHG

## 2022-03-24 DIAGNOSIS — R10.32 LEFT LOWER QUADRANT ABDOMINAL PAIN: ICD-10-CM

## 2022-03-24 DIAGNOSIS — R10.12 LEFT UPPER QUADRANT ABDOMINAL PAIN: Primary | ICD-10-CM

## 2022-03-24 LAB
ALBUMIN SERPL BCP-MCNC: 4.1 G/DL (ref 3.5–5)
ALP SERPL-CCNC: 78 U/L (ref 46–116)
ALT SERPL W P-5'-P-CCNC: 21 U/L (ref 12–78)
ANION GAP SERPL CALCULATED.3IONS-SCNC: 4 MMOL/L (ref 4–13)
AST SERPL W P-5'-P-CCNC: 15 U/L (ref 5–45)
BASOPHILS # BLD AUTO: 0.04 THOUSANDS/ΜL (ref 0–0.1)
BASOPHILS NFR BLD AUTO: 0 % (ref 0–1)
BILIRUB SERPL-MCNC: 0.42 MG/DL (ref 0.2–1)
BUN SERPL-MCNC: 14 MG/DL (ref 5–25)
CALCIUM SERPL-MCNC: 9.2 MG/DL (ref 8.3–10.1)
CHLORIDE SERPL-SCNC: 110 MMOL/L (ref 100–108)
CO2 SERPL-SCNC: 26 MMOL/L (ref 21–32)
CREAT SERPL-MCNC: 0.73 MG/DL (ref 0.6–1.3)
EOSINOPHIL # BLD AUTO: 0.11 THOUSAND/ΜL (ref 0–0.61)
EOSINOPHIL NFR BLD AUTO: 1 % (ref 0–6)
ERYTHROCYTE [DISTWIDTH] IN BLOOD BY AUTOMATED COUNT: 12.6 % (ref 11.6–15.1)
GFR SERPL CREATININE-BSD FRML MDRD: 100 ML/MIN/1.73SQ M
GLUCOSE P FAST SERPL-MCNC: 87 MG/DL (ref 65–99)
HCT VFR BLD AUTO: 42.6 % (ref 34.8–46.1)
HGB BLD-MCNC: 13.6 G/DL (ref 11.5–15.4)
IMM GRANULOCYTES # BLD AUTO: 0.03 THOUSAND/UL (ref 0–0.2)
IMM GRANULOCYTES NFR BLD AUTO: 0 % (ref 0–2)
LIPASE SERPL-CCNC: 197 U/L (ref 73–393)
LYMPHOCYTES # BLD AUTO: 4.42 THOUSANDS/ΜL (ref 0.6–4.47)
LYMPHOCYTES NFR BLD AUTO: 46 % (ref 14–44)
MCH RBC QN AUTO: 30.8 PG (ref 26.8–34.3)
MCHC RBC AUTO-ENTMCNC: 31.9 G/DL (ref 31.4–37.4)
MCV RBC AUTO: 97 FL (ref 82–98)
MONOCYTES # BLD AUTO: 0.51 THOUSAND/ΜL (ref 0.17–1.22)
MONOCYTES NFR BLD AUTO: 5 % (ref 4–12)
NEUTROPHILS # BLD AUTO: 4.43 THOUSANDS/ΜL (ref 1.85–7.62)
NEUTS SEG NFR BLD AUTO: 48 % (ref 43–75)
NRBC BLD AUTO-RTO: 0 /100 WBCS
PLATELET # BLD AUTO: 251 THOUSANDS/UL (ref 149–390)
PMV BLD AUTO: 9.3 FL (ref 8.9–12.7)
POTASSIUM SERPL-SCNC: 4.3 MMOL/L (ref 3.5–5.3)
PROT SERPL-MCNC: 7.3 G/DL (ref 6.4–8.2)
RBC # BLD AUTO: 4.41 MILLION/UL (ref 3.81–5.12)
SODIUM SERPL-SCNC: 140 MMOL/L (ref 136–145)
WBC # BLD AUTO: 9.54 THOUSAND/UL (ref 4.31–10.16)

## 2022-03-24 PROCEDURE — 83690 ASSAY OF LIPASE: CPT

## 2022-03-24 PROCEDURE — 3008F BODY MASS INDEX DOCD: CPT | Performed by: FAMILY MEDICINE

## 2022-03-24 PROCEDURE — 36415 COLL VENOUS BLD VENIPUNCTURE: CPT

## 2022-03-24 PROCEDURE — 99214 OFFICE O/P EST MOD 30 MIN: CPT | Performed by: FAMILY MEDICINE

## 2022-03-24 PROCEDURE — 80053 COMPREHEN METABOLIC PANEL: CPT

## 2022-03-24 PROCEDURE — 85025 COMPLETE CBC W/AUTO DIFF WBC: CPT

## 2022-03-24 PROCEDURE — 4004F PT TOBACCO SCREEN RCVD TLK: CPT | Performed by: FAMILY MEDICINE

## 2022-03-24 RX ORDER — NAPROXEN 500 MG/1
500 TABLET ORAL 2 TIMES DAILY WITH MEALS
Qty: 10 TABLET | Refills: 0 | Status: SHIPPED | OUTPATIENT
Start: 2022-03-24 | End: 2022-03-29

## 2022-03-24 NOTE — PROGRESS NOTES
Assessment/Plan:      Diagnoses and all orders for this visit:    Left upper quadrant abdominal pain  -     Lipase; Future  -     CBC and differential; Future  -     Comprehensive metabolic panel; Future  -     Cancel: US abdomen complete; Future  -     US abdomen complete; Future  -     naproxen (NAPROSYN) 500 mg tablet; Take 1 tablet (500 mg total) by mouth 2 (two) times a day with meals for 5 days    Mass felt at location of tenderness  Was not tender with palpation of ribs  Has been being evaluated by heme/oncology  PET abnormal right chest  Has a pending CT chest with follow up  Return in about 1 week (around 3/31/2022) for abdominal pain  The following portions of the patient's history were reviewed and updated as appropriate: allergies, current medications, past family history, past medical history, past social history, past surgical history, and problem list      Subjective:     Patient ID: Lake Villanueva is a 38 y o  female  HPI    Pain for 2 weeks in the LUQ abdominal pain  No trauma or accidents  Reports gradual  Feels like a stabbing pain  Reports daily and sometimes more frequently  Coughing brings it on or exercise like walking  Reports last for about a few minutes and goes away  Sometimes will walk or do something where she does not get symptoms  Reports brisk walk or up hill that she gets symptoms  No similar symptoms before  Denies shortness of breath or chest pain  No personal heart disease history  Reports no family history of MI  Reports 1 ppd x 2 years, quit previously for 3 years but prior to that 2 ppd x 10 + years  No diabetes  No drug use in last 2 weeks  Denies reflux symptoms  No diarrhea or blood in the stool          PHQ-9 Depression Screening            Current Outpatient Medications on File Prior to Visit   Medication Sig Dispense Refill    buPROPion (WELLBUTRIN SR) 100 mg 12 hr tablet 100 mg daily   0    Cholecalciferol (VITAMIN D3) 2000 units TABS Take 4,000 Units by mouth daily        citalopram (CeleXA) 20 mg tablet Take 10 mg by mouth every morning   0    gabapentin (NEURONTIN) 300 mg capsule take 1 capsule by mouth twice a day and 2 capsules at bedtime 120 capsule 2    lamoTRIgine (LAMICTAL) 200 MG tablet Take 1 tablet by mouth 2 (two) times a day      LORazepam (ATIVAN) 0 5 mg tablet Take 1 tablet by mouth daily at bedtime        Multiple Vitamin (DAILY VALUE MULTIVITAMIN) TABS Take by mouth      Nerve Stimulator (Standard TENS) ISABELLA Use 3 (three) times a day as needed (pain) 1 Device 0    simvastatin (ZOCOR) 40 mg tablet take 1 tablet by mouth once daily 90 tablet 0    tiZANidine (ZANAFLEX) 4 mg tablet take 1 tablet by mouth once daily at bedtime 30 tablet 1     No current facility-administered medications on file prior to visit  Review of Systems      Objective:    Vitals:    03/24/22 1314   BP: 98/62   BP Location: Left arm   Patient Position: Sitting   Cuff Size: Standard   Pulse: 71   Temp: 97 7 °F (36 5 °C)   TempSrc: Tympanic   SpO2: 97%   Weight: 64 kg (141 lb)   Height: 5' 6" (1 676 m)         Physical Exam  Vitals and nursing note reviewed  Constitutional:       General: She is not in acute distress  Appearance: Normal appearance  She is not ill-appearing or toxic-appearing  HENT:      Head: Normocephalic and atraumatic  Right Ear: External ear normal       Left Ear: External ear normal    Eyes:      General: No scleral icterus  Right eye: No discharge  Left eye: No discharge  Extraocular Movements: Extraocular movements intact  Conjunctiva/sclera: Conjunctivae normal    Cardiovascular:      Rate and Rhythm: Normal rate and regular rhythm  Heart sounds: Normal heart sounds  No murmur heard  No friction rub  No gallop  Pulmonary:      Effort: Pulmonary effort is normal  No respiratory distress  Breath sounds: Normal breath sounds  No wheezing or rales     Chest:      Chest wall: No mass, deformity, swelling, tenderness or crepitus  Abdominal:      General: Bowel sounds are normal       Palpations: Abdomen is soft  There is mass  There is no shifting dullness, hepatomegaly or splenomegaly  Tenderness: There is abdominal tenderness in the left upper quadrant  There is no right CVA tenderness, left CVA tenderness, guarding or rebound  Hernia: There is no hernia in the ventral area  Neurological:      Mental Status: She is alert

## 2022-03-25 ENCOUNTER — HOSPITAL ENCOUNTER (OUTPATIENT)
Dept: ULTRASOUND IMAGING | Facility: HOSPITAL | Age: 42
Discharge: HOME | End: 2022-03-25
Payer: COMMERCIAL

## 2022-03-25 DIAGNOSIS — R10.32 LEFT LOWER QUADRANT ABDOMINAL PAIN: ICD-10-CM

## 2022-03-25 PROCEDURE — 76700 US EXAM ABDOM COMPLETE: CPT

## 2022-03-30 ENCOUNTER — HOSPITAL ENCOUNTER (OUTPATIENT)
Dept: RADIOLOGY | Facility: HOSPITAL | Age: 42
Discharge: HOME | End: 2022-03-30
Payer: COMMERCIAL

## 2022-03-30 DIAGNOSIS — R10.12 LEFT UPPER QUADRANT ABDOMINAL PAIN: ICD-10-CM

## 2022-03-30 PROCEDURE — 71046 X-RAY EXAM CHEST 2 VIEWS: CPT

## 2022-04-05 DIAGNOSIS — M79.10 MUSCLE PAIN: ICD-10-CM

## 2022-04-05 RX ORDER — TIZANIDINE 4 MG/1
TABLET ORAL
Qty: 30 TABLET | Refills: 1 | Status: SHIPPED | OUTPATIENT
Start: 2022-04-05 | End: 2022-06-02

## 2022-05-13 ENCOUNTER — OFFICE VISIT (OUTPATIENT)
Dept: GASTROENTEROLOGY | Facility: CLINIC | Age: 42
End: 2022-05-13
Payer: COMMERCIAL

## 2022-05-13 VITALS
OXYGEN SATURATION: 97 % | SYSTOLIC BLOOD PRESSURE: 98 MMHG | RESPIRATION RATE: 16 BRPM | HEIGHT: 66 IN | WEIGHT: 137 LBS | BODY MASS INDEX: 22.02 KG/M2 | HEART RATE: 74 BPM | DIASTOLIC BLOOD PRESSURE: 58 MMHG | TEMPERATURE: 98.8 F

## 2022-05-13 DIAGNOSIS — Z86.010 HISTORY OF COLON POLYPS: ICD-10-CM

## 2022-05-13 DIAGNOSIS — K31.A0 INTESTINAL METAPLASIA OF STOMACH: Primary | ICD-10-CM

## 2022-05-13 DIAGNOSIS — R63.4 UNINTENTIONAL WEIGHT LOSS: ICD-10-CM

## 2022-05-13 PROCEDURE — 99214 OFFICE O/P EST MOD 30 MIN: CPT | Performed by: INTERNAL MEDICINE

## 2022-05-13 RX ORDER — SODIUM, POTASSIUM,MAG SULFATES 17.5-3.13G
2 SOLUTION, RECONSTITUTED, ORAL ORAL ONCE
Qty: 177 ML | Refills: 0 | Status: SHIPPED | OUTPATIENT
Start: 2022-05-13 | End: 2022-06-28

## 2022-05-13 NOTE — ASSESSMENT & PLAN NOTE
Rasta does have a history of colon polyps  Her 1st colonoscopy was at age 43 where she was noted to have a large 2 5 cm polyp in the sigmoid colon that was a tubulovillous adenoma  She did have a follow-up colonoscopy in 2019 with a small 4 mm polyp in the sigmoid colon  Her last colonoscopy was in July of 2020 revealing diverticulosis and hemorrhoids  She was also noted to have a tortuous colon/ redundant colon  Prior colonoscopy report states that next procedure should be done with an adult scope  Her family members should start having colonoscopies performed around age 27  Her next colonoscopy should probably be in July 2022  She has use a Suprep in the past therefore I will try to get a Suprep for her  She will use Suprep in split dose with the 2nd dose completed 3 hours prior to arrival     Two bisacodyl tablets  I explained to the patient the procedure of colonoscopy as well as its potential risks which are approximately 3 in 1000 chance of bleeding, infection, and perforation  Perforation may require a surgeon to repair it  I also explained the small but significant chance of missing lesions with colonoscopy which has been reported to be about 5-10%

## 2022-05-13 NOTE — ASSESSMENT & PLAN NOTE
Patient was noted to have intestinal metaplasia on biopsies from the antrum  There is a prominent fold in the 6 o'clock position in the pre-pyloric region  Biopsy revealed mild chronic inactive gastritis with intestinal metaplasia but was negative for dysplasia  Multiple biopsies in the gastric body and fundus were negative for any other intestinal metaplasia  I did recommend repeating upper endoscopy in around January or February of 2023

## 2022-05-13 NOTE — ASSESSMENT & PLAN NOTE
Rasta reports having had about a 60 lb or more weight loss over a period of time  She did have extensive workup in the past   A source for her weight loss was not identified  She is no longer losing weight at this point  She is in the process of a hematology workup  From my standpoint I did perform a fecal elastase and fecal fat, there is no sign of fat malabsorption or exocrine pancreatic insufficiency  Celiac antibody panel was negative

## 2022-05-13 NOTE — PROGRESS NOTES
[facility]  Gastroenterology Outpatient Consultation  Patient Sana Mckeon   Age 38 y o  Gender female   MRN: [id number]  [facility] [id number]     ASSESSMENT AND PLAN:   Problem List Items Addressed This Visit        Digestive    Intestinal metaplasia of stomach - Primary     Patient was noted to have intestinal metaplasia on biopsies from the antrum  There is a prominent fold in the 6 o'clock position in the pre-pyloric region  Biopsy revealed mild chronic inactive gastritis with intestinal metaplasia but was negative for dysplasia  Multiple biopsies in the gastric body and fundus were negative for any other intestinal metaplasia  I did recommend repeating upper endoscopy in around January or February of 2023  Other    Unintentional weight loss     Rasta reports having had about a 60 lb or more weight loss over a period of time  She did have extensive workup in the past   A source for her weight loss was not identified  She is no longer losing weight at this point  She is in the process of a hematology workup  From my standpoint I did perform a fecal elastase and fecal fat, there is no sign of fat malabsorption or exocrine pancreatic insufficiency  Celiac antibody panel was negative  History of colon polyps     Rasta does have a history of colon polyps  Her 1st colonoscopy was at age 43 where she was noted to have a large 2 5 cm polyp in the sigmoid colon that was a tubulovillous adenoma  She did have a follow-up colonoscopy in 2019 with a small 4 mm polyp in the sigmoid colon  Her last colonoscopy was in July of 2020 revealing diverticulosis and hemorrhoids  She was also noted to have a tortuous colon/ redundant colon  Prior colonoscopy report states that next procedure should be done with an adult scope  Her family members should start having colonoscopies performed around age 27  Her next colonoscopy should probably be in July 2022    She has use a Suprep in the past therefore I will try to get a Suprep for her  She will use Suprep in split dose with the 2nd dose completed 3 hours prior to arrival     Two bisacodyl tablets  I explained to the patient the procedure of colonoscopy as well as its potential risks which are approximately 3 in 1000 chance of bleeding, infection, and perforation  Perforation may require a surgeon to repair it  I also explained the small but significant chance of missing lesions with colonoscopy which has been reported to be about 5-10%  Relevant Medications    Na Sulfate-K Sulfate-Mg Sulf (Suprep Bowel Prep Kit) 17 5-3 13-1 6 GM/177ML SOLN    Other Relevant Orders    Colonoscopy         _____________________________________________________________        HPI:   Tim Andino is 38years of age  She presents in follow-up for history of some weight loss in addition to antral intestinal metaplasia and history of colon polyps  She is due for colonoscopy this year  She did see Dr Jarrell Gordon in her primary care provider's office  She was having some atypical chest symptoms  She did have a chest x-ray that was unremarkable  During that evaluation it was noted that patient was experiencing some left upper quadrant/rib pain  That has since completely resolved  Ultrasound as noted below  At this point time patient denies any heartburn, indigestion, dysphagia, nausea, vomiting, or early satiety  Appetite has been good  Her bowel habits have been very regular, she denies any diarrhea, constipation, bleeding or black stools  Her weight has been stable she has not had any further weight loss  She has been evaluated and is in the process of being evaluated by Hematology  She does have a CT scan of the chest lined up for the near future  family history:   Maternal grandfather - esophageal cancer  Paternal aunt - colon cancer   Maternal aunt-stomach cancer found during exploratory laparotomy  Father  of lung cancer    Mom is living she is in poor health  Records reviewed prior to office visit   03/24/2022 laboratory data  Chemistry complete normal     AST 15   ALT 21     02/09/2022   Serum protein electrophoresis relatively normal     WBC 9 99 HGB 14 2 HCT 43 6 MCV 94 platelet count 660 4762  TSH 0 837   Celiac antibody panel negative   Serum immunoglobulin levels all normal     12/22/2021 hematology consultation for unintentional weight loss     11/19/2021   Fecal elastase normal   Fecal fat normal         03/25/2022 ultrasound abdomen  Liver normal at 15 2 cm  Normal echogenicity  Prior cholecystectomy   Common bile duct 5 mm   Spleen normal      01/06/2022 EGD  Normal duodenum to 2nd portion  Biopsies negative for celiac disease  Mildly prominent fold versus polyp in the 6 o'clock position pre-pyloric region with overlying erosion  Biopsy revealed mild chronic, inactive gastritis with intestinal metaplasia negative for dysplasia  Negative for H pylori  Mild antral erythema with mild snake skin appearance status post biopsies antrum  Biopsy revealed mild chronic inactive gastritis and was negative for intestinal metaplasia  Random biopsies obtained of the greater curvature of the gastric body, lesser curvature gastric body and fundus for gastric mapping   Biopsies of the greater, and lesser curvature revealed mild chronic inactive gastritis negative for intestinal metaplasia negative for H pylori  Biopsy of the gastric fundus room mild chronic focal active gastritis negative for intestinal metaplasia and negative for H pylori  Normal esophagus    Multiple records reviewed, I did go through and  reviewedCT scans, looks like she had 10 CT scans of the abdomen pelvis since 2013  Eight CT scans of the chest since 2014  One or 2 CT scans of the head   One PET scan   10/04/2021 CT scan head without contrast   no acute intracranial abnormality    This was done for dizziness      10/01/2021 PET scan   Subcentimeter nodule along the right major fissure does not demonstrate significant FDG uptake and is unchanged from prior CT scan dating back to at least 2015  Suggestive of benign process  Mild increased prominence of the anterior mediastinal soft tissue likely represent residual thymic tissue  May be reactive but may be reassessed on follow-up CT low dose in 6-12 months  Otherwise there are no hypermetabolic lesions that are concerning for malignancy/metastasis     08/13/2021/2021 CT scan abdomen pelvis with   Oral and IV contrast   Indication weight loss   Atelectasis right middle lobe  Minimal nonspecific ground-glass opacity in the right lower lobe  Liver normal     Gallbladder surgically absent  No where is some abdominal pelvic pathology seen  Tiny nonobstructing left kidney stone  Tiny suspected left renal cyst         12/30/2020 CT scan abdomen pelvis IV contrast only  Right lower quadrant abdominal pain   Liver enlarged at 19 8 cm  Bilateral adnexal cyst   Ultrasound performed at the same time demonstrate follicles with some debris on the right  No CT sign of appendicitis   02/12/2020 CT scan renal stone study   Stable punctate nonobstructing calculus within the left renal upper pole  No other urinary calculi  No obstructive uropathy        07/30/2020 colonoscopy Dr Vazquez Hernandez  Examination up to cecum  Normal cecum  Left-sided diverticulosis seen without diverticulitis  Very tortuous left-sided sigmoid colon noted  This may be the cause of left lower quadrant pains  Positive internal hemorrhoids seen on retroflexion  No active bleeding seen        7/30 / 2020 EGD   moderate patchy eroded and erythematous mucosa in the body of the stomach and antrum status post biopsy  Biopsy revealed chronic inactive gastritis and was negative for H pylori  Biopsy of the antrum revealed chronic inactive gastritis with intestinal metaplasia    GERD   Normal duodenum   No active bleeding      07/07/2020 CT scan and pelvis with IV contrast   Gallbladder absent  Liver unremarkable   Spleen unremarkable   Prior hysterectomy  Bilateral adnexal follicles without suspicious adnexal mass          07/10/2020 [facility] for left lower quadrant pain nausea epigastric pain  Diminished appetite  Mucus in the stools  EGD and colonoscopy was recommended       03/25/2019 colonoscopy Dr Tressa Taylor  Hemorrhoids     4 mm flat polyp sigmoid colon - adenoma   Sigmoid colon diverticulosis   Redundant colon       02/29/2016-celiac antibodies negative     01/25/2016-colonoscopy   Mild colitis distal sigmoid colon in which mucosa  Erythematous  Biopsy negative for dysplasia active colitis granulomas lymphocytic colitis or collagenous colitis  Single sessile polyp 6 mm rectum removed but not retrieved  Redundant colon   Normal terminal ileum  Grade 2 internal hemorrhoids  01/2015 colonoscopy  Large 2 5 cm polyp sigmoid colon- tubulovillous adenoma       Multiple CT scan reports reviewed  In addition to above CT scan was done 05/02/2019  Unremarkable  CT scan 01/13/2017-bilateral varying cystic appearing structures, likely follicles, 2 cm maximum      Small fat containing umbilical hernia           No Known Allergies  Current Outpatient Medications   Medication Sig Dispense Refill    buPROPion (WELLBUTRIN SR) 100 mg 12 hr tablet 100 mg daily   0    Cholecalciferol (VITAMIN D3) 2000 units TABS Take 4,000 Units by mouth daily        citalopram (CeleXA) 20 mg tablet Take 10 mg by mouth every morning   0    gabapentin (NEURONTIN) 300 mg capsule take 1 capsule by mouth twice a day and 2 capsules at bedtime 120 capsule 2    lamoTRIgine (LaMICtal) 200 MG tablet Take 1 tablet by mouth 2 (two) times a day      LORazepam (ATIVAN) 0 5 mg tablet Take 1 tablet by mouth daily at bedtime        Multiple Vitamin (DAILY VALUE MULTIVITAMIN) TABS Take by mouth      Na Sulfate-K Sulfate-Mg Sulf (Suprep Bowel Prep Kit) 17 5-3 13-1 6 GM/177ML SOLN Take 2 Bottles by mouth once for 1 dose Please follow instructions given at time of office visit  Please complete the 2nd dose 3 hr prior to arrival 177 mL 0    Nerve Stimulator (Standard TENS) ISABELLA Use 3 (three) times a day as needed (pain) 1 Device 0    simvastatin (ZOCOR) 40 mg tablet take 1 tablet by mouth once daily 90 tablet 0    tiZANidine (ZANAFLEX) 4 mg tablet take 1 tablet by mouth once daily at bedtime 30 tablet 1    naproxen (NAPROSYN) 500 mg tablet Take 1 tablet (500 mg total) by mouth 2 (two) times a day with meals for 5 days (Patient not taking: Reported on 5/13/2022) 10 tablet 0     No current facility-administered medications for this visit       MEDICAL HISTORY:  Past Medical History:   Diagnosis Date    Anxiety     Bipolar disorder ([facility] [address] )     Chronic pain     Colon polyp     Endometriosis     Hyperlipidemia     Kidney stone     Urinary frequency     resolved: 6/14/2016    Urinary urgency     resolved: 6/14/2016     Past Surgical History:   Procedure Laterality Date    BLADDER SUSPENSION      CHOLECYSTECTOMY      COLONOSCOPY      EPIDURAL BLOCK INJECTION N/A 10/17/2019    Procedure: BLOCK / INJECTION EPIDURAL STEROID CERVICAL c7-t1 interlaminar;  Surgeon: Liseth Sotelo MD;  Location: [facility];  Service: Pain Management     EPIDURAL BLOCK INJECTION Bilateral 1/16/2020    Procedure: L4-L5 Transforaminal Epidural Steroid Injection;  Surgeon: Liseth Sotelo MD;  Location: [facility];  Service: Pain Management     FL GUIDED NEEDLE PLAC BX/ASP/INJ  10/17/2019    FL GUIDED NEEDLE PLAC BX/ASP/INJ  1/16/2020    HERNIA REPAIR      HYSTERECTOMY      full     OVARIAN CYST REMOVAL      TN ARTHROSCOPY SHOULDER SURGICAL BICEPS TENODESIS Right 8/14/2019    Procedure: ARTHROSCOPY SHOULDER WITH BICEPS TENOTOMY AND SAD;  Surgeon: Hema Alexander MD;  Location: [facility];  Service: Orthopedics    TN LAP,CHOLECYSTECTOMY N/A 4/30/2018    Procedure: CHOLECYSTECTOMY LAPAROSCOPIC;  Surgeon: Gabriella Haq DO;  Location: [facility];  Service: General    TONSILLECTOMY      UPPER GASTROINTESTINAL ENDOSCOPY       Social History     Substance and Sexual Activity   Alcohol Use Yes    Comment: rare; consumes alcohol occasionally (as per allscripts); social alcohol use (As per allscripts)     Social History     Substance and Sexual Activity   Drug Use No     Social History     Tobacco Use   Smoking Status Current Every Day Smoker    Packs/day: 1 00    Types: Cigarettes   Smokeless Tobacco Never Used     Family History   Problem Relation Age of Onset    Diabetes Mother     Heart disease Mother         rheumatic    Neuropathy Mother     COPD Mother     Diabetes type II Mother     Cancer Father     Lung cancer Father     Stroke Maternal Grandmother         CVA    Diabetes type II Maternal Grandmother         mellitus    Other Maternal Grandfather         esophageal abnormality    Diabetes type II Paternal Grandmother         mellitus    Diabetes type II Paternal Grandfather         mellitus    Depression Family     Esophageal cancer Family     Lung cancer Family     Stomach cancer Family          Objective   Blood pressure 98/58, pulse 74, temperature 98 8 °F (37 1 °C), temperature source Tympanic, resp  rate 16, height 5' 6" (1 676 m), weight 62 1 kg (137 lb), SpO2 97 %, not currently breastfeeding  Body mass index is 22 11 kg/m²  PHYSICAL EXAM:   General Appearance: Alert, cooperative, no distress  HEENT: Normocephalic, atraumatic, anicteric  Neck: Supple, symmetrical, trachea midline  Lungs: Clear to auscultation bilaterally; no rales, rhonchi or wheezing; respirations unlabored   Heart: Regular rate and rhythm; no murmur, rub, or gallop  Abdomen: Soft, bowel sounds normal, non-tender, non-distended; no masses, there is no hepatosplenomegaly   No spider angiomas  Genitalia: Deferred   Rectal: Deferred   Extremities: No cyanosis, clubbing or edema Skin: No jaundice, rashes, or lesions   Lymph nodes: No palpable cervical lymphadenopathy   Lab Results:   Appointment on 03/24/2022   Component Date Value    Lipase 03/24/2022 197     WBC 03/24/2022 9 54     RBC 03/24/2022 4 41     Hemoglobin 03/24/2022 13 6     Hematocrit 03/24/2022 42 6     MCV 03/24/2022 97     MCH 03/24/2022 30 8     MCHC 03/24/2022 31 9     RDW 03/24/2022 12 6     MPV 03/24/2022 9 3     Platelets 75/59/4835 251     nRBC 03/24/2022 0     Neutrophils Relative 03/24/2022 48     Immat GRANS % 03/24/2022 0     Lymphocytes Relative 03/24/2022 46 (A)    Monocytes Relative 03/24/2022 5     Eosinophils Relative 03/24/2022 1     Basophils Relative 03/24/2022 0     Neutrophils Absolute 03/24/2022 4 43     Immature Grans Absolute 03/24/2022 0 03     Lymphocytes Absolute 03/24/2022 4 42     Monocytes Absolute 03/24/2022 0 51     Eosinophils Absolute 03/24/2022 0 11     Basophils Absolute 03/24/2022 0 04     Sodium 03/24/2022 140     Potassium 03/24/2022 4 3     Chloride 03/24/2022 110 (A)    CO2 03/24/2022 26     ANION GAP 03/24/2022 4     BUN 03/24/2022 14     Creatinine 03/24/2022 0 73     Glucose, Fasting 03/24/2022 87     Calcium 03/24/2022 9 2     AST 03/24/2022 15     ALT 03/24/2022 21     Alkaline Phosphatase 03/24/2022 78     Total Protein 03/24/2022 7 3     Albumin 03/24/2022 4 1     Total Bilirubin 03/24/2022 0 42     eGFR 03/24/2022 100      Radiology Results:   No results found    Jessica Millard DO   05/13/22   Cc:

## 2022-05-13 NOTE — PATIENT INSTRUCTIONS
Intestinal metaplasia of stomach  Patient was noted to have intestinal metaplasia on biopsies from the antrum  There is a prominent fold in the 6 o'clock position in the pre-pyloric region  Biopsy revealed mild chronic inactive gastritis with intestinal metaplasia but was negative for dysplasia  Multiple biopsies in the gastric body and fundus were negative for any other intestinal metaplasia  I did recommend repeating upper endoscopy in around January or February of 2023  History of colon polyps  Rasta does have a history of colon polyps  Her 1st colonoscopy was at age 43 where she was noted to have a large 2 5 cm polyp in the sigmoid colon that was a tubulovillous adenoma  She did have a follow-up colonoscopy in 2019 with a small 4 mm polyp in the sigmoid colon  Her last colonoscopy was in July of 2020 revealing diverticulosis and hemorrhoids  She was also noted to have a tortuous colon/ redundant colon  Prior colonoscopy report states that next procedure should be done with an adult scope  Her family members should start having colonoscopies performed around age 27  Her next colonoscopy should probably be in July 2022  She has use a Suprep in the past therefore I will try to get a Suprep for her  She will use Suprep in split dose with the 2nd dose completed 3 hours prior to arrival     Two bisacodyl tablets  I explained to the patient the procedure of colonoscopy as well as its potential risks which are approximately 3 in 1000 chance of bleeding, infection, and perforation  Perforation may require a surgeon to repair it  I also explained the small but significant chance of missing lesions with colonoscopy which has been reported to be about 5-10%  Unintentional weight loss  Rasta reports having had about a 60 lb or more weight loss over a period of time  She did have extensive workup in the past   A source for her weight loss was not identified    She is no longer losing weight at this point   She is in the process of a hematology workup  From my standpoint I did perform a fecal elastase and fecal fat, there is no sign of fat malabsorption or exocrine pancreatic insufficiency  Celiac antibody panel was negative

## 2022-05-14 ENCOUNTER — RA CDI HCC (OUTPATIENT)
Dept: OTHER | Facility: HOSPITAL | Age: 42
End: 2022-05-14

## 2022-05-15 NOTE — PROGRESS NOTES
[facility]  coding opportunities       Chart reviewed, no opportunity found:   [address]        Patients Insurance     Medicare Insurance: [employer]

## 2022-05-17 ENCOUNTER — HOSPITAL ENCOUNTER (OUTPATIENT)
Dept: CT IMAGING | Facility: HOSPITAL | Age: 42
Discharge: HOME | End: 2022-05-17
Payer: COMMERCIAL

## 2022-05-17 ENCOUNTER — APPOINTMENT (OUTPATIENT)
Dept: LAB | Facility: HOSPITAL | Age: 42
End: 2022-05-17
Payer: COMMERCIAL

## 2022-05-17 DIAGNOSIS — R63.4 UNINTENTIONAL WEIGHT LOSS: ICD-10-CM

## 2022-05-17 DIAGNOSIS — J98.59 MEDIASTINAL ABNORMALITY: ICD-10-CM

## 2022-05-17 LAB
ALBUMIN SERPL BCP-MCNC: 4.7 G/DL (ref 3.5–5)
ALP SERPL-CCNC: 75 U/L (ref 34–104)
ALT SERPL W P-5'-P-CCNC: 8 U/L (ref 7–52)
ANION GAP SERPL CALCULATED.3IONS-SCNC: 5 MMOL/L (ref 4–13)
AST SERPL W P-5'-P-CCNC: 9 U/L (ref 13–39)
BASOPHILS # BLD AUTO: 0.05 THOUSANDS/ΜL (ref 0–0.1)
BASOPHILS NFR BLD AUTO: 1 % (ref 0–1)
BILIRUB SERPL-MCNC: 0.57 MG/DL (ref 0.2–1)
BUN SERPL-MCNC: 13 MG/DL (ref 5–25)
CALCIUM SERPL-MCNC: 10.1 MG/DL (ref 8.4–10.2)
CHLORIDE SERPL-SCNC: 106 MMOL/L (ref 96–108)
CO2 SERPL-SCNC: 30 MMOL/L (ref 21–32)
CORTIS SERPL-MCNC: 9.7 UG/DL
CREAT SERPL-MCNC: 0.86 MG/DL (ref 0.6–1.3)
CRP SERPL QL: <1 MG/L
EOSINOPHIL # BLD AUTO: 0.13 THOUSAND/ΜL (ref 0–0.61)
EOSINOPHIL NFR BLD AUTO: 1 % (ref 0–6)
ERYTHROCYTE [DISTWIDTH] IN BLOOD BY AUTOMATED COUNT: 12.6 % (ref 11.6–15.1)
ERYTHROCYTE [SEDIMENTATION RATE] IN BLOOD: 18 MM/HOUR (ref 0–19)
GFR SERPL CREATININE-BSD FRML MDRD: 82 ML/MIN/1.73SQ M
GLUCOSE P FAST SERPL-MCNC: 83 MG/DL (ref 65–99)
HCT VFR BLD AUTO: 44.7 % (ref 34.8–46.1)
HGB BLD-MCNC: 14.2 G/DL (ref 11.5–15.4)
IMM GRANULOCYTES # BLD AUTO: 0.02 THOUSAND/UL (ref 0–0.2)
IMM GRANULOCYTES NFR BLD AUTO: 0 % (ref 0–2)
LDH SERPL-CCNC: 166 U/L (ref 140–271)
LYMPHOCYTES # BLD AUTO: 4.11 THOUSANDS/ΜL (ref 0.6–4.47)
LYMPHOCYTES NFR BLD AUTO: 41 % (ref 14–44)
MCH RBC QN AUTO: 30.8 PG (ref 26.8–34.3)
MCHC RBC AUTO-ENTMCNC: 31.8 G/DL (ref 31.4–37.4)
MCV RBC AUTO: 97 FL (ref 82–98)
MONOCYTES # BLD AUTO: 0.5 THOUSAND/ΜL (ref 0.17–1.22)
MONOCYTES NFR BLD AUTO: 5 % (ref 4–12)
NEUTROPHILS # BLD AUTO: 5.19 THOUSANDS/ΜL (ref 1.85–7.62)
NEUTS SEG NFR BLD AUTO: 52 % (ref 43–75)
NRBC BLD AUTO-RTO: 0 /100 WBCS
PLATELET # BLD AUTO: 267 THOUSANDS/UL (ref 149–390)
PMV BLD AUTO: 9.3 FL (ref 8.9–12.7)
POTASSIUM SERPL-SCNC: 4.4 MMOL/L (ref 3.5–5.3)
PROT SERPL-MCNC: 7.5 G/DL (ref 6.4–8.4)
RBC # BLD AUTO: 4.61 MILLION/UL (ref 3.81–5.12)
SODIUM SERPL-SCNC: 141 MMOL/L (ref 135–147)
WBC # BLD AUTO: 10 THOUSAND/UL (ref 4.31–10.16)

## 2022-05-17 PROCEDURE — 83615 LACTATE (LD) (LDH) ENZYME: CPT

## 2022-05-17 PROCEDURE — 86430 RHEUMATOID FACTOR TEST QUAL: CPT

## 2022-05-17 PROCEDURE — 85652 RBC SED RATE AUTOMATED: CPT

## 2022-05-17 PROCEDURE — 86140 C-REACTIVE PROTEIN: CPT

## 2022-05-17 PROCEDURE — 85025 COMPLETE CBC W/AUTO DIFF WBC: CPT

## 2022-05-17 PROCEDURE — 82024 ASSAY OF ACTH: CPT

## 2022-05-17 PROCEDURE — 71250 CT THORAX DX C-: CPT

## 2022-05-17 PROCEDURE — 82533 TOTAL CORTISOL: CPT

## 2022-05-17 PROCEDURE — 86038 ANTINUCLEAR ANTIBODIES: CPT

## 2022-05-17 PROCEDURE — 80053 COMPREHEN METABOLIC PANEL: CPT

## 2022-05-17 PROCEDURE — 36415 COLL VENOUS BLD VENIPUNCTURE: CPT

## 2022-05-18 LAB
ACTH PLAS-MCNC: 19.5 PG/ML (ref 7.2–63.3)
RHEUMATOID FACT SER QL LA: NEGATIVE
RYE IGE QN: NEGATIVE

## 2022-05-24 ENCOUNTER — OFFICE VISIT (OUTPATIENT)
Dept: HEMATOLOGY ONCOLOGY | Facility: CLINIC | Age: 42
End: 2022-05-24
Payer: COMMERCIAL

## 2022-05-24 VITALS
SYSTOLIC BLOOD PRESSURE: 120 MMHG | BODY MASS INDEX: 22.02 KG/M2 | HEIGHT: 66 IN | TEMPERATURE: 97.4 F | DIASTOLIC BLOOD PRESSURE: 68 MMHG | HEART RATE: 73 BPM | RESPIRATION RATE: 18 BRPM | OXYGEN SATURATION: 98 % | WEIGHT: 137 LBS

## 2022-05-24 DIAGNOSIS — R91.8 LUNG NODULES: ICD-10-CM

## 2022-05-24 DIAGNOSIS — J98.59 MEDIASTINAL ABNORMALITY: ICD-10-CM

## 2022-05-24 DIAGNOSIS — R63.4 UNINTENTIONAL WEIGHT LOSS: Primary | ICD-10-CM

## 2022-05-24 PROCEDURE — 99214 OFFICE O/P EST MOD 30 MIN: CPT | Performed by: NURSE PRACTITIONER

## 2022-05-24 PROCEDURE — 3008F BODY MASS INDEX DOCD: CPT | Performed by: NURSE PRACTITIONER

## 2022-05-24 PROCEDURE — 4004F PT TOBACCO SCREEN RCVD TLK: CPT | Performed by: NURSE PRACTITIONER

## 2022-05-24 NOTE — PROGRESS NOTES
Hematology/Oncology Outpatient Follow-up  Jailene Sam 38 y o  female 1980 [id number]    Date:  5/24/2022      Assessment and Plan:  1  Unintentional weight loss  Patient had extensive workup done for her significant weight loss which has been unrevealing including PET-CT scan and upper endoscopy  No malignant process has been found  Her weight has been rather stable over the past 6-8 months or so around 140 lb  She did weigh 207 lb around April 2018  Etiology is still not entirely clear  She denies any constitutional symptoms at present and reports good appetite  Possibly medication related however patient did discuss with her psychiatrist who does not feel her medications are contributory as she has been on them for quite some time  She has been under lot of stress recently with her mom's cancer diagnosis  Repeat CT imaging of the chest did not reveal any significant changes or obvious hint of malignant process  She does have some lung nodules most reportedly stable- 1 that is new/possibly artifact in the left lower lobe that needs to be monitored closely  Request patient follow-up again with repeat labs/additional labs and another CT scan in about 6 months  Did encourage her not to hesitate to contact us sooner in the interim if needed especially if she would develop any concerning symptoms or start to lose weight again without a good explanation     - CBC and differential; Future  - Comprehensive metabolic panel; Future  - LD,Blood; Future  - C-reactive protein; Future  - Sedimentation rate, automated; Future  - CT chest wo contrast; Future    2  Mediastinal abnormality  Patient's recent PET-CT scan October 2021 noted mild increased prominence of the anterior mediastinal soft tissue which likely represents residual thymic tissue  Radiology recommended 6-12 month follow-up CT scan for close monitoring    Her repeat CT scan of the chest without contrast recently did not reveal any significant change in the soft tissue prominence  Will pursue another CT scan of the chest without contrast in 6 months for now for close monitoring; also recommended she have follow-up CT scan for her lung nodules as well  - CBC and differential; Future  - Comprehensive metabolic panel; Future  - LD,Blood; Future  - C-reactive protein; Future  - Sedimentation rate, automated; Future  - AFP tumor marker; Future  - hCG, quantitative; Future  - CT chest wo contrast; Future  - Acetylcholine Receptor (AChR) Binding Antibodies with Reflex to MuSK Antibodies; Future    3  Lung nodules  As above  Recent imaging showed stable looking lung nodules  One new 8 mm nodule in the left lower lobe which could be artifactual   Patient is a smoker  Will pursue repeat CT scan of the chest in 6 months  Did encourage patient reconsider smoking cessation in the near future  - CT chest wo contrast; Future      HPI:  This is a 25-year-old female with multiple comorbid conditions including bipolar disorder, anxiety, hyperlipidemia, chronic cholecystitis status post cholecystectomy, tobacco use, etc  She also seems to have a history of multiple benign colon polyps which was removed by her GI doctor on different occasions  The patient reported significant weight loss which was reported to be around 60-65 lb since 2018       She was recently seen by the GI team from the [facility] group who pursued upper endoscopy 01/06/2022  Showed antral intestinal metaplasia but no dysplasia/malignant process    She is scheduled for repeat colonoscopy July 2022 and will be getting repeat upper endoscopy 2023      She had multiple imaging studies of her chest abdomen pelvis recently including a CT scan of the chest without contrast and CT scan of the abdomen and pelvis with contrast which did not reveal any obvious hint of malignant process      A PET scan was also done on 10/01/2021 which showed:  IMPRESSION:  1   Subcentimeter nodule along the right major fissure does not demonstrate significant FDG uptake, and is unchanged from prior CTs dating back to at least 2015, most suggestive of a benign process such as an intrafissural node  2  Caroline Henry increased prominence of anterior mediastinal soft tissue likely representing residual thymic tissue   This may be reactive but may be reassessed on follow-up low dose CT in 6-12 months  3   Otherwise there are no hypermetabolic lesions that are concerning for malignancy/metastases    Interval history:  Patient presents today for a follow-up visit  She has no new complaints  Reports good appetite  She denies any constitutional symptoms including night sweats, fever, lymphadenopathy or rash  Her weight has been stable around 140 lb over the past 6-8 months or so  Admits that she has been under lot of stress recently as her mother was diagnosed with small cell lung cancer and is currently receiving chemotherapy and radiation  She continues to smoke cigarettes on a daily basis less than what she did in the past; is considering smoking cessation which she has been successful within the past, but is not the right time at present due to her stress level  Her most recent laboratory studies from 05/17/2022 did not show any significant findings  CBC was normal WBC 10 0, hemoglobin 14 2 with normal white cell differential and no hint of immature cells on the peripheral blood  CMP was normal   LDH normal 166  Inflammatory markers are not elevated  Her PK and rheumatoid factor came back negative  A m  cortisol and ACTH were appropriate       She had a follow-up CT scan of the chest without IV contrast due to national shortage 05/17/2022 which showed:  IMPRESSION:  Soft tissue prominence in the superior anterior mediastinum is not significantly changed compared to PET/CT of 10/01/2021 given intermodality differences       8 mm groundglass nodule in the left lower lobe, Based on current Fleischner Society 2017 Guidelines on incidental pulmonary nodule, followup noncontrast CT is recommended at 6-12 months from the initial examination to confirm persistence; if stable at   that time, additional followup CT is recommended for every 2 years until 5 years of stability is demonstrated      Apical predominant centrilobular groundglass nodules likely represents smoking-related bronchiolitis        ROS: Review of Systems   Constitutional: Positive for fatigue (mild)  Negative for activity change, appetite change, chills and fever  HENT: Negative for congestion, mouth sores, nosebleeds, sore throat and trouble swallowing  Eyes: Negative  Respiratory: Negative for cough, chest tightness and shortness of breath  Cardiovascular: Negative for chest pain, palpitations and leg swelling  Gastrointestinal: Negative for abdominal distention, abdominal pain, blood in stool, constipation, diarrhea, nausea and vomiting  Genitourinary: Negative for difficulty urinating, dysuria, frequency, hematuria and urgency  Musculoskeletal: Positive for myalgias (1/10)  Negative for arthralgias, back pain, gait problem and joint swelling  Skin: Positive for color change  Negative for pallor and rash  Neurological: Positive for numbness  Negative for dizziness, weakness, light-headedness and headaches  Hematological: Negative for adenopathy  Does not bruise/bleed easily  Psychiatric/Behavioral: Negative for dysphoric mood and sleep disturbance  The patient is nervous/anxious          Past Medical History:   Diagnosis Date    Anxiety     Bipolar disorder ([facility] [address] )     Chronic pain     Colon polyp     Endometriosis     Hyperlipidemia     Kidney stone     Urinary frequency     resolved: 6/14/2016    Urinary urgency     resolved: 6/14/2016       Past Surgical History:   Procedure Laterality Date    BLADDER SUSPENSION      CHOLECYSTECTOMY      COLONOSCOPY      EPIDURAL BLOCK INJECTION N/A 10/17/2019    Procedure: BLOCK / INJECTION EPIDURAL STEROID CERVICAL c7-t1 interlaminar;  Surgeon: Michelle Palomares MD;  [facility]: [facility];  Service: Pain Management     EPIDURAL BLOCK INJECTION Bilateral 1/16/2020    Procedure: L4-L5 Transforaminal Epidural Steroid Injection;  Surgeon: Michelle Palomares MD;  [facility]: [facility];  Service: Pain Management     FL GUIDED NEEDLE PLAC BX/ASP/INJ  10/17/2019    FL GUIDED NEEDLE PLAC BX/ASP/INJ  1/16/2020    HERNIA REPAIR      HYSTERECTOMY      full     OVARIAN CYST REMOVAL      HI ARTHROSCOPY SHOULDER SURGICAL BICEPS TENODESIS Right 8/14/2019    Procedure: ARTHROSCOPY SHOULDER WITH BICEPS TENOTOMY AND SAD;  Surgeon: Janey Ahumada MD;  Location: [facility];  Service: Orthopedics    HI LAP,CHOLECYSTECTOMY N/A 4/30/2018    Procedure: CHOLECYSTECTOMY LAPAROSCOPIC;  Surgeon: Ayanna Bennett DO;  [facility]: [facility];  Service: General    TONSILLECTOMY      UPPER GASTROINTESTINAL ENDOSCOPY         Social History     Socioeconomic History    Marital status: /Civil Union     Spouse name: None    Number of children: 1    Years of education: None    Highest education level: GED or equivalent   Occupational History    None   Tobacco Use    Smoking status: Current Every Day Smoker     Packs/day: 1 00     Types: Cigarettes    Smokeless tobacco: Never Used   Vaping Use    Vaping Use: Never used   Substance and Sexual Activity    Alcohol use: Yes     Comment: rare; consumes alcohol occasionally (as per allscripts); social alcohol use (As per allscripts)    Drug use: No    Sexual activity: Yes     Partners: Male   Other Topics Concern    None   Social History Narrative    Disabled    Drinks coffee    One child-age 7     Social Determinants of Health     Financial Resource Strain: Low Risk     Difficulty of Paying Living Expenses: Not hard at all   Food Insecurity: No Food Insecurity    Worried About Running Out of Food in the Last Year: Never true    Lucho of Food in the Last Year: Never true   Transportation Needs: No Transportation Needs    Lack of Transportation (Medical): No    Lack of Transportation (Non-Medical):  No   Physical Activity: Not on file   Stress: Not on file   Social Connections: Not on file   Intimate Partner Violence: Not on file   Housing Stability: Not on file       Family History   Problem Relation Age of Onset    Diabetes Mother     Heart disease Mother         rheumatic    Neuropathy Mother     COPD Mother     Diabetes type II Mother     Cancer Father     Lung cancer Father     Stroke Maternal Grandmother         CVA    Diabetes type II Maternal Grandmother         mellitus    Other Maternal Grandfather         esophageal abnormality    Diabetes type II Paternal Grandmother         mellitus    Diabetes type II Paternal Grandfather         mellitus    Depression Family     Esophageal cancer Family     Lung cancer Family     Stomach cancer Family        No Known Allergies      Current Outpatient Medications:     buPROPion (WELLBUTRIN SR) 100 mg 12 hr tablet, 100 mg daily , Disp: , Rfl: 0    Cholecalciferol (VITAMIN D3) 2000 units TABS, Take 4,000 Units by mouth daily  , Disp: , Rfl:     citalopram (CeleXA) 20 mg tablet, Take 10 mg by mouth every morning , Disp: , Rfl: 0    gabapentin (NEURONTIN) 300 mg capsule, take 1 capsule by mouth twice a day and 2 capsules at bedtime, Disp: 120 capsule, Rfl: 2    lamoTRIgine (LaMICtal) 200 MG tablet, Take 1 tablet by mouth 2 (two) times a day, Disp: , Rfl:     LORazepam (ATIVAN) 0 5 mg tablet, Take 1 tablet by mouth daily at bedtime  , Disp: , Rfl:     Multiple Vitamin (DAILY VALUE MULTIVITAMIN) TABS, Take by mouth, Disp: , Rfl:     Nerve Stimulator (Standard TENS) ISABELLA, Use 3 (three) times a day as needed (pain), Disp: 1 Device, Rfl: 0    simvastatin (ZOCOR) 40 mg tablet, take 1 tablet by mouth once daily, Disp: 90 tablet, Rfl: 0    tiZANidine (ZANAFLEX) 4 mg tablet, take 1 tablet by mouth once daily at bedtime, Disp: 30 tablet, Rfl: 1    Na Sulfate-K Sulfate-Mg Sulf (Suprep Bowel Prep Kit) 17 5-3 13-1 6 GM/177ML SOLN, Take 2 Bottles by mouth once for 1 dose Please follow instructions given at time of office visit  Please complete the 2nd dose 3 hr prior to arrival, Disp: 177 mL, Rfl: 0    naproxen (NAPROSYN) 500 mg tablet, Take 1 tablet (500 mg total) by mouth 2 (two) times a day with meals for 5 days (Patient not taking: No sig reported), Disp: 10 tablet, Rfl: 0      Physical Exam:  /68 (BP Location: Right arm, Patient Position: Sitting, Cuff Size: Adult)   Pulse 73   Temp (!) 97 4 °F (36 3 °C)   Resp 18   Ht 5' 6" (1 676 m)   Wt 62 1 kg (137 lb)   SpO2 98%   BMI 22 11 kg/m²     Physical Exam  Vitals reviewed  Constitutional:       General: She is not in acute distress  Appearance: She is well-developed  She is not diaphoretic  HENT:      Head: Normocephalic and atraumatic  Eyes:      General: No scleral icterus  Conjunctiva/sclera: Conjunctivae normal       Pupils: Pupils are equal, round, and reactive to light  Neck:      Thyroid: No thyromegaly  Cardiovascular:      Rate and Rhythm: Normal rate and regular rhythm  Heart sounds: Normal heart sounds  No murmur heard  Pulmonary:      Effort: Pulmonary effort is normal  No respiratory distress  Breath sounds: Normal breath sounds  Chest:   Breasts:      Right: No axillary adenopathy  Left: No axillary adenopathy  Abdominal:      General: There is no distension  Palpations: Abdomen is soft  There is no hepatomegaly or splenomegaly  Tenderness: There is no abdominal tenderness  Musculoskeletal:         General: No swelling  Normal range of motion  Cervical back: Normal range of motion and neck supple  Lymphadenopathy:      Cervical: No cervical adenopathy  Upper Body:      Right upper body: No axillary adenopathy  Left upper body: No axillary adenopathy     Skin: General: Skin is warm and dry  Findings: No erythema or rash  Neurological:      General: No focal deficit present  Mental Status: She is alert and oriented to person, place, and time  Psychiatric:         Mood and Affect: Mood is anxious  Affect is blunt  Behavior: Behavior normal  Behavior is cooperative  Thought Content: Thought content normal          Judgment: Judgment normal            Labs:  Lab Results   Component Value Date    WBC 10 00 05/17/2022    HGB 14 2 05/17/2022    HCT 44 7 05/17/2022    MCV 97 05/17/2022     05/17/2022     Lab Results   Component Value Date     04/08/2018    K 4 4 05/17/2022     05/17/2022    CO2 30 05/17/2022    ANIONGAP 12 0 04/08/2018    BUN 13 05/17/2022    CREATININE 0 86 05/17/2022    GLUCOSE 82 12/07/2015    GLUF 83 05/17/2022    CALCIUM 10 1 05/17/2022    AST 9 (L) 05/17/2022    ALT 8 05/17/2022    ALKPHOS 75 05/17/2022    PROT 7 2 04/08/2018    BILITOT 0 2 (L) 04/08/2018    EGFR 82 05/17/2022       Patient voiced understanding and agreement in the above discussion  Aware to contact our office with questions/symptoms in the interim  This note has been generated by voice recognition software system  Therefore, there may be spelling, grammar, and or syntax errors  Please contact if questions arise

## 2022-06-02 DIAGNOSIS — M79.10 MUSCLE PAIN: ICD-10-CM

## 2022-06-02 RX ORDER — TIZANIDINE 4 MG/1
TABLET ORAL
Qty: 30 TABLET | Refills: 1 | Status: SHIPPED | OUTPATIENT
Start: 2022-06-02

## 2022-06-02 NOTE — TELEPHONE ENCOUNTER
I have been refilling this in Soo's absence, I have not seen this patient  She will need a new provider, as [facility] is no longer here    Will refill for now

## 2022-06-04 DIAGNOSIS — R20.2 PARESTHESIA: ICD-10-CM

## 2022-06-06 RX ORDER — GABAPENTIN 300 MG/1
CAPSULE ORAL
Qty: 120 CAPSULE | Refills: 2 | Status: SHIPPED | OUTPATIENT
Start: 2022-06-06

## 2022-06-08 DIAGNOSIS — E78.00 HYPERCHOLESTEROLEMIA: ICD-10-CM

## 2022-06-08 RX ORDER — SIMVASTATIN 40 MG
TABLET ORAL
Qty: 90 TABLET | Refills: 0 | Status: SHIPPED | OUTPATIENT
Start: 2022-06-08

## 2022-06-16 ENCOUNTER — TELEPHONE (OUTPATIENT)
Dept: HEMATOLOGY ONCOLOGY | Facility: CLINIC | Age: 42
End: 2022-06-16

## 2022-06-16 NOTE — TELEPHONE ENCOUNTER
Scheduling Appointment     Who Is Calling to Schedule  Patient   Doctor Dr Doak Peabody   Location [address] [facility]   Date and Time 06/28/2022 2:20PM   Reason for scheduling appointment Patient was advised from radiology to schedule to discuss CT results   Patient verbalized understanding    Yes

## 2022-06-23 ENCOUNTER — TELEPHONE (OUTPATIENT)
Dept: NEUROLOGY | Facility: CLINIC | Age: 42
End: 2022-06-23

## 2022-06-23 NOTE — TELEPHONE ENCOUNTER
SCHEDULED: Wed, 8/24/22 at 7:30am w/ Gwendolyn Segura for VIRTUAL VISIT  Patient called back - stated she was not able to travel to [facility] for a follow-up as it is too far away  Rescheduled as virtual visit  Patient is using [facility] for visit

## 2022-06-23 NOTE — TELEPHONE ENCOUNTER
1ST ATTEMPT - Called patient and left message regarding their appt on 7/21 sana/ Soo Lopez in [place]  Advised patient that the appt needs to be rescheduled ASAP as [facility] no longer practicing in our office, and we do not have a schedule currently for [place]  Sending letter to address on file  If patient calls back: please reschedule accordingly      LOV: 11/16/21 SEEMA Peterson    DX: Cervical myofacial pain syndrome - paresthesia

## 2022-06-24 ENCOUNTER — TELEPHONE (OUTPATIENT)
Dept: HEMATOLOGY ONCOLOGY | Facility: CLINIC | Age: 42
End: 2022-06-24

## 2022-06-24 NOTE — TELEPHONE ENCOUNTER
LVM to patient in regards to her lab work that needs to be completed prior to her appt on 6/28/22 at 2:20pm

## 2022-06-25 ENCOUNTER — APPOINTMENT (OUTPATIENT)
Dept: LAB | Facility: HOSPITAL | Age: 42
End: 2022-06-25
Payer: COMMERCIAL

## 2022-06-25 DIAGNOSIS — J98.59 MEDIASTINAL ABNORMALITY: ICD-10-CM

## 2022-06-25 DIAGNOSIS — R63.4 UNINTENTIONAL WEIGHT LOSS: ICD-10-CM

## 2022-06-25 LAB
AFP-TM SERPL-MCNC: 2.7 NG/ML (ref 0.5–8)
ALBUMIN SERPL BCP-MCNC: 4.2 G/DL (ref 3.5–5)
ALP SERPL-CCNC: 70 U/L (ref 34–104)
ALT SERPL W P-5'-P-CCNC: 12 U/L (ref 7–52)
ANION GAP SERPL CALCULATED.3IONS-SCNC: 7 MMOL/L (ref 4–13)
AST SERPL W P-5'-P-CCNC: 16 U/L (ref 13–39)
B-HCG SERPL-ACNC: 2 MIU/ML (ref 0–11.6)
BASOPHILS # BLD AUTO: 0.04 THOUSANDS/ΜL (ref 0–0.1)
BASOPHILS NFR BLD AUTO: 0 % (ref 0–1)
BILIRUB SERPL-MCNC: 0.58 MG/DL (ref 0.2–1)
BUN SERPL-MCNC: 9 MG/DL (ref 5–25)
CALCIUM SERPL-MCNC: 9.3 MG/DL (ref 8.4–10.2)
CHLORIDE SERPL-SCNC: 105 MMOL/L (ref 96–108)
CO2 SERPL-SCNC: 28 MMOL/L (ref 21–32)
CREAT SERPL-MCNC: 0.69 MG/DL (ref 0.6–1.3)
CRP SERPL QL: <1 MG/L
EOSINOPHIL # BLD AUTO: 0.11 THOUSAND/ΜL (ref 0–0.61)
EOSINOPHIL NFR BLD AUTO: 1 % (ref 0–6)
ERYTHROCYTE [DISTWIDTH] IN BLOOD BY AUTOMATED COUNT: 12.6 % (ref 11.6–15.1)
ERYTHROCYTE [SEDIMENTATION RATE] IN BLOOD: 12 MM/HOUR (ref 0–19)
GFR SERPL CREATININE-BSD FRML MDRD: 106 ML/MIN/1.73SQ M
GLUCOSE SERPL-MCNC: 86 MG/DL (ref 65–140)
HCT VFR BLD AUTO: 41.6 % (ref 34.8–46.1)
HGB BLD-MCNC: 13.4 G/DL (ref 11.5–15.4)
IMM GRANULOCYTES # BLD AUTO: 0.03 THOUSAND/UL (ref 0–0.2)
IMM GRANULOCYTES NFR BLD AUTO: 0 % (ref 0–2)
LDH SERPL-CCNC: 130 U/L (ref 140–271)
LYMPHOCYTES # BLD AUTO: 3.43 THOUSANDS/ΜL (ref 0.6–4.47)
LYMPHOCYTES NFR BLD AUTO: 39 % (ref 14–44)
MCH RBC QN AUTO: 31 PG (ref 26.8–34.3)
MCHC RBC AUTO-ENTMCNC: 32.2 G/DL (ref 31.4–37.4)
MCV RBC AUTO: 96 FL (ref 82–98)
MONOCYTES # BLD AUTO: 0.44 THOUSAND/ΜL (ref 0.17–1.22)
MONOCYTES NFR BLD AUTO: 5 % (ref 4–12)
NEUTROPHILS # BLD AUTO: 4.84 THOUSANDS/ΜL (ref 1.85–7.62)
NEUTS SEG NFR BLD AUTO: 55 % (ref 43–75)
NRBC BLD AUTO-RTO: 0 /100 WBCS
PLATELET # BLD AUTO: 226 THOUSANDS/UL (ref 149–390)
PMV BLD AUTO: 9 FL (ref 8.9–12.7)
POTASSIUM SERPL-SCNC: 4.2 MMOL/L (ref 3.5–5.3)
PROT SERPL-MCNC: 6.8 G/DL (ref 6.4–8.4)
RBC # BLD AUTO: 4.32 MILLION/UL (ref 3.81–5.12)
SODIUM SERPL-SCNC: 140 MMOL/L (ref 135–147)
WBC # BLD AUTO: 8.89 THOUSAND/UL (ref 4.31–10.16)

## 2022-06-25 PROCEDURE — 85652 RBC SED RATE AUTOMATED: CPT

## 2022-06-25 PROCEDURE — 83519 RIA NONANTIBODY: CPT

## 2022-06-25 PROCEDURE — 85025 COMPLETE CBC W/AUTO DIFF WBC: CPT

## 2022-06-25 PROCEDURE — 84702 CHORIONIC GONADOTROPIN TEST: CPT

## 2022-06-25 PROCEDURE — 86140 C-REACTIVE PROTEIN: CPT

## 2022-06-25 PROCEDURE — 83615 LACTATE (LD) (LDH) ENZYME: CPT

## 2022-06-25 PROCEDURE — 80053 COMPREHEN METABOLIC PANEL: CPT

## 2022-06-25 PROCEDURE — 36415 COLL VENOUS BLD VENIPUNCTURE: CPT

## 2022-06-25 PROCEDURE — 82105 ALPHA-FETOPROTEIN SERUM: CPT

## 2022-06-28 ENCOUNTER — OFFICE VISIT (OUTPATIENT)
Dept: HEMATOLOGY ONCOLOGY | Facility: CLINIC | Age: 42
End: 2022-06-28
Payer: COMMERCIAL

## 2022-06-28 VITALS
SYSTOLIC BLOOD PRESSURE: 112 MMHG | BODY MASS INDEX: 21.86 KG/M2 | HEIGHT: 66 IN | TEMPERATURE: 97.5 F | DIASTOLIC BLOOD PRESSURE: 60 MMHG | HEART RATE: 64 BPM | WEIGHT: 136 LBS | OXYGEN SATURATION: 96 % | RESPIRATION RATE: 16 BRPM

## 2022-06-28 DIAGNOSIS — R91.8 LUNG NODULES: ICD-10-CM

## 2022-06-28 DIAGNOSIS — J98.59 MEDIASTINAL ABNORMALITY: ICD-10-CM

## 2022-06-28 DIAGNOSIS — R63.4 UNINTENTIONAL WEIGHT LOSS: Primary | ICD-10-CM

## 2022-06-28 PROCEDURE — 3008F BODY MASS INDEX DOCD: CPT | Performed by: INTERNAL MEDICINE

## 2022-06-28 PROCEDURE — 99214 OFFICE O/P EST MOD 30 MIN: CPT | Performed by: INTERNAL MEDICINE

## 2022-06-28 PROCEDURE — 4004F PT TOBACCO SCREEN RCVD TLK: CPT | Performed by: INTERNAL MEDICINE

## 2022-06-28 NOTE — PROGRESS NOTES
Hematology/Oncology Outpatient Follow-up  Candido Ortiz 38 y o  female 1980 [id number]    Date:  6/28/2022        Assessment and Plan:  1  Unintentional weight loss  The exact etiology of her continuous weight loss is not entirely clear  This could be due to the fact that she is a heavy smoker  There is no obvious signs of malignant etiology at least at this point in time  - CBC and differential; Future  - Comprehensive metabolic panel; Future  - Magnesium; Future  - LD,Blood; Future  - C-reactive protein; Future  - Sedimentation rate, automated; Future    2  Mediastinal abnormality  She seems to have stable prominence of the anterior mediastinum seen on multiple imaging which will be monitored closely  - CBC and differential; Future  - Comprehensive metabolic panel; Future  - Magnesium; Future  - LD,Blood; Future  - C-reactive protein; Future  - Sedimentation rate, automated; Future    3  Lung nodules  Follow-up CT scan within 6-12 months was recommended for close monitoring of the new 8 mm nodule in the left lower lobe  - CBC and differential; Future  - Comprehensive metabolic panel; Future  - Magnesium; Future  - LD,Blood; Future  - C-reactive protein; Future  - Sedimentation rate, automated; Future        HPI:  This is a 47-year-old female with multiple comorbid conditions including bipolar disorder, anxiety, hyperlipidemia, chronic cholecystitis status post cholecystectomy, tobacco use, etc  She also seems to have a history of multiple benign colon polyps which was removed by her GI doctor on different occasions  The patient reported significant weight loss which was reported to be around 60-65 lb since 2018       She was recently seen by the GI team from the [address] [place] [address] who pursued upper endoscopy 01/06/2022  Showed antral intestinal metaplasia but no dysplasia/malignant process    She is scheduled for repeat colonoscopy July 2022 and will be getting repeat upper endoscopy 2023      She had multiple imaging studies of her chest abdomen pelvis recently including a CT scan of the chest without contrast and CT scan of the abdomen and pelvis with contrast which did not reveal any obvious hint of malignant process      A PET scan was also done on 10/01/2021 which showed:  IMPRESSION:  1   Subcentimeter nodule along the right major fissure does not demonstrate significant FDG uptake, and is unchanged from prior CTs dating back to at least 2015, most suggestive of a benign process such as an intrafissural node  2  Rendall Cap increased prominence of anterior mediastinal soft tissue likely representing residual thymic tissue   This may be reactive but may be reassessed on follow-up low dose CT in 6-12 months  3   Otherwise there are no hypermetabolic lesions that are concerning for malignancy/metastases      Interval history:  The patient can defer follow-up visit  She did lose about 6 lb since December until now  She continues to lose about a lb per month or so  She continues to smoke daily  She had another CT scan of the chest without contrast on 05/17/2022 which showed:  IMPRESSION:     Soft tissue prominence in the superior anterior mediastinum is not significantly changed compared to PET/CT of 10/01/2021 given intermodality differences       8 mm groundglass nodule in the left lower lobe, Based on current Fleischner Society 2017 Guidelines on incidental pulmonary nodule, followup noncontrast CT is recommended at 6-12 months from the initial examination to confirm persistence; if stable at   that time, additional followup CT is recommended for every 2 years until 5 years of stability is demonstrated      Apical predominant centrilobular groundglass nodules likely represents smoking-related bronchiolitis  Blood work on 06/25/2022 showed normal hemoglobin at 13 4 with normal white cells and platelets  Creatinine was 0 6 with normal calcium and liver enzymes  LDH was 130    Inflammatory markers within normal range  Alpha fetoprotein 2 7       ROS: Review of Systems   Constitutional: Positive for fatigue  Negative for chills and fever  HENT: Negative for ear pain and sore throat  Eyes: Negative for pain and visual disturbance  Respiratory: Positive for shortness of breath  Negative for cough  Cardiovascular: Negative for chest pain and palpitations  Gastrointestinal: Negative for abdominal pain and vomiting  Genitourinary: Negative for dysuria and hematuria  Musculoskeletal: Negative for arthralgias and back pain  Skin: Negative for color change and rash  Neurological: Positive for dizziness and headaches  Negative for seizures and syncope  Hematological: Bruises/bleeds easily  All other systems reviewed and are negative        Past Medical History:   Diagnosis Date    Anxiety     Bipolar disorder ([facility] [address] )     Chronic pain     Colon polyp     Endometriosis     Hyperlipidemia     Kidney stone     Urinary frequency     resolved: 6/14/2016    Urinary urgency     resolved: 6/14/2016       Past Surgical History:   Procedure Laterality Date    BLADDER SUSPENSION      CHOLECYSTECTOMY      COLONOSCOPY      EPIDURAL BLOCK INJECTION N/A 10/17/2019    Procedure: BLOCK / INJECTION EPIDURAL STEROID CERVICAL c7-t1 interlaminar;  Surgeon: Hugh Alexandra MD;  [facility];  Service: Pain Management     EPIDURAL BLOCK INJECTION Bilateral 1/16/2020    Procedure: L4-L5 Transforaminal Epidural Steroid Injection;  Surgeon: Hugh Alexandra MD;  [facility];  Service: Pain Management     FL GUIDED NEEDLE PLAC BX/ASP/INJ  10/17/2019    FL GUIDED NEEDLE PLAC BX/ASP/INJ  1/16/2020    HERNIA REPAIR      HYSTERECTOMY      full     OVARIAN CYST REMOVAL      ID ARTHROSCOPY SHOULDER SURGICAL BICEPS TENODESIS Right 8/14/2019    Procedure: ARTHROSCOPY SHOULDER WITH BICEPS TENOTOMY AND SAD;  Surgeon: Sadia Corona MD;  [facility];  Service: Orthopedics    CT LAP,CHOLECYSTECTOMY N/A 4/30/2018    Procedure: CHOLECYSTECTOMY LAPAROSCOPIC;  Surgeon: Steph De La Torre DO;  [facility];  Service: General    TONSILLECTOMY      UPPER GASTROINTESTINAL ENDOSCOPY         Social History     Socioeconomic History    Marital status: /Civil Union     Spouse name: None    Number of children: 1    Years of education: None    Highest education level: GED or equivalent   Occupational History    None   Tobacco Use    Smoking status: Current Every Day Smoker     Packs/day: 1 00     Types: Cigarettes    Smokeless tobacco: Never Used   Vaping Use    Vaping Use: Never used   Substance and Sexual Activity    Alcohol use: Yes     Comment: rare; consumes alcohol occasionally (as per allscripts); social alcohol use (As per allscripts)    Drug use: No    Sexual activity: Yes     Partners: Male   Other Topics Concern    None   Social History Narrative    Disabled    Drinks coffee    One child-age 7     Social Determinants of Health     Financial Resource Strain: Not on file   Food Insecurity: Not on file   Transportation Needs: Not on file   Physical Activity: Not on file   Stress: Not on file   Social Connections: Not on file   Intimate Partner Violence: Not on file   Housing Stability: Not on file       Family History   Problem Relation Age of Onset    Diabetes Mother     Heart disease Mother         rheumatic    Neuropathy Mother     COPD Mother     Diabetes type II Mother     Cancer Father     Lung cancer Father     Stroke Maternal Grandmother         CVA    Diabetes type II Maternal Grandmother         mellitus    Other Maternal Grandfather         esophageal abnormality    Diabetes type II Paternal Grandmother         mellitus    Diabetes type II Paternal Grandfather         mellitus    Depression Family     Esophageal cancer Family     Lung cancer Family     Stomach cancer Family        No Known Allergies      Current Outpatient Medications:    buPROPion (WELLBUTRIN SR) 100 mg 12 hr tablet, 100 mg daily , Disp: , Rfl: 0    Cholecalciferol (VITAMIN D3) 2000 units TABS, Take 4,000 Units by mouth daily  , Disp: , Rfl:     citalopram (CeleXA) 20 mg tablet, Take 10 mg by mouth every morning , Disp: , Rfl: 0    gabapentin (NEURONTIN) 300 mg capsule, take 1 capsule by mouth twice a day and 2 capsules at bedtime, Disp: 120 capsule, Rfl: 2    lamoTRIgine (LaMICtal) 200 MG tablet, Take 1 tablet by mouth 2 (two) times a day, Disp: , Rfl:     LORazepam (ATIVAN) 0 5 mg tablet, Take 1 tablet by mouth daily at bedtime  , Disp: , Rfl:     Multiple Vitamin (DAILY VALUE MULTIVITAMIN) TABS, Take by mouth, Disp: , Rfl:     Nerve Stimulator (Standard TENS) ISABELLA, Use 3 (three) times a day as needed (pain), Disp: 1 Device, Rfl: 0    simvastatin (ZOCOR) 40 mg tablet, take 1 tablet by mouth once daily, Disp: 90 tablet, Rfl: 0    tiZANidine (ZANAFLEX) 4 mg tablet, take 1 tablet by mouth once daily at bedtime, Disp: 30 tablet, Rfl: 1    Na Sulfate-K Sulfate-Mg Sulf (Suprep Bowel Prep Kit) 17 5-3 13-1 6 GM/177ML SOLN, Take 2 Bottles by mouth once for 1 dose Please follow instructions given at time of office visit  Please complete the 2nd dose 3 hr prior to arrival (Patient not taking: No sig reported), Disp: 177 mL, Rfl: 0    naproxen (NAPROSYN) 500 mg tablet, Take 1 tablet (500 mg total) by mouth 2 (two) times a day with meals for 5 days (Patient not taking: No sig reported), Disp: 10 tablet, Rfl: 0      Physical Exam:  /60 (BP Location: Left arm, Patient Position: Sitting, Cuff Size: Adult)   Pulse 64   Temp 97 5 °F (36 4 °C)   Resp 16   Ht 5' 6" (1 676 m)   Wt 61 7 kg (136 lb)   SpO2 96%   BMI 21 95 kg/m²     Physical Exam  Constitutional:       General: She is not in acute distress  Appearance: She is well-developed  She is not diaphoretic  HENT:      Head: Normocephalic and atraumatic        Nose: Nose normal    Eyes:      General: No scleral icterus  Right eye: No discharge  Left eye: No discharge  Conjunctiva/sclera: Conjunctivae normal       Pupils: Pupils are equal, round, and reactive to light  Neck:      Thyroid: No thyromegaly  Vascular: No JVD  Trachea: No tracheal deviation  Cardiovascular:      Rate and Rhythm: Normal rate and regular rhythm  Heart sounds: Normal heart sounds  No murmur heard  No friction rub  Pulmonary:      Effort: Pulmonary effort is normal  No respiratory distress  Breath sounds: Normal breath sounds  No stridor  No wheezing or rales  Chest:      Chest wall: No tenderness  Abdominal:      General: There is no distension  Palpations: Abdomen is soft  There is no hepatomegaly or splenomegaly  Tenderness: There is no abdominal tenderness  There is no guarding or rebound  Musculoskeletal:         General: No tenderness or deformity  Normal range of motion  Cervical back: Normal range of motion and neck supple  Lymphadenopathy:      Cervical: No cervical adenopathy  Skin:     General: Skin is warm and dry  Coloration: Skin is not pale  Findings: No erythema or rash  Neurological:      Mental Status: She is alert and oriented to person, place, and time  Cranial Nerves: No cranial nerve deficit  Coordination: Coordination normal       Deep Tendon Reflexes: Reflexes are normal and symmetric  Psychiatric:         Behavior: Behavior normal          Thought Content:  Thought content normal          Judgment: Judgment normal            Labs:  Lab Results   Component Value Date    WBC 8 89 06/25/2022    HGB 13 4 06/25/2022    HCT 41 6 06/25/2022    MCV 96 06/25/2022     06/25/2022     Lab Results   Component Value Date     04/08/2018    K 4 2 06/25/2022     06/25/2022    CO2 28 06/25/2022    ANIONGAP 12 0 04/08/2018    BUN 9 06/25/2022    CREATININE 0 69 06/25/2022    GLUCOSE 82 12/07/2015    GLUF 83 05/17/2022    CALCIUM 9 3 06/25/2022    AST 16 06/25/2022    ALT 12 06/25/2022    ALKPHOS 70 06/25/2022    PROT 7 2 04/08/2018    BILITOT 0 2 (L) 04/08/2018    EGFR 106 06/25/2022     No results found for: TSH    Patient voiced understanding and agreement in the above discussion  Aware to contact our office with questions/symptoms in the interim

## 2022-07-11 LAB — MISCELLANEOUS LAB TEST RESULT: NORMAL

## 2022-07-11 RX ORDER — SODIUM CHLORIDE, SODIUM LACTATE, POTASSIUM CHLORIDE, CALCIUM CHLORIDE 600; 310; 30; 20 MG/100ML; MG/100ML; MG/100ML; MG/100ML
20 INJECTION, SOLUTION INTRAVENOUS CONTINUOUS
Status: CANCELLED | OUTPATIENT
Start: 2022-07-11

## 2022-07-11 RX ORDER — SODIUM CHLORIDE, SODIUM LACTATE, POTASSIUM CHLORIDE, CALCIUM CHLORIDE 600; 310; 30; 20 MG/100ML; MG/100ML; MG/100ML; MG/100ML
125 INJECTION, SOLUTION INTRAVENOUS CONTINUOUS
Status: CANCELLED | OUTPATIENT
Start: 2022-07-11

## 2022-07-12 ENCOUNTER — ANESTHESIA EVENT (OUTPATIENT)
Dept: GASTROENTEROLOGY | Facility: HOSPITAL | Age: 42
End: 2022-07-12

## 2022-07-12 ENCOUNTER — ANESTHESIA (OUTPATIENT)
Dept: GASTROENTEROLOGY | Facility: HOSPITAL | Age: 42
End: 2022-07-12

## 2022-07-12 ENCOUNTER — HOSPITAL ENCOUNTER (OUTPATIENT)
Dept: GASTROENTEROLOGY | Facility: HOSPITAL | Age: 42
Setting detail: OUTPATIENT SURGERY
Discharge: HOME | End: 2022-07-12
Attending: INTERNAL MEDICINE | Admitting: INTERNAL MEDICINE
Payer: COMMERCIAL

## 2022-07-12 VITALS
SYSTOLIC BLOOD PRESSURE: 106 MMHG | HEIGHT: 66 IN | OXYGEN SATURATION: 100 % | RESPIRATION RATE: 20 BRPM | TEMPERATURE: 97.7 F | WEIGHT: 136 LBS | HEART RATE: 79 BPM | BODY MASS INDEX: 21.86 KG/M2 | DIASTOLIC BLOOD PRESSURE: 48 MMHG

## 2022-07-12 DIAGNOSIS — Z86.010 HISTORY OF COLON POLYPS: ICD-10-CM

## 2022-07-12 PROBLEM — Z90.710 HISTORY OF HYSTERECTOMY: Status: ACTIVE | Noted: 2022-07-12

## 2022-07-12 PROCEDURE — G0105 COLORECTAL SCRN; HI RISK IND: HCPCS | Performed by: INTERNAL MEDICINE

## 2022-07-12 RX ORDER — SODIUM CHLORIDE, SODIUM LACTATE, POTASSIUM CHLORIDE, CALCIUM CHLORIDE 600; 310; 30; 20 MG/100ML; MG/100ML; MG/100ML; MG/100ML
125 INJECTION, SOLUTION INTRAVENOUS CONTINUOUS
Status: DISCONTINUED | OUTPATIENT
Start: 2022-07-12 | End: 2022-07-16 | Stop reason: HOSPADM

## 2022-07-12 RX ORDER — PROPOFOL 10 MG/ML
INJECTION, EMULSION INTRAVENOUS AS NEEDED
Status: DISCONTINUED | OUTPATIENT
Start: 2022-07-12 | End: 2022-07-12

## 2022-07-12 RX ORDER — LIDOCAINE HYDROCHLORIDE 20 MG/ML
INJECTION, SOLUTION EPIDURAL; INFILTRATION; INTRACAUDAL; PERINEURAL AS NEEDED
Status: DISCONTINUED | OUTPATIENT
Start: 2022-07-12 | End: 2022-07-12

## 2022-07-12 RX ORDER — PROPOFOL 10 MG/ML
INJECTION, EMULSION INTRAVENOUS CONTINUOUS PRN
Status: DISCONTINUED | OUTPATIENT
Start: 2022-07-12 | End: 2022-07-12

## 2022-07-12 RX ORDER — SODIUM CHLORIDE, SODIUM LACTATE, POTASSIUM CHLORIDE, CALCIUM CHLORIDE 600; 310; 30; 20 MG/100ML; MG/100ML; MG/100ML; MG/100ML
20 INJECTION, SOLUTION INTRAVENOUS CONTINUOUS
Status: DISCONTINUED | OUTPATIENT
Start: 2022-07-12 | End: 2022-07-16 | Stop reason: HOSPADM

## 2022-07-12 RX ORDER — ONDANSETRON 2 MG/ML
INJECTION INTRAMUSCULAR; INTRAVENOUS AS NEEDED
Status: DISCONTINUED | OUTPATIENT
Start: 2022-07-12 | End: 2022-07-12

## 2022-07-12 RX ADMIN — PROPOFOL 130 MCG/KG/MIN: 10 INJECTION, EMULSION INTRAVENOUS at 10:45

## 2022-07-12 RX ADMIN — SODIUM CHLORIDE, SODIUM LACTATE, POTASSIUM CHLORIDE, AND CALCIUM CHLORIDE 125 ML/HR: .6; .31; .03; .02 INJECTION, SOLUTION INTRAVENOUS at 09:11

## 2022-07-12 RX ADMIN — PROPOFOL 70 MG: 10 INJECTION, EMULSION INTRAVENOUS at 10:45

## 2022-07-12 RX ADMIN — ONDANSETRON 4 MG: 2 INJECTION INTRAMUSCULAR; INTRAVENOUS at 10:42

## 2022-07-12 RX ADMIN — PROPOFOL 50 MG: 10 INJECTION, EMULSION INTRAVENOUS at 10:55

## 2022-07-12 RX ADMIN — LIDOCAINE HYDROCHLORIDE 100 MG: 20 INJECTION, SOLUTION EPIDURAL; INFILTRATION; INTRACAUDAL; PERINEURAL at 10:45

## 2022-07-12 NOTE — H&P
History and Physical - [facility]  Kuldip Grajeda 38 y o  female MRN: [id number]                  HPI: Kuldip Grajeda is a 38y o  year old female who presents for colonoscopy due to a personal history colon polyps  For 1st colonoscopy was at age 43 where she was noted to have a 2 5 cm polyp in sigmoid colon which is a tubulovillous adenoma  In 2019 she had a colonoscopy that revealed  a 4 mm polyp in sigmoid colon  In 2020 she had diverticulosis and hemorrhoids noted  She is also noted to have a tortuous and redundant colon  Patient denies any change in her bowel habits  Denies any rectal bleeding or melena  Denies any heartburn indigestion dysphagia nausea vomiting  She was seen by Anesthesia is stable for this procedure    REVIEW OF SYSTEMS: Per the HPI, and otherwise unremarkable      Historical Information   Past Medical History:   Diagnosis Date    Anxiety     Bipolar disorder ([facility] [address] )     Chronic pain     Colon polyp     Endometriosis     Hyperlipidemia     Kidney stone     Urinary frequency     resolved: 6/14/2016    Urinary urgency     resolved: 6/14/2016     Past Surgical History:   Procedure Laterality Date    BLADDER SUSPENSION      CHOLECYSTECTOMY      COLONOSCOPY      EPIDURAL BLOCK INJECTION N/A 10/17/2019    Procedure: BLOCK / INJECTION EPIDURAL STEROID CERVICAL c7-t1 interlaminar;  Surgeon: Meron Ndiaye MD;  [facility];  Service: Pain Management     EPIDURAL BLOCK INJECTION Bilateral 1/16/2020    Procedure: L4-L5 Transforaminal Epidural Steroid Injection;  Surgeon: Meron Ndiaye MD;  [facility];  Service: Pain Management     FL GUIDED NEEDLE PLAC BX/ASP/INJ  10/17/2019    FL GUIDED NEEDLE PLAC BX/ASP/INJ  1/16/2020    HERNIA REPAIR      HYSTERECTOMY      full     OVARIAN CYST REMOVAL      CT ARTHROSCOPY SHOULDER SURGICAL BICEPS TENODESIS Right 8/14/2019    Procedure: ARTHROSCOPY SHOULDER WITH BICEPS TENOTOMY AND SAD; Surgeon: Cintia Mccabe MD;  [facility];  Service: Orthopedics    OR LAP,CHOLECYSTECTOMY N/A 4/30/2018    Procedure: CHOLECYSTECTOMY LAPAROSCOPIC;  Surgeon: Sharonda Mcclendon DO;  [facility];  Service: General    TONSILLECTOMY      UPPER GASTROINTESTINAL ENDOSCOPY       Social History   Social History     Substance and Sexual Activity   Alcohol Use Yes    Comment: rare; consumes alcohol occasionally (as per allscripts); social alcohol use (As per allscripts)     Social History     Substance and Sexual Activity   Drug Use No     Social History     Tobacco Use   Smoking Status Current Every Day Smoker    Packs/day: 1 00    Types: Cigarettes   Smokeless Tobacco Never Used     Family History   Problem Relation Age of Onset    Diabetes Mother     Heart disease Mother         rheumatic    Neuropathy Mother     COPD Mother     Diabetes type II Mother     Cancer Father     Lung cancer Father     Stroke Maternal Grandmother         CVA    Diabetes type II Maternal Grandmother         mellitus    Other Maternal Grandfather         esophageal abnormality    Diabetes type II Paternal Grandmother         mellitus    Diabetes type II Paternal Grandfather         mellitus    Depression Family     Esophageal cancer Family     Lung cancer Family     Stomach cancer Family        Meds/Allergies       Current Outpatient Medications:     buPROPion (WELLBUTRIN SR) 100 mg 12 hr tablet    Cholecalciferol (VITAMIN D3) 2000 units TABS    citalopram (CeleXA) 20 mg tablet    gabapentin (NEURONTIN) 300 mg capsule    lamoTRIgine (LaMICtal) 200 MG tablet    Multiple Vitamin (DAILY VALUE MULTIVITAMIN) TABS    simvastatin (ZOCOR) 40 mg tablet    LORazepam (ATIVAN) 0 5 mg tablet    Na Sulfate-K Sulfate-Mg Sulf (Suprep Bowel Prep Kit) 17 5-3 13-1 6 GM/177ML SOLN    naproxen (NAPROSYN) 500 mg tablet    Nerve Stimulator (Standard TENS) ISABELLA    tiZANidine (ZANAFLEX) 4 mg tablet    Current Facility-Administered Medications:     lactated ringers infusion, 20 mL/hr, Intravenous, Continuous    lactated ringers infusion, 125 mL/hr, Intravenous, Continuous, 125 mL/hr at 07/12/22 0911    No Known Allergies    Objective     /74   Pulse 81   Temp (!) 97 2 °F (36 2 °C) (Temporal)   Resp 18   Ht 5' 6" (1 676 m)   Wt 61 7 kg (136 lb)   SpO2 96%   BMI 21 95 kg/m²       PHYSICAL EXAM    Gen: NAD  Head: NCAT  CV: RRR  CHEST: Clear  ABD: soft, NT/ND  EXT: no edema      ASSESSMENT/PLAN:  This is a 38y o  year old female here for colonoscopy, and she is stable and optimized for her procedure

## 2022-07-12 NOTE — ANESTHESIA PREPROCEDURE EVALUATION
Procedure:  COLONOSCOPY    Relevant Problems   ANESTHESIA (within normal limits)   (-) History of anesthesia complications      CARDIO   (+) Classic migraine   (+) Hypercholesterolemia   (+) Hyperlipidemia      ENDO (within normal limits)      GI/HEPATIC  Confirmed NPO appropriate  s/p bowel prep   (-) Gastroesophageal reflux disease      /RENAL (within normal limits)      GYN   (+) History of hysterectomy      HEMATOLOGY (within normal limits)      MUSCULOSKELETAL   (+) Acute back pain   (+) Adhesive capsulitis of right shoulder   (+) Bilateral low back pain with right-sided sciatica   (+) Cervical myofascial pain syndrome   (+) Chronic bilateral low back pain without sciatica   (+) Lower back pain      NEURO/PSYCH   (+) Cervical myofascial pain syndrome   (+) Chronic bilateral low back pain without sciatica   (+) Chronic neck pain   (+) Chronic pain syndrome   (+) Classic migraine   (+) Generalized anxiety disorder   (+) History of colon polyps   (+) Major depression   (+) Paresthesia   (+) Upper limb weakness      PULMONARY   (+) COPD (chronic obstructive pulmonary disease) (HCC)   (+) Shortness of breath   (+) Smoking   (-) URI (upper respiratory infection)      Other   (+) Lymphadenitis, acute        Physical Exam    Airway    Mallampati score: II  TM Distance: >3 FB  Neck ROM: full     Dental   upper dentures,     Cardiovascular  Rhythm: regular, Rate: normal,     Pulmonary  Breath sounds clear to auscultation, No wheezes,     Other Findings        Anesthesia Plan  ASA Score- 2     Anesthesia Type- IV sedation with anesthesia with ASA Monitors  Additional Monitors:   Airway Plan:     Comment: I discussed the risks and benefits of IV sedation anesthesia including the possibility of the need to convert to general anesthesia and the potential risk of awareness  The patient was given the opportunity to ask questions, which were answered          Plan Factors-Exercise tolerance (METS): >4 METS      Chart reviewed  Patient is a current smoker  Induction- intravenous  Postoperative Plan-     Informed Consent- Anesthetic plan and risks discussed with patient and spouse  I personally reviewed this patient with the CRNA  Discussed and agreed on the Anesthesia Plan with the CRNA  Delmi Crum

## 2022-07-12 NOTE — ANESTHESIA POSTPROCEDURE EVALUATION
Post-Op Assessment Note    CV Status:  Stable  Pain Score: 0    Pain management: adequate     Mental Status:  Arousable   Hydration Status:  Stable   PONV Controlled:  None   Airway Patency:  Patent      Post Op Vitals Reviewed: Yes      Staff: CRNA         No complications documented      BP   101/54   Temp     Pulse  76   Resp   15   SpO2   100

## 2022-08-09 ENCOUNTER — TELEPHONE (OUTPATIENT)
Dept: NEUROLOGY | Facility: CLINIC | Age: 42
End: 2022-08-09

## 2022-08-12 DIAGNOSIS — M79.10 MUSCLE PAIN: ICD-10-CM

## 2022-08-12 RX ORDER — TIZANIDINE 4 MG/1
TABLET ORAL
Qty: 30 TABLET | Refills: 1 | Status: SHIPPED | OUTPATIENT
Start: 2022-08-12 | End: 2022-10-13 | Stop reason: SDUPTHER

## 2022-08-24 ENCOUNTER — TELEPHONE (OUTPATIENT)
Dept: NEUROLOGY | Facility: CLINIC | Age: 42
End: 2022-08-24

## 2022-08-24 ENCOUNTER — TELEMEDICINE (OUTPATIENT)
Dept: NEUROLOGY | Facility: CLINIC | Age: 42
End: 2022-08-24
Payer: COMMERCIAL

## 2022-08-24 VITALS — BODY MASS INDEX: 22.5 KG/M2 | HEIGHT: 66 IN | WEIGHT: 140 LBS

## 2022-08-24 DIAGNOSIS — M79.18 CERVICAL MYOFASCIAL PAIN SYNDROME: Primary | ICD-10-CM

## 2022-08-24 DIAGNOSIS — R20.2 PARESTHESIA OF SKIN: ICD-10-CM

## 2022-08-24 DIAGNOSIS — M54.2 CERVICALGIA: ICD-10-CM

## 2022-08-24 PROBLEM — R29.898 WEAKNESS OF BOTH LOWER EXTREMITIES: Status: RESOLVED | Noted: 2017-05-23 | Resolved: 2022-08-24

## 2022-08-24 PROCEDURE — 99214 OFFICE O/P EST MOD 30 MIN: CPT | Performed by: PHYSICIAN ASSISTANT

## 2022-08-24 NOTE — TELEPHONE ENCOUNTER
Lmom for pt to call back and schedule a return in about 9 months (around 5/24/2023) with Eduin Bruno

## 2022-08-24 NOTE — PROGRESS NOTES
Virtual Regular Visit    Verification of patient location:    Patient is located in the following state in which I hold an active license [place]  Assessment:  Amrik Larios is a 20-year-old female who initially presented in 2017 with complaints of paresthesias, muscle cramps, weakness and stiffness  Neuro exam fairly unremarkable  Workup has included normal MRI of the brain, normal EMG, MRI cervical spine with some lordosis and non-compressive degenerative changes, lumbar spine films unrevealing  She has been referred to physiatry and pain management, had some trigger point injections, done PT  She has maintained on gabapentin and tizanidine, feels these meds are helpful for cervical myofascial pain, lumbar pain etc      Discussed I would ideally like her to follow with pain management for chronic neck, back and myofascial pain, as no neurologic disorder has been identified, but we are currently Rx'ing her gabapentin and tizanidine  She is stable, so will continue, but discussed if any worsening pain, would really like her to go back to pain management  Plan:  -continue gabapentin 300 mg b i d  and 600 mg HS  -continue tizanidine 4 mg HS  -continue to stay active with regular exercise and stretching  -consider referral back to pain management if any changes    Patient will follow-up in 9 months or sooner if needed    Problem List Items Addressed This Visit        Musculoskeletal and Integument    Cervical myofascial pain syndrome - Primary       Other    Cervicalgia      Other Visit Diagnoses     Paresthesia of skin                   Reason for visit is   Chief Complaint   Patient presents with    Virtual Regular Visit        Encounter provider Jonathan Hahn PA-C    Provider located at [address] [facility]  [place]  [phone]      Recent Visits  No visits were found meeting these conditions    Showing recent visits within past 7 days and meeting all other requirements  Today's Visits  Date Type Provider Dept   08/24/22 Telemedicine Comfort Ashley PA-C [facility] [place]   Showing today's visits and meeting all other requirements  Future Appointments  No visits were found meeting these conditions  Showing future appointments within next 150 days and meeting all other requirements       The patient was identified by name and date of birth  Sean Hidalgo was informed that this is a telemedicine visit and that the visit is being conducted through [address] [facility] and patient was informed that this is a secure, HIPAA-compliant platform  She agrees to proceed     My office door was closed  No one else was in the room  She acknowledged consent and understanding of privacy and security of the video platform  The patient has agreed to participate and understands they can discontinue the visit at any time  Patient is aware this is a billable service  Max Alegre is a 38 y o  female who presents today for neurologic follow up  She was last seen in November 2021 by Cole AMES   To review, patient initially seen by Dr Davon Bentley in 2017 for paresthesias and cramping, as well as stiffness/weakness throughout her body  Her PCP had ordered MRI brain which was normal   She had extensive labs which were essentially all unremarkable  She had EMG of the right upper and right lower extremities in October 2017 and this was a normal study  Neurologic exam has been normal   She was placed on gabapentin  MRI of the cervical spine was also obtained which showed mild, noncompressive degenerative changes  She was sent to physiatry and saw Dr Mi Huerta who diagnosed cervical myofascial pain syndrome  She had advised over-the-counter topical muscle relaxants and also performed trigger point injections  She was also referred to Pain Management  Patient has been maintained on gabapentin 300 mg b i d  and 600 mg HS, along with tizanidine 4 mg HS      Today, patient reports she is doing well  She feels that the gabapentin and tizanidine work well for her neck pain and spasms  She denies any new neurologic symptoms  She does report some stiffness and tightness in her hands  She has not seen Pain Management in a while        Past Medical History:   Diagnosis Date    Anxiety     Bipolar disorder ([address] )     Chronic pain     Colon polyp     Endometriosis     Hyperlipidemia     Kidney stone     Urinary frequency     resolved: 6/14/2016    Urinary urgency     resolved: 6/14/2016       Past Surgical History:   Procedure Laterality Date    BLADDER SUSPENSION      CHOLECYSTECTOMY      COLONOSCOPY      EPIDURAL BLOCK INJECTION N/A 10/17/2019    Procedure: BLOCK / INJECTION EPIDURAL STEROID CERVICAL c7-t1 interlaminar;  Surgeon: Anel Voss MD;  [facility];  Service: Pain Management     EPIDURAL BLOCK INJECTION Bilateral 1/16/2020    Procedure: L4-L5 Transforaminal Epidural Steroid Injection;  Surgeon: Anel Voss MD;  [facility];  Service: Pain Management     FL GUIDED NEEDLE PLAC BX/ASP/INJ  10/17/2019    FL GUIDED NEEDLE PLAC BX/ASP/INJ  1/16/2020    HERNIA REPAIR      HYSTERECTOMY      full     OVARIAN CYST REMOVAL      MS ARTHROSCOPY SHOULDER SURGICAL BICEPS TENODESIS Right 8/14/2019    Procedure: ARTHROSCOPY SHOULDER WITH BICEPS TENOTOMY AND SAD;  Surgeon: Elsy Heard MD;  [facility];  Service: Orthopedics    MS LAP,CHOLECYSTECTOMY N/A 4/30/2018    Procedure: CHOLECYSTECTOMY LAPAROSCOPIC;  Surgeon: Bandar Miner DO;  [facility];  Service: General    TONSILLECTOMY      UPPER GASTROINTESTINAL ENDOSCOPY         Current Outpatient Medications   Medication Sig Dispense Refill    Cholecalciferol (VITAMIN D3) 2000 units TABS Take 4,000 Units by mouth daily        gabapentin (NEURONTIN) 300 mg capsule take 1 capsule by mouth twice a day and 2 capsules at bedtime 120 capsule 2    lamoTRIgine (LaMICtal) 200 MG tablet Take 1 tablet by mouth 2 (two) times a day      LORazepam (ATIVAN) 0 5 mg tablet Take 1 tablet by mouth daily at bedtime        Multiple Vitamin (DAILY VALUE MULTIVITAMIN) TABS Take by mouth      Nerve Stimulator (Standard TENS) ISABELLA Use 3 (three) times a day as needed (pain) 1 Device 0    simvastatin (ZOCOR) 40 mg tablet take 1 tablet by mouth once daily 90 tablet 0    tiZANidine (ZANAFLEX) 4 mg tablet take 1 tablet by mouth once daily at bedtime 30 tablet 1    buPROPion (WELLBUTRIN SR) 100 mg 12 hr tablet 100 mg daily  (Patient not taking: Reported on 8/24/2022)  0    citalopram (CeleXA) 20 mg tablet Take 10 mg by mouth every morning  (Patient not taking: Reported on 8/24/2022)  0    Na Sulfate-K Sulfate-Mg Sulf (Suprep Bowel Prep Kit) 17 5-3 13-1 6 GM/177ML SOLN Take 2 Bottles by mouth once for 1 dose Please follow instructions given at time of office visit  Please complete the 2nd dose 3 hr prior to arrival (Patient not taking: No sig reported) 177 mL 0    naproxen (NAPROSYN) 500 mg tablet Take 1 tablet (500 mg total) by mouth 2 (two) times a day with meals for 5 days (Patient not taking: No sig reported) 10 tablet 0     No current facility-administered medications for this visit  No Known Allergies    Review of Systems   Constitutional: Negative for chills and fever  HENT: Negative for ear pain and sore throat  Eyes: Negative for pain and visual disturbance  Respiratory: Negative for cough and shortness of breath  Cardiovascular: Negative for chest pain and palpitations  Gastrointestinal: Negative for abdominal pain and vomiting  Genitourinary: Negative for dysuria and hematuria  Musculoskeletal: Positive for myalgias, neck pain and neck stiffness  Negative for arthralgias and back pain  Skin: Negative for color change and rash  Neurological: Negative for seizures and syncope  All other systems reviewed and are negative        Video Exam    Vitals: 08/24/22 0736   Weight: 63 5 kg (140 lb)   Height: 5' 6" (1 676 m)       Physical Exam  Constitutional:       Appearance: Normal appearance  HENT:      Head: Normocephalic and atraumatic  Neurological:      Mental Status: She is alert        Comments: Mental status: AO x 3, able to follow commands, no dysarthria or aphasia    CN 1: not tested  CN 2: not tested  CN 3, 4, 6: no noticeable palsy, EOMs intact  CN 5: not tested  CN 7: face symmetrical  CN 8: hearing intact to voice  CN 9: not tested  CN 10: not tested  CN 11: shoulder shrug symmetric   CN 12: tongue midline     Motor:  No focal weakness or abnormal movements appreciated, normal coordination   Psychiatric:         Mood and Affect: Mood normal          Behavior: Behavior normal           I spent 12 minutes directly with the patient during this visit

## 2022-08-24 NOTE — PATIENT INSTRUCTIONS
Can look up some hand exercises and stretches online    Could also consider occupational therapy  Continue tizanidine 4mg at bedtime  Continue gabapentin 1 cap in the AM, 1 cap in the afternoon and 2 caps at night  If any worsening pain symptoms, would refer back to pain management  Follow up in 9 months or sooner if needed

## 2022-08-25 ENCOUNTER — OFFICE VISIT (OUTPATIENT)
Dept: URGENT CARE | Facility: CLINIC | Age: 42
End: 2022-08-25
Payer: COMMERCIAL

## 2022-08-25 VITALS
RESPIRATION RATE: 18 BRPM | TEMPERATURE: 98.2 F | OXYGEN SATURATION: 97 % | SYSTOLIC BLOOD PRESSURE: 109 MMHG | DIASTOLIC BLOOD PRESSURE: 63 MMHG | HEART RATE: 62 BPM

## 2022-08-25 DIAGNOSIS — J20.9 ACUTE BRONCHITIS, UNSPECIFIED ORGANISM: Primary | ICD-10-CM

## 2022-08-25 LAB
SARS-COV-2 AG UPPER RESP QL IA: NEGATIVE
VALID CONTROL: NORMAL

## 2022-08-25 PROCEDURE — 99213 OFFICE O/P EST LOW 20 MIN: CPT

## 2022-08-25 PROCEDURE — 87811 SARS-COV-2 COVID19 W/OPTIC: CPT

## 2022-08-25 PROCEDURE — S9088 SERVICES PROVIDED IN URGENT: HCPCS

## 2022-08-25 RX ORDER — DOXYCYCLINE 100 MG/1
100 TABLET ORAL 2 TIMES DAILY
Qty: 10 TABLET | Refills: 0 | Status: SHIPPED | OUTPATIENT
Start: 2022-08-25 | End: 2022-08-30

## 2022-08-25 RX ORDER — PREDNISONE 20 MG/1
40 TABLET ORAL DAILY
Qty: 10 TABLET | Refills: 0 | Status: SHIPPED | OUTPATIENT
Start: 2022-08-25 | End: 2022-08-30

## 2022-08-25 NOTE — PROGRESS NOTES
[facility]        NAME: Miesha Gonzales is a 38 y o  female  : 1980    MRN: [id number]  DATE: 2022  TIME: 9:46 AM      Assessment and Plan     Acute bronchitis, unspecified organism [J20 9]  1  Acute bronchitis, unspecified organism  Poct Covid 19 Rapid Antigen Test    doxycycline (ADOXA) 100 MG tablet    predniSONE 20 mg tablet         Patient requesting for rapid covid- negative  Pt educated and verbalizes understanding that a PCR would be more accurate given six days of symptoms  Offered PCR- declined  Agreeable to wear a mask in public for the next 4 days  QT from EKG on 20 shows widened QT-  and   Will hold on zpack and treat with doxycycline  Will treat for bronchitis given worsening symptoms with current smoker  Patient Instructions     Take antibiotic as prescribed  Recommend probiotic use while taking antibiotic  Avoid direct sunlight with use of doxycyline, reapply SPF 50 at least every 1-2 hours, wear long sleeves, and a wide brimmed hat  Take steroids as directed  Recommend to take them in the morning and with food  Acetaminophen  for fever and pain  Follow-up with PCP in 3-5 days  Go to ER if symptoms worsen  Chief Complaint     Chief Complaint   Patient presents with    Cough     Cough, congestion, sweating, fever and stomach ache since Friday  History of Present Illness     Patient is a 24-year-old female who presents with cough, runny nose, body aches, fatigue, and chills for six days  Reports nausea this morning and two episodes of diarrhea  States her family members are also sick  States her son was tested for covid and was negative and is being treated for bronchitis  Reports productive cough- unable to describe sputum coloration  Reports shortness of breath  Denies chest pain  Denies chance of pregnancy  Denies DM history  Reports history of asthma when she was a child  Denies recent antibiotic use   States she is a smoker- has decreased since becoming sick  Review of Systems     Review of Systems   Constitutional: Positive for chills and fatigue  Negative for fever  HENT: Positive for rhinorrhea  Negative for congestion and sore throat  Respiratory: Positive for cough and shortness of breath  Negative for wheezing  Cardiovascular: Negative for chest pain  Gastrointestinal: Positive for diarrhea and nausea  Negative for vomiting  Musculoskeletal: Positive for myalgias  All other systems reviewed and are negative          Current Medications       Current Outpatient Medications:     doxycycline (ADOXA) 100 MG tablet, Take 1 tablet (100 mg total) by mouth 2 (two) times a day for 5 days, Disp: 10 tablet, Rfl: 0    predniSONE 20 mg tablet, Take 2 tablets (40 mg total) by mouth daily for 5 days, Disp: 10 tablet, Rfl: 0    buPROPion (WELLBUTRIN SR) 100 mg 12 hr tablet, 100 mg daily  (Patient not taking: Reported on 8/24/2022), Disp: , Rfl: 0    Cholecalciferol (VITAMIN D3) 2000 units TABS, Take 4,000 Units by mouth daily  , Disp: , Rfl:     citalopram (CeleXA) 20 mg tablet, Take 10 mg by mouth every morning  (Patient not taking: Reported on 8/24/2022), Disp: , Rfl: 0    gabapentin (NEURONTIN) 300 mg capsule, take 1 capsule by mouth twice a day and 2 capsules at bedtime, Disp: 120 capsule, Rfl: 2    lamoTRIgine (LaMICtal) 200 MG tablet, Take 1 tablet by mouth 2 (two) times a day, Disp: , Rfl:     LORazepam (ATIVAN) 0 5 mg tablet, Take 1 tablet by mouth daily at bedtime  , Disp: , Rfl:     Multiple Vitamin (DAILY VALUE MULTIVITAMIN) TABS, Take by mouth, Disp: , Rfl:     naproxen (NAPROSYN) 500 mg tablet, Take 1 tablet (500 mg total) by mouth 2 (two) times a day with meals for 5 days (Patient not taking: No sig reported), Disp: 10 tablet, Rfl: 0    Nerve Stimulator (Standard TENS) ISABELLA, Use 3 (three) times a day as needed (pain), Disp: 1 Device, Rfl: 0    simvastatin (ZOCOR) 40 mg tablet, take 1 tablet by mouth once daily, Disp: 90 tablet, Rfl: 0    tiZANidine (ZANAFLEX) 4 mg tablet, take 1 tablet by mouth once daily at bedtime, Disp: 30 tablet, Rfl: 1    Current Allergies     Allergies as of 08/25/2022    (No Known Allergies)              The following portions of the patient's history were reviewed and updated as appropriate: allergies, current medications, past family history, past medical history, past social history, past surgical history and problem list      Past Medical History:   Diagnosis Date    Anxiety     Bipolar disorder ([address] )     Chronic pain     Colon polyp     Endometriosis     Hyperlipidemia     Kidney stone     Urinary frequency     resolved: 6/14/2016    Urinary urgency     resolved: 6/14/2016       Past Surgical History:   Procedure Laterality Date    BLADDER SUSPENSION      CHOLECYSTECTOMY      COLONOSCOPY      EPIDURAL BLOCK INJECTION N/A 10/17/2019    Procedure: BLOCK / INJECTION EPIDURAL STEROID CERVICAL c7-t1 interlaminar;  Surgeon: Dayanna Justice MD;  [facility];  Service: Pain Management     EPIDURAL BLOCK INJECTION Bilateral 1/16/2020    Procedure: L4-L5 Transforaminal Epidural Steroid Injection;  Surgeon: Dayanna Justice MD;  [facility];  Service: Pain Management     FL GUIDED NEEDLE PLAC BX/ASP/INJ  10/17/2019    FL GUIDED NEEDLE PLAC BX/ASP/INJ  1/16/2020    HERNIA REPAIR      HYSTERECTOMY      full     OVARIAN CYST REMOVAL      ND ARTHROSCOPY SHOULDER SURGICAL BICEPS TENODESIS Right 8/14/2019    Procedure: ARTHROSCOPY SHOULDER WITH BICEPS TENOTOMY AND SAD;  Surgeon: Oma Monreal MD;  [facility];  Service: Orthopedics    ND LAP,CHOLECYSTECTOMY N/A 4/30/2018    Procedure: CHOLECYSTECTOMY LAPAROSCOPIC;  Surgeon: Hayes Baker DO;  [facility];  Service: General    TONSILLECTOMY      UPPER GASTROINTESTINAL ENDOSCOPY         Family History   Problem Relation Age of Onset    Diabetes Mother     Heart disease Mother rheumatic    Neuropathy Mother     COPD Mother     Diabetes type II Mother     Cancer Father     Lung cancer Father     Stroke Maternal Grandmother         CVA    Diabetes type II Maternal Grandmother         mellitus    Other Maternal Grandfather         esophageal abnormality    Diabetes type II Paternal Grandmother         mellitus    Diabetes type II Paternal Grandfather         mellitus    Depression Family     Esophageal cancer Family     Lung cancer Family     Stomach cancer Family          Medications have been verified  Objective     /63   Pulse 62   Temp 98 2 °F (36 8 °C)   Resp 18   SpO2 97%   No LMP recorded  Patient has had a hysterectomy  Physical Exam     Physical Exam  Vitals and nursing note reviewed  Constitutional:       General: She is awake  She is not in acute distress  Appearance: Normal appearance  She is not ill-appearing, toxic-appearing or diaphoretic  HENT:      Right Ear: Tympanic membrane, ear canal and external ear normal  Tympanic membrane is not injected or erythematous  Left Ear: Tympanic membrane, ear canal and external ear normal  Tympanic membrane is not injected or erythematous  Nose: Rhinorrhea present  Rhinorrhea is clear  Mouth/Throat:      Lips: Pink  Mouth: Mucous membranes are moist       Pharynx: Oropharynx is clear  Uvula midline  Tonsils: 0 on the right  0 on the left  Cardiovascular:      Rate and Rhythm: Normal rate  Pulses: Normal pulses  Heart sounds: Normal heart sounds, S1 normal and S2 normal    Pulmonary:      Effort: Pulmonary effort is normal       Breath sounds: Normal breath sounds and air entry  No stridor, decreased air movement or transmitted upper airway sounds  No decreased breath sounds, wheezing, rhonchi or rales  Skin:     General: Skin is warm  Capillary Refill: Capillary refill takes less than 2 seconds  Neurological:      Mental Status: She is alert  Psychiatric:         Mood and Affect: Mood normal          Behavior: Behavior normal          Thought Content:  Thought content normal          Judgment: Judgment normal

## 2022-08-25 NOTE — PATIENT INSTRUCTIONS
Take antibiotic as prescribed  Recommend probiotic use while taking antibiotic  Avoid direct sunlight with use of doxycyline, reapply SPF 50 at least every 1-2 hours, wear long sleeves, and a wide brimmed hat  Take steroids as directed  Recommend to take them in the morning and with food  Acetaminophen  for fever and pain  Follow-up with PCP in 3-5 days  Go to ER if symptoms worsen

## 2022-08-30 DIAGNOSIS — E78.00 HYPERCHOLESTEROLEMIA: ICD-10-CM

## 2022-08-30 RX ORDER — SIMVASTATIN 40 MG
TABLET ORAL
Qty: 90 TABLET | Refills: 0 | Status: SHIPPED | OUTPATIENT
Start: 2022-08-30

## 2022-10-13 DIAGNOSIS — M79.10 MUSCLE PAIN: ICD-10-CM

## 2022-10-13 DIAGNOSIS — R20.2 PARESTHESIA: ICD-10-CM

## 2022-10-14 RX ORDER — TIZANIDINE 4 MG/1
4 TABLET ORAL
Qty: 30 TABLET | Refills: 3 | Status: SHIPPED | OUTPATIENT
Start: 2022-10-14

## 2022-10-14 RX ORDER — GABAPENTIN 300 MG/1
CAPSULE ORAL
Qty: 120 CAPSULE | Refills: 3 | Status: SHIPPED | OUTPATIENT
Start: 2022-10-14

## 2022-10-20 ENCOUNTER — TELEPHONE (OUTPATIENT)
Dept: FAMILY MEDICINE CLINIC | Facility: CLINIC | Age: 42
End: 2022-10-20

## 2022-10-20 NOTE — TELEPHONE ENCOUNTER
Patient called requesting an extension for her PPL bill, please advise if you approve on this extension

## 2022-10-21 NOTE — TELEPHONE ENCOUNTER
Spoke with provider, unfortunately unable to provide patient with an additional extension PPL form left message for patient

## 2022-11-15 ENCOUNTER — APPOINTMENT (OUTPATIENT)
Dept: LAB | Facility: CLINIC | Age: 42
End: 2022-11-15

## 2022-11-15 DIAGNOSIS — R63.4 UNINTENTIONAL WEIGHT LOSS: ICD-10-CM

## 2022-11-15 DIAGNOSIS — J98.59 MEDIASTINAL ABNORMALITY: ICD-10-CM

## 2022-11-15 DIAGNOSIS — R91.8 LUNG NODULES: ICD-10-CM

## 2022-11-15 LAB
ALBUMIN SERPL BCP-MCNC: 4 G/DL (ref 3.5–5)
ALP SERPL-CCNC: 79 U/L (ref 46–116)
ALT SERPL W P-5'-P-CCNC: 22 U/L (ref 12–78)
ANION GAP SERPL CALCULATED.3IONS-SCNC: 4 MMOL/L (ref 4–13)
AST SERPL W P-5'-P-CCNC: 15 U/L (ref 5–45)
BILIRUB SERPL-MCNC: 0.45 MG/DL (ref 0.2–1)
BUN SERPL-MCNC: 14 MG/DL (ref 5–25)
CALCIUM SERPL-MCNC: 9.3 MG/DL (ref 8.3–10.1)
CHLORIDE SERPL-SCNC: 107 MMOL/L (ref 96–108)
CO2 SERPL-SCNC: 26 MMOL/L (ref 21–32)
CREAT SERPL-MCNC: 0.79 MG/DL (ref 0.6–1.3)
CRP SERPL QL: <3 MG/L
EOSINOPHIL # BLD AUTO: 0.12 THOUSAND/UL (ref 0–0.61)
EOSINOPHIL NFR BLD MANUAL: 1 % (ref 0–6)
ERYTHROCYTE [DISTWIDTH] IN BLOOD BY AUTOMATED COUNT: 12.5 % (ref 11.6–15.1)
ERYTHROCYTE [SEDIMENTATION RATE] IN BLOOD: 12 MM/HOUR (ref 0–19)
GFR SERPL CREATININE-BSD FRML MDRD: 91 ML/MIN/1.73SQ M
GLUCOSE SERPL-MCNC: 121 MG/DL (ref 65–140)
HCT VFR BLD AUTO: 39.5 % (ref 34.8–46.1)
HGB BLD-MCNC: 12.7 G/DL (ref 11.5–15.4)
LDH SERPL-CCNC: 179 U/L (ref 81–234)
LYMPHOCYTES # BLD AUTO: 45 %
LYMPHOCYTES # BLD AUTO: 5.23 THOUSAND/UL (ref 0.6–4.47)
MAGNESIUM SERPL-MCNC: 2.1 MG/DL (ref 1.6–2.6)
MCH RBC QN AUTO: 30.8 PG (ref 26.8–34.3)
MCHC RBC AUTO-ENTMCNC: 32.2 G/DL (ref 31.4–37.4)
MCV RBC AUTO: 96 FL (ref 82–98)
MONOCYTES # BLD AUTO: 0.47 THOUSAND/UL (ref 0–1.22)
MONOCYTES NFR BLD AUTO: 4 % (ref 4–12)
NEUTS SEG # BLD: 5.82 THOUSAND/UL (ref 1.81–6.82)
NEUTS SEG NFR BLD AUTO: 50 %
PLATELET # BLD AUTO: 242 THOUSANDS/UL (ref 149–390)
PLATELET BLD QL SMEAR: ADEQUATE
PMV BLD AUTO: 9.8 FL (ref 8.9–12.7)
POTASSIUM SERPL-SCNC: 3.8 MMOL/L (ref 3.5–5.3)
PROT SERPL-MCNC: 7 G/DL (ref 6.4–8.4)
RBC # BLD AUTO: 4.13 MILLION/UL (ref 3.81–5.12)
RBC MORPH BLD: NORMAL
SODIUM SERPL-SCNC: 137 MMOL/L (ref 135–147)
TOTAL CELLS COUNTED SPEC: 100
WBC # BLD AUTO: 11.63 THOUSAND/UL (ref 4.31–10.16)

## 2022-11-17 ENCOUNTER — HOSPITAL ENCOUNTER (OUTPATIENT)
Dept: CT IMAGING | Facility: HOSPITAL | Age: 42
End: 2022-11-17

## 2022-11-17 DIAGNOSIS — J98.59 MEDIASTINAL ABNORMALITY: ICD-10-CM

## 2022-11-17 DIAGNOSIS — R63.4 UNINTENTIONAL WEIGHT LOSS: ICD-10-CM

## 2022-11-17 DIAGNOSIS — R91.8 LUNG NODULES: ICD-10-CM

## 2022-11-29 ENCOUNTER — OFFICE VISIT (OUTPATIENT)
Dept: HEMATOLOGY ONCOLOGY | Facility: CLINIC | Age: 42
End: 2022-11-29

## 2022-11-29 VITALS
HEIGHT: 66 IN | DIASTOLIC BLOOD PRESSURE: 60 MMHG | RESPIRATION RATE: 18 BRPM | BODY MASS INDEX: 20.65 KG/M2 | WEIGHT: 128.5 LBS | OXYGEN SATURATION: 97 % | TEMPERATURE: 96.8 F | SYSTOLIC BLOOD PRESSURE: 108 MMHG | HEART RATE: 75 BPM

## 2022-11-29 DIAGNOSIS — R91.8 LUNG NODULES: ICD-10-CM

## 2022-11-29 DIAGNOSIS — R63.4 UNINTENTIONAL WEIGHT LOSS: Primary | ICD-10-CM

## 2022-11-29 DIAGNOSIS — J98.59 MEDIASTINAL ABNORMALITY: ICD-10-CM

## 2022-11-29 RX ORDER — DOXYCYCLINE 100 MG/1
TABLET ORAL
COMMUNITY

## 2022-11-29 RX ORDER — LAMOTRIGINE 100 MG/1
100 TABLET ORAL DAILY
COMMUNITY
Start: 2022-11-05

## 2022-11-29 NOTE — PROGRESS NOTES
Hematology/Oncology Outpatient Follow-up  Dora Solorzano 38 y o  female 1980 [id number]    Date:  11/29/2022        Assessment and Plan:  1  Unintentional weight loss  The patient continues to lose weight of unknown etiology this could be related to her tobacco use  There is no obvious hint of malignant etiology at this point  We will repeat the CT scan of the chest abdomen pelvis with contrast prior to her next visit in 6 months     - CBC and differential; Future  - Comprehensive metabolic panel; Future  - Magnesium; Future  - C-reactive protein; Future  - Sedimentation rate, automated; Future  - LD,Blood; Future  - CT chest abdomen pelvis w contrast; Future    2  Mediastinal abnormality  The recent CT scan showed stable 1 8 cm anterior mediastinal nodule which will be monitored closely  - CBC and differential; Future  - Comprehensive metabolic panel; Future  - Magnesium; Future  - C-reactive protein; Future  - Sedimentation rate, automated; Future  - LD,Blood; Future  - CT chest abdomen pelvis w contrast; Future    3  Lung nodules  New tiny cystic nodule seen on the recent imaging which may be related to inflammatory etiology  We will check her CT scan again in about 6 months from now as it was recommended with contrast   - CBC and differential; Future  - Comprehensive metabolic panel; Future  - Magnesium; Future  - C-reactive protein; Future  - Sedimentation rate, automated; Future  - LD,Blood; Future  - CT chest abdomen pelvis w contrast; Future        HPI:  This is a 58-year-old female with multiple comorbid conditions including bipolar disorder, anxiety, hyperlipidemia, chronic cholecystitis status post cholecystectomy, tobacco use, etc  She also seems to have a history of multiple benign colon polyps which was removed by her GI doctor on different occasions    The patient reported significant weight loss which was reported to be around 60-65 lb since 2018       She was recently seen by the GI team from the [facility]'s group who pursued upper endoscopy 01/06/2022   Showed antral intestinal metaplasia but no dysplasia/malignant process   She is scheduled for repeat colonoscopy July 2022 and will be getting repeat upper endoscopy 2023      She had multiple imaging studies of her chest abdomen pelvis recently including a CT scan of the chest without contrast and CT scan of the abdomen and pelvis with contrast which did not reveal any obvious hint of malignant process      A PET scan was also done on 10/01/2021 which showed:  IMPRESSION:  1   Subcentimeter nodule along the right major fissure does not demonstrate significant FDG uptake, and is unchanged from prior CTs dating back to at least 2015, most suggestive of a benign process such as an intrafissural node  2  Thermon Toon increased prominence of anterior mediastinal soft tissue likely representing residual thymic tissue   This may be reactive but may be reassessed on follow-up low dose CT in 6-12 months  3   Otherwise there are no hypermetabolic lesions that are concerning for malignancy/metastases      Interval history:  Patient can do for a follow-up visit  She did lose about 7-8 lb since the end of June until now  He continues to smoke daily  She had a CT scan of the chest without contrast on 11/17/2022 which showed:  IMPRESSION:     1  Stable 1 8 cm nodule anterior mediastinum      2  8 mm left lower lobe groundglass nodule described on most recent CT study is not visualized on the current exam        3  New 3 mm cystic nodule with 1 mm wall thickness in the left upper lobe (series 3/43)  Although this could represent a manifestation of smoking-related bronchiolitis suspected elsewhere, continued CT surveillance in 6 months is recommended to ensure   stability    Additional new 5 mm right lower lobe nodule can be reevaluated at that time (possible inflammatory pseudonodule)      4   Mild COPD with small, stable and likely benign nodules elsewhere  Recent blood work on 11/15/2022 showed white cell count of 11 6 with hemoglobin of 12 7 and platelet of 288  White cell differential showed mild increase of the absolute lymphocytes of 5 2  Creatinine 0 7 with normal calcium liver enzymes  Magnesium 2 1    C-reactive protein and sed rate were within normal range  ROS: Review of Systems   Constitutional: Positive for fatigue  Negative for chills and fever  HENT: Negative for ear pain and sore throat  Eyes: Negative for pain and visual disturbance  Respiratory: Negative for cough and shortness of breath  Cardiovascular: Negative for chest pain and palpitations  Gastrointestinal: Negative for abdominal pain and vomiting  Genitourinary: Negative for dysuria and hematuria  Musculoskeletal: Positive for arthralgias  Negative for back pain  Skin: Negative for color change and rash  Neurological: Positive for dizziness, numbness and headaches  Negative for seizures and syncope  Psychiatric/Behavioral: Positive for sleep disturbance  All other systems reviewed and are negative        Past Medical History:   Diagnosis Date   • Anxiety    • Bipolar disorder [facility])    • Chronic pain    • Colon polyp    • Endometriosis    • Hyperlipidemia    • Kidney stone    • Urinary frequency     resolved: 6/14/2016   • Urinary urgency     resolved: 6/14/2016       Past Surgical History:   Procedure Laterality Date   • BLADDER SUSPENSION     • CHOLECYSTECTOMY     • COLONOSCOPY     • EPIDURAL BLOCK INJECTION N/A 10/17/2019    Procedure: BLOCK / INJECTION EPIDURAL STEROID CERVICAL c7-t1 interlaminar;  Surgeon: Morgan Saini MD;  Location: [facility];  Service: Pain Management    • EPIDURAL BLOCK INJECTION Bilateral 1/16/2020    Procedure: L4-L5 Transforaminal Epidural Steroid Injection;  Surgeon: Morgan Saini MD;  Location: [facility];  Service: Pain Management    • FL GUIDED NEEDLE PLAC BX/ASP/INJ  10/17/2019   • FL GUIDED NEEDLE PLAC BX/ASP/INJ  1/16/2020   • HERNIA REPAIR     • HYSTERECTOMY      full    • OVARIAN CYST REMOVAL     • VA ARTHROSCOPY SHOULDER SURGICAL BICEPS TENODESIS Right 8/14/2019    Procedure: ARTHROSCOPY SHOULDER WITH BICEPS TENOTOMY AND SAD;  Surgeon: Chaitanya Smith MD;  Location: [facility];  Service: Orthopedics   • VA LAP,CHOLECYSTECTOMY N/A 4/30/2018    Procedure: CHOLECYSTECTOMY LAPAROSCOPIC;  Surgeon: Alexei Doherty DO;  Location: [facility];  Service: General   • TONSILLECTOMY     • UPPER GASTROINTESTINAL ENDOSCOPY         Social History     Socioeconomic History   • Marital status: /Civil Union     Spouse name: None   • Number of children: 1   • Years of education: None   • Highest education level: GED or equivalent   Occupational History   • None   Tobacco Use   • Smoking status: Every Day     Packs/day: 1 00     Types: Cigarettes   • Smokeless tobacco: Never   Vaping Use   • Vaping Use: Never used   Substance and Sexual Activity   • Alcohol use: Yes     Comment: rare; consumes alcohol occasionally (as per allscripts); social alcohol use (As per allscripts)   • Drug use: No   • Sexual activity: Yes     Partners: Male   Other Topics Concern   • None   Social History Narrative    Disabled    Drinks coffee    One child-age 7     Social Determinants of Health     Financial Resource Strain: Not on file   Food Insecurity: Not on file   Transportation Needs: Not on file   Physical Activity: Not on file   Stress: Not on file   Social Connections: Not on file   Intimate Partner Violence: Not on file   Housing Stability: Not on file       Family History   Problem Relation Age of Onset   • Diabetes Mother    • Heart disease Mother         rheumatic   • Neuropathy Mother    • COPD Mother    • Diabetes type II Mother    • Cancer Father    • Lung cancer Father    • Stroke Maternal Grandmother         CVA   • Diabetes type II Maternal Grandmother         mellitus   • Other Maternal Grandfather         esophageal abnormality   • Diabetes type II Paternal Grandmother         mellitus   • Diabetes type II Paternal Grandfather         mellitus   • Depression Family    • Esophageal cancer Family    • Lung cancer Family    • Stomach cancer Family        No Known Allergies      Current Outpatient Medications:   •  Cholecalciferol (VITAMIN D3) 2000 units TABS, Take 4,000 Units by mouth daily  , Disp: , Rfl:   •  gabapentin (NEURONTIN) 300 mg capsule, take 1 capsule by mouth twice a day and 2 capsules at bedtime, Disp: 120 capsule, Rfl: 3  •  lamoTRIgine (LaMICtal) 100 mg tablet, Take 100 mg by mouth daily, Disp: , Rfl:   •  LORazepam (ATIVAN) 0 5 mg tablet, Take 1 tablet by mouth daily at bedtime  , Disp: , Rfl:   •  Multiple Vitamin (DAILY VALUE MULTIVITAMIN) TABS, Take by mouth, Disp: , Rfl:   •  Nerve Stimulator (Standard TENS) ISABELLA, Use 3 (three) times a day as needed (pain), Disp: 1 Device, Rfl: 0  •  simvastatin (ZOCOR) 40 mg tablet, take 1 tablet by mouth once daily, Disp: 90 tablet, Rfl: 3  •  tiZANidine (ZANAFLEX) 4 mg tablet, Take 1 tablet (4 mg total) by mouth daily at bedtime, Disp: 30 tablet, Rfl: 3  •  buPROPion (WELLBUTRIN SR) 100 mg 12 hr tablet, 100 mg daily  (Patient not taking: Reported on 8/24/2022), Disp: , Rfl: 0  •  citalopram (CeleXA) 20 mg tablet, Take 10 mg by mouth every morning  (Patient not taking: Reported on 8/24/2022), Disp: , Rfl: 0  •  doxycycline (ADOXA) 100 MG tablet, doxycycline monohydrate 100 mg tablet  take 1 tablet by mouth twice a day for 5 days (Patient not taking: Reported on 11/29/2022), Disp: , Rfl:   •  lamoTRIgine (LaMICtal) 200 MG tablet, Take 1 tablet by mouth 2 (two) times a day (Patient not taking: Reported on 11/29/2022), Disp: , Rfl:   •  naproxen (NAPROSYN) 500 mg tablet, Take 1 tablet (500 mg total) by mouth 2 (two) times a day with meals for 5 days (Patient not taking: Reported on 5/13/2022), Disp: 10 tablet, Rfl: 0      Physical Exam:  /60 (BP Location: Right arm, Patient Position: Sitting, Cuff Size: Adult)   Pulse 75   Temp (!) 96 8 °F (36 °C)   Resp 18   Ht 5' 6" (1 676 m)   Wt 58 3 kg (128 lb 8 oz)   SpO2 97%   BMI 20 74 kg/m²     Physical Exam  Constitutional:       General: She is not in acute distress  Appearance: She is well-developed and well-nourished  She is not diaphoretic  HENT:      Head: Normocephalic and atraumatic  Mouth/Throat:      Mouth: Oropharynx is clear and moist    Eyes:      General: No scleral icterus  Right eye: No discharge  Left eye: No discharge  Extraocular Movements: EOM normal       Conjunctiva/sclera: Conjunctivae normal       Pupils: Pupils are equal, round, and reactive to light  Neck:      Thyroid: No thyromegaly  Vascular: No JVD  Trachea: No tracheal deviation  Cardiovascular:      Rate and Rhythm: Normal rate and regular rhythm  Heart sounds: Normal heart sounds  No murmur heard  No friction rub  Pulmonary:      Effort: Pulmonary effort is normal  No respiratory distress  Breath sounds: No stridor  Wheezing present  No rales  Chest:      Chest wall: No tenderness  Abdominal:      General: There is no distension  Palpations: Abdomen is soft  There is no hepatomegaly, splenomegaly or hepatosplenomegaly  Tenderness: There is no abdominal tenderness  There is no guarding or rebound  Musculoskeletal:         General: No tenderness, deformity or edema  Normal range of motion  Cervical back: Normal range of motion and neck supple  Lymphadenopathy:      Cervical: No cervical adenopathy  Skin:     General: Skin is warm and dry  Coloration: Skin is not pale  Findings: No erythema or rash  Neurological:      Mental Status: She is alert and oriented to person, place, and time  Cranial Nerves: No cranial nerve deficit  Coordination: Coordination normal       Deep Tendon Reflexes: Reflexes are normal and symmetric     Psychiatric: Mood and Affect: Mood and affect normal          Behavior: Behavior normal          Thought Content: Thought content normal          Judgment: Judgment normal            Labs:  Lab Results   Component Value Date    WBC 11 63 (H) 11/15/2022    HGB 12 7 11/15/2022    HCT 39 5 11/15/2022    MCV 96 11/15/2022     11/15/2022     Lab Results   Component Value Date     04/08/2018    K 3 8 11/15/2022     11/15/2022    CO2 26 11/15/2022    ANIONGAP 12 0 04/08/2018    BUN 14 11/15/2022    CREATININE 0 79 11/15/2022    GLUCOSE 82 12/07/2015    GLUF 83 05/17/2022    CALCIUM 9 3 11/15/2022    AST 15 11/15/2022    ALT 22 11/15/2022    ALKPHOS 79 11/15/2022    PROT 7 2 04/08/2018    BILITOT 0 2 (L) 04/08/2018    EGFR 91 11/15/2022     No results found for: TSH    Patient voiced understanding and agreement in the above discussion  Aware to contact our office with questions/symptoms in the interim

## 2022-12-02 ENCOUNTER — TELEPHONE (OUTPATIENT)
Dept: FAMILY MEDICINE CLINIC | Facility: CLINIC | Age: 42
End: 2022-12-02

## 2022-12-02 NOTE — TELEPHONE ENCOUNTER
Pt called in stating that at her oncology appt they told her she was wheezing and needs an inhaler  Patient would like an inhaler sent over to her pharmacy  Please advise

## 2022-12-08 ENCOUNTER — TELEPHONE (OUTPATIENT)
Dept: ADMINISTRATIVE | Facility: OTHER | Age: 42
End: 2022-12-08

## 2022-12-08 NOTE — TELEPHONE ENCOUNTER
----- Message from Shabana Meyer sent at 12/8/2022 11:19 AM EST -----  Regarding: mammogram  12/08/22 11:19 AM    Hello, our patient Shea Harrell has had Mammogram completed/performed  Please assist in updating the patient chart by pulling the Care Everywhere (CE) document  The date of service is 10/12/2022       Thank you,  Shabana REBOLLEDO [facility] AT [facility]

## 2022-12-08 NOTE — TELEPHONE ENCOUNTER
Upon review of the In Basket request we were able to locate, review, and update the patient chart as requested for Mammogram     Any additional questions or concerns should be emailed to the Practice Liaisons via the appropriate education email address, please do not reply via In Basket      Thank you  Nathan Schilling

## 2022-12-12 ENCOUNTER — OFFICE VISIT (OUTPATIENT)
Dept: FAMILY MEDICINE CLINIC | Facility: CLINIC | Age: 42
End: 2022-12-12

## 2022-12-12 VITALS
OXYGEN SATURATION: 99 % | WEIGHT: 129 LBS | HEART RATE: 68 BPM | HEIGHT: 66 IN | BODY MASS INDEX: 20.73 KG/M2 | DIASTOLIC BLOOD PRESSURE: 62 MMHG | SYSTOLIC BLOOD PRESSURE: 102 MMHG | TEMPERATURE: 97.2 F

## 2022-12-12 DIAGNOSIS — J44.9 CHRONIC OBSTRUCTIVE PULMONARY DISEASE, UNSPECIFIED COPD TYPE (HCC): Primary | ICD-10-CM

## 2022-12-12 DIAGNOSIS — R06.02 SOB (SHORTNESS OF BREATH): ICD-10-CM

## 2022-12-12 DIAGNOSIS — Z23 ENCOUNTER FOR IMMUNIZATION: ICD-10-CM

## 2022-12-12 DIAGNOSIS — F17.200 SMOKING: ICD-10-CM

## 2022-12-12 DIAGNOSIS — Z12.31 ENCOUNTER FOR SCREENING MAMMOGRAM FOR BREAST CANCER: ICD-10-CM

## 2022-12-12 DIAGNOSIS — R63.4 UNINTENTIONAL WEIGHT LOSS: ICD-10-CM

## 2022-12-12 RX ORDER — ALBUTEROL SULFATE 90 UG/1
2 AEROSOL, METERED RESPIRATORY (INHALATION) EVERY 6 HOURS PRN
Qty: 8.5 G | Refills: 6 | Status: SHIPPED | OUTPATIENT
Start: 2022-12-12

## 2022-12-12 NOTE — PROGRESS NOTES
Assessment/Plan:    No problem-specific Assessment & Plan notes found for this encounter  Diagnoses and all orders for this visit:    Chronic obstructive pulmonary disease, unspecified COPD type ([facility] [address] )  -     Complete PFT with post bronchodilator; Future    SOB (shortness of breath)  -     Complete PFT with post bronchodilator; Future    Encounter for immunization    Encounter for screening mammogram for breast cancer    Unintentional weight loss  Comments:  continue workup with heme/onc    Smoking  Comments:  pt counseled to quit smoking          PHQ-2/9 Depression Screening    Little interest or pleasure in doing things: 0 - not at all  Feeling down, depressed, or hopeless: 0 - not at all  Trouble falling or staying asleep, or sleeping too much: 0 - not at all  Feeling tired or having little energy: 0 - not at all  Poor appetite or overeatin - not at all  Feeling bad about yourself - or that you are a failure or have let yourself or your family down: 0 - not at all  Trouble concentrating on things, such as reading the newspaper or watching television: 0 - not at all  Moving or speaking so slowly that other people could have noticed  Or the opposite - being so fidgety or restless that you have been moving around a lot more than usual: 0 - not at all  Thoughts that you would be better off dead, or of hurting yourself in some way: 0 - not at all  PHQ-9 Score: 0   PHQ-9 Interpretation: No or Minimal depression             Subjective:      Patient ID: Shi Angel is a 38 y o  female      Follow up for unintentional weight loss pt is seeing heme/onc and is undergoing testing but nothing is conclusive, pt has lost an additional 20 lbs and has not changed her eating habits or activity level      The following portions of the patient's history were reviewed and updated as appropriate: allergies, current medications, past family history, past medical history, past social history, past surgical history and problem list     Review of Systems   Constitutional: Negative for chills, fatigue and fever  Respiratory: Positive for shortness of breath  Negative for wheezing  Cardiovascular: Positive for palpitations  Negative for chest pain  Endocrine: Positive for heat intolerance  Negative for cold intolerance  Objective:    /62 (BP Location: Left arm, Patient Position: Sitting, Cuff Size: Standard)   Pulse 68   Temp (!) 97 2 °F (36 2 °C) (Tympanic)   Ht 5' 6" (1 676 m)   Wt 58 5 kg (129 lb)   SpO2 99%   BMI 20 82 kg/m²      Physical Exam  Vitals and nursing note reviewed  Constitutional:       General: She is not in acute distress  Appearance: Normal appearance  She is not ill-appearing, toxic-appearing or diaphoretic  HENT:      Head: Normocephalic and atraumatic  Right Ear: Tympanic membrane, ear canal and external ear normal  There is no impacted cerumen  Left Ear: Tympanic membrane, ear canal and external ear normal  There is no impacted cerumen  Nose: Nose normal  No congestion or rhinorrhea  Mouth/Throat:      Mouth: Mucous membranes are moist       Pharynx: No oropharyngeal exudate or posterior oropharyngeal erythema  Eyes:      Conjunctiva/sclera: Conjunctivae normal    Cardiovascular:      Rate and Rhythm: Normal rate and regular rhythm  Heart sounds: Normal heart sounds  No murmur heard  Pulmonary:      Effort: Pulmonary effort is normal  No respiratory distress  Breath sounds: Normal breath sounds  No wheezing, rhonchi or rales  Musculoskeletal:      Cervical back: Normal range of motion and neck supple  No rigidity  Right lower leg: No edema  Left lower leg: No edema  Lymphadenopathy:      Cervical: No cervical adenopathy  Neurological:      Mental Status: She is alert and oriented to person, place, and time  Psychiatric:         Mood and Affect: Mood normal          Behavior: Behavior normal          Thought Content:  Thought content normal          Judgment: Judgment normal

## 2022-12-28 ENCOUNTER — HOSPITAL ENCOUNTER (OUTPATIENT)
Dept: PULMONOLOGY | Facility: HOSPITAL | Age: 42
Discharge: HOME | End: 2022-12-28
Attending: FAMILY MEDICINE

## 2022-12-28 DIAGNOSIS — J44.9 CHRONIC OBSTRUCTIVE PULMONARY DISEASE, UNSPECIFIED COPD TYPE (HCC): ICD-10-CM

## 2022-12-28 DIAGNOSIS — R06.02 SOB (SHORTNESS OF BREATH): ICD-10-CM

## 2022-12-28 RX ORDER — ALBUTEROL SULFATE 2.5 MG/3ML
2.5 SOLUTION RESPIRATORY (INHALATION) ONCE AS NEEDED
Status: COMPLETED | OUTPATIENT
Start: 2022-12-28 | End: 2022-12-28

## 2022-12-28 RX ADMIN — ALBUTEROL SULFATE 2.5 MG: 2.5 SOLUTION RESPIRATORY (INHALATION) at 15:38

## 2023-01-10 ENCOUNTER — OFFICE VISIT (OUTPATIENT)
Dept: FAMILY MEDICINE CLINIC | Facility: CLINIC | Age: 43
End: 2023-01-10

## 2023-01-10 VITALS
TEMPERATURE: 96.7 F | HEART RATE: 78 BPM | OXYGEN SATURATION: 98 % | DIASTOLIC BLOOD PRESSURE: 70 MMHG | BODY MASS INDEX: 22.34 KG/M2 | WEIGHT: 139 LBS | HEIGHT: 66 IN | SYSTOLIC BLOOD PRESSURE: 116 MMHG

## 2023-01-10 DIAGNOSIS — Z13.1 SCREENING FOR DIABETES MELLITUS: ICD-10-CM

## 2023-01-10 DIAGNOSIS — R63.4 UNINTENTIONAL WEIGHT LOSS: Primary | ICD-10-CM

## 2023-01-10 DIAGNOSIS — Z13.220 SCREENING FOR LIPID DISORDERS: ICD-10-CM

## 2023-01-10 DIAGNOSIS — Z13.29 SCREENING FOR THYROID DISORDER: ICD-10-CM

## 2023-01-10 DIAGNOSIS — Z13.0 SCREENING, ANEMIA, DEFICIENCY, IRON: ICD-10-CM

## 2023-01-10 NOTE — PROGRESS NOTES
Name: Sean Hidalgo      : 1980      MRN: [id number]  Encounter Provider: Lindsay Beth MD  Encounter Date: 1/10/2023   Encounter department: [address] [place] [address]  Unintentional weight loss  Assessment & Plan:  - 2yrs+ unintentional weight loss, roughly 40lbs  -  Also been following w GI and Heme/Onc   - Negative work up thus far  - 10lb weight gain since last visit, increased effort to eat larger portions more often  - We will obtain lab work prior to [employer] in [address] to continue to monitor weight      2  Screening for diabetes mellitus  -     Comprehensive metabolic panel; Future  -     Hemoglobin A1C; Future    3  Screening for thyroid disorder  -     TSH, 3rd generation with Free T4 reflex; Future    4  Screening, anemia, deficiency, iron  -     CBC and differential; Future    5  Screening for lipid disorders  -     Lipid panel; Future         Subjective      Ms Jael Riggins is a 35yo F being seen in our clinic following up regarding work up for unintentional weight loss  We have been working with the patient for the past 2yrs+ to discern why she has been experiencing unintentional weight loss, with a total loss of roughly 40lbs  The patient has also been following with GI and Heme/Onc for this concern  We most recently obtained PFTs, which showed normal lung function overall  Her most recent mammography showed one small density in each breast w/o any associated pain, skin change, swollen or tender nodes  She will be obtaining U/S to further evaluate these densities  She has had a 10lb weight gain since our last visit, and notes increased effort to eat larger portions more often  She is otherwise in her usual state of david  We will obtain lab work prior to a follow up in 2mo for her annual wellness  The patient was advised to continue to monitor her weight and let us know if other symptoms arise       Review of Systems   Constitutional: Positive for unexpected weight change  Negative for activity change, appetite change, chills, fatigue and fever  HENT: Negative for congestion, ear pain, postnasal drip, rhinorrhea, sinus pain, sore throat and trouble swallowing  Eyes: Negative for pain and visual disturbance  Respiratory: Negative for cough, chest tightness, shortness of breath and wheezing  Cardiovascular: Negative for chest pain and palpitations  Gastrointestinal: Negative for abdominal distention, abdominal pain, blood in stool, constipation, diarrhea, nausea and vomiting  Genitourinary: Negative for dysuria and hematuria  Musculoskeletal: Negative for arthralgias, back pain, joint swelling, myalgias, neck pain and neck stiffness  Skin: Negative for color change and rash  Neurological: Negative for dizziness, seizures, syncope, weakness, light-headedness, numbness and headaches  Psychiatric/Behavioral: Negative for agitation, confusion and sleep disturbance  All other systems reviewed and are negative        Current Outpatient Medications on File Prior to Visit   Medication Sig   • albuterol (ProAir HFA) 90 mcg/act inhaler Inhale 2 puffs every 6 (six) hours as needed for wheezing or shortness of breath   • Cholecalciferol (VITAMIN D3) 2000 units TABS Take 4,000 Units by mouth daily     • gabapentin (NEURONTIN) 300 mg capsule take 1 capsule by mouth twice a day and 2 capsules at bedtime   • lamoTRIgine (LaMICtal) 100 mg tablet Take 100 mg by mouth daily   • LORazepam (ATIVAN) 0 5 mg tablet Take 1 tablet by mouth daily at bedtime     • Multiple Vitamin (DAILY VALUE MULTIVITAMIN) TABS Take by mouth   • Nerve Stimulator (Standard TENS) ISABELLA Use 3 (three) times a day as needed (pain)   • simvastatin (ZOCOR) 40 mg tablet take 1 tablet by mouth once daily   • tiZANidine (ZANAFLEX) 4 mg tablet Take 1 tablet (4 mg total) by mouth daily at bedtime   • buPROPion (WELLBUTRIN SR) 100 mg 12 hr tablet 100 mg daily  (Patient not taking: Reported on 8/24/2022)   • citalopram (CeleXA) 20 mg tablet Take 10 mg by mouth every morning  (Patient not taking: Reported on 8/24/2022)   • doxycycline (ADOXA) 100 MG tablet doxycycline monohydrate 100 mg tablet   take 1 tablet by mouth twice a day for 5 days (Patient not taking: Reported on 11/29/2022)   • lamoTRIgine (LaMICtal) 200 MG tablet Take 1 tablet by mouth 2 (two) times a day (Patient not taking: Reported on 11/29/2022)   • naproxen (NAPROSYN) 500 mg tablet Take 1 tablet (500 mg total) by mouth 2 (two) times a day with meals for 5 days (Patient not taking: Reported on 5/13/2022)       Objective     /70   Pulse 78   Temp (!) 96 7 °F (35 9 °C)   Ht 5' 6" (1 676 m)   Wt 63 kg (139 lb)   SpO2 98%   BMI 22 44 kg/m²     Physical Exam  Vitals and nursing note reviewed  Constitutional:       General: She is not in acute distress  Appearance: Normal appearance  She is normal weight  She is not ill-appearing  HENT:      Head: Normocephalic and atraumatic  Right Ear: External ear normal       Left Ear: External ear normal       Nose: Nose normal  No congestion or rhinorrhea  Mouth/Throat:      Mouth: Mucous membranes are moist       Pharynx: Oropharynx is clear  Eyes:      Extraocular Movements: Extraocular movements intact  Conjunctiva/sclera: Conjunctivae normal    Cardiovascular:      Rate and Rhythm: Normal rate and regular rhythm  Pulses: Normal pulses  Heart sounds: Normal heart sounds  No murmur heard  No gallop  Pulmonary:      Effort: Pulmonary effort is normal       Breath sounds: Normal breath sounds  No wheezing or rales  Abdominal:      General: Abdomen is flat  Bowel sounds are normal  There is no distension  Palpations: Abdomen is soft  There is no mass  Tenderness: There is no abdominal tenderness  There is no guarding or rebound  Musculoskeletal:         General: No swelling or signs of injury  Normal range of motion        Cervical back: Normal range of motion and neck supple  No rigidity  Skin:     General: Skin is warm and dry  Coloration: Skin is not pale  Findings: No bruising, erythema or rash  Neurological:      General: No focal deficit present  Mental Status: She is alert and oriented to person, place, and time     Psychiatric:         Mood and Affect: Mood normal          Behavior: Behavior normal        Ellie Crawley MD

## 2023-01-12 NOTE — ASSESSMENT & PLAN NOTE
- 2yrs+ unintentional weight loss, roughly 40lbs  -  Also been following w GI and Heme/Onc   - Negative work up thus far  - 10lb weight gain since last visit, increased effort to eat larger portions more often  - We will obtain lab work prior to [id number] in [address] to continue to monitor weight

## 2023-02-09 DIAGNOSIS — M79.10 MUSCLE PAIN: ICD-10-CM

## 2023-02-09 RX ORDER — TIZANIDINE 4 MG/1
TABLET ORAL
Qty: 30 TABLET | Refills: 3 | Status: SHIPPED | OUTPATIENT
Start: 2023-02-09

## 2023-02-11 ENCOUNTER — APPOINTMENT (OUTPATIENT)
Dept: LAB | Facility: CLINIC | Age: 43
End: 2023-02-11

## 2023-02-11 DIAGNOSIS — Z13.1 SCREENING FOR DIABETES MELLITUS: ICD-10-CM

## 2023-02-11 DIAGNOSIS — Z13.220 SCREENING FOR LIPID DISORDERS: ICD-10-CM

## 2023-02-11 DIAGNOSIS — Z13.0 SCREENING, ANEMIA, DEFICIENCY, IRON: ICD-10-CM

## 2023-02-11 DIAGNOSIS — Z13.29 SCREENING FOR THYROID DISORDER: ICD-10-CM

## 2023-02-11 DIAGNOSIS — R20.2 PARESTHESIA: ICD-10-CM

## 2023-02-11 LAB
ALBUMIN SERPL BCP-MCNC: 4 G/DL (ref 3.5–5)
ALP SERPL-CCNC: 76 U/L (ref 46–116)
ALT SERPL W P-5'-P-CCNC: 26 U/L (ref 12–78)
ANION GAP SERPL CALCULATED.3IONS-SCNC: 1 MMOL/L (ref 4–13)
AST SERPL W P-5'-P-CCNC: 18 U/L (ref 5–45)
BASOPHILS # BLD AUTO: 0.03 THOUSANDS/ÂΜL (ref 0–0.1)
BASOPHILS NFR BLD AUTO: 0 % (ref 0–1)
BILIRUB SERPL-MCNC: 0.57 MG/DL (ref 0.2–1)
BUN SERPL-MCNC: 16 MG/DL (ref 5–25)
CALCIUM SERPL-MCNC: 9.4 MG/DL (ref 8.3–10.1)
CHLORIDE SERPL-SCNC: 109 MMOL/L (ref 96–108)
CHOLEST SERPL-MCNC: 150 MG/DL
CO2 SERPL-SCNC: 28 MMOL/L (ref 21–32)
CREAT SERPL-MCNC: 0.68 MG/DL (ref 0.6–1.3)
EOSINOPHIL # BLD AUTO: 0.1 THOUSAND/ÂΜL (ref 0–0.61)
EOSINOPHIL NFR BLD AUTO: 1 % (ref 0–6)
ERYTHROCYTE [DISTWIDTH] IN BLOOD BY AUTOMATED COUNT: 12.4 % (ref 11.6–15.1)
EST. AVERAGE GLUCOSE BLD GHB EST-MCNC: 108 MG/DL
GFR SERPL CREATININE-BSD FRML MDRD: 105 ML/MIN/1.73SQ M
GLUCOSE P FAST SERPL-MCNC: 92 MG/DL (ref 65–99)
HBA1C MFR BLD: 5.4 %
HCT VFR BLD AUTO: 42.6 % (ref 34.8–46.1)
HDLC SERPL-MCNC: 60 MG/DL
HGB BLD-MCNC: 14 G/DL (ref 11.5–15.4)
IMM GRANULOCYTES # BLD AUTO: 0.04 THOUSAND/UL (ref 0–0.2)
IMM GRANULOCYTES NFR BLD AUTO: 0 % (ref 0–2)
LDLC SERPL CALC-MCNC: 82 MG/DL (ref 0–100)
LYMPHOCYTES # BLD AUTO: 3.93 THOUSANDS/ÂΜL (ref 0.6–4.47)
LYMPHOCYTES NFR BLD AUTO: 41 % (ref 14–44)
MCH RBC QN AUTO: 31.5 PG (ref 26.8–34.3)
MCHC RBC AUTO-ENTMCNC: 32.9 G/DL (ref 31.4–37.4)
MCV RBC AUTO: 96 FL (ref 82–98)
MONOCYTES # BLD AUTO: 0.51 THOUSAND/ÂΜL (ref 0.17–1.22)
MONOCYTES NFR BLD AUTO: 5 % (ref 4–12)
NEUTROPHILS # BLD AUTO: 5.05 THOUSANDS/ÂΜL (ref 1.85–7.62)
NEUTS SEG NFR BLD AUTO: 53 % (ref 43–75)
NONHDLC SERPL-MCNC: 90 MG/DL
NRBC BLD AUTO-RTO: 0 /100 WBCS
PLATELET # BLD AUTO: 255 THOUSANDS/UL (ref 149–390)
PMV BLD AUTO: 9.4 FL (ref 8.9–12.7)
POTASSIUM SERPL-SCNC: 4.1 MMOL/L (ref 3.5–5.3)
PROT SERPL-MCNC: 7.1 G/DL (ref 6.4–8.4)
RBC # BLD AUTO: 4.45 MILLION/UL (ref 3.81–5.12)
SODIUM SERPL-SCNC: 138 MMOL/L (ref 135–147)
TRIGL SERPL-MCNC: 41 MG/DL
TSH SERPL DL<=0.05 MIU/L-ACNC: 1.12 UIU/ML (ref 0.45–4.5)
WBC # BLD AUTO: 9.66 THOUSAND/UL (ref 4.31–10.16)

## 2023-02-13 RX ORDER — GABAPENTIN 300 MG/1
CAPSULE ORAL
Qty: 120 CAPSULE | Refills: 3 | Status: SHIPPED | OUTPATIENT
Start: 2023-02-13

## 2023-02-14 ENCOUNTER — OFFICE VISIT (OUTPATIENT)
Dept: FAMILY MEDICINE CLINIC | Facility: CLINIC | Age: 43
End: 2023-02-14

## 2023-02-14 VITALS
BODY MASS INDEX: 20.57 KG/M2 | TEMPERATURE: 97.6 F | OXYGEN SATURATION: 98 % | WEIGHT: 128 LBS | HEART RATE: 77 BPM | DIASTOLIC BLOOD PRESSURE: 70 MMHG | HEIGHT: 66 IN | SYSTOLIC BLOOD PRESSURE: 98 MMHG

## 2023-02-14 DIAGNOSIS — Z00.00 MEDICARE ANNUAL WELLNESS VISIT, SUBSEQUENT: Primary | ICD-10-CM

## 2023-02-14 DIAGNOSIS — Z23 ENCOUNTER FOR IMMUNIZATION: ICD-10-CM

## 2023-02-14 NOTE — PROGRESS NOTES
Assessment and Plan:     Problem List Items Addressed This Visit    None  Visit Diagnoses     Medicare annual wellness visit, subsequent    -  Primary    Encounter for immunization               Preventive health issues were discussed with patient, and age appropriate screening tests were ordered as noted in patient's After Visit Summary  Personalized health advice and appropriate referrals for health education or preventive services given if needed, as noted in patient's After Visit Summary       History of Present Illness:     Patient presents for a Medicare Wellness Visit    HPI   Patient Care Team:  Chavez Alarcon DO as PCP - General  Chavez Alarcon DO as PCP - [address] [place] [address] (RT[place])  [facility]  MD Sanjana Luke DO (Gastroenterology)     Review of Systems:     Review of Systems     Problem List:     Patient Active Problem List   Diagnosis   • Acute back pain   • Bipolar disorder [facility])   • Breast lump on right side at 10 o'clock position   • Chronic neck pain   • Classic migraine   • Contact with and suspected exposure to communicable disease   • Dizziness   • COPD (chronic obstructive pulmonary disease) (HCC)   • Generalized anxiety disorder   • Hypercholesterolemia   • Hyperlipidemia   • Hyperglycemia   • Leg cramps   • Leg pain   • Leukocytosis   • Lower back pain   • Lung nodule   • Lymphadenitis, acute   • Major depression   • Muscle cramps   • Other ovarian cyst, right side   • Pain, hand   • Paresthesia   • Pelvic pain in female   • Right lower quadrant abdominal pain   • Shingles   • Shortness of breath   • Skin rash   • Smoking   • Upper limb weakness   • Vaginal discharge   • Vitamin D deficiency   • Gallbladder polyp   • Cervicalgia   • Cervical myofascial pain syndrome   • Chronic pain syndrome   • Lumbar radiculopathy   • Cervical radiculopathy   • Facet arthropathy, cervical   • Negative depression screening   • Adhesive capsulitis of right shoulder   • Bilateral low back pain with right-sided sciatica   • Overweight (BMI 25 0-29  9)   • Sacroiliac pain   • Chronic bilateral low back pain without sciatica   • Need for influenza vaccination   • Unintentional weight loss   • Intestinal metaplasia of stomach   • History of colon polyps   • Umbilical hernia   • Advice given about COVID-19 virus infection   • History of hysterectomy      Past Medical and Surgical History:     Past Medical History:   Diagnosis Date   • Anxiety    • Bipolar disorder ([address] )    • Chronic pain    • Colon polyp    • Endometriosis    • Hyperlipidemia    • Kidney stone    • Urinary frequency     resolved: 6/14/2016   • Urinary urgency     resolved: 6/14/2016     Past Surgical History:   Procedure Laterality Date   • BLADDER SUSPENSION     • CHOLECYSTECTOMY     • COLONOSCOPY     • EPIDURAL BLOCK INJECTION N/A 10/17/2019    Procedure: BLOCK / INJECTION EPIDURAL STEROID CERVICAL c7-t1 interlaminar;  Surgeon: Jose Scott MD;  [facility];  Service: Pain Management    • EPIDURAL BLOCK INJECTION Bilateral 1/16/2020    Procedure: L4-L5 Transforaminal Epidural Steroid Injection;  Surgeon: Jose Scott MD;  [facility];  Service: Pain Management    • FL GUIDED NEEDLE PLAC BX/ASP/INJ  10/17/2019   • FL GUIDED NEEDLE PLAC BX/ASP/INJ  1/16/2020   • HERNIA REPAIR     • HYSTERECTOMY      full    • OVARIAN CYST REMOVAL     • FL LAPAROSCOPY SURG CHOLECYSTECTOMY N/A 4/30/2018    Procedure: CHOLECYSTECTOMY LAPAROSCOPIC;  Surgeon: Bulmaro Sweeney DO;  [facility];  Service: General   • FL SURGICAL ARTHROSCOPY SHOULDER BICEPS TENODESIS Right 8/14/2019    Procedure: ARTHROSCOPY SHOULDER WITH BICEPS TENOTOMY AND SAD;  Surgeon: Megha Patel MD;  [facility];  Service: Orthopedics   • TONSILLECTOMY     • UPPER GASTROINTESTINAL ENDOSCOPY        Family History:     Family History   Problem Relation Age of Onset   • Diabetes Mother    • Heart disease Mother         rheumatic   • Neuropathy Mother    • COPD Mother    • Diabetes type II Mother    • Cancer Mother    • Cancer Father    • Lung cancer Father    • Stroke Maternal Grandmother         CVA   • Diabetes type II Maternal Grandmother         mellitus   • Other Maternal Grandfather         esophageal abnormality   • Diabetes type II Paternal Grandmother         mellitus   • Diabetes type II Paternal Grandfather         mellitus   • Depression Family    • Esophageal cancer Family    • Lung cancer Family    • Stomach cancer Family       Social History:     Social History     Socioeconomic History   • Marital status: /Civil Union     Spouse name: None   • Number of children: 1   • Years of education: None   • Highest education level: GED or equivalent   Occupational History   • None   Tobacco Use   • Smoking status: Every Day     Packs/day: 1 00     Years: 1 00     Pack years: 1 00     Types: Cigarettes   • Smokeless tobacco: Never   Vaping Use   • Vaping Use: Never used   Substance and Sexual Activity   • Alcohol use:  Yes     Alcohol/week: 3 0 - 4 0 standard drinks     Types: 1 - 2 Glasses of wine, 2 Standard drinks or equivalent per week     Comment: Social drinker   • Drug use: No   • Sexual activity: Yes     Partners: Male     Birth control/protection: Female Sterilization     Comment: Hysterectomy   Other Topics Concern   • None   Social History Narrative    Disabled    Drinks coffee    One child-age 7     Social Determinants of Health     Financial Resource Strain: Not on file   Food Insecurity: Not on file   Transportation Needs: Not on file   Physical Activity: Not on file   Stress: Not on file   Social Connections: Not on file   Intimate Partner Violence: Not on file   Housing Stability: Not on file      Medications and Allergies:     Current Outpatient Medications   Medication Sig Dispense Refill   • albuterol (ProAir HFA) 90 mcg/act inhaler Inhale 2 puffs every 6 (six) hours as needed for wheezing or shortness of breath 8 5 g 6   • Cholecalciferol (VITAMIN D3) 2000 units TABS Take 4,000 Units by mouth daily       • gabapentin (NEURONTIN) 300 mg capsule TAKE 1 CAPSULE BY MOUTH TWICE A DAY AND 2 CAPSULES BY MOUTH AT BEDTIME 120 capsule 3   • lamoTRIgine (LaMICtal) 100 mg tablet Take 100 mg by mouth daily     • LORazepam (ATIVAN) 0 5 mg tablet Take 1 tablet by mouth daily at bedtime       • Multiple Vitamin (DAILY VALUE MULTIVITAMIN) TABS Take by mouth     • Nerve Stimulator (Standard TENS) ISABELLA Use 3 (three) times a day as needed (pain) 1 Device 0   • simvastatin (ZOCOR) 40 mg tablet take 1 tablet by mouth once daily 90 tablet 3   • tiZANidine (ZANAFLEX) 4 mg tablet TAKE 1 TABLET BY MOUTH DAILY AT BEDTIME 30 tablet 3   • buPROPion (WELLBUTRIN SR) 100 mg 12 hr tablet 100 mg daily  (Patient not taking: Reported on 8/24/2022)  0   • citalopram (CeleXA) 20 mg tablet Take 10 mg by mouth every morning  (Patient not taking: Reported on 8/24/2022)  0   • doxycycline (ADOXA) 100 MG tablet doxycycline monohydrate 100 mg tablet   take 1 tablet by mouth twice a day for 5 days (Patient not taking: Reported on 11/29/2022)     • lamoTRIgine (LaMICtal) 200 MG tablet Take 1 tablet by mouth 2 (two) times a day (Patient not taking: Reported on 11/29/2022)     • naproxen (NAPROSYN) 500 mg tablet Take 1 tablet (500 mg total) by mouth 2 (two) times a day with meals for 5 days (Patient not taking: Reported on 5/13/2022) 10 tablet 0     No current facility-administered medications for this visit       No Known Allergies   Immunizations:     Immunization History   Administered Date(s) Administered   • INFLUENZA 10/25/2018   • Influenza Quadrivalent Preservative Free 3 years and older IM 01/23/2018   • Influenza, injectable, quadrivalent, preservative free 0 5 mL 10/25/2018, 11/14/2020   • Td (adult), Unspecified 03/06/2007      Health Maintenance:         Topic Date Due   • Breast Cancer Screening: Mammogram 10/12/2023   • Colorectal Cancer Screening  07/11/2027   • HIV Screening  Completed   • Hepatitis C Screening  Completed         Topic Date Due   • COVID-19 Vaccine (1) Never done   • Pneumococcal Vaccine: Pediatrics (0 to 5 Years) and At-Risk Patients (6 to 59 Years) (1 - PCV) Never done   • Influenza Vaccine (1) 09/01/2022      Medicare Screening Tests and Risk Assessments:     Katie Pinto is here for her Subsequent Wellness visit  Health Risk Assessment:   Patient rates overall health as good  Patient feels that their physical health rating is same  Patient is satisfied with their life  Eyesight was rated as same  Hearing was rated as same  Patient feels that their emotional and mental health rating is same  Patients states they are sometimes angry  Patient states they are sometimes unusually tired/fatigued  Pain experienced in the last 7 days has been none  Patient states that she has experienced no weight loss or gain in last 6 months  Fall Risk Screening: In the past year, patient has experienced: no history of falling in past year      Urinary Incontinence Screening:   Patient has not leaked urine accidently in the last six months  Home Safety:  Patient does not have trouble with stairs inside or outside of their home  Patient has working smoke alarms and has working carbon monoxide detector  Home safety hazards include: none  Nutrition:   Current diet is Regular  Medications:   Patient is currently taking over-the-counter supplements  OTC medications include: see medication list  Patient is able to manage medications  Activities of Daily Living (ADLs)/Instrumental Activities of Daily Living (IADLs):   Walk and transfer into and out of bed and chair?: Yes  Dress and groom yourself?: Yes    Bathe or shower yourself?: Yes    Feed yourself?  Yes  Do your laundry/housekeeping?: Yes  Manage your money, pay your bills and track your expenses?: Yes  Make your own meals?: Yes    Do your own shopping?: Yes    Previous Hospitalizations:   Any hospitalizations or ED visits within the last 12 months?: Yes    How many hospitalizations have you had in the last year?: 1-2    Advance Care Planning:   Living will: No    Durable POA for healthcare: No      PREVENTIVE SCREENINGS      Cardiovascular Screening:    General: Screening Not Indicated and History Lipid Disorder      Diabetes Screening:     General: Screening Current      Colorectal Cancer Screening:     General: Screening Current      Breast Cancer Screening:     General: Screening Current      Cervical Cancer Screening:    General: Screening Not Indicated      Lung Cancer Screening:     General: Screening Not Indicated      Hepatitis C Screening:    General: Screening Current    No results found       Physical Exam:     Ht 5' 6" (1 676 m)   Wt 58 1 kg (128 lb)   BMI 20 66 kg/m²     Physical Exam     Serenity Mcadams MD

## 2023-02-14 NOTE — PROGRESS NOTES
Assessment and Plan:     Problem List Items Addressed This Visit    None  Visit Diagnoses     Medicare annual wellness visit, subsequent    -  Primary    Encounter for immunization        Relevant Orders    Pneumococcal Conjugate Vaccine 20-valent (PCV20) (Completed)    influenza vaccine, quadrivalent, 0 5 mL, preservative-free, for adult and pediatric patients 6 mos+ (AFLURIA, FLUARIX, FLULAVAL, FLUZONE) (Completed)              Patient is here for annual wellness visit  Does not bring any new concerns or complaints  She has been having unintentional weight loss for 2 weeks and follows up with GI and hematology  Patient sees gynecologist in [facility],  Patient follows up with psychiatry Dr Sarah Díaz,  Follows up with chronic pain management secondary to chronic neck pain  Patient follows up with hematology and GI secondary to unintentional weight loss  She reports that she has has been having unintentional weight loss for almost 2 years  Lost roughly 40 pounds  Labs reviewed TSH normal, CMP and  CBC normal,CRP, ESR-NL,      Smokes cigarette a pack per day for almost 10 years  Uses alcohol occasionally  No recreational drug use  Preventive health issues were discussed with patient, and age appropriate screening tests were ordered as noted in patient's After Visit Summary  Personalized health advice and appropriate referrals for health education or preventive services given if needed, as noted in patient's After Visit Summary  History of Present Illness:     Patient presents for a Medicare Wellness Visit    HPI   Patient Care Team:  Esperanza Mosley DO as PCP - General  Esperanza Mosley DO as PCP - [address] [place] [address] ([facility])  Esperanza Mosley DO as PCP - PCP-[facility] ([facility])  MD Rosey Chiacs DO (Gastroenterology)     Review of Systems:     Review of Systems   Constitutional: Negative for chills and fever  HENT: Negative for ear pain and sore throat      Eyes: Negative for pain and visual disturbance  Respiratory: Negative for apnea, cough and shortness of breath  Cardiovascular: Negative for chest pain and palpitations  Gastrointestinal: Negative for abdominal pain and vomiting  Genitourinary: Negative for difficulty urinating, dysuria and hematuria  Musculoskeletal: Negative for arthralgias and back pain  Skin: Negative for color change and rash  Neurological: Negative for seizures and syncope  All other systems reviewed and are negative  Problem List:     Patient Active Problem List   Diagnosis   • Acute back pain   • Bipolar disorder ([facility] [address] )   • Breast lump on right side at 10 o'clock position   • Chronic neck pain   • Classic migraine   • Contact with and suspected exposure to communicable disease   • Dizziness   • COPD (chronic obstructive pulmonary disease) ([facility])   • Generalized anxiety disorder   • Hypercholesterolemia   • Hyperlipidemia   • Hyperglycemia   • Leg cramps   • Leg pain   • Leukocytosis   • Lower back pain   • Lung nodule   • Lymphadenitis, acute   • Major depression   • Muscle cramps   • Other ovarian cyst, right side   • Pain, hand   • Paresthesia   • Pelvic pain in female   • Right lower quadrant abdominal pain   • Shingles   • Shortness of breath   • Skin rash   • Smoking   • Upper limb weakness   • Vaginal discharge   • Vitamin D deficiency   • Gallbladder polyp   • Cervicalgia   • Cervical myofascial pain syndrome   • Chronic pain syndrome   • Lumbar radiculopathy   • Cervical radiculopathy   • Facet arthropathy, cervical   • Negative depression screening   • Adhesive capsulitis of right shoulder   • Bilateral low back pain with right-sided sciatica   • Overweight (BMI 25 0-29  9)   • Sacroiliac pain   • Chronic bilateral low back pain without sciatica   • Need for influenza vaccination   • Unintentional weight loss   • Intestinal metaplasia of stomach   • History of colon polyps   • Umbilical hernia   • Advice given about COVID-19 virus infection   • History of hysterectomy      Past Medical and Surgical History:     Past Medical History:   Diagnosis Date   • Anxiety    • Bipolar disorder ([facility] [address] )    • Chronic pain    • Colon polyp    • Endometriosis    • Hyperlipidemia    • Kidney stone    • Urinary frequency     resolved: 6/14/2016   • Urinary urgency     resolved: 6/14/2016     Past Surgical History:   Procedure Laterality Date   • BLADDER SUSPENSION     • CHOLECYSTECTOMY     • COLONOSCOPY     • EPIDURAL BLOCK INJECTION N/A 10/17/2019    Procedure: BLOCK / INJECTION EPIDURAL STEROID CERVICAL c7-t1 interlaminar;  Surgeon: Isac Stevens MD;  [facility];  Service: Pain Management    • EPIDURAL BLOCK INJECTION Bilateral 1/16/2020    Procedure: L4-L5 Transforaminal Epidural Steroid Injection;  Surgeon: Isac Stevens MD;  [facility];  Service: Pain Management    • FL GUIDED NEEDLE PLAC BX/ASP/INJ  10/17/2019   • FL GUIDED NEEDLE PLAC BX/ASP/INJ  1/16/2020   • HERNIA REPAIR     • HYSTERECTOMY      full    • OVARIAN CYST REMOVAL     • GA LAPAROSCOPY SURG CHOLECYSTECTOMY N/A 4/30/2018    Procedure: CHOLECYSTECTOMY LAPAROSCOPIC;  Surgeon: Leonela Elder DO;  [facility];  Service: General   • GA SURGICAL ARTHROSCOPY SHOULDER BICEPS TENODESIS Right 8/14/2019    Procedure: ARTHROSCOPY SHOULDER WITH BICEPS TENOTOMY AND SAD;  Surgeon: Slava Davis MD;  [facility];  Service: Orthopedics   • TONSILLECTOMY     • UPPER GASTROINTESTINAL ENDOSCOPY        Family History:     Family History   Problem Relation Age of Onset   • Diabetes Mother    • Heart disease Mother         rheumatic   • Neuropathy Mother    • COPD Mother    • Diabetes type II Mother    • Cancer Mother    • Cancer Father    • Lung cancer Father    • Stroke Maternal Grandmother         CVA   • Diabetes type II Maternal Grandmother         mellitus   • Other Maternal Grandfather         esophageal abnormality   • Diabetes type II Paternal Grandmother         mellitus   • Diabetes type II Paternal Grandfather         mellitus   • Depression Family    • Esophageal cancer Family    • Lung cancer Family    • Stomach cancer Family       Social History:     Social History     Socioeconomic History   • Marital status: /Civil Union     Spouse name: None   • Number of children: 1   • Years of education: None   • Highest education level: GED or equivalent   Occupational History   • None   Tobacco Use   • Smoking status: Every Day     Packs/day: 1 00     Years: 1 00     Pack years: 1 00     Types: Cigarettes   • Smokeless tobacco: Never   Vaping Use   • Vaping Use: Never used   Substance and Sexual Activity   • Alcohol use:  Yes     Alcohol/week: 3 0 - 4 0 standard drinks     Types: 1 - 2 Glasses of wine, 2 Standard drinks or equivalent per week     Comment: Social drinker   • Drug use: No   • Sexual activity: Yes     Partners: Male     Birth control/protection: Female Sterilization     Comment: Hysterectomy   Other Topics Concern   • None   Social History Narrative    Disabled    Drinks coffee    One child-age 7     Social Determinants of Health     Financial Resource Strain: Not on file   Food Insecurity: Not on file   Transportation Needs: Not on file   Physical Activity: Not on file   Stress: Not on file   Social Connections: Not on file   Intimate Partner Violence: Not on file   Housing Stability: Not on file      Medications and Allergies:     Current Outpatient Medications   Medication Sig Dispense Refill   • albuterol (ProAir HFA) 90 mcg/act inhaler Inhale 2 puffs every 6 (six) hours as needed for wheezing or shortness of breath 8 5 g 6   • Cholecalciferol (VITAMIN D3) 2000 units TABS Take 4,000 Units by mouth daily       • gabapentin (NEURONTIN) 300 mg capsule TAKE 1 CAPSULE BY MOUTH TWICE A DAY AND 2 CAPSULES BY MOUTH AT BEDTIME 120 capsule 3   • lamoTRIgine (LaMICtal) 100 mg tablet Take 100 mg by mouth daily     • LORazepam (ATIVAN) 0 5 mg tablet Take 1 tablet by mouth daily at bedtime       • Multiple Vitamin (DAILY VALUE MULTIVITAMIN) TABS Take by mouth     • Nerve Stimulator (Standard TENS) ISABELLA Use 3 (three) times a day as needed (pain) 1 Device 0   • simvastatin (ZOCOR) 40 mg tablet take 1 tablet by mouth once daily 90 tablet 3   • tiZANidine (ZANAFLEX) 4 mg tablet TAKE 1 TABLET BY MOUTH DAILY AT BEDTIME 30 tablet 3   • buPROPion (WELLBUTRIN SR) 100 mg 12 hr tablet 100 mg daily  (Patient not taking: Reported on 8/24/2022)  0   • citalopram (CeleXA) 20 mg tablet Take 10 mg by mouth every morning  (Patient not taking: Reported on 8/24/2022)  0   • doxycycline (ADOXA) 100 MG tablet doxycycline monohydrate 100 mg tablet   take 1 tablet by mouth twice a day for 5 days (Patient not taking: Reported on 11/29/2022)     • lamoTRIgine (LaMICtal) 200 MG tablet Take 1 tablet by mouth 2 (two) times a day (Patient not taking: Reported on 11/29/2022)     • naproxen (NAPROSYN) 500 mg tablet Take 1 tablet (500 mg total) by mouth 2 (two) times a day with meals for 5 days (Patient not taking: Reported on 5/13/2022) 10 tablet 0     No current facility-administered medications for this visit       No Known Allergies   Immunizations:     Immunization History   Administered Date(s) Administered   • INFLUENZA 10/25/2018   • Influenza Quadrivalent Preservative Free 3 years and older IM 01/23/2018   • Influenza, injectable, quadrivalent, preservative free 0 5 mL 10/25/2018, 11/14/2020, 02/14/2023   • Pneumococcal Conjugate Vaccine 20-valent (Pcv20), Polysace 02/14/2023   • Td (adult), Unspecified 03/06/2007      Health Maintenance:         Topic Date Due   • Breast Cancer Screening: Mammogram  10/12/2023   • Colorectal Cancer Screening  07/11/2027   • HIV Screening  Completed   • Hepatitis C Screening  Completed         Topic Date Due   • COVID-19 Vaccine (1) Never done      Medicare Screening Tests and Risk Assessments:         PREVENTIVE SCREENINGS      Cardiovascular Screening:    General: Screening Not Indicated and History Lipid Disorder      Diabetes Screening:     General: Screening Current      Colorectal Cancer Screening:     General: Screening Current      Breast Cancer Screening:     General: Screening Current      Cervical Cancer Screening:    General: Screening Not Indicated      Lung Cancer Screening:     General: Screening Not Indicated      Hepatitis C Screening:    General: Screening Current    No results found  Physical Exam:     BP 98/70   Pulse 77   Temp 97 6 °F (36 4 °C) (Tympanic)   Ht 5' 6" (1 676 m)   Wt 58 1 kg (128 lb)   SpO2 98%   BMI 20 66 kg/m²     Physical Exam  Vitals and nursing note reviewed  Constitutional:       General: She is not in acute distress  Appearance: Normal appearance  She is well-developed  HENT:      Head: Normocephalic and atraumatic  Right Ear: Tympanic membrane normal       Left Ear: Tympanic membrane normal       Mouth/Throat:      Mouth: Mucous membranes are moist       Pharynx: No oropharyngeal exudate or posterior oropharyngeal erythema  Eyes:      Conjunctiva/sclera: Conjunctivae normal    Cardiovascular:      Rate and Rhythm: Normal rate and regular rhythm  Pulses: Normal pulses  Heart sounds: Normal heart sounds  No murmur heard  Pulmonary:      Effort: Pulmonary effort is normal  No respiratory distress  Breath sounds: Normal breath sounds  Abdominal:      General: Abdomen is flat  Bowel sounds are normal       Palpations: Abdomen is soft  Tenderness: There is no abdominal tenderness  Musculoskeletal:         General: No swelling  Normal range of motion  Cervical back: Neck supple  Skin:     General: Skin is warm and dry  Capillary Refill: Capillary refill takes less than 2 seconds  Neurological:      General: No focal deficit present  Mental Status: She is alert and oriented to person, place, and time     Psychiatric:         Mood and Affect: Mood normal  Thought Content:  Thought content normal          Judgment: Judgment normal           Patrick Pena MD

## 2023-02-14 NOTE — PATIENT INSTRUCTIONS
Medicare Preventive Visit Patient Instructions  Thank you for completing your Welcome to Medicare Visit or Medicare Annual Wellness Visit today  Your next wellness visit will be due in one year (2/15/2024)  The screening/preventive services that you may require over the next 5-10 years are detailed below  Some tests may not apply to you based off risk factors and/or age  Screening tests ordered at today's visit but not completed yet may show as past due  Also, please note that scanned in results may not display below  Preventive Screenings:  Service Recommendations Previous Testing/Comments   Colorectal Cancer Screening  * Colonoscopy    * Fecal Occult Blood Test (FOBT)/Fecal Immunochemical Test (FIT)  * Fecal DNA/Cologuard Test  * Flexible Sigmoidoscopy Age: 39-70 years old   Colonoscopy: every 10 years (may be performed more frequently if at higher risk)  OR  FOBT/FIT: every 1 year  OR  Cologuard: every 3 years  OR  Sigmoidoscopy: every 5 years  Screening may be recommended earlier than age 39 if at higher risk for colorectal cancer  Also, an individualized decision between you and your healthcare provider will decide whether screening between the ages of 74-80 would be appropriate  Colonoscopy: 07/12/2022  FOBT/FIT: Not on file  Cologuard: Not on file  Sigmoidoscopy: Not on file          Breast Cancer Screening Age: 36 years old  Frequency: every 1-2 years  Not required if history of left and right mastectomy Mammogram: 10/12/2022        Cervical Cancer Screening Between the ages of 21-29, pap smear recommended once every 3 years  Between the ages of 33-67, can perform pap smear with HPV co-testing every 5 years     Recommendations may differ for women with a history of total hysterectomy, cervical cancer, or abnormal pap smears in past  Pap Smear: Not on file        Hepatitis C Screening Once for adults born between [facility]  More frequently in patients at high risk for Hepatitis C Hep C Antibody: 11/15/2021        Diabetes Screening 1-2 times per year if you're at risk for diabetes or have pre-diabetes Fasting glucose: 92 mg/dL (2/11/2023)  A1C: 5 4 % (2/11/2023)      Cholesterol Screening Once every 5 years if you don't have a lipid disorder  May order more often based on risk factors  Lipid panel: 02/11/2023          Other Preventive Screenings Covered by Medicare:  1  Abdominal Aortic Aneurysm (AAA) Screening: covered once if your at risk  You're considered to be at risk if you have a family history of AAA  2  Lung Cancer Screening: covers low dose CT scan once per year if you meet all of the following conditions: (1) Age 50-69; (2) No signs or symptoms of lung cancer; (3) Current smoker or have quit smoking within the last 15 years; (4) You have a tobacco smoking history of at least 20 pack years (packs per day multiplied by number of years you smoked); (5) You get a written order from a healthcare provider  3  Glaucoma Screening: covered annually if you're considered high risk: (1) You have diabetes OR (2) Family history of glaucoma OR (3)  aged 48 and older OR (3)  American aged 72 and older  3  Osteoporosis Screening: covered every 2 years if you meet one of the following conditions: (1) You're estrogen deficient and at risk for osteoporosis based off medical history and other findings; (2) Have a vertebral abnormality; (3) On glucocorticoid therapy for more than 3 months; (4) Have primary hyperparathyroidism; (5) On osteoporosis medications and need to assess response to drug therapy  · Last bone density test (DXA Scan): Not on file  5  HIV Screening: covered annually if you're between the age of 12-76  Also covered annually if you are younger than 13 and older than 72 with risk factors for HIV infection  For pregnant patients, it is covered up to 3 times per pregnancy      Immunizations:  Immunization Recommendations   Influenza Vaccine Annual influenza vaccination during flu season is recommended for all persons aged >= 6 months who do not have contraindications   Pneumococcal Vaccine   * Pneumococcal conjugate vaccine = PCV13 (Prevnar 13), PCV15 (Vaxneuvance), PCV20 (Prevnar 20)  * Pneumococcal polysaccharide vaccine = PPSV23 (Pneumovax) Adults 25-60 years old: 1-3 doses may be recommended based on certain risk factors  Adults 72 years old: 1-2 doses may be recommended based off what pneumonia vaccine you previously received   Hepatitis B Vaccine 3 dose series if at intermediate or high risk (ex: diabetes, end stage renal disease, liver disease)   Tetanus (Td) Vaccine - COST NOT COVERED BY MEDICARE PART B Following completion of primary series, a booster dose should be given every 10 years to maintain immunity against tetanus  Td may also be given as tetanus wound prophylaxis  Tdap Vaccine - COST NOT COVERED BY MEDICARE PART B Recommended at least once for all adults  For pregnant patients, recommended with each pregnancy  Shingles Vaccine (Shingrix) - COST NOT COVERED BY MEDICARE PART B  2 shot series recommended in those aged 48 and above     Health Maintenance Due:      Topic Date Due   • Breast Cancer Screening: Mammogram  10/12/2023   • Colorectal Cancer Screening  07/11/2027   • HIV Screening  Completed   • Hepatitis C Screening  Completed     Immunizations Due:      Topic Date Due   • COVID-19 Vaccine (1) Never done   • Pneumococcal Vaccine: Pediatrics (0 to 5 Years) and At-Risk Patients (6 to 59 Years) (1 - PCV) Never done   • Influenza Vaccine (1) 09/01/2022     Advance Directives   What are advance directives? Advance directives are legal documents that state your wishes and plans for medical care  These plans are made ahead of time in case you lose your ability to make decisions for yourself  Advance directives can apply to any medical decision, such as the treatments you want, and if you want to donate organs  What are the types of advance directives?   There are many types of advance directives, and each state has rules about how to use them  You may choose a combination of any of the following:  · Living will: This is a written record of the treatment you want  You can also choose which treatments you do not want, which to limit, and which to stop at a certain time  This includes surgery, medicine, IV fluid, and tube feedings  · Durable power of  for healthcare [facility]): This is a written record that states who you want to make healthcare choices for you when you are unable to make them for yourself  This person, called a proxy, is usually a family member or a friend  You may choose more than 1 proxy  · Do not resuscitate (DNR) order:  A DNR order is used in case your heart stops beating or you stop breathing  It is a request not to have certain forms of treatment, such as CPR  A DNR order may be included in other types of advance directives  · Medical directive: This covers the care that you want if you are in a coma, near death, or unable to make decisions for yourself  You can list the treatments you want for each condition  Treatment may include pain medicine, surgery, blood transfusions, dialysis, IV or tube feedings, and a ventilator (breathing machine)  · Values history: This document has questions about your views, beliefs, and how you feel and think about life  This information can help others choose the care that you would choose  Why are advance directives important? An advance directive helps you control your care  Although spoken wishes may be used, it is better to have your wishes written down  Spoken wishes can be misunderstood, or not followed  Treatments may be given even if you do not want them  An advance directive may make it easier for your family to make difficult choices about your care     Cigarette Smoking and Your Health   Risks to your health if you smoke:  Nicotine and other chemicals found in tobacco damage every cell in your body  Even if you are a light smoker, you have an increased risk for cancer, heart disease, and lung disease  If you are pregnant or have diabetes, smoking increases your risk for complications  Benefits to your health if you stop smoking:   · You decrease respiratory symptoms such as coughing, wheezing, and shortness of breath  · You reduce your risk for cancers of the lung, mouth, throat, kidney, bladder, pancreas, stomach, and cervix  If you already have cancer, you increase the benefits of chemotherapy  You also reduce your risk for cancer returning or a second cancer from developing  · You reduce your risk for heart disease, blood clots, heart attack, and stroke  · You reduce your risk for lung infections, and diseases such as pneumonia, asthma, chronic bronchitis, and emphysema  · Your circulation improves  More oxygen can be delivered to your body  If you have diabetes, you lower your risk for complications, such as kidney, artery, and eye diseases  You also lower your risk for nerve damage  Nerve damage can lead to amputations, poor vision, and blindness  · You improve your body's ability to heal and to fight infections  For more information and support to stop smoking:   · [employer]  Phone: 9- 414 - [phone]  Web Address: www [employer]   Mari Lynchkevaimee 2018 Information is for End User's use only and may not be sold, redistributed or otherwise used for commercial purposes  All illustrations and images included in CareNotes® are the copyrighted property of [employer]  or [facility] Preventive Visit Patient Instructions  Thank you for completing your Welcome to Medicare Visit or Medicare Annual Wellness Visit today  Your next wellness visit will be due in one year (2/15/2024)  The screening/preventive services that you may require over the next 5-10 years are detailed below  Some tests may not apply to you based off risk factors and/or age   Screening tests ordered at today's visit but not completed yet may show as past due  Also, please note that scanned in results may not display below  Preventive Screenings:  Service Recommendations Previous Testing/Comments   Colorectal Cancer Screening  * Colonoscopy    * Fecal Occult Blood Test (FOBT)/Fecal Immunochemical Test (FIT)  * Fecal DNA/Cologuard Test  * Flexible Sigmoidoscopy Age: 39-70 years old   Colonoscopy: every 10 years (may be performed more frequently if at higher risk)  OR  FOBT/FIT: every 1 year  OR  Cologuard: every 3 years  OR  Sigmoidoscopy: every 5 years  Screening may be recommended earlier than age 39 if at higher risk for colorectal cancer  Also, an individualized decision between you and your healthcare provider will decide whether screening between the ages of 74-80 would be appropriate  Colonoscopy: 07/12/2022  FOBT/FIT: Not on file  Cologuard: Not on file  Sigmoidoscopy: Not on file    Screening Current  Screening Current     Breast Cancer Screening Age: 36 years old  Frequency: every 1-2 years  Not required if history of left and right mastectomy Mammogram: 10/12/2022    Screening Current  Screening Current   Cervical Cancer Screening Between the ages of 21-29, pap smear recommended once every 3 years  Between the ages of 33-67, can perform pap smear with HPV co-testing every 5 years     Recommendations may differ for women with a history of total hysterectomy, cervical cancer, or abnormal pap smears in past  Pap Smear: Not on file    Screening Not Indicated  Screening Not Indicated   Hepatitis C Screening Once for adults born between 1945 and 1965  More frequently in patients at high risk for Hepatitis C Hep C Antibody: 11/15/2021    Screening Current  Screening Current   Diabetes Screening 1-2 times per year if you're at risk for diabetes or have pre-diabetes Fasting glucose: 92 mg/dL (2/11/2023)  A1C: 5 4 % (2/11/2023)  Screening Current  Screening Current   Cholesterol Screening Once every 5 years if you don't have a lipid disorder  May order more often based on risk factors  Lipid panel: 02/11/2023    Screening Not Indicated  History Lipid Disorder  Screening Not Indicated  History Lipid Disorder     Other Preventive Screenings Covered by Medicare:  6  Abdominal Aortic Aneurysm (AAA) Screening: covered once if your at risk  You're considered to be at risk if you have a family history of AAA  7  Lung Cancer Screening: covers low dose CT scan once per year if you meet all of the following conditions: (1) Age 50-69; (2) No signs or symptoms of lung cancer; (3) Current smoker or have quit smoking within the last 15 years; (4) You have a tobacco smoking history of at least 20 pack years (packs per day multiplied by number of years you smoked); (5) You get a written order from a healthcare provider  8  Glaucoma Screening: covered annually if you're considered high risk: (1) You have diabetes OR (2) Family history of glaucoma OR (3)  aged 48 and older OR (3)  American aged 72 and older  5  Osteoporosis Screening: covered every 2 years if you meet one of the following conditions: (1) You're estrogen deficient and at risk for osteoporosis based off medical history and other findings; (2) Have a vertebral abnormality; (3) On glucocorticoid therapy for more than 3 months; (4) Have primary hyperparathyroidism; (5) On osteoporosis medications and need to assess response to drug therapy  · Last bone density test (DXA Scan): Not on file  10  HIV Screening: covered annually if you're between the age of 12-76  Also covered annually if you are younger than 13 and older than 72 with risk factors for HIV infection  For pregnant patients, it is covered up to 3 times per pregnancy      Immunizations:  Immunization Recommendations   Influenza Vaccine Annual influenza vaccination during flu season is recommended for all persons aged >= 6 months who do not have contraindications Pneumococcal Vaccine   * Pneumococcal conjugate vaccine = PCV13 (Prevnar 13), PCV15 (Vaxneuvance), PCV20 (Prevnar 20)  * Pneumococcal polysaccharide vaccine = PPSV23 (Pneumovax) Adults 2364 years old: 1-3 doses may be recommended based on certain risk factors  Adults 72 years old: 1-2 doses may be recommended based off what pneumonia vaccine you previously received   Hepatitis B Vaccine 3 dose series if at intermediate or high risk (ex: diabetes, end stage renal disease, liver disease)   Tetanus (Td) Vaccine - COST NOT COVERED BY MEDICARE PART B Following completion of primary series, a booster dose should be given every 10 years to maintain immunity against tetanus  Td may also be given as tetanus wound prophylaxis  Tdap Vaccine - COST NOT COVERED BY MEDICARE PART B Recommended at least once for all adults  For pregnant patients, recommended with each pregnancy  Shingles Vaccine (Shingrix) - COST NOT COVERED BY MEDICARE PART B  2 shot series recommended in those aged 48 and above     Health Maintenance Due:      Topic Date Due   • Breast Cancer Screening: Mammogram  10/12/2023   • Colorectal Cancer Screening  07/11/2027   • HIV Screening  Completed   • Hepatitis C Screening  Completed     Immunizations Due:      Topic Date Due   • COVID-19 Vaccine (1) Never done   • Pneumococcal Vaccine: Pediatrics (0 to 5 Years) and At-Risk Patients (6 to 59 Years) (1 - PCV) Never done   • Influenza Vaccine (1) 09/01/2022     Advance Directives   What are advance directives? Advance directives are legal documents that state your wishes and plans for medical care  These plans are made ahead of time in case you lose your ability to make decisions for yourself  Advance directives can apply to any medical decision, such as the treatments you want, and if you want to donate organs  What are the types of advance directives? There are many types of advance directives, and each state has rules about how to use them   You may choose a combination of any of the following:  · Living will: This is a written record of the treatment you want  You can also choose which treatments you do not want, which to limit, and which to stop at a certain time  This includes surgery, medicine, IV fluid, and tube feedings  · Durable power of  for healthcare [facility]): This is a written record that states who you want to make healthcare choices for you when you are unable to make them for yourself  This person, called a proxy, is usually a family member or a friend  You may choose more than 1 proxy  · Do not resuscitate (DNR) order:  A DNR order is used in case your heart stops beating or you stop breathing  It is a request not to have certain forms of treatment, such as CPR  A DNR order may be included in other types of advance directives  · Medical directive: This covers the care that you want if you are in a coma, near death, or unable to make decisions for yourself  You can list the treatments you want for each condition  Treatment may include pain medicine, surgery, blood transfusions, dialysis, IV or tube feedings, and a ventilator (breathing machine)  · Values history: This document has questions about your views, beliefs, and how you feel and think about life  This information can help others choose the care that you would choose  Why are advance directives important? An advance directive helps you control your care  Although spoken wishes may be used, it is better to have your wishes written down  Spoken wishes can be misunderstood, or not followed  Treatments may be given even if you do not want them  An advance directive may make it easier for your family to make difficult choices about your care  Cigarette Smoking and Your Health   Risks to your health if you smoke:  Nicotine and other chemicals found in tobacco damage every cell in your body   Even if you are a light smoker, you have an increased risk for cancer, heart disease, and lung disease  If you are pregnant or have diabetes, smoking increases your risk for complications  Benefits to your health if you stop smoking:   · You decrease respiratory symptoms such as coughing, wheezing, and shortness of breath  · You reduce your risk for cancers of the lung, mouth, throat, kidney, bladder, pancreas, stomach, and cervix  If you already have cancer, you increase the benefits of chemotherapy  You also reduce your risk for cancer returning or a second cancer from developing  · You reduce your risk for heart disease, blood clots, heart attack, and stroke  · You reduce your risk for lung infections, and diseases such as pneumonia, asthma, chronic bronchitis, and emphysema  · Your circulation improves  More oxygen can be delivered to your body  If you have diabetes, you lower your risk for complications, such as kidney, artery, and eye diseases  You also lower your risk for nerve damage  Nerve damage can lead to amputations, poor vision, and blindness  · You improve your body's ability to heal and to fight infections  For more information and support to stop smoking:   · [employer]  Phone: 0- 294 - 096-7358  Web Address: [employer]    [address] 2018 Information is for End User's use only and may not be sold, redistributed or otherwise used for commercial purposes  All illustrations and images included in CareNotes® are the copyrighted property of [employer]  or [facility] Preventive Visit Patient Instructions  Thank you for completing your Welcome to Medicare Visit or Medicare Annual Wellness Visit today  Your next wellness visit will be due in one year (2/16/2024)  The screening/preventive services that you may require over the next 5-10 years are detailed below  Some tests may not apply to you based off risk factors and/or age  Screening tests ordered at today's visit but not completed yet may show as past due   Also, please note that scanned in results may not display below  Preventive Screenings:  Service Recommendations Previous Testing/Comments   Colorectal Cancer Screening  * Colonoscopy    * Fecal Occult Blood Test (FOBT)/Fecal Immunochemical Test (FIT)  * Fecal DNA/Cologuard Test  * Flexible Sigmoidoscopy Age: 39-70 years old   Colonoscopy: every 10 years (may be performed more frequently if at higher risk)  OR  FOBT/FIT: every 1 year  OR  Cologuard: every 3 years  OR  Sigmoidoscopy: every 5 years  Screening may be recommended earlier than age 39 if at higher risk for colorectal cancer  Also, an individualized decision between you and your healthcare provider will decide whether screening between the ages of 74-80 would be appropriate  Colonoscopy: 07/12/2022  FOBT/FIT: Not on file  Cologuard: Not on file  Sigmoidoscopy: Not on file    Screening Current  Screening Current     Breast Cancer Screening Age: 36 years old  Frequency: every 1-2 years  Not required if history of left and right mastectomy Mammogram: 10/12/2022    Screening Current  Screening Current   Cervical Cancer Screening Between the ages of 21-29, pap smear recommended once every 3 years  Between the ages of 33-67, can perform pap smear with HPV co-testing every 5 years  Recommendations may differ for women with a history of total hysterectomy, cervical cancer, or abnormal pap smears in past  Pap Smear: Not on file    Screening Not Indicated  Screening Not Indicated   Hepatitis C Screening Once for adults born between 1945 and 1965  More frequently in patients at high risk for Hepatitis C Hep C Antibody: 11/15/2021    Screening Current  Screening Current   Diabetes Screening 1-2 times per year if you're at risk for diabetes or have pre-diabetes Fasting glucose: 92 mg/dL (2/11/2023)  A1C: 5 4 % (2/11/2023)  Screening Current  Screening Current   Cholesterol Screening Once every 5 years if you don't have a lipid disorder  May order more often based on risk factors  Lipid panel: 02/11/2023    Screening Not Indicated  History Lipid Disorder  Screening Not Indicated  History Lipid Disorder     Other Preventive Screenings Covered by Medicare:  11  Abdominal Aortic Aneurysm (AAA) Screening: covered once if your at risk  You're considered to be at risk if you have a family history of AAA  12  Lung Cancer Screening: covers low dose CT scan once per year if you meet all of the following conditions: (1) Age 50-69; (2) No signs or symptoms of lung cancer; (3) Current smoker or have quit smoking within the last 15 years; (4) You have a tobacco smoking history of at least 20 pack years (packs per day multiplied by number of years you smoked); (5) You get a written order from a healthcare provider  15  Glaucoma Screening: covered annually if you're considered high risk: (1) You have diabetes OR (2) Family history of glaucoma OR (3)  aged 48 and older OR (3)  American aged 72 and older  15  Osteoporosis Screening: covered every 2 years if you meet one of the following conditions: (1) You're estrogen deficient and at risk for osteoporosis based off medical history and other findings; (2) Have a vertebral abnormality; (3) On glucocorticoid therapy for more than 3 months; (4) Have primary hyperparathyroidism; (5) On osteoporosis medications and need to assess response to drug therapy  · Last bone density test (DXA Scan): Not on file  15  HIV Screening: covered annually if you're between the age of 12-76  Also covered annually if you are younger than 13 and older than 72 with risk factors for HIV infection  For pregnant patients, it is covered up to 3 times per pregnancy      Immunizations:  Immunization Recommendations   Influenza Vaccine Annual influenza vaccination during flu season is recommended for all persons aged >= 6 months who do not have contraindications   Pneumococcal Vaccine   * Pneumococcal conjugate vaccine = PCV13 (Prevnar 13), PCV15 (Vaxneuvance), PCV20 (Prevnar 20)  * Pneumococcal polysaccharide vaccine = PPSV23 (Pneumovax) Adults 2364 years old: 1-3 doses may be recommended based on certain risk factors  Adults 72 years old: 1-2 doses may be recommended based off what pneumonia vaccine you previously received   Hepatitis B Vaccine 3 dose series if at intermediate or high risk (ex: diabetes, end stage renal disease, liver disease)   Tetanus (Td) Vaccine - COST NOT COVERED BY MEDICARE PART B Following completion of primary series, a booster dose should be given every 10 years to maintain immunity against tetanus  Td may also be given as tetanus wound prophylaxis  Tdap Vaccine - COST NOT COVERED BY MEDICARE PART B Recommended at least once for all adults  For pregnant patients, recommended with each pregnancy  Shingles Vaccine (Shingrix) - COST NOT COVERED BY MEDICARE PART B  2 shot series recommended in those aged 48 and above     Health Maintenance Due:      Topic Date Due   • Breast Cancer Screening: Mammogram  10/12/2023   • Colorectal Cancer Screening  07/11/2027   • HIV Screening  Completed   • Hepatitis C Screening  Completed     Immunizations Due:      Topic Date Due   • COVID-19 Vaccine (1) Never done     Advance Directives   What are advance directives? Advance directives are legal documents that state your wishes and plans for medical care  These plans are made ahead of time in case you lose your ability to make decisions for yourself  Advance directives can apply to any medical decision, such as the treatments you want, and if you want to donate organs  What are the types of advance directives? There are many types of advance directives, and each state has rules about how to use them  You may choose a combination of any of the following:  · Living will: This is a written record of the treatment you want  You can also choose which treatments you do not want, which to limit, and which to stop at a certain time   This includes surgery, medicine, IV fluid, and tube feedings  · Durable power of  for healthcare [facility]): This is a written record that states who you want to make healthcare choices for you when you are unable to make them for yourself  This person, called a proxy, is usually a family member or a friend  You may choose more than 1 proxy  · Do not resuscitate (DNR) order:  A DNR order is used in case your heart stops beating or you stop breathing  It is a request not to have certain forms of treatment, such as CPR  A DNR order may be included in other types of advance directives  · Medical directive: This covers the care that you want if you are in a coma, near death, or unable to make decisions for yourself  You can list the treatments you want for each condition  Treatment may include pain medicine, surgery, blood transfusions, dialysis, IV or tube feedings, and a ventilator (breathing machine)  · Values history: This document has questions about your views, beliefs, and how you feel and think about life  This information can help others choose the care that you would choose  Why are advance directives important? An advance directive helps you control your care  Although spoken wishes may be used, it is better to have your wishes written down  Spoken wishes can be misunderstood, or not followed  Treatments may be given even if you do not want them  An advance directive may make it easier for your family to make difficult choices about your care  Cigarette Smoking and Your Health   Risks to your health if you smoke:  Nicotine and other chemicals found in tobacco damage every cell in your body  Even if you are a light smoker, you have an increased risk for cancer, heart disease, and lung disease  If you are pregnant or have diabetes, smoking increases your risk for complications  Benefits to your health if you stop smoking:   · You decrease respiratory symptoms such as coughing, wheezing, and shortness of breath  · You reduce your risk for cancers of the lung, mouth, throat, kidney, bladder, pancreas, stomach, and cervix  If you already have cancer, you increase the benefits of chemotherapy  You also reduce your risk for cancer returning or a second cancer from developing  · You reduce your risk for heart disease, blood clots, heart attack, and stroke  · You reduce your risk for lung infections, and diseases such as pneumonia, asthma, chronic bronchitis, and emphysema  · Your circulation improves  More oxygen can be delivered to your body  If you have diabetes, you lower your risk for complications, such as kidney, artery, and eye diseases  You also lower your risk for nerve damage  Nerve damage can lead to amputations, poor vision, and blindness  · You improve your body's ability to heal and to fight infections  For more information and support to stop smoking:   · [place]  Phone: 8- 232 - [phone]  Web Address: [employer]  [facility] 2018 Information is for End User's use only and may not be sold, redistributed or otherwise used for commercial purposes   All illustrations and images included in CareNotes® are the copyrighted property of A D A M , Inc  or [address] [place] [address]

## 2023-02-27 ENCOUNTER — OFFICE VISIT (OUTPATIENT)
Dept: FAMILY MEDICINE CLINIC | Facility: CLINIC | Age: 43
End: 2023-02-27

## 2023-02-27 VITALS
SYSTOLIC BLOOD PRESSURE: 108 MMHG | TEMPERATURE: 98 F | BODY MASS INDEX: 21.38 KG/M2 | HEART RATE: 68 BPM | HEIGHT: 66 IN | WEIGHT: 133 LBS | OXYGEN SATURATION: 97 % | DIASTOLIC BLOOD PRESSURE: 62 MMHG

## 2023-02-27 DIAGNOSIS — J01.00 ACUTE NON-RECURRENT MAXILLARY SINUSITIS: Primary | ICD-10-CM

## 2023-02-27 DIAGNOSIS — J44.9 CHRONIC OBSTRUCTIVE PULMONARY DISEASE, UNSPECIFIED COPD TYPE (HCC): ICD-10-CM

## 2023-02-27 RX ORDER — METHYLPREDNISOLONE 4 MG/1
TABLET ORAL
Qty: 21 EACH | Refills: 0 | Status: SHIPPED | OUTPATIENT
Start: 2023-02-27

## 2023-02-27 RX ORDER — AZITHROMYCIN 250 MG/1
TABLET, FILM COATED ORAL
Qty: 6 TABLET | Refills: 0 | Status: SHIPPED | OUTPATIENT
Start: 2023-02-27 | End: 2023-03-04

## 2023-02-27 NOTE — PROGRESS NOTES
Assessment/Plan:    No problem-specific Assessment & Plan notes found for this encounter  Diagnoses and all orders for this visit:    Acute non-recurrent maxillary sinusitis  -     azithromycin (Zithromax) 250 mg tablet; Take 2 tablets (500 mg total) by mouth daily for 1 day, THEN 1 tablet (250 mg total) daily for 4 days  -     methylPREDNISolone 4 MG tablet therapy pack; Use as directed on package    Chronic obstructive pulmonary disease, unspecified COPD type ([facility] )          PHQ-2/9 Depression Screening    Little interest or pleasure in doing things: 0 - not at all  Feeling down, depressed, or hopeless: 0 - not at all  Trouble falling or staying asleep, or sleeping too much: 0 - not at all  Feeling tired or having little energy: 0 - not at all  Poor appetite or overeatin - not at all  Feeling bad about yourself - or that you are a failure or have let yourself or your family down: 0 - not at all  Trouble concentrating on things, such as reading the newspaper or watching television: 0 - not at all  Moving or speaking so slowly that other people could have noticed  Or the opposite - being so fidgety or restless that you have been moving around a lot more than usual: 0 - not at all  Thoughts that you would be better off dead, or of hurting yourself in some way: 0 - not at all  PHQ-9 Score: 0   PHQ-9 Interpretation: No or Minimal depression             Subjective:      Patient ID: Alessio Veras is a 37 y o  female  Sinusitis  This is a new problem  The current episode started 1 to 4 weeks ago  The problem is unchanged  There has been no fever  Associated symptoms include congestion, coughing, headaches and sinus pressure  Pertinent negatives include no chills or shortness of breath  Treatments tried: OTC dayquil  The treatment provided mild relief         The following portions of the patient's history were reviewed and updated as appropriate: allergies, current medications, past family history, past medical history, past social history, past surgical history and problem list     Review of Systems   Constitutional: Negative for chills and fever  HENT: Positive for congestion, sinus pressure and sinus pain  Respiratory: Positive for cough  Negative for shortness of breath and wheezing  Neurological: Positive for headaches  Objective:    /62 (BP Location: Left arm, Patient Position: Sitting)   Pulse 68   Temp 98 °F (36 7 °C) (Tympanic)   Ht 5' 6" (1 676 m)   Wt 60 3 kg (133 lb)   SpO2 97%   BMI 21 47 kg/m²      Physical Exam  Vitals and nursing note reviewed  Constitutional:       General: She is not in acute distress  Appearance: Normal appearance  She is not ill-appearing, toxic-appearing or diaphoretic  HENT:      Head: Normocephalic and atraumatic  Right Ear: Tympanic membrane, ear canal and external ear normal  There is no impacted cerumen  Left Ear: Tympanic membrane, ear canal and external ear normal  There is no impacted cerumen  Mouth/Throat:      Mouth: Mucous membranes are moist    Eyes:      General:         Right eye: No discharge  Left eye: No discharge  Cardiovascular:      Rate and Rhythm: Normal rate and regular rhythm  Heart sounds: Normal heart sounds  No murmur heard  Pulmonary:      Effort: Pulmonary effort is normal  No respiratory distress  Breath sounds: No wheezing, rhonchi or rales  Musculoskeletal:      Cervical back: Neck supple  No rigidity  Lymphadenopathy:      Cervical: No cervical adenopathy  Neurological:      Mental Status: She is alert and oriented to person, place, and time  Psychiatric:         Mood and Affect: Mood normal          Behavior: Behavior normal          Thought Content:  Thought content normal          Judgment: Judgment normal

## 2023-04-26 ENCOUNTER — OFFICE VISIT (OUTPATIENT)
Dept: FAMILY MEDICINE CLINIC | Facility: CLINIC | Age: 43
End: 2023-04-26

## 2023-04-26 VITALS
WEIGHT: 135 LBS | SYSTOLIC BLOOD PRESSURE: 106 MMHG | TEMPERATURE: 96 F | DIASTOLIC BLOOD PRESSURE: 62 MMHG | HEIGHT: 66 IN | BODY MASS INDEX: 21.69 KG/M2 | OXYGEN SATURATION: 99 % | HEART RATE: 72 BPM

## 2023-04-26 DIAGNOSIS — M77.9 TENDINITIS: Primary | ICD-10-CM

## 2023-04-26 RX ORDER — PREDNISONE 10 MG/1
TABLET ORAL
Qty: 30 TABLET | Refills: 0 | Status: SHIPPED | OUTPATIENT
Start: 2023-04-26 | End: 2023-05-08

## 2023-04-26 NOTE — PROGRESS NOTES
Assessment/Plan     Diagnoses and all orders for this visit:    Tendinitis  Comments:  Pain,swelling for 3 wks post cutting weed  No wound/erythema  Likely Tendinitis   No improvement with Ibuprofen  Start Prednisone taper x 12 days  F/u 2 wks    Orders:  -     predniSONE 10 mg tablet; Take 4 tablets (40 mg total) by mouth daily for 3 days, THEN 3 tablets (30 mg total) daily for 3 days, THEN 2 tablets (20 mg total) daily for 3 days, THEN 1 tablet (10 mg total) daily for 3 days  Subjective     Patient Id: Clair Hunter is a 37 y o  female    Presented to the clinic with c/o painful wrist lump since 2- 3wks  Pt states that she felt the discomfort/pain after working for a while in her garden while she was cutting weeds  She states that her wrist has been sore, grades the pain 8/10 when she pressed on it and 5/10 when not touching  She dennies burning  She states that the swelling has been growing in size  Denies radiation of pain  Denies redness, numbness/ tingling of her fingers  She states that her pain is more on wrist extension  She denies any wound while cutting grass  Has been taking Ibuprofen 800 BID with minimal relief  Review of Systems   Constitutional: Negative for chills and fever  HENT: Negative for ear pain and sore throat  Eyes: Negative for pain and visual disturbance  Respiratory: Negative for cough and shortness of breath  Cardiovascular: Negative for chest pain and palpitations  Gastrointestinal: Negative for abdominal pain and vomiting  Genitourinary: Negative for dysuria and hematuria  Musculoskeletal: Positive for arthralgias and joint swelling  Negative for back pain  Skin: Negative for color change and rash  Neurological: Negative for seizures and syncope  All other systems reviewed and are negative        Past Medical History:   Diagnosis Date   • Anxiety    • Bipolar disorder [facility])    • Chronic pain    • Colon polyp    • Endometriosis    • Hyperlipidemia    • Kidney stone    • Urinary frequency     resolved: 6/14/2016   • Urinary urgency     resolved: 6/14/2016       Past Surgical History:   Procedure Laterality Date   • BLADDER SUSPENSION     • CHOLECYSTECTOMY     • COLONOSCOPY     • EPIDURAL BLOCK INJECTION N/A 10/17/2019    Procedure: BLOCK / INJECTION EPIDURAL STEROID CERVICAL c7-t1 interlaminar;  Surgeon: Stella Jules MD;  Location: [facility];  Service: Pain Management    • EPIDURAL BLOCK INJECTION Bilateral 1/16/2020    Procedure: L4-L5 Transforaminal Epidural Steroid Injection;  Surgeon: Stella Jules MD;  Location: [facility];  Service: Pain Management    • FL GUIDED NEEDLE PLAC BX/ASP/INJ  10/17/2019   • FL GUIDED NEEDLE PLAC BX/ASP/INJ  1/16/2020   • HERNIA REPAIR     • HYSTERECTOMY      full    • OVARIAN CYST REMOVAL     • SC LAPAROSCOPY SURG CHOLECYSTECTOMY N/A 4/30/2018    Procedure: CHOLECYSTECTOMY LAPAROSCOPIC;  Surgeon: Gabriela Jewell DO;  Location: [facility];  Service: General   • SC SURGICAL ARTHROSCOPY SHOULDER BICEPS TENODESIS Right 8/14/2019    Procedure: ARTHROSCOPY SHOULDER WITH BICEPS TENOTOMY AND SAD;  Surgeon: Alexis Blackwell MD;  Location: [facility];  Service: Orthopedics   • TONSILLECTOMY     • UPPER GASTROINTESTINAL ENDOSCOPY         Family History   Problem Relation Age of Onset   • Diabetes Mother    • Heart disease Mother         rheumatic   • Neuropathy Mother    • COPD Mother    • Diabetes type II Mother    • Cancer Mother    • Cancer Father    • Lung cancer Father    • Stroke Maternal Grandmother         CVA   • Diabetes type II Maternal Grandmother         mellitus   • Other Maternal Grandfather         esophageal abnormality   • Diabetes type II Paternal Grandmother         mellitus   • Diabetes type II Paternal Grandfather         mellitus   • Depression Family    • Esophageal cancer Family    • Lung cancer Family    • Stomach cancer Family        Social History     Socioeconomic History   • Marital status: /Civil [employer]     Spouse name: None   • Number of children: 1   • Years of education: None   • Highest education level: GED or equivalent   Occupational History   • None   Tobacco Use   • Smoking status: Every Day     Packs/day: 1 00     Years: 1 00     Pack years: 1 00     Types: Cigarettes   • Smokeless tobacco: Never   Vaping Use   • Vaping Use: Never used   Substance and Sexual Activity   • Alcohol use:  Yes     Alcohol/week: 3 0 - 4 0 standard drinks     Types: 1 - 2 Glasses of wine, 2 Standard drinks or equivalent per week     Comment: Social drinker   • Drug use: No   • Sexual activity: Yes     Partners: Male     Birth control/protection: Female Sterilization     Comment: Hysterectomy   Other Topics Concern   • None   Social History Narrative    Disabled    Drinks coffee    One child-age 7     Social Determinants of Health     Financial Resource Strain: Not on file   Food Insecurity: Not on file   Transportation Needs: Not on file   Physical Activity: Not on file   Stress: Not on file   Social Connections: Not on file   Intimate Partner Violence: Not on file   Housing Stability: Not on file       No Known Allergies      Current Outpatient Medications:   •  albuterol (ProAir HFA) 90 mcg/act inhaler, Inhale 2 puffs every 6 (six) hours as needed for wheezing or shortness of breath, Disp: 8 5 g, Rfl: 6  •  buPROPion (WELLBUTRIN SR) 100 mg 12 hr tablet, 100 mg daily, Disp: , Rfl: 0  •  Cholecalciferol (VITAMIN D3) 2000 units TABS, Take 4,000 Units by mouth daily  , Disp: , Rfl:   •  gabapentin (NEURONTIN) 300 mg capsule, TAKE 1 CAPSULE BY MOUTH TWICE A DAY AND 2 CAPSULES BY MOUTH AT BEDTIME, Disp: 120 capsule, Rfl: 3  •  lamoTRIgine (LaMICtal) 100 mg tablet, Take 100 mg by mouth daily, Disp: , Rfl:   •  LORazepam (ATIVAN) 0 5 mg tablet, Take 1 tablet by mouth daily at bedtime  , Disp: , Rfl:   •  Multiple Vitamin (DAILY VALUE MULTIVITAMIN) TABS, Take by mouth, Disp: , Rfl:   •  Nerve Stimulator (Standard "TENS) ISABLELA, Use 3 (three) times a day as needed (pain), Disp: 1 Device, Rfl: 0  •  predniSONE 10 mg tablet, Take 4 tablets (40 mg total) by mouth daily for 3 days, THEN 3 tablets (30 mg total) daily for 3 days, THEN 2 tablets (20 mg total) daily for 3 days, THEN 1 tablet (10 mg total) daily for 3 days  , Disp: 30 tablet, Rfl: 0  •  simvastatin (ZOCOR) 40 mg tablet, take 1 tablet by mouth once daily, Disp: 90 tablet, Rfl: 3  •  tiZANidine (ZANAFLEX) 4 mg tablet, TAKE 1 TABLET BY MOUTH DAILY AT BEDTIME, Disp: 30 tablet, Rfl: 3  •  citalopram (CeleXA) 20 mg tablet, Take 10 mg by mouth every morning  (Patient not taking: Reported on 8/24/2022), Disp: , Rfl: 0  •  doxycycline (ADOXA) 100 MG tablet, doxycycline monohydrate 100 mg tablet  take 1 tablet by mouth twice a day for 5 days (Patient not taking: Reported on 11/29/2022), Disp: , Rfl:   •  lamoTRIgine (LaMICtal) 200 MG tablet, Take 1 tablet by mouth 2 (two) times a day (Patient not taking: Reported on 11/29/2022), Disp: , Rfl:   •  naproxen (NAPROSYN) 500 mg tablet, Take 1 tablet (500 mg total) by mouth 2 (two) times a day with meals for 5 days (Patient not taking: Reported on 5/13/2022), Disp: 10 tablet, Rfl: 0    Objective     Vitals:    04/26/23 1002   BP: 106/62   BP Location: Left arm   Patient Position: Sitting   Pulse: 72   Temp: (!) 96 °F (35 6 °C)   TempSrc: Tympanic   SpO2: 99%   Weight: 61 2 kg (135 lb)   Height: 5' 6\" (1 676 m)       Physical Exam  Vitals and nursing note reviewed  Constitutional:       General: She is not in acute distress  Appearance: She is well-developed  HENT:      Head: Normocephalic and atraumatic  Eyes:      Conjunctiva/sclera: Conjunctivae normal    Cardiovascular:      Rate and Rhythm: Normal rate and regular rhythm  Heart sounds: No murmur heard  Pulmonary:      Effort: Pulmonary effort is normal  No respiratory distress  Breath sounds: Normal breath sounds  Abdominal:      Palpations: Abdomen is soft      " Tenderness: There is no abdominal tenderness  Musculoskeletal:      Right wrist: Swelling, tenderness and bony tenderness present  No deformity or lacerations  Normal range of motion  Cervical back: Neck supple  Comments: Small 1 x 2 cm lump felt on the dorsal side of the wrist near the anatomical snuff box on the radial side  The lump is movable, tender to palptaion  ROM intact, pain on extension of the wrist  No erythema, bleed,or wound seen  Skin:     General: Skin is warm and dry  Capillary Refill: Capillary refill takes less than 2 seconds  Neurological:      Mental Status: She is alert     Psychiatric:         Mood and Affect: Mood normal          Merlin Almas, MD  Family Medicine

## 2023-05-15 ENCOUNTER — HOSPITAL ENCOUNTER (OUTPATIENT)
Dept: CT IMAGING | Facility: HOSPITAL | Age: 43
Discharge: HOME | End: 2023-05-15
Attending: INTERNAL MEDICINE

## 2023-05-15 DIAGNOSIS — R91.8 LUNG NODULES: ICD-10-CM

## 2023-05-15 DIAGNOSIS — R63.4 UNINTENTIONAL WEIGHT LOSS: ICD-10-CM

## 2023-05-15 DIAGNOSIS — J98.59 MEDIASTINAL ABNORMALITY: ICD-10-CM

## 2023-05-15 RX ADMIN — IOHEXOL 100 ML: 350 INJECTION, SOLUTION INTRAVENOUS at 08:14

## 2023-05-18 ENCOUNTER — RA CDI HCC (OUTPATIENT)
Dept: OTHER | Facility: HOSPITAL | Age: 43
End: 2023-05-18

## 2023-05-19 NOTE — PROGRESS NOTES
[facility]  coding opportunities       Chart reviewed, no opportunity found: CHART REVIEWED, Ricky Lopez [id number]     Patients Insurance     Medicare Insurance: [employer]

## 2023-06-06 DIAGNOSIS — R20.2 PARESTHESIA: ICD-10-CM

## 2023-06-06 DIAGNOSIS — M79.10 MUSCLE PAIN: ICD-10-CM

## 2023-06-07 ENCOUNTER — APPOINTMENT (OUTPATIENT)
Dept: LAB | Facility: CLINIC | Age: 43
End: 2023-06-07
Payer: COMMERCIAL

## 2023-06-07 DIAGNOSIS — R63.4 UNINTENTIONAL WEIGHT LOSS: ICD-10-CM

## 2023-06-07 DIAGNOSIS — R91.8 LUNG NODULES: ICD-10-CM

## 2023-06-07 DIAGNOSIS — J98.59 MEDIASTINAL ABNORMALITY: ICD-10-CM

## 2023-06-07 LAB
ALBUMIN SERPL BCP-MCNC: 4 G/DL (ref 3.5–5)
ALP SERPL-CCNC: 87 U/L (ref 46–116)
ALT SERPL W P-5'-P-CCNC: 23 U/L (ref 12–78)
ANION GAP SERPL CALCULATED.3IONS-SCNC: 0 MMOL/L (ref 4–13)
AST SERPL W P-5'-P-CCNC: 15 U/L (ref 5–45)
BASOPHILS # BLD AUTO: 0.05 THOUSANDS/ÂΜL (ref 0–0.1)
BASOPHILS NFR BLD AUTO: 0 % (ref 0–1)
BILIRUB SERPL-MCNC: 0.25 MG/DL (ref 0.2–1)
BUN SERPL-MCNC: 14 MG/DL (ref 5–25)
CALCIUM SERPL-MCNC: 9.2 MG/DL (ref 8.3–10.1)
CHLORIDE SERPL-SCNC: 109 MMOL/L (ref 96–108)
CO2 SERPL-SCNC: 29 MMOL/L (ref 21–32)
CREAT SERPL-MCNC: 0.72 MG/DL (ref 0.6–1.3)
CRP SERPL QL: <3 MG/L
EOSINOPHIL # BLD AUTO: 0.11 THOUSAND/ÂΜL (ref 0–0.61)
EOSINOPHIL NFR BLD AUTO: 1 % (ref 0–6)
ERYTHROCYTE [DISTWIDTH] IN BLOOD BY AUTOMATED COUNT: 12.5 % (ref 11.6–15.1)
GFR SERPL CREATININE-BSD FRML MDRD: 101 ML/MIN/1.73SQ M
GLUCOSE P FAST SERPL-MCNC: 92 MG/DL (ref 65–99)
HCT VFR BLD AUTO: 40.9 % (ref 34.8–46.1)
HGB BLD-MCNC: 13.6 G/DL (ref 11.5–15.4)
IMM GRANULOCYTES # BLD AUTO: 0.03 THOUSAND/UL (ref 0–0.2)
IMM GRANULOCYTES NFR BLD AUTO: 0 % (ref 0–2)
LDH SERPL-CCNC: 156 U/L (ref 81–234)
LYMPHOCYTES # BLD AUTO: 5.01 THOUSANDS/ÂΜL (ref 0.6–4.47)
LYMPHOCYTES NFR BLD AUTO: 44 % (ref 14–44)
MAGNESIUM SERPL-MCNC: 2.2 MG/DL (ref 1.6–2.6)
MCH RBC QN AUTO: 32.1 PG (ref 26.8–34.3)
MCHC RBC AUTO-ENTMCNC: 33.3 G/DL (ref 31.4–37.4)
MCV RBC AUTO: 97 FL (ref 82–98)
MONOCYTES # BLD AUTO: 0.63 THOUSAND/ÂΜL (ref 0.17–1.22)
MONOCYTES NFR BLD AUTO: 6 % (ref 4–12)
NEUTROPHILS # BLD AUTO: 5.53 THOUSANDS/ÂΜL (ref 1.85–7.62)
NEUTS SEG NFR BLD AUTO: 49 % (ref 43–75)
NRBC BLD AUTO-RTO: 0 /100 WBCS
PLATELET # BLD AUTO: 250 THOUSANDS/UL (ref 149–390)
PMV BLD AUTO: 10.1 FL (ref 8.9–12.7)
POTASSIUM SERPL-SCNC: 4 MMOL/L (ref 3.5–5.3)
PROT SERPL-MCNC: 7.4 G/DL (ref 6.4–8.4)
RBC # BLD AUTO: 4.24 MILLION/UL (ref 3.81–5.12)
SODIUM SERPL-SCNC: 138 MMOL/L (ref 135–147)
WBC # BLD AUTO: 11.36 THOUSAND/UL (ref 4.31–10.16)

## 2023-06-07 PROCEDURE — 83735 ASSAY OF MAGNESIUM: CPT

## 2023-06-07 PROCEDURE — 85025 COMPLETE CBC W/AUTO DIFF WBC: CPT

## 2023-06-07 PROCEDURE — 36415 COLL VENOUS BLD VENIPUNCTURE: CPT

## 2023-06-07 PROCEDURE — 86140 C-REACTIVE PROTEIN: CPT

## 2023-06-07 PROCEDURE — 85652 RBC SED RATE AUTOMATED: CPT

## 2023-06-07 PROCEDURE — 83615 LACTATE (LD) (LDH) ENZYME: CPT

## 2023-06-07 PROCEDURE — 80053 COMPREHEN METABOLIC PANEL: CPT

## 2023-06-07 RX ORDER — GABAPENTIN 300 MG/1
CAPSULE ORAL
Qty: 120 CAPSULE | Refills: 3 | Status: SHIPPED | OUTPATIENT
Start: 2023-06-07

## 2023-06-07 RX ORDER — TIZANIDINE 4 MG/1
4 TABLET ORAL
Qty: 30 TABLET | Refills: 3 | Status: SHIPPED | OUTPATIENT
Start: 2023-06-07

## 2023-06-07 NOTE — TELEPHONE ENCOUNTER
Patient can also have these meds taken over by PCP if she does not plan on following with our office

## 2023-06-07 NOTE — TELEPHONE ENCOUNTER
LOV (virtual) 8/24/2022 with Kris Weber  No future follow ups scheduled  Pt recommended follow-up in 9 months from [facility]  Kris Weber - Rx entered  Please review and sign if in agreement

## 2023-06-08 ENCOUNTER — TELEPHONE (OUTPATIENT)
Dept: NEUROLOGY | Facility: CLINIC | Age: 43
End: 2023-06-08

## 2023-06-08 LAB — ERYTHROCYTE [SEDIMENTATION RATE] IN BLOOD: 19 MM/HOUR (ref 0–19)

## 2023-06-08 NOTE — TELEPHONE ENCOUNTER
Patient would like to see Karen Clay again and is scheduled for next Monday, 6/12/23 at 12:30pm in [facility]  Completing ADD-ON in separate telephone encounter

## 2023-06-12 ENCOUNTER — TELEPHONE (OUTPATIENT)
Dept: NEUROLOGY | Facility: CLINIC | Age: 43
End: 2023-06-12

## 2023-06-12 NOTE — TELEPHONE ENCOUNTER
LMOM for the patient to call us back to reschedule her appt for today with fabiola since she will not be in the office  Informed patient that we are canceling and she would need to call back to reschedule   If the patient calls back we can also reschedule with [place] or [address] in [facility] or [facility]

## 2023-06-12 NOTE — TELEPHONE ENCOUNTER
Left message for patient to call back to reschedule appointment for today due to provider being out

## 2023-06-15 ENCOUNTER — OFFICE VISIT (OUTPATIENT)
Dept: HEMATOLOGY ONCOLOGY | Facility: CLINIC | Age: 43
End: 2023-06-15
Payer: COMMERCIAL

## 2023-06-15 VITALS
HEART RATE: 77 BPM | DIASTOLIC BLOOD PRESSURE: 72 MMHG | HEIGHT: 66 IN | BODY MASS INDEX: 22.34 KG/M2 | WEIGHT: 139 LBS | RESPIRATION RATE: 18 BRPM | TEMPERATURE: 98.3 F | OXYGEN SATURATION: 96 % | SYSTOLIC BLOOD PRESSURE: 118 MMHG

## 2023-06-15 DIAGNOSIS — R91.8 LUNG NODULES: ICD-10-CM

## 2023-06-15 DIAGNOSIS — J98.59 MEDIASTINAL ABNORMALITY: ICD-10-CM

## 2023-06-15 DIAGNOSIS — D72.829 LEUKOCYTOSIS, UNSPECIFIED TYPE: ICD-10-CM

## 2023-06-15 DIAGNOSIS — R63.4 UNINTENTIONAL WEIGHT LOSS: Primary | ICD-10-CM

## 2023-06-15 PROCEDURE — 99214 OFFICE O/P EST MOD 30 MIN: CPT | Performed by: INTERNAL MEDICINE

## 2023-06-15 NOTE — PROGRESS NOTES
Hematology/Oncology Outpatient Follow-up  Peg Tavares 37 y o  female 1980 [id number]    Date:  6/15/2023    Assessment and Plan:  1  Unintentional weight loss  She stated that her weight is stable at this point in time  Most likely this is related to stress and tobacco abuse  There is no obvious hint of malignant process according to the recent imaging  She is up-to-date on mammograms and colonoscopies  2  Mediastinal abnormality  Stable anterior mediastinal nodule measuring 1 3 cm of unknown clinical significance  3  Lung nodule  Resolved according to recent CT scan  The patient was educated about smoking cessation  4  Leukocytosis, unspecified type  Most likely related to the tobacco abuse  There is mild lymphocytosis  Flow cytometry will be ordered prior to her next visit  - CBC and differential; Future  - Comprehensive metabolic panel; Future  - Magnesium; Future  - C-reactive protein; Future  - Sedimentation rate, automated; Future  - Leukemia/Lymphoma flow cytometry; Future      HPI:  This is a 64-year-old female with multiple comorbid conditions including bipolar disorder, anxiety, hyperlipidemia, chronic cholecystitis status post cholecystectomy, tobacco use, etc  She also seems to have a history of multiple benign colon polyps which was removed by her GI doctor on different occasions    The patient reported significant weight loss which was reported to be around 60-65 lb since 2018       She was recently seen by the GI team from the [facility] group who pursued upper endoscopy 01/06/2022   Showed antral intestinal metaplasia but no dysplasia/malignant process   She is scheduled for repeat colonoscopy July 2022 and will be getting repeat upper endoscopy 2023      She had multiple imaging studies of her chest abdomen pelvis recently including a CT scan of the chest without contrast and CT scan of the abdomen and pelvis with contrast which did not reveal any obvious hint of malignant process      A PET scan was also done on 10/01/2021 which showed:  IMPRESSION:  1   Subcentimeter nodule along the right major fissure does not demonstrate significant FDG uptake, and is unchanged from prior CTs dating back to at least 2015, most suggestive of a benign process such as an intrafissural node  2  Pennelope Bridges increased prominence of anterior mediastinal soft tissue likely representing residual thymic tissue   This may be reactive but may be reassessed on follow-up low dose CT in 6-12 months  3   Otherwise there are no hypermetabolic lesions that are concerning for malignancy/metastases          Interval history:  The patient came today for follow-up visit  She denied further weight loss  She did have a CT scan of the chest abdomen pelvis on 5/15/2023 which showed:  IMPRESSION:  1  Stable COPD with biapical pleural-parenchymal scarring and tiny fissural likely benign nodules as above  No new or worrisome nodule appreciated  Previously noted left upper lobe cystic 3 mm nodule has resolved during the interim  2  Left renal midpole 2 to 3 mm nonobstructive calculus  3  No findings to account for weight loss  Blood work on 6/7/2023 showed hemoglobin of 13 6, white cell count of 11 3 and a platelet count of 879  ANC was 5 5 with absolute lymphocyte count of 5 0  Creatinine 0 7 with normal calcium and liver enzymes  Magnesium 2 2  Inflammation markers are normal   LDH was normal       ROS: Review of Systems   Constitutional: Positive for fatigue  Negative for chills and fever  HENT: Negative for ear pain and sore throat  Eyes: Negative for pain and visual disturbance  Respiratory: Negative for cough and shortness of breath  Cardiovascular: Negative for chest pain and palpitations  Gastrointestinal: Negative for abdominal pain and vomiting  Genitourinary: Negative for dysuria and hematuria  Musculoskeletal: Positive for arthralgias  Negative for back pain     Skin: Negative for color change and rash  Neurological: Positive for dizziness, numbness and headaches  Negative for seizures and syncope  Psychiatric/Behavioral: Positive for sleep disturbance  All other systems reviewed and are negative        Past Medical History:   Diagnosis Date   • Anxiety    • Bipolar disorder [facility])    • Chronic pain    • Colon polyp    • Endometriosis    • Hyperlipidemia    • Kidney stone    • Urinary frequency     resolved: 6/14/2016   • Urinary urgency     resolved: 6/14/2016       Past Surgical History:   Procedure Laterality Date   • BLADDER SUSPENSION     • CHOLECYSTECTOMY     • COLONOSCOPY     • EPIDURAL BLOCK INJECTION N/A 10/17/2019    Procedure: BLOCK / INJECTION EPIDURAL STEROID CERVICAL c7-t1 interlaminar;  Surgeon: Gerald Rankin MD;  Location: [facility];  Service: Pain Management    • EPIDURAL BLOCK INJECTION Bilateral 1/16/2020    Procedure: L4-L5 Transforaminal Epidural Steroid Injection;  Surgeon: Gerald Rankin MD;  Location: [facility];  Service: Pain Management    • FL GUIDED NEEDLE PLAC BX/ASP/INJ  10/17/2019   • FL GUIDED NEEDLE PLAC BX/ASP/INJ  1/16/2020   • HERNIA REPAIR     • HYSTERECTOMY      full    • OVARIAN CYST REMOVAL     • SC LAPAROSCOPY SURG CHOLECYSTECTOMY N/A 4/30/2018    Procedure: CHOLECYSTECTOMY LAPAROSCOPIC;  Surgeon: Kaya Nichols DO;  Location: [facility];  Service: General   • SC SURGICAL ARTHROSCOPY SHOULDER BICEPS TENODESIS Right 8/14/2019    Procedure: ARTHROSCOPY SHOULDER WITH BICEPS TENOTOMY AND SAD;  Surgeon: Anabel Stack MD;  Location: [facility];  Service: Orthopedics   • TONSILLECTOMY     • UPPER GASTROINTESTINAL ENDOSCOPY         Social History     Socioeconomic History   • Marital status: /Civil Union     Spouse name: None   • Number of children: 1   • Years of education: None   • Highest education level: GED or equivalent   Occupational History   • None   Tobacco Use   • Smoking status: Every Day     Packs/day: 1 00     Years: 1 00 Total pack years: 1 00     Types: Cigarettes   • Smokeless tobacco: Never   Vaping Use   • Vaping Use: Never used   Substance and Sexual Activity   • Alcohol use: Yes     Alcohol/week: 3 0 - 4 0 standard drinks of alcohol     Types: 1 - 2 Glasses of wine, 2 Standard drinks or equivalent per week     Comment: Social drinker   • Drug use: No   • Sexual activity: Yes     Partners: Male     Birth control/protection: Female Sterilization     Comment: Hysterectomy   Other Topics Concern   • None   Social History Narrative    Disabled    Drinks coffee    One child-age 7     Social Determinants of Health     Financial Resource Strain: Low Risk  (6/7/2021)    Overall Financial Resource Strain (CARDIA)    • Difficulty of Paying Living Expenses: Not hard at all   Food Insecurity: No Food Insecurity (6/7/2021)    Hunger Vital Sign    • Worried About Running Out of Food in the Last Year: Never true    • Ran Out of Food in the Last Year: Never true   Transportation Needs: No Transportation Needs (6/7/2021)    PRAPARE - Transportation    • Lack of Transportation (Medical): No    • Lack of Transportation (Non-Medical): No   Physical Activity: Inactive (7/8/2019)    Exercise Vital Sign    • Days of Exercise per Week: 0 days    • Minutes of Exercise per Session: 0 min   Stress: No Stress Concern Present (7/8/2019)    Martin7 [place]anch[address] Lomas    • Feeling of Stress :  Only a little   Social Connections: Unknown (7/8/2019)    Social Connection and Isolation Panel [NHANES]    • Frequency of Communication with Friends and Family: More than three times a week    • Frequency of Social Gatherings with Friends and Family: More than three times a week    • Attends Sikhism Services: Not on file    • Active Member of Clubs or Organizations: Not on file    • Attends Club or Organization Meetings: Not on file    • Marital Status:    Intimate Partner Violence: Not At Risk (7/8/2019) Humiliation, Afraid, Rape, and Kick questionnaire    • Fear of Current or Ex-Partner: No    • Emotionally Abused: No    • Physically Abused: No    • Sexually Abused: No   Housing Stability: Not on file       Family History   Problem Relation Age of Onset   • Diabetes Mother    • Heart disease Mother         rheumatic   • Neuropathy Mother    • COPD Mother    • Diabetes type II Mother    • Cancer Mother    • Cancer Father    • Lung cancer Father    • Stroke Maternal Grandmother         CVA   • Diabetes type II Maternal Grandmother         mellitus   • Other Maternal Grandfather         esophageal abnormality   • Diabetes type II Paternal Grandmother         mellitus   • Diabetes type II Paternal Grandfather         mellitus   • Depression Family    • Esophageal cancer Family    • Lung cancer Family    • Stomach cancer Family        No Known Allergies      Current Outpatient Medications:   •  albuterol (ProAir HFA) 90 mcg/act inhaler, Inhale 2 puffs every 6 (six) hours as needed for wheezing or shortness of breath, Disp: 8 5 g, Rfl: 6  •  buPROPion (WELLBUTRIN SR) 100 mg 12 hr tablet, 100 mg daily, Disp: , Rfl: 0  •  Cholecalciferol (VITAMIN D3) 2000 units TABS, Take 4,000 Units by mouth daily  , Disp: , Rfl:   •  gabapentin (NEURONTIN) 300 mg capsule, TAKE 1 CAPSULE BY MOUTH TWICE A DAY AND 2 CAPSULES BY MOUTH AT BEDTIME, Disp: 120 capsule, Rfl: 3  •  lamoTRIgine (LaMICtal) 100 mg tablet, Take 100 mg by mouth daily, Disp: , Rfl:   •  LORazepam (ATIVAN) 0 5 mg tablet, Take 1 tablet by mouth daily at bedtime  , Disp: , Rfl:   •  Multiple Vitamin (DAILY VALUE MULTIVITAMIN) TABS, Take by mouth, Disp: , Rfl:   •  Nerve Stimulator (Standard TENS) ISABELLA, Use 3 (three) times a day as needed (pain), Disp: 1 Device, Rfl: 0  •  simvastatin (ZOCOR) 40 mg tablet, take 1 tablet by mouth once daily, Disp: 90 tablet, Rfl: 3  •  tiZANidine (ZANAFLEX) 4 mg tablet, Take 1 tablet (4 mg total) by mouth daily at bedtime, Disp: 30 tablet, "Rfl: 3  •  citalopram (CeleXA) 20 mg tablet, Take 10 mg by mouth every morning  (Patient not taking: Reported on 8/24/2022), Disp: , Rfl: 0  •  doxycycline (ADOXA) 100 MG tablet, doxycycline monohydrate 100 mg tablet  take 1 tablet by mouth twice a day for 5 days (Patient not taking: Reported on 11/29/2022), Disp: , Rfl:   •  lamoTRIgine (LaMICtal) 200 MG tablet, Take 1 tablet by mouth 2 (two) times a day (Patient not taking: Reported on 11/29/2022), Disp: , Rfl:   •  naproxen (NAPROSYN) 500 mg tablet, Take 1 tablet (500 mg total) by mouth 2 (two) times a day with meals for 5 days (Patient not taking: Reported on 5/13/2022), Disp: 10 tablet, Rfl: 0      Physical Exam:  /72 (BP Location: Right arm, Patient Position: Sitting, Cuff Size: Adult)   Pulse 77   Temp 98 3 °F (36 8 °C)   Resp 18   Ht 5' 6\" (1 676 m)   Wt 63 kg (139 lb)   SpO2 96%   BMI 22 44 kg/m²     Physical Exam  Constitutional:       General: She is not in acute distress  Appearance: She is well-developed  She is not diaphoretic  HENT:      Head: Normocephalic and atraumatic  Nose: Nose normal    Eyes:      General: No scleral icterus  Right eye: No discharge  Left eye: No discharge  Conjunctiva/sclera: Conjunctivae normal       Pupils: Pupils are equal, round, and reactive to light  Neck:      Thyroid: No thyromegaly  Vascular: No JVD  Trachea: No tracheal deviation  Cardiovascular:      Rate and Rhythm: Normal rate and regular rhythm  Heart sounds: Normal heart sounds  No murmur heard  No friction rub  Pulmonary:      Effort: Pulmonary effort is normal  No respiratory distress  Breath sounds: Normal breath sounds  No stridor  No wheezing or rales  Chest:      Chest wall: No tenderness  Abdominal:      General: There is no distension  Palpations: Abdomen is soft  There is no hepatomegaly or splenomegaly  Tenderness: There is no abdominal tenderness   There is no " guarding or rebound  Musculoskeletal:         General: No tenderness or deformity  Normal range of motion  Cervical back: Normal range of motion and neck supple  Lymphadenopathy:      Cervical: No cervical adenopathy  Skin:     General: Skin is warm and dry  Coloration: Skin is not pale  Findings: No erythema or rash  Neurological:      Mental Status: She is alert and oriented to person, place, and time  Cranial Nerves: No cranial nerve deficit  Coordination: Coordination normal       Deep Tendon Reflexes: Reflexes are normal and symmetric  Psychiatric:         Behavior: Behavior normal          Thought Content: Thought content normal          Judgment: Judgment normal            Labs:  Lab Results   Component Value Date    HCT 40 9 06/07/2023    HGB 13 6 06/07/2023    MCV 97 06/07/2023     06/07/2023    WBC 11 36 (H) 06/07/2023     Lab Results   Component Value Date    ALKPHOS 87 06/07/2023    ALT 23 06/07/2023    ANIONGAP 12 0 04/08/2018    AST 15 06/07/2023    BILITOT 0 2 (L) 04/08/2018    BUN 14 06/07/2023    CALCIUM 9 2 06/07/2023     (H) 06/07/2023    CO2 29 06/07/2023    CREATININE 0 72 06/07/2023    EGFR 101 06/07/2023    GLUCOSE 82 12/07/2015    GLUF 92 06/07/2023    K 4 0 06/07/2023     04/08/2018    PROT 7 2 04/08/2018       Patient voiced understanding and agreement in the above discussion  Aware to contact our office with questions/symptoms in the interim

## 2023-06-20 ENCOUNTER — TELEPHONE (OUTPATIENT)
Dept: FAMILY MEDICINE CLINIC | Facility: CLINIC | Age: 43
End: 2023-06-20

## 2023-06-20 NOTE — TELEPHONE ENCOUNTER
Patient called stating on her recent CT of her abdomen, it was noted that she has a fatty liver  She wants to know how she can either get rid of that or work on that? She states Dr Jacob Daniels would not address that at her visit  Patient was made aware that Dr Sukh Mckee is out this week

## 2023-06-22 NOTE — TELEPHONE ENCOUNTER
Called patient back to address her concerns  Patient denies alcohol use  Advised patient to work on lifestyle modifications  Encouraged healthy diet and exercise

## 2023-07-05 ENCOUNTER — TELEPHONE (OUTPATIENT)
Dept: NEUROLOGY | Facility: CLINIC | Age: 43
End: 2023-07-05

## 2023-07-05 NOTE — TELEPHONE ENCOUNTER
I called and left a voicemail message reminding the patient of there upcoming appointment with Clayton Echeverria PA-C on 7/11/23 @ 9am in the [facility]. I requested a call back to our office to confirm the appointment or if the patient has any issues or concerns or cannot keep this appointment.

## 2023-07-11 ENCOUNTER — OFFICE VISIT (OUTPATIENT)
Dept: NEUROLOGY | Facility: CLINIC | Age: 43
End: 2023-07-11
Payer: COMMERCIAL

## 2023-07-11 VITALS
HEART RATE: 66 BPM | BODY MASS INDEX: 22.66 KG/M2 | WEIGHT: 141 LBS | OXYGEN SATURATION: 97 % | SYSTOLIC BLOOD PRESSURE: 112 MMHG | TEMPERATURE: 97.6 F | DIASTOLIC BLOOD PRESSURE: 64 MMHG | HEIGHT: 66 IN | RESPIRATION RATE: 18 BRPM

## 2023-07-11 DIAGNOSIS — G89.29 CHRONIC NECK PAIN: ICD-10-CM

## 2023-07-11 DIAGNOSIS — M54.2 CHRONIC NECK PAIN: ICD-10-CM

## 2023-07-11 DIAGNOSIS — M79.18 CERVICAL MYOFASCIAL PAIN SYNDROME: Primary | ICD-10-CM

## 2023-07-11 DIAGNOSIS — M54.16 LUMBAR RADICULOPATHY: ICD-10-CM

## 2023-07-11 PROCEDURE — 99214 OFFICE O/P EST MOD 30 MIN: CPT | Performed by: PHYSICIAN ASSISTANT

## 2023-07-11 NOTE — PROGRESS NOTES
Patient ID: Enrico Eddy is a 37 y.o. female. Assessment/Plan:  Joy Tilley is a 49-year-old female who initially presented in 2017 with complaints of paresthesias, muscle cramps, weakness and stiffness. Neuro exam has been unremarkable. Workup has included normal MRI of the brain, normal EMG, MRI cervical spine with some lordosis and non-compressive degenerative changes (normal cord signal), lumbar spine films unrevealing. She was referred to and seen by physiatry and pain management, but has not continued to follow with them. She has maintained on gabapentin and tizanidine, feels these meds are helpful for cervical myofascial pain, lumbar pain etc.     I again discussed with the patient that we do not treat chronic neck pain, back pain and myofascial pain in neurology. Chronic pain should be managed by PCP or pain management. She is interested in returning to pain management as she had some injections several years ago which were helpful and is interested in discussing repeat injections. We are currently Rx’ing her gabapentin and tizanidine, which is why she comes yearly for appts, but we are not treating her for any neurologic disorder. At this time, will ask that her PCP take over the scripts and refer back to pain management. There is no need for ongoing follow up through our office. Follow up PRN. Diagnoses and all orders for this visit:    Cervical myofascial pain syndrome  -     Ambulatory Referral to Pain Management; Future    Chronic neck pain  -     Ambulatory Referral to Pain Management; Future    Lumbar radiculopathy  -     Ambulatory Referral to Pain Management; Future           Subjective:    HPI    Enrico Eddy is a 37 y.o. female who presents today for neurologic follow up. She was last seen in August 2022 via telemedicine. To review, patient initially seen by Dr. Kyali Macdonald in 2017 for paresthesias and cramping, as well as stiffness/weakness throughout her body.  Her PCP had ordered MRI brain which was normal. She had extensive labs which were essentially all unremarkable. She had EMG of the right upper and right lower extremities in October 2017 and this was a normal study. Neurologic exam has been normal. She was placed on gabapentin. MRI of the cervical spine was also obtained which showed mild, noncompressive degenerative changes. She was sent to physiatry and saw Dr. Mary Kay Montemayor who diagnosed cervical myofascial pain syndrome. She had advised over-the-counter topical muscle relaxants and also performed trigger point injections. She was also referred to Pain Management. Patient has been maintained on gabapentin 300 mg b.i.d. and 600 mg HS, along with tizanidine 4 mg HS. At timing of last visit in August 2022 (which was my first time meeting her, she was following with another provider in the practice who is no longer here), we discussed that we do not treat chronic neck pain in neurology. We discussed that we are not treating her for a neurologic disorder and ideally pain management or PCP should take over her gabapentin and tizanidine scripts. Today, patient reports things are the same. She continues with neck and back pain, but does feel tizanidine and gabapentin help with her pain. She has not seen pain management in several years, had some injections through their office which helped, but have worn off. She has absolutely no new neurologic symptoms. Denies weakness, bowel or bladder changes, falls. The following portions of the patient's history were reviewed and updated as appropriate: current medications, past family history, past medical history, past social history, past surgical history and problem list.       Objective:    Blood pressure 112/64, pulse 66, temperature 97.6 °F (36.4 °C), temperature source Temporal, resp. rate 18, height 5' 6" (1.676 m), weight 64 kg (141 lb), SpO2 97 %    Physical Exam  Constitutional:       Appearance: Normal appearance. HENT:      Head: Normocephalic and atraumatic. Eyes:      Extraocular Movements: EOM normal.      Pupils: Pupils are equal, round, and reactive to light. Neurological:      Mental Status: She is alert. Motor: Motor strength is normal.     Deep Tendon Reflexes: Reflexes are normal and symmetric. Psychiatric:         Mood and Affect: Mood normal.         Speech: Speech normal.         Behavior: Behavior normal.         Neurological Exam  Mental Status  Alert. Oriented to person, place, time and situation. Speech is normal. Language is fluent with no aphasia. Attention and concentration are normal.    Cranial Nerves  CN II: Visual fields full to confrontation. CN III, IV, VI: Extraocular movements intact bilaterally. Pupils equal round and reactive to light bilaterally. CN V: Facial sensation is normal.  CN VII: Full and symmetric facial movement. CN VIII: Hearing is normal.  CN IX, X: Palate elevates symmetrically  CN XI: Shoulder shrug strength is normal.  CN XII: Tongue midline without atrophy or fasciculations. Motor   Normal muscle tone. Strength is 5/5 throughout all four extremities. Sensory  Light touch is normal in upper and lower extremities. Reflexes  Deep tendon reflexes are 2+ and symmetric in all four extremities. Coordination  Right: Finger-to-nose normal.Left: Finger-to-nose normal.    Gait  Casual gait is normal including stance, stride, and arm swing. ROS:    Review of Systems   Constitutional: Negative for chills and fever. HENT: Negative for ear pain and sore throat. Eyes: Negative for pain and visual disturbance. Respiratory: Negative for cough and shortness of breath. Cardiovascular: Negative for chest pain and palpitations. Gastrointestinal: Negative for abdominal pain and vomiting. Genitourinary: Negative for dysuria and hematuria. Musculoskeletal: Positive for back pain (upper and lower), neck pain and neck stiffness. Negative for arthralgias. Skin: Negative for color change and rash. Neurological: Positive for dizziness (when bending down) and light-headedness. Negative for seizures and syncope. Psychiatric/Behavioral: Positive for confusion. All other systems reviewed and are negative.     I personally reviewed and updated the ROS as appropriate

## 2023-07-11 NOTE — PATIENT INSTRUCTIONS
Continue current doses of gabapentin and tizanidine  Referral back to pain management  Would ask that your PCP or pain management take over the scripts for tizanidine and gabapentin since there is no neurologic disorder we are following your for   Follow up as needed

## 2023-08-24 ENCOUNTER — APPOINTMENT (OUTPATIENT)
Dept: RADIOLOGY | Facility: CLINIC | Age: 43
End: 2023-08-24
Payer: COMMERCIAL

## 2023-08-24 ENCOUNTER — CONSULT (OUTPATIENT)
Age: 43
End: 2023-08-24
Payer: COMMERCIAL

## 2023-08-24 ENCOUNTER — TELEPHONE (OUTPATIENT)
Age: 43
End: 2023-08-24

## 2023-08-24 VITALS
HEART RATE: 73 BPM | WEIGHT: 140 LBS | OXYGEN SATURATION: 96 % | SYSTOLIC BLOOD PRESSURE: 98 MMHG | DIASTOLIC BLOOD PRESSURE: 60 MMHG | BODY MASS INDEX: 22.5 KG/M2 | HEIGHT: 66 IN

## 2023-08-24 DIAGNOSIS — G89.29 CHRONIC NECK PAIN: ICD-10-CM

## 2023-08-24 DIAGNOSIS — M54.2 CHRONIC NECK PAIN: ICD-10-CM

## 2023-08-24 DIAGNOSIS — M54.50 CHRONIC BILATERAL LOW BACK PAIN WITHOUT SCIATICA: ICD-10-CM

## 2023-08-24 DIAGNOSIS — G89.29 CHRONIC BILATERAL LOW BACK PAIN WITHOUT SCIATICA: ICD-10-CM

## 2023-08-24 DIAGNOSIS — M47.816 LUMBAR SPONDYLOSIS: Primary | ICD-10-CM

## 2023-08-24 DIAGNOSIS — M79.18 CERVICAL MYOFASCIAL PAIN SYNDROME: ICD-10-CM

## 2023-08-24 DIAGNOSIS — M47.816 LUMBAR SPONDYLOSIS: ICD-10-CM

## 2023-08-24 DIAGNOSIS — R20.2 PARESTHESIA: ICD-10-CM

## 2023-08-24 DIAGNOSIS — M54.2 CERVICALGIA: ICD-10-CM

## 2023-08-24 DIAGNOSIS — M54.12 CERVICAL RADICULOPATHY: ICD-10-CM

## 2023-08-24 DIAGNOSIS — M54.16 LUMBAR RADICULOPATHY: ICD-10-CM

## 2023-08-24 DIAGNOSIS — M79.10 MUSCLE PAIN: ICD-10-CM

## 2023-08-24 PROCEDURE — 72110 X-RAY EXAM L-2 SPINE 4/>VWS: CPT

## 2023-08-24 PROCEDURE — 72050 X-RAY EXAM NECK SPINE 4/5VWS: CPT

## 2023-08-24 PROCEDURE — 99204 OFFICE O/P NEW MOD 45 MIN: CPT | Performed by: ANESTHESIOLOGY

## 2023-08-24 RX ORDER — TIZANIDINE 4 MG/1
4 TABLET ORAL
Qty: 30 TABLET | Refills: 3 | Status: SHIPPED | OUTPATIENT
Start: 2023-08-24

## 2023-08-24 RX ORDER — GABAPENTIN 300 MG/1
CAPSULE ORAL
Qty: 120 CAPSULE | Refills: 3 | Status: SHIPPED | OUTPATIENT
Start: 2023-08-24

## 2023-08-24 NOTE — PATIENT INSTRUCTIONS
Core Strengthening Exercises   WHAT YOU NEED TO KNOW:   What do I need to know about core strengthening exercises? Your core includes the muscles of your lower back, hip, pelvis, and abdomen. Core strengthening exercises help heal and strengthen these muscles. This helps prevent another injury, and keeps your pelvis, spine, and hips in the correct position. What do I need to know about exercise safety? Talk to your healthcare provider before you start an exercise program. A physical therapist can teach you how to do core strengthening exercises safely. Do the exercises on a mat or firm surface. A firm surface will support your spine and prevent low back pain. Do not do these exercises on a bed. Move slowly and smoothly. Avoid fast or jerky motions. Stop if you feel pain. You may feel some discomfort at first, but you should not feel pain. Tell your provider or physical therapist if you have pain while you exercise. Regular exercise will help decrease your discomfort over time. Breathe normally during core exercises. Do not hold your breath. This may cause an increase in blood pressure and prevent muscle strengthening. Your healthcare provider will tell you when to inhale and exhale during the exercise. Begin all of your exercises with abdominal bracing. Abdominal bracing helps warm up your core muscles. You can also practice abdominal bracing throughout the day. Lie on your back with your knees bent and feet flat on the floor. Place your arms in a relaxed position beside your body. Tighten your abdominal muscles. Pull your belly button in and up toward your spine. Hold for 5 seconds. Relax your muscles. Repeat 10 times. How do I perform core strengthening exercises? Your healthcare provider will tell you how often to do these exercises. The provider will also tell you how many repetitions of each exercise you should do. Hold each exercise for 5 seconds or as directed.  As you get stronger, increase your hold to 10 to 15 seconds. You can do some of these exercises on a stability ball, or with a weight. Ask your healthcare provider how to use a stability ball or weight for these exercises:  Bridging:  Lie on your back with your knees bent and feet flat on the floor. Rest your arms at your side. Tighten your buttocks, and then lift your hips 1 inch off the floor. Hold for 5 seconds. When you can do this exercise without pain for 10 seconds, increase the distance you lift your hips. A good goal is to be able to lift your hips so that your shoulders, hips, and knees are in a straight line. Dead bug:  Lie on your back with your knees bent and feet flat on the floor. Place your arms in a relaxed position beside your body. Begin with abdominal bracing. Next, raise one leg, keeping your knee bent. Hold for 5 seconds. Repeat with the other leg. When you can do this exercise without pain for 10 to 15 seconds, you may raise one straight leg and hold. Repeat with the other leg. Quadruped:  Place your hands and knees on the floor. Keep your wrists directly below your shoulders and your knees directly below your hips. Pull your belly button in toward your spine. Do not flatten or arch your back. Tighten your abdominal muscles below your belly button. Hold for 5 seconds. When you can do this exercise without pain for 10 to 15 seconds, you may extend one arm and hold. Repeat on the other side. Side bridge exercises:      Standing side bridge:  Stand next to a wall and extend one arm toward the wall. Place your palm flat on the wall with your fingers pointing upward. Begin with abdominal bracing. Next, without moving your feet, slowly bend your arm to 90 degrees. Hold for 5 seconds. Repeat on the other side. When you can do this exercise without pain for 10 to 15 seconds, you may do the bent leg side bridge on the floor.          Bent leg side bridge:  Lie on one side with your legs, hips, and shoulders in a straight line. Prop yourself up onto your forearm so your elbow is directly below your shoulder. Bend your knees back to 90 degrees. Begin with abdominal bracing. Next, lift your hips and balance yourself on your forearm and knees. Hold for 5 seconds. Repeat on the other side. When you can do this exercise without pain for 10 to 15 seconds, you may do the straight leg side bridge on the floor. Straight leg side bridge:  Lie on one side with your legs, hips, and shoulders in a straight line. Prop yourself up onto your forearm so your elbow is directly below your shoulder. Begin with abdominal bracing. Lift your hips off the floor and balance yourself on your forearm and the outside of your flexed foot. Do not let your ankle bend sideways. Hold for 5 seconds. Repeat on the other side. When you can do this exercise without pain for 10 to 15 seconds, ask your healthcare provider for more advanced exercises. Superman:  Lie on your stomach. Extend your arms forward on the floor. Tighten your abdominal muscles and lift your right hand and left leg off the floor. Hold this position. Slowly return to the starting position. Tighten your abdominal muscles and lift your left hand and right leg off the floor. Hold this position. Slowly return to the starting position. Clam:  Lie on your side with your knees bent. Put your bottom arm under your head to keep your neck in line. Put your top hand on your hip to keep your pelvis from moving. Put your heels together, and keep them together during this exercise. Slowly raise your top knee toward the ceiling. Then lower your leg so your knees are together. Repeat this exercise 10 times. Then switch sides and do the exercise 10 times with the other leg. Curl up:  Lie on your back with your knees bent and feet flat on the floor. Place your hands, palms down, underneath your lower back.  Next, with your elbows on the floor, lift your shoulders and chest 2 to 3 inches off the floor. Keep your head in line with your shoulders. Hold this position. Slowly return to the starting position. Straight leg raises:  Lie on your back with one leg straight. Bend the other knee and place your foot flat on the floor. Tighten your abdominal muscles. Keep your leg straight and slowly lift it straight up 6 to 12 inches off the floor. Hold this position. Lower your leg slowly. Do as many repetitions as directed on this side. Repeat with the other leg. Plank:  Lie on your stomach. Bend your elbows and place your forearms flat on the floor. Lift your chest, stomach, and knees off the floor. Make sure your elbows are below your shoulders. Your body should be in a straight line. Do not let your hips or lower back sink to the ground. Squeeze your abdominal muscles together and hold for 15 seconds. To make this exercise harder, hold for 30 seconds or lift 1 leg at a time. Bicycles:  Lie on your back. Bend both knees and bring them toward your chest. Your calves should be parallel to the floor. Place the palms of your hands on the back of your head. Straighten your right leg and keep it lifted 2 inches off the floor. Raise your head and shoulders off the floor and twist towards your left. Keep your head and shoulders lifted. Bend your right knee while you straighten your left leg. Keep your left leg 2 inches off the floor. Twist your head and chest towards the left leg. Continue to straighten 1 leg at a time and twist.       When should I call my doctor or physical therapist?   You have sharp or worsening pain during exercise or at rest.    You have questions or concerns about your condition, care, or exercise program.    CARE AGREEMENT:   You have the right to help plan your care. Learn about your health condition and how it may be treated. Discuss treatment options with your healthcare providers to decide what care you want to receive.  You always have the right to refuse treatment. The above information is an  only. It is not intended as medical advice for individual conditions or treatments. Talk to your doctor, nurse or pharmacist before following any medical regimen to see if it is safe and effective for you. © Copyright [employer] 2022 Information is for End User's use only and may not be sold, redistributed or otherwise used for commercial purposes.

## 2023-08-24 NOTE — PROGRESS NOTES
Assessment:  1. Lumbar spondylosis    2. Cervical radiculopathy    3. Lumbar radiculopathy    4. Cervical myofascial pain syndrome    5. Cervicalgia    6. Chronic bilateral low back pain without sciatica    7. Chronic neck pain    8. Paresthesia    9. Muscle pain        Plan:  Patient is a 53-year-old female complains of neck pain, low back pain, right shoulder pain with chronic pain syndrome secondary to cervical radiculopathy, lumbar spondylosis presents to office for initial consultation. Patient was seen in the past which we did perform L5 nerve root blocks which provide patient with significant alleviation of symptoms. Patient denies any radiating pain down into the legs however she does note discogenic and facet agenic low back pain. Patient also reports facet agenic neck pain with some radiation into the right shoulder which appears to be in the C5 nerve root distribution. At this time we will combine both interventional management and physical therapy. Patient does have a history of biceps tendon tear right upper extremity. 1.  We will order an x-ray of the cervical lumbar spine to better assess the degenerative change and correlate patient current presentation. 2.  We will provide patient physical therapy for cervical spine strengthening using the next level and lumbar core strengthening exercises  3. We will schedule patient for a bilateral L4-L5 and L5-S1 medial branch block #1  4. We will refill gabapentin 300 mg p.o. 4 times daily  5. We will refill tizanidine 4 mg p.o. nightly  6. We will follow-up 1 month after injection    Complete risks and benefits including bleeding, infection, tissue reaction, nerve injury and allergic reaction were discussed. The approach was demonstrated using models and literature was provided. Verbal and written consent was obtained. History of Present Illness:     The patient is a 37 y.o. female who presents for consultation in regards to Headache, Neck Pain, Shoulder Pain, Arm Pain, and Back Pain. Symptoms have been present for 5 years. Symptoms began without any precipitating injury or trauma. Pain is reported to be 4 on the numeric rating scale. Symptoms are felt nearly constantly and worst in the no typical pattern. Symptoms are characterized as cramping, shooting, sharp, dull/aching and pressure-like. Symptoms are associated with no weakness. Aggravating factors include bending and exercise. Relieving factors include nothing. No change in symptoms with kneeling, lying down, standing, sitting, walking, exercise, relaxation, coughing/sneezing and bowel movements. Treatments that have been helpful include prior injections including LESI. physical therapy, chiropractic manipulation, home exercise and heat/ice have provided no relief. Medications to relieve symptoms include tizanidine, gabapentinv. Review of Systems:    Review of Systems   Constitutional: Negative for chills, fatigue and fever. HENT: Negative for hearing loss, sinus pain, sore throat and trouble swallowing. Eyes: Negative for pain and visual disturbance. Respiratory: Negative for shortness of breath and wheezing. Cardiovascular: Negative for chest pain and palpitations. Gastrointestinal: Negative for abdominal pain, constipation and nausea. Endocrine: Negative for polydipsia and polyuria. Genitourinary: Negative for difficulty urinating. Musculoskeletal: Positive for myalgias. Negative for arthralgias, gait problem and joint swelling. Joint pain   Skin: Negative for rash. Neurological: Positive for dizziness and headaches. Negative for weakness. Hematological: Does not bruise/bleed easily. Psychiatric/Behavioral: Positive for dysphoric mood. The patient is nervous/anxious. All other systems reviewed and are negative.         Past Medical History:   Diagnosis Date   • Anxiety    • Bipolar disorder [facility])    • Chronic pain    • Colon polyp    • Endometriosis    • Hyperlipidemia    • Kidney stone    • Urinary frequency     resolved: 6/14/2016   • Urinary urgency     resolved: 6/14/2016       Past Surgical History:   Procedure Laterality Date   • BLADDER SUSPENSION     • CHOLECYSTECTOMY     • COLONOSCOPY     • EPIDURAL BLOCK INJECTION N/A 10/17/2019    Procedure: BLOCK / INJECTION EPIDURAL STEROID CERVICAL c7-t1 interlaminar;  Surgeon: Payton Morillo MD;  Location: [facility];  Service: Pain Management    • EPIDURAL BLOCK INJECTION Bilateral 1/16/2020    Procedure: L4-L5 Transforaminal Epidural Steroid Injection;  Surgeon: Payton Morillo MD;  Location: [facility];  Service: Pain Management    • FL GUIDED NEEDLE PLAC BX/ASP/INJ  10/17/2019   • FL GUIDED NEEDLE PLAC BX/ASP/INJ  1/16/2020   • HERNIA REPAIR     • HYSTERECTOMY      full    • OVARIAN CYST REMOVAL     • DC LAPAROSCOPY SURG CHOLECYSTECTOMY N/A 4/30/2018    Procedure: CHOLECYSTECTOMY LAPAROSCOPIC;  Surgeon: Kelly Newsome DO;  Location: [facility];  Service: General   • DC SURGICAL ARTHROSCOPY SHOULDER BICEPS TENODESIS Right 8/14/2019    Procedure: ARTHROSCOPY SHOULDER WITH BICEPS TENOTOMY AND SAD;  Surgeon: Trevor Neil MD;  Location: [facility];  Service: Orthopedics   • TONSILLECTOMY     • UPPER GASTROINTESTINAL ENDOSCOPY         Family History   Problem Relation Age of Onset   • Diabetes Mother    • Heart disease Mother         rheumatic   • Neuropathy Mother    • COPD Mother    • Diabetes type II Mother    • Cancer Mother    • Cancer Father    • Lung cancer Father    • Stroke Maternal Grandmother         CVA   • Diabetes type II Maternal Grandmother         mellitus   • Other Maternal Grandfather         esophageal abnormality   • Diabetes type II Paternal Grandmother         mellitus   • Diabetes type II Paternal Grandfather         mellitus   • Depression Family    • Esophageal cancer Family    • Lung cancer Family    • Stomach cancer Family        Social History     Occupational History   • Not on file   Tobacco Use   • Smoking status: Every Day     Packs/day: 1.00     Years: 1.00     Total pack years: 1.00     Types: Cigarettes   • Smokeless tobacco: Never   Vaping Use   • Vaping Use: Never used   Substance and Sexual Activity   • Alcohol use: Yes     Alcohol/week: 3.0 - 4.0 standard drinks of alcohol     Types: 1 - 2 Glasses of wine, 2 Standard drinks or equivalent per week     Comment: Social drinker   • Drug use: No   • Sexual activity: Yes     Partners: Male     Birth control/protection: Female Sterilization     Comment: Hysterectomy         Current Outpatient Medications:   •  albuterol (ProAir HFA) 90 mcg/act inhaler, Inhale 2 puffs every 6 (six) hours as needed for wheezing or shortness of breath, Disp: 8.5 g, Rfl: 6  •  buPROPion (WELLBUTRIN SR) 100 mg 12 hr tablet, 100 mg daily, Disp: , Rfl: 0  •  Cholecalciferol (VITAMIN D3) 2000 units TABS, Take 4,000 Units by mouth daily  , Disp: , Rfl:   •  gabapentin (NEURONTIN) 300 mg capsule, TAKE 1 CAPSULE BY MOUTH TWICE A DAY AND 2 CAPSULES BY MOUTH AT BEDTIME, Disp: 120 capsule, Rfl: 3  •  lamoTRIgine (LaMICtal) 100 mg tablet, Take 100 mg by mouth daily, Disp: , Rfl:   •  LORazepam (ATIVAN) 0.5 mg tablet, Take 1 tablet by mouth daily at bedtime  , Disp: , Rfl:   •  Multiple Vitamin (DAILY VALUE MULTIVITAMIN) TABS, Take by mouth, Disp: , Rfl:   •  Nerve Stimulator (Standard TENS) ISABELLA, Use 3 (three) times a day as needed (pain), Disp: 1 Device, Rfl: 0  •  simvastatin (ZOCOR) 40 mg tablet, take 1 tablet by mouth once daily, Disp: 90 tablet, Rfl: 3  •  tiZANidine (ZANAFLEX) 4 mg tablet, Take 1 tablet (4 mg total) by mouth daily at bedtime, Disp: 30 tablet, Rfl: 3    No Known Allergies    Physical Exam:    BP 98/60   Pulse 73   Ht 5' 6" (1.676 m)   Wt 63.5 kg (140 lb)   SpO2 96%   BMI 22.60 kg/m²     Constitutional: normal, well developed, well nourished, alert, in no distress and non-toxic and no overt pain behavior.   Eyes: anicteric  HEENT: grossly intact  Neck: supple, symmetric, trachea midline and no masses   Pulmonary:even and unlabored  Cardiovascular:No edema or pitting edema present  Skin:Normal without rashes or lesions and well hydrated  Psychiatric:Mood and affect appropriate  Neurologic:Cranial Nerves II-XII grossly intact  Musculoskeletal:antalgic     Cervical Spine examination demonstrates. Decreased ROM secondary to pain with lateral rotation to the left/right and bending to the left/right, in addition to neck flexion. 5/5 upper extremity strength in all muscle groups bilaterally. Negative Spurling's maneuver to the b/l Ue, sensitivity to light touch intact b/l Ue. Lumbar/Sacral Spine examination demonstrates. Decreased range of motion lumbar spine with pain upon: flexion, lateral rotation to the left/right, and bending to the left/right. Bilateral lumbar paraspinals tender to palpation. Muscle spasms noted in the lumbar area bilaterally. 5/5 lower extremity strength in all muscle groups bilaterally. Positive seated straight leg raise for bilateral lower extremities. Sensitivity to light touch intact bilateral lower extremities. 2+ reflexes in the patella and Achilles. No ankle clonus    Imaging  No orders to display       No orders of the defined types were placed in this encounter.

## 2023-08-28 DIAGNOSIS — E78.00 HYPERCHOLESTEROLEMIA: ICD-10-CM

## 2023-08-28 RX ORDER — SIMVASTATIN 40 MG
40 TABLET ORAL DAILY
Qty: 90 TABLET | Refills: 0 | Status: SHIPPED | OUTPATIENT
Start: 2023-08-28

## 2023-08-29 ENCOUNTER — TELEPHONE (OUTPATIENT)
Age: 43
End: 2023-08-29

## 2023-08-29 NOTE — TELEPHONE ENCOUNTER
PA REQUEST FOR GABAPENTIN HAS BEEN SUBMITTED TO [facility] FOR COVERAGE DETERMINATION.  WILL AWAIT RESPONSE     CMM [id number]     WAS PREVIOUSLY SUBMITTED WITH SURE SCRIPTS WITH NO RESPONSE

## 2023-10-03 ENCOUNTER — HOSPITAL ENCOUNTER (OUTPATIENT)
Dept: RADIOLOGY | Facility: HOSPITAL | Age: 43
Discharge: HOME | End: 2023-10-03
Admitting: ANESTHESIOLOGY
Payer: COMMERCIAL

## 2023-10-03 VITALS
SYSTOLIC BLOOD PRESSURE: 106 MMHG | OXYGEN SATURATION: 95 % | HEART RATE: 76 BPM | RESPIRATION RATE: 20 BRPM | DIASTOLIC BLOOD PRESSURE: 74 MMHG | TEMPERATURE: 98.1 F

## 2023-10-03 DIAGNOSIS — M47.816 LUMBAR SPONDYLOSIS: ICD-10-CM

## 2023-10-03 PROCEDURE — 64494 INJ PARAVERT F JNT L/S 2 LEV: CPT | Performed by: ANESTHESIOLOGY

## 2023-10-03 PROCEDURE — 64493 INJ PARAVERT F JNT L/S 1 LEV: CPT | Performed by: ANESTHESIOLOGY

## 2023-10-03 RX ORDER — LIDOCAINE HYDROCHLORIDE 10 MG/ML
5 INJECTION, SOLUTION EPIDURAL; INFILTRATION; INTRACAUDAL; PERINEURAL ONCE
Status: COMPLETED | OUTPATIENT
Start: 2023-10-03 | End: 2023-10-03

## 2023-10-03 RX ADMIN — Medication 3 ML: at 11:09

## 2023-10-03 RX ADMIN — LIDOCAINE HYDROCHLORIDE 5 ML: 10 INJECTION, SOLUTION EPIDURAL; INFILTRATION; INTRACAUDAL; PERINEURAL at 11:04

## 2023-10-03 NOTE — H&P
Assessment:  1. Lumbar spondylosis  FL spine and pain procedure    FL spine and pain procedure          Plan:  Trisha Headley is a 37 y.o. female with complaints of low back pain presents to surgical center for procedure. 1. We will perform a bilateral L4-L5 and L5-S1 medial branch block #1  2. 2. Follow-up 1 month after injection    Complete risks and benefits including bleeding, infection, tissue reaction, nerve injury and allergic reaction were discussed. The approach was demonstrated using models and literature was provided. Verbal and written consent was obtained. My impressions and treatment recommendations were discussed in detail with the patient who verbalized understanding and had no further questions. Discharge instructions were provided. I personally saw and examined the patient and I agree with the above discussed plan of care. Orders Placed This Encounter   Procedures   • FL spine and pain procedure     Bilateral L4-L5 and L5-S1 medial branch block #1     Standing Status:   Standing     Number of Occurrences:   1     Order Specific Question:   Reason for Exam:     Answer:   Lumbar spondylosis     Order Specific Question:   Is the patient pregnant? Answer:   No     Order Specific Question:   Anticoagulant hold needed? Answer:   No     New Medications Ordered This Visit   Medications   • lidocaine (PF) (XYLOCAINE-MPF) 1 % injection 5 mL   • lidocaine (PF) (XYLOCAINE-MPF) 2 % injection 3 mL       History of Present Illness:  Trisha Headley is a 37 y.o. female who presents for a follow up office visit in regards to low back pain. The patient’s current symptoms include 8 of 10 sharp and stabbing throbbing pain with any particular time pattern. I have personally reviewed and/or updated the patient's past medical history, past surgical history, family history, social history, current medications, allergies, and vital signs today.      Review of Systems   Musculoskeletal: Positive for arthralgias and back pain. All other systems reviewed and are negative. Patient Active Problem List   Diagnosis   • Acute back pain   • Bipolar disorder ([address])   • Breast lump on right side at 10 o'clock position   • Chronic neck pain   • Classic migraine   • Contact with and suspected exposure to communicable disease   • Dizziness   • COPD (chronic obstructive pulmonary disease) ([facility])   • Generalized anxiety disorder   • Hypercholesterolemia   • Hyperlipidemia   • Hyperglycemia   • Leg cramps   • Leg pain   • Leukocytosis   • Lower back pain   • Lung nodule   • Lymphadenitis, acute   • Major depression   • Muscle cramps   • Other ovarian cyst, right side   • Pain, hand   • Paresthesia   • Pelvic pain in female   • Right lower quadrant abdominal pain   • Shingles   • Shortness of breath   • Skin rash   • Smoking   • Upper limb weakness   • Vaginal discharge   • Vitamin D deficiency   • Gallbladder polyp   • Cervicalgia   • Cervical myofascial pain syndrome   • Chronic pain syndrome   • Lumbar radiculopathy   • Cervical radiculopathy   • Facet arthropathy, cervical   • Negative depression screening   • Adhesive capsulitis of right shoulder   • Bilateral low back pain with right-sided sciatica   • Overweight (BMI 25.0-29. 9)   • Sacroiliac pain   • Chronic bilateral low back pain without sciatica   • Need for influenza vaccination   • Unintentional weight loss   • Intestinal metaplasia of stomach   • History of colon polyps   • Umbilical hernia   • Advice given about COVID-19 virus infection   • History of hysterectomy   • Lumbar spondylosis       Past Medical History:   Diagnosis Date   • Anxiety    • Bipolar disorder ([address])    • Chronic pain    • Colon polyp    • Endometriosis    • Hyperlipidemia    • Kidney stone    • Urinary frequency     resolved: 6/14/2016   • Urinary urgency     resolved: 6/14/2016       Past Surgical History:   Procedure Laterality Date   • BLADDER SUSPENSION     • CHOLECYSTECTOMY     • COLONOSCOPY     • EPIDURAL BLOCK INJECTION N/A 10/17/2019    Procedure: BLOCK / INJECTION EPIDURAL STEROID CERVICAL c7-t1 interlaminar;  Surgeon: Caroline Disla MD;  Location: [facility];  Service: Pain Management    • EPIDURAL BLOCK INJECTION Bilateral 1/16/2020    Procedure: L4-L5 Transforaminal Epidural Steroid Injection;  Surgeon: Caroline Disla MD;  Location: [facility];  Service: Pain Management    • FL GUIDED NEEDLE PLAC BX/ASP/INJ  10/17/2019   • FL GUIDED NEEDLE PLAC BX/ASP/INJ  1/16/2020   • HERNIA REPAIR     • HYSTERECTOMY      full    • OVARIAN CYST REMOVAL     • [place] LAPAROSCOPY SURG CHOLECYSTECTOMY N/A 4/30/2018    Procedure: CHOLECYSTECTOMY LAPAROSCOPIC;  Surgeon: Anish Quintero DO;  Location: [facility];  Service: General   • MO SURGICAL ARTHROSCOPY SHOULDER BICEPS TENODESIS Right 8/14/2019    Procedure: ARTHROSCOPY SHOULDER WITH BICEPS TENOTOMY AND SAD;  Surgeon: Arsalan Bee MD;  Location: [facility];  Service: Orthopedics   • TONSILLECTOMY     • UPPER GASTROINTESTINAL ENDOSCOPY         Family History   Problem Relation Age of Onset   • Diabetes Mother    • Heart disease Mother         rheumatic   • Neuropathy Mother    • COPD Mother    • Diabetes type II Mother    • Cancer Mother    • Cancer Father    • Lung cancer Father    • Stroke Maternal Grandmother         CVA   • Diabetes type II Maternal Grandmother         mellitus   • Other Maternal Grandfather         esophageal abnormality   • Diabetes type II Paternal Grandmother         mellitus   • Diabetes type II Paternal Grandfather         mellitus   • Depression Family    • Esophageal cancer Family    • Lung cancer Family    • Stomach cancer Family        Social History     Occupational History   • Not on file   Tobacco Use   • Smoking status: Every Day     Packs/day: 1.00     Years: 1.00     Total pack years: 1.00     Types: Cigarettes   • Smokeless tobacco: Never   Vaping Use   • Vaping Use: Never used   Substance and Sexual Activity   • Alcohol use: Yes     Alcohol/week: 3.0 - 4.0 standard drinks of alcohol     Types: 1 - 2 Glasses of wine, 2 Standard drinks or equivalent per week     Comment: Social drinker   • Drug use: No   • Sexual activity: Yes     Partners: Male     Birth control/protection: Female Sterilization     Comment: Hysterectomy       Current Outpatient Medications on File Prior to Encounter   Medication Sig   • albuterol (ProAir HFA) 90 mcg/act inhaler Inhale 2 puffs every 6 (six) hours as needed for wheezing or shortness of breath   • buPROPion (WELLBUTRIN SR) 100 mg 12 hr tablet 100 mg daily   • Cholecalciferol (VITAMIN D3) 2000 units TABS Take 4,000 Units by mouth daily     • gabapentin (NEURONTIN) 300 mg capsule TAKE 1 CAPSULE BY MOUTH TWICE A DAY AND 2 CAPSULES BY MOUTH AT BEDTIME   • lamoTRIgine (LaMICtal) 100 mg tablet Take 100 mg by mouth daily   • LORazepam (ATIVAN) 0.5 mg tablet Take 1 tablet by mouth daily at bedtime     • Multiple Vitamin (DAILY VALUE MULTIVITAMIN) TABS Take by mouth   • Nerve Stimulator (Standard TENS) ISABELLA Use 3 (three) times a day as needed (pain)   • simvastatin (ZOCOR) 40 mg tablet Take 1 tablet (40 mg total) by mouth daily   • tiZANidine (ZANAFLEX) 4 mg tablet Take 1 tablet (4 mg total) by mouth daily at bedtime     No current facility-administered medications on file prior to encounter. No Known Allergies    Physical Exam:    /73   Pulse 85   Temp 98.1 °F (36.7 °C) (Temporal)   Resp 20   SpO2 98%     Constitutional:normal, well developed, well nourished, alert, in no distress and non-toxic and no overt pain behavior.   Eyes:anicteric  HEENT:grossly intact  Neck:supple, symmetric, trachea midline and no masses   Pulmonary:even and unlabored  Cardiovascular:No edema or pitting edema present  Skin:Normal without rashes or lesions and well hydrated  Psychiatric:Mood and affect appropriate  Neurologic:Cranial Nerves II-XII grossly intact  Musculoskeletal:normal

## 2023-10-03 NOTE — DISCHARGE INSTRUCTIONS

## 2023-10-04 ENCOUNTER — TELEPHONE (OUTPATIENT)
Age: 43
End: 2023-10-04

## 2023-10-05 ENCOUNTER — TELEPHONE (OUTPATIENT)
Dept: PAIN MEDICINE | Facility: CLINIC | Age: 43
End: 2023-10-05

## 2023-10-16 ENCOUNTER — TELEPHONE (OUTPATIENT)
Dept: HEMATOLOGY ONCOLOGY | Facility: CLINIC | Age: 43
End: 2023-10-16

## 2023-10-16 NOTE — TELEPHONE ENCOUNTER
Appointment Change  Cancel, Reschedule, Change to Virtual      Who are you speaking with? Patient   If it is not the patient, is the caller listed on the communication consent form? N/A   Which provider is the appointment scheduled with? SEEMA Mcclellan   When was the original appointment scheduled? Please list date and time 12/14/23 @ 1   At which location is the appointment scheduled to take place? [facility]   Was the appointment rescheduled? Was the appointment changed from an in person visit to a virtual visit? If so, please list the details of the change. 12/6/23 @ 1   What is the reason for the appointment change? provider       Was STAR transport scheduled? No   Does STAR transport need to be scheduled for the new visit (if applicable) No   Does the patient need an infusion appointment rescheduled? No   Does the patient have an upcoming infusion appointment scheduled? If so, when? No   Is the patient undergoing chemotherapy? No   For appointments cancelled with less than 24 hours:  Was the no-show policy reviewed?  N/A

## 2023-10-26 ENCOUNTER — OFFICE VISIT (OUTPATIENT)
Dept: PAIN MEDICINE | Facility: CLINIC | Age: 43
End: 2023-10-26
Payer: COMMERCIAL

## 2023-10-26 VITALS
SYSTOLIC BLOOD PRESSURE: 108 MMHG | WEIGHT: 149 LBS | DIASTOLIC BLOOD PRESSURE: 67 MMHG | HEART RATE: 76 BPM | BODY MASS INDEX: 23.95 KG/M2 | HEIGHT: 66 IN

## 2023-10-26 DIAGNOSIS — M53.3 SACROILIAC PAIN: ICD-10-CM

## 2023-10-26 DIAGNOSIS — G89.4 CHRONIC PAIN SYNDROME: Primary | ICD-10-CM

## 2023-10-26 DIAGNOSIS — M54.50 CHRONIC BILATERAL LOW BACK PAIN WITHOUT SCIATICA: ICD-10-CM

## 2023-10-26 DIAGNOSIS — G89.29 CHRONIC BILATERAL LOW BACK PAIN WITHOUT SCIATICA: ICD-10-CM

## 2023-10-26 DIAGNOSIS — M47.816 LUMBAR SPONDYLOSIS: ICD-10-CM

## 2023-10-26 PROCEDURE — 99214 OFFICE O/P EST MOD 30 MIN: CPT | Performed by: NURSE PRACTITIONER

## 2023-10-26 NOTE — PROGRESS NOTES
Assessment:  1. Chronic pain syndrome    2. Chronic bilateral low back pain without sciatica    3. Lumbar spondylosis    4. Sacroiliac pain        Plan:  While the patient was in the office today, I did have a thorough conversation regarding their chronic pain syndrome, medication management, and treatment plan options. Patient is being seen for follow-up visit. She underwent bilateral L4-5 and L5-S1 medial branch blocks on 10/3/2023. She had significant improvement on the left side for only 1 hour. She had greater than 80% improvement on the right side for about 8 hours. She continues with pain across her low back. She was previously treated in 2020 with an epidural steroid injection which provided her with up to 20 to 25% improvement for only 1 to 2 weeks. She previously participated in physical therapy without significant improvement. On exam, she demonstrates findings consistent with both facetogenic pain as well as sacroiliac mediated pain. They, we discussed possibility of diagnostic/therapeutic SI joint injections. I provided her with information to take home and read over regarding SI joint injections. Start aqua therapy. Continue tizanidine 4 mg at bedtime if needed for spasms. She did not require refill of this medication during today's visit. Continue gabapentin 300 mg twice daily and 600 mg at bedtime. She did not require refill of this medication during today's visit. Follow-up in 6 weeks. My impressions and treatment recommendations were discussed in detail with the patient who verbalized understanding and had no further questions. Discharge instructions were provided. I personally saw and examined the patient and I agree with the above discussed plan of care.     Orders Placed This Encounter   Procedures    Ambulatory Referral to Physical Therapy     Standing Status:   Future     Standing Expiration Date:   10/26/2024     Referral Priority:   Routine     Referral Type: Physical Therapy     Referral Reason:   Specialty Services Required     Requested Specialty:   Physical Therapy     Number of Visits Requested:   1     Expiration Date:   10/26/2024     No orders of the defined types were placed in this encounter. History of Present Illness:  Mal Koyanagi is a 37 y.o. female who presents for a follow up office visit in regards to Neck Pain, Shoulder Pain, Back Pain, and Hip Pain. The patient’s current symptoms include complaints of low back pain. Pain can radiate to the thoracic area. Also, neck pain. Current pain level is an 8/10. Quality pain is described as dull, aching, throbbing, sharp, pressure-like, shooting, pins-and-needles. I have personally reviewed and/or updated the patient's past medical history, past surgical history, family history, social history, current medications, allergies, and vital signs today. Review of Systems   Eyes:  Negative for visual disturbance. Respiratory:  Negative for shortness of breath. Cardiovascular:  Negative for chest pain. Gastrointestinal:  Negative for constipation, diarrhea and nausea. Musculoskeletal:  Negative for arthralgias, gait problem, joint swelling and myalgias. Skin:  Negative for rash. Neurological:  Negative for dizziness, seizures and weakness.        Patient Active Problem List   Diagnosis    Acute back pain    Bipolar disorder ([facility])    Breast lump on right side at 10 o'clock position    Chronic neck pain    Classic migraine    Contact with and suspected exposure to communicable disease    Dizziness    COPD (chronic obstructive pulmonary disease) ([facility])    Generalized anxiety disorder    Hypercholesterolemia    Hyperlipidemia    Hyperglycemia    Leg cramps    Leg pain    Leukocytosis    Lower back pain    Lung nodule    Lymphadenitis, acute    Major depression    Muscle cramps    Other ovarian cyst, right side    Pain, hand    Paresthesia    Pelvic pain in female    Right lower quadrant abdominal pain    Shingles    Shortness of breath    Skin rash    Smoking    Upper limb weakness    Vaginal discharge    Vitamin D deficiency    Gallbladder polyp    Cervicalgia    Cervical myofascial pain syndrome    Chronic pain syndrome    Lumbar radiculopathy    Cervical radiculopathy    Facet arthropathy, cervical    Negative depression screening    Adhesive capsulitis of right shoulder    Bilateral low back pain with right-sided sciatica    Overweight (BMI 25.0-29. 9)    Sacroiliac pain    Chronic bilateral low back pain without sciatica    Need for influenza vaccination    Unintentional weight loss    Intestinal metaplasia of stomach    History of colon polyps    Umbilical hernia    Advice given about COVID-19 virus infection    History of hysterectomy    Lumbar spondylosis       Past Medical History:   Diagnosis Date    Anxiety     Bipolar disorder ([address])     Chronic pain     Colon polyp     Endometriosis     Hyperlipidemia     Kidney stone     Urinary frequency     resolved: 6/14/2016    Urinary urgency     resolved: 6/14/2016       Past Surgical History:   Procedure Laterality Date    BLADDER SUSPENSION      CHOLECYSTECTOMY      COLONOSCOPY      EPIDURAL BLOCK INJECTION N/A 10/17/2019    Procedure: BLOCK / INJECTION EPIDURAL STEROID CERVICAL c7-t1 interlaminar;  Surgeon: Valarie Foley MD;  [facility];  Service: Pain Management     EPIDURAL BLOCK INJECTION Bilateral 1/16/2020    Procedure: L4-L5 Transforaminal Epidural Steroid Injection;  Surgeon: Valarie Foley MD;  [facility];  Service: Pain Management     FL GUIDED NEEDLE PLAC BX/ASP/INJ  10/17/2019    FL GUIDED NEEDLE PLAC BX/ASP/INJ  1/16/2020    HERNIA REPAIR      HYSTERECTOMY      full     OVARIAN CYST REMOVAL      [place] LAPAROSCOPY SURG CHOLECYSTECTOMY N/A 4/30/2018    Procedure: CHOLECYSTECTOMY LAPAROSCOPIC;  Surgeon: Martha Fitch DO;  Location: [facility];  Service: General    WA SURGICAL ARTHROSCOPY SHOULDER BICEPS TENODESIS Right 8/14/2019    Procedure: ARTHROSCOPY SHOULDER WITH BICEPS TENOTOMY AND SAD;  Surgeon: Leilani Whalen MD;  Location: [facility];  Service: Orthopedics    TONSILLECTOMY      UPPER GASTROINTESTINAL ENDOSCOPY         Family History   Problem Relation Age of Onset    Diabetes Mother     Heart disease Mother         rheumatic    Neuropathy Mother     COPD Mother     Diabetes type II Mother     Cancer Mother     Cancer Father     Lung cancer Father     Stroke Maternal Grandmother         CVA    Diabetes type II Maternal Grandmother         mellitus    Other Maternal Grandfather         esophageal abnormality    Diabetes type II Paternal Grandmother         mellitus    Diabetes type II Paternal Grandfather         mellitus    Depression Family     Esophageal cancer Family     Lung cancer Family     Stomach cancer Family        Social History     Occupational History    Not on file   Tobacco Use    Smoking status: Every Day     Packs/day: 1.00     Years: 1.00     Total pack years: 1.00     Types: Cigarettes    Smokeless tobacco: Never   Vaping Use    Vaping Use: Never used   Substance and Sexual Activity    Alcohol use:  Yes     Alcohol/week: 3.0 - 4.0 standard drinks of alcohol     Types: 1 - 2 Glasses of wine, 2 Standard drinks or equivalent per week     Comment: Social drinker    Drug use: No    Sexual activity: Yes     Partners: Male     Birth control/protection: Female Sterilization     Comment: Hysterectomy       Current Outpatient Medications on File Prior to Visit   Medication Sig    albuterol (ProAir HFA) 90 mcg/act inhaler Inhale 2 puffs every 6 (six) hours as needed for wheezing or shortness of breath    buPROPion (WELLBUTRIN SR) 100 mg 12 hr tablet Take 150 mg by mouth daily    Cholecalciferol (VITAMIN D3) 2000 units TABS Take 4,000 Units by mouth daily      gabapentin (NEURONTIN) 300 mg capsule TAKE 1 CAPSULE BY MOUTH TWICE A DAY AND 2 CAPSULES BY MOUTH AT BEDTIME    lamoTRIgine (LaMICtal) 100 mg tablet Take 100 mg by mouth daily    LORazepam (ATIVAN) 0.5 mg tablet Take 1 tablet by mouth daily at bedtime      Multiple Vitamin (DAILY VALUE MULTIVITAMIN) TABS Take by mouth    Nerve Stimulator (Standard TENS) ISABELLA Use 3 (three) times a day as needed (pain)    simvastatin (ZOCOR) 40 mg tablet Take 1 tablet (40 mg total) by mouth daily    tiZANidine (ZANAFLEX) 4 mg tablet Take 1 tablet (4 mg total) by mouth daily at bedtime     No current facility-administered medications on file prior to visit. No Known Allergies    Physical Exam:    /67 (BP Location: Right arm, Patient Position: Sitting, Cuff Size: Large)   Pulse 76   Ht 5' 6" (1.676 m)   Wt 67.6 kg (149 lb)   BMI 24.05 kg/m²     Constitutional:normal, well developed, well nourished, alert, in no distress and non-toxic and no overt pain behavior. Eyes:anicteric  HEENT:grossly intact  Neck:supple, symmetric, trachea midline and no masses   Pulmonary:even and unlabored  Cardiovascular:No edema or pitting edema present  Skin:Normal without rashes or lesions and well hydrated  Psychiatric:Mood and affect appropriate  Neurologic:Cranial Nerves II-XII grossly intact  Musculoskeletal: Range of motion of the lumbar spine is limited in all planes. There is tenderness bilaterally L4-S1. There are lumbar paraspinal muscle spasms present. There is tenderness over bilateral SI joints. Compression test, distraction test, Gaenslen's tests are positive bilaterally.     Imaging

## 2023-11-28 ENCOUNTER — TELEPHONE (OUTPATIENT)
Dept: OTHER | Facility: HOSPITAL | Age: 43
End: 2023-11-28

## 2023-11-28 ENCOUNTER — APPOINTMENT (OUTPATIENT)
Dept: LAB | Facility: CLINIC | Age: 43
End: 2023-11-28
Payer: COMMERCIAL

## 2023-11-28 DIAGNOSIS — D72.829 LEUKOCYTOSIS, UNSPECIFIED TYPE: ICD-10-CM

## 2023-11-28 LAB
ALBUMIN SERPL BCP-MCNC: 4.8 G/DL (ref 3.5–5)
ALP SERPL-CCNC: 72 U/L (ref 34–104)
ALT SERPL W P-5'-P-CCNC: 15 U/L (ref 7–52)
ANION GAP SERPL CALCULATED.3IONS-SCNC: 5 MMOL/L
AST SERPL W P-5'-P-CCNC: 17 U/L (ref 13–39)
BASOPHILS # BLD AUTO: 0.03 THOUSANDS/ÂΜL (ref 0–0.1)
BASOPHILS NFR BLD AUTO: 0 % (ref 0–1)
BILIRUB SERPL-MCNC: 0.39 MG/DL (ref 0.2–1)
BUN SERPL-MCNC: 11 MG/DL (ref 5–25)
CALCIUM SERPL-MCNC: 10.1 MG/DL (ref 8.4–10.2)
CHLORIDE SERPL-SCNC: 104 MMOL/L (ref 96–108)
CO2 SERPL-SCNC: 29 MMOL/L (ref 21–32)
CREAT SERPL-MCNC: 0.66 MG/DL (ref 0.6–1.3)
CRP SERPL QL: <1 MG/L
EOSINOPHIL # BLD AUTO: 0.1 THOUSAND/ÂΜL (ref 0–0.61)
EOSINOPHIL NFR BLD AUTO: 1 % (ref 0–6)
ERYTHROCYTE [DISTWIDTH] IN BLOOD BY AUTOMATED COUNT: 12.5 % (ref 11.6–15.1)
ERYTHROCYTE [SEDIMENTATION RATE] IN BLOOD: 24 MM/HOUR (ref 0–19)
GFR SERPL CREATININE-BSD FRML MDRD: 107 ML/MIN/1.73SQ M
GLUCOSE SERPL-MCNC: 70 MG/DL (ref 65–140)
HCT VFR BLD AUTO: 44.8 % (ref 34.8–46.1)
HGB BLD-MCNC: 14.6 G/DL (ref 11.5–15.4)
IMM GRANULOCYTES # BLD AUTO: 0.03 THOUSAND/UL (ref 0–0.2)
IMM GRANULOCYTES NFR BLD AUTO: 0 % (ref 0–2)
LYMPHOCYTES # BLD AUTO: 3.7 THOUSANDS/ÂΜL (ref 0.6–4.47)
LYMPHOCYTES NFR BLD AUTO: 51 % (ref 14–44)
MAGNESIUM SERPL-MCNC: 2.3 MG/DL (ref 1.9–2.7)
MCH RBC QN AUTO: 31.7 PG (ref 26.8–34.3)
MCHC RBC AUTO-ENTMCNC: 32.6 G/DL (ref 31.4–37.4)
MCV RBC AUTO: 97 FL (ref 82–98)
MONOCYTES # BLD AUTO: 0.55 THOUSAND/ÂΜL (ref 0.17–1.22)
MONOCYTES NFR BLD AUTO: 8 % (ref 4–12)
NEUTROPHILS # BLD AUTO: 2.91 THOUSANDS/ÂΜL (ref 1.85–7.62)
NEUTS SEG NFR BLD AUTO: 40 % (ref 43–75)
NRBC BLD AUTO-RTO: 0 /100 WBCS
PLATELET # BLD AUTO: 231 THOUSANDS/UL (ref 149–390)
PMV BLD AUTO: 9.9 FL (ref 8.9–12.7)
POTASSIUM SERPL-SCNC: 6.3 MMOL/L (ref 3.5–5.3)
PROT SERPL-MCNC: 7.9 G/DL (ref 6.4–8.4)
RBC # BLD AUTO: 4.61 MILLION/UL (ref 3.81–5.12)
SODIUM SERPL-SCNC: 138 MMOL/L (ref 135–147)
WBC # BLD AUTO: 7.32 THOUSAND/UL (ref 4.31–10.16)

## 2023-11-28 PROCEDURE — 88185 FLOWCYTOMETRY/TC ADD-ON: CPT

## 2023-11-28 PROCEDURE — 83735 ASSAY OF MAGNESIUM: CPT

## 2023-11-28 PROCEDURE — 86140 C-REACTIVE PROTEIN: CPT

## 2023-11-28 PROCEDURE — 36415 COLL VENOUS BLD VENIPUNCTURE: CPT

## 2023-11-28 PROCEDURE — 80053 COMPREHEN METABOLIC PANEL: CPT

## 2023-11-28 PROCEDURE — 85652 RBC SED RATE AUTOMATED: CPT

## 2023-11-28 PROCEDURE — 85025 COMPLETE CBC W/AUTO DIFF WBC: CPT

## 2023-11-28 PROCEDURE — 88184 FLOWCYTOMETRY/ TC 1 MARKER: CPT | Performed by: INTERNAL MEDICINE

## 2023-11-29 ENCOUNTER — APPOINTMENT (OUTPATIENT)
Dept: LAB | Facility: CLINIC | Age: 43
End: 2023-11-29
Payer: COMMERCIAL

## 2023-11-29 ENCOUNTER — TELEPHONE (OUTPATIENT)
Dept: HEMATOLOGY ONCOLOGY | Facility: CLINIC | Age: 43
End: 2023-11-29

## 2023-11-29 DIAGNOSIS — E87.5 SERUM POTASSIUM ELEVATED: ICD-10-CM

## 2023-11-29 DIAGNOSIS — E87.5 SERUM POTASSIUM ELEVATED: Primary | ICD-10-CM

## 2023-11-29 LAB
ANION GAP SERPL CALCULATED.3IONS-SCNC: 7 MMOL/L
BUN SERPL-MCNC: 12 MG/DL (ref 5–25)
CALCIUM SERPL-MCNC: 9.2 MG/DL (ref 8.4–10.2)
CHLORIDE SERPL-SCNC: 106 MMOL/L (ref 96–108)
CO2 SERPL-SCNC: 28 MMOL/L (ref 21–32)
CREAT SERPL-MCNC: 0.72 MG/DL (ref 0.6–1.3)
GFR SERPL CREATININE-BSD FRML MDRD: 101 ML/MIN/1.73SQ M
GLUCOSE P FAST SERPL-MCNC: 83 MG/DL (ref 65–99)
POTASSIUM SERPL-SCNC: 4.5 MMOL/L (ref 3.5–5.3)
SCAN RESULT: NORMAL
SODIUM SERPL-SCNC: 141 MMOL/L (ref 135–147)

## 2023-11-29 PROCEDURE — 36415 COLL VENOUS BLD VENIPUNCTURE: CPT

## 2023-11-29 PROCEDURE — 80048 BASIC METABOLIC PNL TOTAL CA: CPT

## 2023-11-29 NOTE — TELEPHONE ENCOUNTER
Pt returned my call and I let her know that her potassium level was 6.3 which is high. Pt is not aware of any kidney issues and does not take any potassium supplements or multi vits. I instructed pt to have BMP today and we will go from there once we see results,pt verbalized understanding and will go from there.        Returned a call to pt and had to leave a voice message left my direct number for return call

## 2023-11-29 NOTE — TELEPHONE ENCOUNTER
Phoned pt and had to leave a voice message informing her that her potassium level was high at 6.3.  I left my teams number [phone] for a return call

## 2023-12-06 ENCOUNTER — OFFICE VISIT (OUTPATIENT)
Dept: HEMATOLOGY ONCOLOGY | Facility: CLINIC | Age: 43
End: 2023-12-06
Payer: COMMERCIAL

## 2023-12-06 VITALS
HEIGHT: 66 IN | BODY MASS INDEX: 23.95 KG/M2 | DIASTOLIC BLOOD PRESSURE: 62 MMHG | OXYGEN SATURATION: 97 % | TEMPERATURE: 97.2 F | SYSTOLIC BLOOD PRESSURE: 100 MMHG | HEART RATE: 85 BPM | RESPIRATION RATE: 18 BRPM | WEIGHT: 149 LBS

## 2023-12-06 DIAGNOSIS — D72.828 OTHER ELEVATED WHITE BLOOD CELL (WBC) COUNT: Primary | ICD-10-CM

## 2023-12-06 DIAGNOSIS — R55 NEAR SYNCOPE: ICD-10-CM

## 2023-12-06 DIAGNOSIS — R63.4 UNINTENTIONAL WEIGHT LOSS: ICD-10-CM

## 2023-12-06 DIAGNOSIS — J98.59 MEDIASTINAL ABNORMALITY: ICD-10-CM

## 2023-12-06 DIAGNOSIS — R91.8 LUNG NODULES: ICD-10-CM

## 2023-12-06 DIAGNOSIS — I95.1 ORTHOSTATIC HYPOTENSION: ICD-10-CM

## 2023-12-06 PROCEDURE — 99214 OFFICE O/P EST MOD 30 MIN: CPT | Performed by: NURSE PRACTITIONER

## 2023-12-06 NOTE — PROGRESS NOTES
Hematology/Oncology Outpatient Follow-up  Luis Crane 37 y.o. female 1980 [id number]    Date:  12/6/2023      Assessment and Plan:  1. Other elevated white blood cell (WBC) count  Patient's WBC is normal according to her most recent CBC. Her flow cytometry from the peripheral blood was negative. Suspect that her occasional mild thrombocytosis is likely reactive in nature most likely due to tobacco abuse. We will continue to monitor for now. If her laboratory studies/additional studies remain stable in the future can consider deferring management back to her PCP. Request she follow-up again with repeat labs in 6 months from now or sooner should the need arise.    - CBC and differential; Future  - Comprehensive metabolic panel; Future  - LD,Blood; Future  - C-reactive protein; Future  - Sedimentation rate, automated; Future  - CT chest w contrast; Future    2. 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 Was stable on her most recent CT scan measuring 1.3 cm. She also has a history of a 3 mm left lobe cystic nodule which resolved on follow-up imaging. Was told for completeness sake we will pursue another 1 year follow-up CT scan of the chest to reevaluate abnormality/etc.   - CT chest w contrast; Future    3. Lung nodules  - CT chest w contrast; Future    4. Unintentional weight loss  Patient is no longer losing weight in fact she gained 10 pounds over the past 6 months. Her weight loss in past was likely a result of stress/etc. when she was taking care of her mother who had aggressive metastatic small cell lung cancer. 5. Near syncope  Patient reports that at least for the past year or so she has been having dizziness and near syncopal episodes with rapid position change. Has also noticed that her blood pressure has been running pretty low.   She did mention to her PCP who ordered a echocardiogram which was unrevealing. Is questioning if she has POTS. Did recommend that she follow-up with her neurology team to address this-she was seen by them in the past for a different reason/neuropathy. We will ask office staff to schedule follow-up appointment. In the interim I recommended that she increase her salt and water intake, wear compression stockings as well as an abdominal binder which may improve her symptoms. 6. Orthostatic hypotension        HPI:  This is a 28-year-old female with multiple comorbid conditions including bipolar disorder, anxiety, hyperlipidemia, chronic cholecystitis status post cholecystectomy, tobacco use, etc. She also seems to have a history of multiple benign colon polyps which was removed by her GI doctor on different occasions. The patient reported significant weight loss which was reported to be around 60-65 lb since 2018. She was recently seen by the GI team from the [address] [place] [address] Drive group who pursued upper endoscopy 01/06/2022. Showed antral intestinal metaplasia but no dysplasia/malignant process. She is scheduled for repeat colonoscopy July 2022 and will be getting repeat upper endoscopy 2023. She had multiple imaging studies of her chest abdomen pelvis recently including a CT scan of the chest without contrast and CT scan of the abdomen and pelvis with contrast which did not reveal any obvious hint of malignant process. A PET scan was also done on 10/01/2021 which showed:  IMPRESSION:  1. Subcentimeter nodule along the right major fissure does not demonstrate significant FDG uptake, and is unchanged from prior CTs dating back to at least 2015, most suggestive of a benign process such as an intrafissural node. 2.  Mildly increased prominence of anterior mediastinal soft tissue likely representing residual thymic tissue. This may be reactive but may be reassessed on follow-up low dose CT in 6-12 months.   3.  Otherwise there are no hypermetabolic lesions that are concerning for malignancy/metastases    Interval history:  Patient presents today for a follow-up visit. She mentions for at least the past year or so she has been having issues with positional dizziness/near syncope with position change. Her blood pressure has been running lower than average as well. She did mention to her PCP who ordered echocardiogram which was unrevealing. Had no further recommendations. She states that she does hydrate herself well. Otherwise she has no new complaints. Is no longer losing weight has gained pounds over the past 6 months. Believes that prior weight loss may have been secondary to all the stress and anxiety she was experiencing due to her mother's cancer diagnosis. Her most recent laboratory studies 11/28/2023 showed normal WBC 7.32 her relative lymphocyte count is slightly higher than average with slightly lower than average neutrophil count however there are absolute neutrophil and lymphocyte normal, no immature cells noted, she is anemic H&H 14.6/44.8, MCV 97 platelet count normal 231. Her potassium was very high at 6.3 otherwise normal CMP. However this was likely a lab error she had a repeat BMP the following day and potassium was normal 4.5. Magnesium normal.  Sed rate slightly elevated 24 with normal C-reactive protein. She had a flow cytometry done through her blood which was negative for lymphoproliferative/myeloproliferative disorder. ROS: Review of Systems   Constitutional:  Positive for fatigue. Musculoskeletal:  Positive for arthralgias and myalgias. Neurological:  Positive for dizziness, light-headedness, numbness and headaches. Psychiatric/Behavioral:  Positive for dysphoric mood. All other systems reviewed and are negative.       Past Medical History:   Diagnosis Date    Anxiety     Bipolar disorder ([address])     Chronic pain     Colon polyp     Endometriosis     Hyperlipidemia     Kidney stone     Urinary frequency     resolved: 6/14/2016 Urinary urgency     resolved: 6/14/2016       Past Surgical History:   Procedure Laterality Date    BLADDER SUSPENSION      CHOLECYSTECTOMY      COLONOSCOPY      EPIDURAL BLOCK INJECTION N/A 10/17/2019    Procedure: BLOCK / INJECTION EPIDURAL STEROID CERVICAL c7-t1 interlaminar;  Surgeon: Kvng Blackmon MD;  Location: [facility];  Service: Pain Management     EPIDURAL BLOCK INJECTION Bilateral 1/16/2020    Procedure: L4-L5 Transforaminal Epidural Steroid Injection;  Surgeon: Kvng Blackmon MD;  Location: [facility];  Service: Pain Management     FL GUIDED NEEDLE PLAC BX/ASP/INJ  10/17/2019    FL GUIDED NEEDLE PLAC BX/ASP/INJ  1/16/2020    HERNIA REPAIR      HYSTERECTOMY      full     OVARIAN CYST REMOVAL      ME LAPAROSCOPY SURG CHOLECYSTECTOMY N/A 4/30/2018    Procedure: CHOLECYSTECTOMY LAPAROSCOPIC;  Surgeon: Rachana Carpenter DO;  Location: [facility];  Service: General    ME SURGICAL ARTHROSCOPY SHOULDER BICEPS TENODESIS Right 8/14/2019    Procedure: ARTHROSCOPY SHOULDER WITH BICEPS TENOTOMY AND SAD;  Surgeon: Garry Stubbs MD;  Location: [facility];  Service: Orthopedics    TONSILLECTOMY      UPPER GASTROINTESTINAL ENDOSCOPY         Social History     Socioeconomic History    Marital status: /Civil Union     Spouse name: None    Number of children: 1    Years of education: None    Highest education level: GED or equivalent   Occupational History    None   Tobacco Use    Smoking status: Every Day     Packs/day: 1.00     Years: 1.00     Total pack years: 1.00     Types: Cigarettes    Smokeless tobacco: Never   Vaping Use    Vaping Use: Never used   Substance and Sexual Activity    Alcohol use:  Yes     Alcohol/week: 3.0 - 4.0 standard drinks of alcohol     Types: 1 - 2 Glasses of wine, 2 Standard drinks or equivalent per week     Comment: Social drinker    Drug use: No    Sexual activity: Yes     Partners: Male     Birth control/protection: Female Sterilization     Comment: Hysterectomy   Other Topics Concern    None   Social History Narrative    Disabled    Drinks coffee    One child-age 7     Social Determinants of Health     Financial Resource Strain: Low Risk  (6/7/2021)    Overall Financial Resource Strain (CARDIA)     Difficulty of Paying Living Expenses: Not hard at all   Food Insecurity: No Food Insecurity (6/7/2021)    Hunger Vital Sign     Worried About Running Out of Food in the Last Year: Never true     Ran Out of Food in the Last Year: Never true   Transportation Needs: No Transportation Needs (6/7/2021)    PRAPARE - Transportation     Lack of Transportation (Medical): No     Lack of Transportation (Non-Medical): No   Physical Activity: Inactive (7/8/2019)    Exercise Vital Sign     Days of Exercise per Week: 0 days     Minutes of Exercise per Session: 0 min   Stress: No Stress Concern Present (7/8/2019)    [facility]     Feeling of Stress :  Only a little   Social Connections: Unknown (7/8/2019)    Social Connection and Isolation Panel [NHANES]     Frequency of Communication with Friends and Family: More than three times a week     Frequency of Social Gatherings with Friends and Family: More than three times a week     Attends Restorationist Services: Not on file     Active Member of Clubs or Organizations: Not on file     Attends Club or Organization Meetings: Not on file     Marital Status:    Intimate Partner Violence: Not At Risk (7/8/2019)    Humiliation, Afraid, Rape, and Kick questionnaire     Fear of Current or Ex-Partner: No     Emotionally Abused: No     Physically Abused: No     Sexually Abused: No   Housing Stability: Not on file       Family History   Problem Relation Age of Onset    Diabetes Mother     Heart disease Mother         rheumatic    Neuropathy Mother     COPD Mother     Diabetes type II Mother     Cancer Mother     Cancer Father     Lung cancer Father     Stroke Maternal Grandmother         CVA Diabetes type II Maternal Grandmother         mellitus    Other Maternal Grandfather         esophageal abnormality    Diabetes type II Paternal Grandmother         mellitus    Diabetes type II Paternal Grandfather         mellitus    Depression Family     Esophageal cancer Family     Lung cancer Family     Stomach cancer Family        No Known Allergies      Current Outpatient Medications:     albuterol (ProAir HFA) 90 mcg/act inhaler, Inhale 2 puffs every 6 (six) hours as needed for wheezing or shortness of breath, Disp: 8.5 g, Rfl: 6    buPROPion (WELLBUTRIN SR) 100 mg 12 hr tablet, Take 150 mg by mouth daily, Disp: , Rfl: 0    Cholecalciferol (VITAMIN D3) 2000 units TABS, Take 4,000 Units by mouth daily  , Disp: , Rfl:     gabapentin (NEURONTIN) 300 mg capsule, TAKE 1 CAPSULE BY MOUTH TWICE A DAY AND 2 CAPSULES BY MOUTH AT BEDTIME, Disp: 120 capsule, Rfl: 3    lamoTRIgine (LaMICtal) 100 mg tablet, Take 100 mg by mouth daily, Disp: , Rfl:     LORazepam (ATIVAN) 0.5 mg tablet, Take 1 tablet by mouth daily at bedtime  , Disp: , Rfl:     Multiple Vitamin (DAILY VALUE MULTIVITAMIN) TABS, Take by mouth, Disp: , Rfl:     Nerve Stimulator (Standard TENS) ISABELLA, Use 3 (three) times a day as needed (pain), Disp: 1 Device, Rfl: 0    simvastatin (ZOCOR) 40 mg tablet, Take 1 tablet (40 mg total) by mouth daily, Disp: 90 tablet, Rfl: 0    tiZANidine (ZANAFLEX) 4 mg tablet, Take 1 tablet (4 mg total) by mouth daily at bedtime, Disp: 30 tablet, Rfl: 3      Physical Exam:  /62 (BP Location: Left arm, Patient Position: Sitting, Cuff Size: Adult)   Pulse 85   Temp (!) 97.2 °F (36.2 °C)   Resp 18   Ht 5' 6" (1.676 m)   Wt 67.6 kg (149 lb)   SpO2 97%   BMI 24.05 kg/m²     Physical Exam  Vitals reviewed. Constitutional:       General: She is not in acute distress. Appearance: She is well-developed. She is not diaphoretic. HENT:      Head: Normocephalic and atraumatic. Eyes:      General: No scleral icterus. Conjunctiva/sclera: Conjunctivae normal.      Pupils: Pupils are equal, round, and reactive to light. Neck:      Thyroid: No thyromegaly. Cardiovascular:      Rate and Rhythm: Normal rate and regular rhythm. Heart sounds: Normal heart sounds. No murmur heard. Pulmonary:      Effort: Pulmonary effort is normal. No respiratory distress. Breath sounds: Normal breath sounds. Abdominal:      General: There is no distension. Palpations: Abdomen is soft. There is no hepatomegaly or splenomegaly. Tenderness: There is no abdominal tenderness. Musculoskeletal:         General: No swelling. Normal range of motion. Cervical back: Normal range of motion and neck supple. Lymphadenopathy:      Cervical: No cervical adenopathy. Upper Body:      Right upper body: No axillary adenopathy. Left upper body: No axillary adenopathy. Skin:     General: Skin is warm and dry. Findings: No erythema or rash. Neurological:      General: No focal deficit present. Mental Status: She is alert and oriented to person, place, and time. Psychiatric:         Mood and Affect: Affect is blunt. Behavior: Behavior normal. Behavior is cooperative. Thought Content: Thought content normal.         Judgment: Judgment normal.         Labs:  Lab Results   Component Value Date    WBC 7.32 11/28/2023    HGB 14.6 11/28/2023    HCT 44.8 11/28/2023    MCV 97 11/28/2023     11/28/2023        Patient voiced understanding and agreement in the above discussion. Aware to contact our office with questions/symptoms in the interim. This note has been generated by voice recognition software system. Therefore, there may be spelling, grammar, and or syntax errors. Please contact if questions arise.

## 2023-12-07 ENCOUNTER — TELEPHONE (OUTPATIENT)
Dept: NEUROLOGY | Facility: CLINIC | Age: 43
End: 2023-12-07

## 2023-12-07 NOTE — TELEPHONE ENCOUNTER
Pt called to say she has had low blood pressure and now has dizziness to the point of almost passing out. This is a new concern.    Please assist.

## 2023-12-15 DIAGNOSIS — M79.10 MUSCLE PAIN: ICD-10-CM

## 2023-12-15 RX ORDER — TIZANIDINE 4 MG/1
4 TABLET ORAL
Qty: 30 TABLET | Refills: 3 | Status: SHIPPED | OUTPATIENT
Start: 2023-12-15

## 2023-12-15 NOTE — TELEPHONE ENCOUNTER
Pt called again frustrated that no one has called her back. Please call pt ASAP to discuss her concerns. It is very upset and needs to talk to be seen ASAP. Thank you.

## 2023-12-15 NOTE — TELEPHONE ENCOUNTER
Reviewed previous office notes. Pt not seen in our practice for dizziness, near syncope, or hypotension. Pt seen by our office in the past for cervical myofascial pain syndrome, chronic neck pain, and lumbar radiculopathy. Recommended to see pain management. Called and spoke with pt. She reports she has had problem with hypotension for the last year. In the last 2 months, has started feeling lightheaded and as though she may pass out. During dizzy/lightheaded episodes will see black spots. Must sit down otherwise feels as though she may faint. Pt reports she spoke with her PCP about hypotension and was told as long as she is not fainting there is nothing to be done. Pt states she saw a commercial for POTS (postural orthostatic tachycardia syndrome) and noticed she has a lot of the symptoms. Pt did previously see [facility] cardiology due to chest pain. Echo and stress test were complete and normal per pt. Results in care everywhere. Pt reports most bothersome symptoms are dizziness and near syncope. This occurs multiple times a day. Happens when standing from a sitting position or from kneeling. Also occurs with bending over. Pt also reports palpitations, shortness of breath, jittery/nervous feeling, and sleep issues. Pt has not checked her pulse during one of these episodes. Questioning tilt table test or other recommendations. She was not sure if she needs to see neuro or cardiology. I did advise pt to contact her cardiologist regarding her symptoms. Please advise if there are any further recommendations from our office other than contacting cardiology. [place] to leave detailed message.

## 2023-12-15 NOTE — TELEPHONE ENCOUNTER
Chief complaint:   Chief Complaint   Patient presents with   • Cough     Cough, sore throat, and runny nose x 1 week       Vitals:  Visit Vitals  /70   Pulse 82   Temp 98.4 °F (36.9 °C)   SpO2 99%       HISTORY OF PRESENT ILLNESS     25-year-old female who presents to the walk-in clinic today complaining of cough, sore throat, runny nose, sinus pressure, and bilateral ear pain.  Patient's been having these symptoms for 1 week.  She has been taking NyQuil and Tylenol which seemed to help.  No nausea or vomiting.  No documented fevers.  Patient believes that she did have a fever earlier in the week but that has resolved.        Other significant problems:  Patient Active Problem List    Diagnosis Date Noted   • Vitamin D insufficiency 09/15/2017     Priority: Low       PAST MEDICAL, FAMILY AND SOCIAL HISTORY     Medications:  Current Outpatient Prescriptions   Medication   • Vitamin D, Ergocalciferol, 89681 units capsule   • [START ON 2018] Cholecalciferol (VITAMIN D) 2000 units capsule   • hydroCORTisone (CORTIZONE) 2.5 % cream   • traZODone (DESYREL) 50 MG tablet   • amitriptyline (ELAVIL) 25 MG tablet     No current facility-administered medications for this visit.        Allergies:  ALLERGIES:  No Known Allergies    Past Medical  History/Surgeries:  Past Medical History:   Diagnosis Date   • Allergy    • Anxiety    • Migraines        Past Surgical History:   Procedure Laterality Date   •  section, classic     •  section, low transverse      x 2   • Cholecystectomy  2010       Family History:  Family History   Problem Relation Age of Onset   • High blood pressure Mother    • Arthritis Mother    • Hypertension Mother    • Substance abuse Father    • Depression Sister    • Arthritis Brother    • Psychiatric Brother    • Depression Daughter    • Learning disabilities Daughter    • Depression Son    • Learning disabilities Son        Social History:  Social History   Substance Use Topics  No other recommendations. This is something she should be seeing her PCP and/or cardiology for. We do not diagnose or treat POTS if that is her concern.  Thanks   • Smoking status: Former Smoker   • Smokeless tobacco: Never Used   • Alcohol use Yes      Comment: 4 times a year       REVIEW OF SYSTEMS     Review of Systems   HENT: Positive for congestion, ear pain, postnasal drip, rhinorrhea, sinus pressure and sore throat.    Respiratory: Positive for cough.    Cardiovascular: Negative.    Neurological: Positive for headaches.   All other systems reviewed and are negative.      PHYSICAL EXAM     Physical Exam   Constitutional: She is oriented to person, place, and time. She appears well-developed and well-nourished. No distress.   HENT:   Head: Normocephalic and atraumatic.   Thick mucus secretions seen behind the tympanic membranes bilaterally with slight bulging.  Palpation of the frontal and maxillary sinuses elicit tenderness.  Oropharynx appears clear.   Eyes: Conjunctivae and EOM are normal. Pupils are equal, round, and reactive to light.   Neck: Normal range of motion. Neck supple.   Cardiovascular: Normal rate, regular rhythm and normal heart sounds.  Exam reveals no gallop and no friction rub.    No murmur heard.  Pulmonary/Chest: Effort normal and breath sounds normal. No respiratory distress. She has no wheezes. She has no rales.   Neurological: She is alert and oriented to person, place, and time.   Skin: Skin is warm and dry. She is not diaphoretic.   Psychiatric: She has a normal mood and affect. Her behavior is normal.   Nursing note and vitals reviewed.      ASSESSMENT/PLAN     25-year-old female with sinusitis, bilateral serous otitis media, and sore throat.    1.  Will treat the patient with a Z-Edd and a Medrol Dosepak.  Claritin, Allegra, or Zyrtec may help to reduce postnasal drip and alleviate sinus pressure.  Patient may take Tylenol every 4-6 hours when necessary for any fever or discomfort.  Home care instructions provided for review.    If symptoms worsen or fail to improve, patient should follow-up with her PCP.

## 2023-12-22 NOTE — TELEPHONE ENCOUNTER
Caller: [facility] - [place], [place] - [facility] [address] - [phone]  - [phone] FX    Relationship: Pharmacy    Best call back number:[phone]    Requested Prescriptions:   Requested Prescriptions     Pending Prescriptions Disp Refills    Trulicity 3 MG/0.5ML solution pen-injector [Pharmacy Med Name: TRULICITY 3MG/0.5ML SDP 0.5ML] 6 mL 0     Sig: ADMINISTER 3 MG UNDER THE SKIN 1 TIME EVERY WEEK AS DIRECTED        Pharmacy where request should be sent: [id number] [place], [place] - [address] AT [facility] [address] - [phone] [facility] [phone] FX  [facility] - [place], [place] - [facility] [address] - [phone]  - [phone] FX     Last office visit with prescribing clinician: 10/27/2023   Last telemedicine visit with prescribing clinician: Visit date not found   Next office visit with prescribing clinician: Visit date not found     Additional details provided by patient: [facility] DOES NOT HAVE THIS MEDICATION IN STOCK.     Felipe José Rep   12/22/23 15:26 EST            Patient Call    Who are you speaking with? Patient    If it is not the patient, are they listed on an active communication consent form? N/A   What is the reason for this call? She was returning 's call   Does this require a call back? Yes   If a call back is required, please list best call back number [phone]    If a call back is required, advise that a message will be forwarded to their care team and someone will return their call as soon as possible. Did you relay this information to the patient?  Yes

## 2023-12-26 DIAGNOSIS — E78.00 HYPERCHOLESTEROLEMIA: ICD-10-CM

## 2023-12-26 RX ORDER — SIMVASTATIN 40 MG
40 TABLET ORAL DAILY
Qty: 90 TABLET | Refills: 0 | Status: SHIPPED | OUTPATIENT
Start: 2023-12-26

## 2024-01-07 ENCOUNTER — OFFICE VISIT (OUTPATIENT)
Dept: URGENT CARE | Facility: CLINIC | Age: 44
End: 2024-01-07
Payer: COMMERCIAL

## 2024-01-07 ENCOUNTER — APPOINTMENT (OUTPATIENT)
Dept: RADIOLOGY | Facility: CLINIC | Age: 44
End: 2024-01-07
Payer: COMMERCIAL

## 2024-01-07 VITALS
BODY MASS INDEX: 24.11 KG/M2 | HEART RATE: 82 BPM | RESPIRATION RATE: 20 BRPM | TEMPERATURE: 98.4 F | OXYGEN SATURATION: 97 % | DIASTOLIC BLOOD PRESSURE: 67 MMHG | SYSTOLIC BLOOD PRESSURE: 111 MMHG | HEIGHT: 66 IN | WEIGHT: 150 LBS

## 2024-01-07 DIAGNOSIS — R07.81 RIB PAIN ON RIGHT SIDE: Primary | ICD-10-CM

## 2024-01-07 PROCEDURE — 99213 OFFICE O/P EST LOW 20 MIN: CPT | Performed by: FAMILY MEDICINE

## 2024-01-07 PROCEDURE — 71101 X-RAY EXAM UNILAT RIBS/CHEST: CPT

## 2024-01-07 PROCEDURE — S9088 SERVICES PROVIDED IN URGENT: HCPCS | Performed by: FAMILY MEDICINE

## 2024-01-07 RX ORDER — BUPROPION HYDROCHLORIDE 200 MG/1
200 TABLET, EXTENDED RELEASE ORAL DAILY
COMMUNITY
Start: 2023-12-19

## 2024-01-07 NOTE — PROGRESS NOTES
[facility]    NAME: Rasta Roland is a 43 y.o. female  : 1980    MRN: [id number]  DATE: 2024  TIME: 5:01 PM    Assessment and Plan   Rib pain on right side [R07.81]  1. Rib pain on right side  XR ribs right w pa chest min 3 views    XR ribs right w pa chest min 3 views          Patient Instructions     Patient Instructions   Xray appears negative for any fracture.  Will follow up with radiologist report when available.  Recommend icing the area every 2 hours for 20-30 minutes, take Ibuprofen every 6-8 hours to reduce inflammation. Use incentive spirometer. If not improving over the next week, follow up with PCP or orthopedics.        Chief Complaint     Chief Complaint   Patient presents with    Rib Injury     Son picked her up earlier today and right side of rib pain/painful to inhale since        History of Present Illness   43 year old female here with complaint of right rib pain.  Son wrapped his arms around her and picked her up.  Felt sudden onset of right lateral rib pain.  Hurts when she takes a deep breath. No shortness of breath.        Review of Systems   Review of Systems   Respiratory:  Negative for cough.    Cardiovascular:  Positive for chest pain (right lateral rib pain).       Current Medications     Current Outpatient Medications:     albuterol (ProAir HFA) 90 mcg/act inhaler, Inhale 2 puffs every 6 (six) hours as needed for wheezing or shortness of breath, Disp: 8.5 g, Rfl: 6    buPROPion (WELLBUTRIN SR) 100 mg 12 hr tablet, Take 150 mg by mouth daily, Disp: , Rfl: 0    buPROPion (WELLBUTRIN SR) 200 MG 12 hr tablet, Take 200 mg by mouth daily, Disp: , Rfl:     Cholecalciferol (VITAMIN D3) 2000 units TABS, Take 4,000 Units by mouth daily  , Disp: , Rfl:     gabapentin (NEURONTIN) 300 mg capsule, TAKE 1 CAPSULE BY MOUTH TWICE A DAY AND 2 CAPSULES BY MOUTH AT BEDTIME, Disp: 120 capsule, Rfl: 3    lamoTRIgine (LaMICtal) 100 mg tablet, Take 100 mg by mouth daily, Disp: ,  Rfl:     LORazepam (ATIVAN) 0.5 mg tablet, Take 1 tablet by mouth daily at bedtime  , Disp: , Rfl:     Multiple Vitamin (DAILY VALUE MULTIVITAMIN) TABS, Take by mouth, Disp: , Rfl:     Nerve Stimulator (Standard TENS) ISABELLA, Use 3 (three) times a day as needed (pain), Disp: 1 Device, Rfl: 0    simvastatin (ZOCOR) 40 mg tablet, take 1 tablet by mouth once daily, Disp: 90 tablet, Rfl: 0    tiZANidine (ZANAFLEX) 4 mg tablet, take 1 tablet by mouth once daily at bedtime, Disp: 30 tablet, Rfl: 3    Current Allergies     Allergies as of 01/07/2024    (No Known Allergies)          The following portions of the patient's history were reviewed and updated as appropriate: allergies, current medications, past family history, past medical history, past social history, past surgical history and problem list.   Past Medical History:   Diagnosis Date    Anxiety     Bipolar disorder (HCC)     Chronic pain     Colon polyp     Endometriosis     Hyperlipidemia     Kidney stone     Urinary frequency     resolved: 6/14/2016    Urinary urgency     resolved: 6/14/2016     Past Surgical History:   Procedure Laterality Date    BLADDER SUSPENSION      CHOLECYSTECTOMY      COLONOSCOPY      EPIDURAL BLOCK INJECTION N/A 10/17/2019    Procedure: BLOCK / INJECTION EPIDURAL STEROID CERVICAL c7-t1 interlaminar;  Surgeon: Ghazal Ray MD;  Location: [facility];  Service: Pain Management     EPIDURAL BLOCK INJECTION Bilateral 1/16/2020    Procedure: L4-L5 Transforaminal Epidural Steroid Injection;  Surgeon: Ghazal Ray MD;  Location: [facility];  Service: Pain Management     FL GUIDED NEEDLE PLAC BX/ASP/INJ  10/17/2019    FL GUIDED NEEDLE PLAC BX/ASP/INJ  1/16/2020    HERNIA REPAIR      HYSTERECTOMY      full     OVARIAN CYST REMOVAL      MA LAPAROSCOPY SURG CHOLECYSTECTOMY N/A 4/30/2018    Procedure: CHOLECYSTECTOMY LAPAROSCOPIC;  Surgeon: Jose Matson DO;  Location: [facility];  Service: General    MA SURGICAL ARTHROSCOPY SHOULDER  BICEPS TENODESIS Right 8/14/2019    Procedure: ARTHROSCOPY SHOULDER WITH BICEPS TENOTOMY AND SAD;  Surgeon: Osiris Hannah MD;  Location: [facility];  Service: Orthopedics    TONSILLECTOMY      UPPER GASTROINTESTINAL ENDOSCOPY       Family History   Problem Relation Age of Onset    Diabetes Mother     Heart disease Mother         rheumatic    Neuropathy Mother     COPD Mother     Diabetes type II Mother     Cancer Mother     Cancer Father     Lung cancer Father     Stroke Maternal Grandmother         CVA    Diabetes type II Maternal Grandmother         mellitus    Other Maternal Grandfather         esophageal abnormality    Diabetes type II Paternal Grandmother         mellitus    Diabetes type II Paternal Grandfather         mellitus    Depression Family     Esophageal cancer Family     Lung cancer Family     Stomach cancer Family      Social History     Socioeconomic History    Marital status: /Civil Union     Spouse name: Not on file    Number of children: 1    Years of education: Not on file    Highest education level: GED or equivalent   Occupational History    Not on file   Tobacco Use    Smoking status: Every Day     Current packs/day: 1.00     Average packs/day: 1 pack/day for 1 year (1.0 ttl pk-yrs)     Types: Cigarettes    Smokeless tobacco: Never   Vaping Use    Vaping status: Never Used   Substance and Sexual Activity    Alcohol use: Yes     Alcohol/week: 3.0 - 4.0 standard drinks of alcohol     Types: 1 - 2 Glasses of wine, 2 Standard drinks or equivalent per week     Comment: Social drinker    Drug use: No    Sexual activity: Yes     Partners: Male     Birth control/protection: Female Sterilization     Comment: Hysterectomy   Other Topics Concern    Not on file   Social History Narrative    Disabled    Drinks coffee    One child-age 7     Social Determinants of Health     Financial Resource Strain: Low Risk  (6/7/2021)    Overall Financial Resource Strain (CARDIA)     Difficulty of Paying Living  "Expenses: Not hard at all   Food Insecurity: No Food Insecurity (2/6/2023)    Received from [facility]    Hunger Vital Sign     Worried About Running Out of Food in the Last Year: Never true     Ran Out of Food in the Last Year: Never true   Transportation Needs: No Transportation Needs (6/7/2021)    PRAPARE - Transportation     Lack of Transportation (Medical): No     Lack of Transportation (Non-Medical): No   Physical Activity: Inactive (7/8/2019)    Exercise Vital Sign     Days of Exercise per Week: 0 days     Minutes of Exercise per Session: 0 min   Stress: No Stress Concern Present (7/8/2019)    [place] [employer] - Occupational Stress Questionnaire     Feeling of Stress : Only a little   Social Connections: Unknown (7/8/2019)    Social Connection and Isolation Panel [NHANES]     Frequency of Communication with Friends and Family: More than three times a week     Frequency of Social Gatherings with Friends and Family: More than three times a week     Attends Congregational Services: Not on file     Active Member of Clubs or Organizations: Not on file     Attends Club or Organization Meetings: Not on file     Marital Status:    Intimate Partner Violence: Not At Risk (7/8/2019)    Humiliation, Afraid, Rape, and Kick questionnaire     Fear of Current or Ex-Partner: No     Emotionally Abused: No     Physically Abused: No     Sexually Abused: No   Housing Stability: Not on file     Medications have been verified.    Objective   /67   Pulse 82   Temp 98.4 °F (36.9 °C)   Resp 20   Ht 5' 6\" (1.676 m)   Wt 68 kg (150 lb)   SpO2 97%   BMI 24.21 kg/m²      Physical Exam   Physical Exam  Vitals and nursing note reviewed.   Constitutional:       Appearance: Normal appearance.   Cardiovascular:      Rate and Rhythm: Normal rate and regular rhythm.      Pulses: Normal pulses.      Heart sounds: Normal heart sounds.   Pulmonary:      Effort: Pulmonary effort is normal.      Breath sounds: No " wheezing or rhonchi.   Chest:      Chest wall: Tenderness (right lateral ribs 4-8) present.   Musculoskeletal:      Cervical back: Normal range of motion.   Neurological:      Mental Status: She is alert.

## 2024-01-07 NOTE — PATIENT INSTRUCTIONS
Xray appears negative for any fracture.  Will follow up with radiologist report when available.  Recommend icing the area every 2 hours for 20-30 minutes, take Ibuprofen every 6-8 hours to reduce inflammation. Use incentive spirometer. If not improving over the next week, follow up with PCP or orthopedics.

## 2024-02-13 ENCOUNTER — RA CDI HCC (OUTPATIENT)
Dept: OTHER | Facility: HOSPITAL | Age: 44
End: 2024-02-13

## 2024-02-21 ENCOUNTER — RA CDI HCC (OUTPATIENT)
Dept: OTHER | Facility: HOSPITAL | Age: 44
End: 2024-02-21

## 2024-02-21 PROBLEM — M54.9 ACUTE BACK PAIN: Status: RESOLVED | Noted: 2017-04-26 | Resolved: 2024-02-21

## 2024-02-22 ENCOUNTER — OFFICE VISIT (OUTPATIENT)
Dept: FAMILY MEDICINE CLINIC | Facility: CLINIC | Age: 44
End: 2024-02-22
Payer: COMMERCIAL

## 2024-02-22 VITALS
SYSTOLIC BLOOD PRESSURE: 110 MMHG | OXYGEN SATURATION: 99 % | DIASTOLIC BLOOD PRESSURE: 62 MMHG | BODY MASS INDEX: 24.59 KG/M2 | HEART RATE: 88 BPM | TEMPERATURE: 96.8 F | WEIGHT: 153 LBS | HEIGHT: 66 IN

## 2024-02-22 DIAGNOSIS — R63.4 UNINTENTIONAL WEIGHT LOSS: ICD-10-CM

## 2024-02-22 DIAGNOSIS — Z23 ENCOUNTER FOR IMMUNIZATION: ICD-10-CM

## 2024-02-22 DIAGNOSIS — Z00.00 MEDICARE ANNUAL WELLNESS VISIT, SUBSEQUENT: Primary | ICD-10-CM

## 2024-02-22 DIAGNOSIS — F17.200 SMOKING: ICD-10-CM

## 2024-02-22 DIAGNOSIS — E78.00 HYPERCHOLESTEROLEMIA: ICD-10-CM

## 2024-02-22 PROCEDURE — 90686 IIV4 VACC NO PRSV 0.5 ML IM: CPT | Performed by: FAMILY MEDICINE

## 2024-02-22 PROCEDURE — 99213 OFFICE O/P EST LOW 20 MIN: CPT | Performed by: FAMILY MEDICINE

## 2024-02-22 PROCEDURE — G0008 ADMIN INFLUENZA VIRUS VAC: HCPCS | Performed by: FAMILY MEDICINE

## 2024-02-22 PROCEDURE — G0439 PPPS, SUBSEQ VISIT: HCPCS | Performed by: FAMILY MEDICINE

## 2024-02-22 RX ORDER — BUPROPION HYDROCHLORIDE 300 MG/1
300 TABLET ORAL DAILY
COMMUNITY
Start: 2024-02-06

## 2024-02-22 NOTE — PATIENT INSTRUCTIONS
Medicare Preventive Visit Patient Instructions  Thank you for completing your Welcome to Medicare Visit or Medicare Annual Wellness Visit today. Your next wellness visit will be due in one year (2/22/2025).  The screening/preventive services that you may require over the next 5-10 years are detailed below. Some tests may not apply to you based off risk factors and/or age. Screening tests ordered at today's visit but not completed yet may show as past due. Also, please note that scanned in results may not display below.  Preventive Screenings:  Service Recommendations Previous Testing/Comments   Colorectal Cancer Screening  * Colonoscopy    * Fecal Occult Blood Test (FOBT)/Fecal Immunochemical Test (FIT)  * Fecal DNA/Cologuard Test  * Flexible Sigmoidoscopy Age: 45-75 years old   Colonoscopy: every 10 years (may be performed more frequently if at higher risk)  OR  FOBT/FIT: every 1 year  OR  Cologuard: every 3 years  OR  Sigmoidoscopy: every 5 years  Screening may be recommended earlier than age 45 if at higher risk for colorectal cancer. Also, an individualized decision between you and your healthcare provider will decide whether screening between the ages of 76-85 would be appropriate. Colonoscopy: 07/12/2022  FOBT/FIT: 07/12/2022  Cologuard: 07/12/2022  Sigmoidoscopy: 07/12/2022    Screening Current     Breast Cancer Screening Age: 40+ years old  Frequency: every 1-2 years  Not required if history of left and right mastectomy Mammogram: 10/19/2023    Screening Current   Cervical Cancer Screening Between the ages of 21-29, pap smear recommended once every 3 years.   Between the ages of 30-65, can perform pap smear with HPV co-testing every 5 years.   Recommendations may differ for women with a history of total hysterectomy, cervical cancer, or abnormal pap smears in past. Pap Smear: Not on file    Screening Not Indicated   Hepatitis C Screening Once for adults born between 1945 and 1965  More frequently in patients  at high risk for Hepatitis C Hep C Antibody: 11/15/2021    Screening Current   Diabetes Screening 1-2 times per year if you're at risk for diabetes or have pre-diabetes Fasting glucose: 83 mg/dL (11/29/2023)  A1C: 5.4 % (2/11/2023)  Screening Current   Cholesterol Screening Once every 5 years if you don't have a lipid disorder. May order more often based on risk factors. Lipid panel: 02/11/2023    Screening Not Indicated  History Lipid Disorder     Other Preventive Screenings Covered by Medicare:  Abdominal Aortic Aneurysm (AAA) Screening: covered once if your at risk. You're considered to be at risk if you have a family history of AAA.  Lung Cancer Screening: covers low dose CT scan once per year if you meet all of the following conditions: (1) Age 55-77; (2) No signs or symptoms of lung cancer; (3) Current smoker or have quit smoking within the last 15 years; (4) You have a tobacco smoking history of at least 20 pack years (packs per day multiplied by number of years you smoked); (5) You get a written order from a healthcare provider.  Glaucoma Screening: covered annually if you're considered high risk: (1) You have diabetes OR (2) Family history of glaucoma OR (3)  aged 50 and older OR (4)  American aged 65 and older  Osteoporosis Screening: covered every 2 years if you meet one of the following conditions: (1) You're estrogen deficient and at risk for osteoporosis based off medical history and other findings; (2) Have a vertebral abnormality; (3) On glucocorticoid therapy for more than 3 months; (4) Have primary hyperparathyroidism; (5) On osteoporosis medications and need to assess response to drug therapy.   Last bone density test (DXA Scan): Not on file.  HIV Screening: covered annually if you're between the age of 15-65. Also covered annually if you are younger than 15 and older than 65 with risk factors for HIV infection. For pregnant patients, it is covered up to 3 times per  pregnancy.    Immunizations:  Immunization Recommendations   Influenza Vaccine Annual influenza vaccination during flu season is recommended for all persons aged >= 6 months who do not have contraindications   Pneumococcal Vaccine   * Pneumococcal conjugate vaccine = PCV13 (Prevnar 13), PCV15 (Vaxneuvance), PCV20 (Prevnar 20)  * Pneumococcal polysaccharide vaccine = PPSV23 (Pneumovax) Adults 19-63 yo with certain risk factors or if 65+ yo  If never received any pneumonia vaccine: recommend Prevnar 20 (PCV20)  Give PCV20 if previously received 1 dose of PCV13 or PPSV23   Hepatitis B Vaccine 3 dose series if at intermediate or high risk (ex: diabetes, end stage renal disease, liver disease)   Respiratory syncytial virus (RSV) Vaccine - COVERED BY MEDICARE PART D  * RSVPreF3 (Arexvy) CDC recommends that adults 60 years of age and older may receive a single dose of RSV vaccine using shared clinical decision-making (SCDM)   Tetanus (Td) Vaccine - COST NOT COVERED BY MEDICARE PART B Following completion of primary series, a booster dose should be given every 10 years to maintain immunity against tetanus. Td may also be given as tetanus wound prophylaxis.   Tdap Vaccine - COST NOT COVERED BY MEDICARE PART B Recommended at least once for all adults. For pregnant patients, recommended with each pregnancy.   Shingles Vaccine (Shingrix) - COST NOT COVERED BY MEDICARE PART B  2 shot series recommended in those 19 years and older who have or will have weakened immune systems or those 50 years and older     Health Maintenance Due:      Topic Date Due   • Breast Cancer Screening: Mammogram  10/19/2024   • Colorectal Cancer Screening  07/11/2027   • HIV Screening  Completed   • Hepatitis C Screening  Completed     Immunizations Due:      Topic Date Due   • Influenza Vaccine (1) 09/01/2023   • COVID-19 Vaccine (1 - 2023-24 season) Never done     Advance Directives   What are advance directives?  Advance directives are legal  documents that state your wishes and plans for medical care. These plans are made ahead of time in case you lose your ability to make decisions for yourself. Advance directives can apply to any medical decision, such as the treatments you want, and if you want to donate organs.   What are the types of advance directives?  There are many types of advance directives, and each state has rules about how to use them. You may choose a combination of any of the following:  Living will:  This is a written record of the treatment you want. You can also choose which treatments you do not want, which to limit, and which to stop at a certain time. This includes surgery, medicine, IV fluid, and tube feedings.   Durable power of  for healthcare (DPAHC):  This is a written record that states who you want to make healthcare choices for you when you are unable to make them for yourself. This person, called a proxy, is usually a family member or a friend. You may choose more than 1 proxy.  Do not resuscitate (DNR) order:  A DNR order is used in case your heart stops beating or you stop breathing. It is a request not to have certain forms of treatment, such as CPR. A DNR order may be included in other types of advance directives.  Medical directive:  This covers the care that you want if you are in a coma, near death, or unable to make decisions for yourself. You can list the treatments you want for each condition. Treatment may include pain medicine, surgery, blood transfusions, dialysis, IV or tube feedings, and a ventilator (breathing machine).  Values history:  This document has questions about your views, beliefs, and how you feel and think about life. This information can help others choose the care that you would choose.  Why are advance directives important?  An advance directive helps you control your care. Although spoken wishes may be used, it is better to have your wishes written down. Spoken wishes can be  misunderstood, or not followed. Treatments may be given even if you do not want them. An advance directive may make it easier for your family to make difficult choices about your care.   Cigarette Smoking and Your Health   Risks to your health if you smoke:  Nicotine and other chemicals found in tobacco damage every cell in your body. Even if you are a light smoker, you have an increased risk for cancer, heart disease, and lung disease. If you are pregnant or have diabetes, smoking increases your risk for complications.   Benefits to your health if you stop smoking:   You decrease respiratory symptoms such as coughing, wheezing, and shortness of breath.   You reduce your risk for cancers of the lung, mouth, throat, kidney, bladder, pancreas, stomach, and cervix. If you already have cancer, you increase the benefits of chemotherapy. You also reduce your risk for cancer returning or a second cancer from developing.   You reduce your risk for heart disease, blood clots, heart attack, and stroke.   You reduce your risk for lung infections, and diseases such as pneumonia, asthma, chronic bronchitis, and emphysema.  Your circulation improves. More oxygen can be delivered to your body. If you have diabetes, you lower your risk for complications, such as kidney, artery, and eye diseases. You also lower your risk for nerve damage. Nerve damage can lead to amputations, poor vision, and blindness.  You improve your body's ability to heal and to fight infections.  For more information and support to stop smoking:   Smokefree.gov  Phone: 0- 930 - 093-8149  Web Address: www.[employer]     © Copyright [employer] 2018 Information is for End User's use only and may not be sold, redistributed or otherwise used for commercial purposes. All illustrations and images included in CareNotes® are the copyrighted property of [employer] or [employer]

## 2024-02-22 NOTE — PROGRESS NOTES
Assessment and Plan:     Problem List Items Addressed This Visit       Hypercholesterolemia    Relevant Orders    Lipid panel    TSH, 3rd generation with Free T4 reflex    Smoking    Unintentional weight loss     Other Visit Diagnoses       Medicare annual wellness visit, subsequent    -  Primary    Encounter for immunization        Relevant Orders    influenza vaccine, quadrivalent, 0.5 mL, preservative-free, for adult and pediatric patients 6 mos+ (AFLURIA, FLUARIX, FLULAVAL, FLUZONE)             Preventive health issues were discussed with patient, and age appropriate screening tests were ordered as noted in patient's After Visit Summary.  Personalized health advice and appropriate referrals for health education or preventive services given if needed, as noted in patient's After Visit Summary.     History of Present Illness:     Patient presents for a Medicare Wellness Visit    Hyperlipidemia  This is a chronic problem. The current episode started more than 1 year ago. Pertinent negatives include no chest pain, myalgias or shortness of breath.      Patient Care Team:  Ton Lew DO as PCP - General  Ton Lew DO as PCP - PCP-[facility] ([facility])  Ton Lew DO as PCP - PCP-[facility] ([facility])  MD Ton Mcgowan DO (Gastroenterology)     Review of Systems:     Review of Systems   Constitutional:  Negative for chills, fatigue and fever.   HENT: Negative.  Negative for hearing loss.    Eyes: Negative.  Negative for visual disturbance.   Respiratory:  Negative for shortness of breath and wheezing.    Cardiovascular:  Negative for chest pain and palpitations.   Gastrointestinal:  Negative for abdominal pain, blood in stool, constipation, diarrhea, nausea and vomiting.   Endocrine: Negative.    Genitourinary:  Negative for difficulty urinating and dysuria.   Musculoskeletal:  Negative for arthralgias and myalgias.   Skin: Negative.     Allergic/Immunologic: Negative.    Neurological:  Negative for seizures and syncope.   Hematological:  Negative for adenopathy.   Psychiatric/Behavioral: Negative.          Problem List:     Patient Active Problem List   Diagnosis    Bipolar disorder (HCC)    Breast lump on right side at 10 o'clock position    Chronic neck pain    Classic migraine    Contact with and suspected exposure to communicable disease    Dizziness    COPD (chronic obstructive pulmonary disease) (HCC)    Generalized anxiety disorder    Hypercholesterolemia    Hyperlipidemia    Hyperglycemia    Leg cramps    Leg pain    Leukocytosis    Lower back pain    Lung nodule    Lymphadenitis, acute    Major depression    Muscle cramps    Other ovarian cyst, right side    Pain, hand    Paresthesia    Pelvic pain in female    Right lower quadrant abdominal pain    Shingles    Shortness of breath    Skin rash    Smoking    Upper limb weakness    Vaginal discharge    Vitamin D deficiency    Gallbladder polyp    Cervicalgia    Cervical myofascial pain syndrome    Chronic pain syndrome    Lumbar radiculopathy    Cervical radiculopathy    Facet arthropathy, cervical    Negative depression screening    Adhesive capsulitis of right shoulder    Bilateral low back pain with right-sided sciatica    Overweight (BMI 25.0-29.9)    Sacroiliac pain    Chronic bilateral low back pain without sciatica    Need for influenza vaccination    Unintentional weight loss    Intestinal metaplasia of stomach    History of colon polyps    Umbilical hernia    Advice given about COVID-19 virus infection    History of hysterectomy    Lumbar spondylosis      Past Medical and Surgical History:     Past Medical History:   Diagnosis Date    Acute back pain 04/26/2017    Anxiety     Bipolar disorder (HCC)     Chronic pain     Colon polyp     Endometriosis     Hyperlipidemia     Kidney stone     Urinary frequency     resolved: 6/14/2016    Urinary urgency     resolved: 6/14/2016     Past  Surgical History:   Procedure Laterality Date    BLADDER SUSPENSION      CHOLECYSTECTOMY      COLONOSCOPY      EPIDURAL BLOCK INJECTION N/A 10/17/2019    Procedure: BLOCK / INJECTION EPIDURAL STEROID CERVICAL c7-t1 interlaminar;  Surgeon: Ghazal Ray MD;  Location: [facility];  Service: Pain Management     EPIDURAL BLOCK INJECTION Bilateral 1/16/2020    Procedure: L4-L5 Transforaminal Epidural Steroid Injection;  Surgeon: Ghazal Ray MD;  Location: [facility];  Service: Pain Management     FL GUIDED NEEDLE PLAC BX/ASP/INJ  10/17/2019    FL GUIDED NEEDLE PLAC BX/ASP/INJ  1/16/2020    HERNIA REPAIR      HYSTERECTOMY      full     OVARIAN CYST REMOVAL      TX LAPAROSCOPY SURG CHOLECYSTECTOMY N/A 4/30/2018    Procedure: CHOLECYSTECTOMY LAPAROSCOPIC;  Surgeon: Jose Matson DO;  Location: [facility];  Service: General    TX SURGICAL ARTHROSCOPY SHOULDER BICEPS TENODESIS Right 8/14/2019    Procedure: ARTHROSCOPY SHOULDER WITH BICEPS TENOTOMY AND SAD;  Surgeon: Osiris Hannah MD;  Location: [facility];  Service: Orthopedics    TONSILLECTOMY      UPPER GASTROINTESTINAL ENDOSCOPY        Family History:     Family History   Problem Relation Age of Onset    Diabetes Mother     Heart disease Mother         rheumatic    Neuropathy Mother     COPD Mother     Diabetes type II Mother     Cancer Mother     Cancer Father     Lung cancer Father     Stroke Maternal Grandmother         CVA    Diabetes type II Maternal Grandmother         mellitus    Other Maternal Grandfather         esophageal abnormality    Diabetes type II Paternal Grandmother         mellitus    Diabetes type II Paternal Grandfather         mellitus    Depression Family     Esophageal cancer Family     Lung cancer Family     Stomach cancer Family       Social History:     Social History     Socioeconomic History    Marital status: /Civil Union     Spouse name: None    Number of children: 1    Years of education: None    Highest education  level: GED or equivalent   Occupational History    None   Tobacco Use    Smoking status: Every Day     Current packs/day: 1.00     Average packs/day: 1 pack/day for 1 year (1.0 ttl pk-yrs)     Types: Cigarettes    Smokeless tobacco: Never   Vaping Use    Vaping status: Never Used   Substance and Sexual Activity    Alcohol use: Yes     Alcohol/week: 3.0 - 4.0 standard drinks of alcohol     Types: 1 - 2 Glasses of wine, 2 Standard drinks or equivalent per week     Comment: Social drinker    Drug use: No    Sexual activity: Yes     Partners: Male     Birth control/protection: Female Sterilization     Comment: Hysterectomy   Other Topics Concern    None   Social History Narrative    Disabled    Drinks coffee    One child-age 7     Social Determinants of Health     Financial Resource Strain: Low Risk  (6/7/2021)    Overall Financial Resource Strain (CARDIA)     Difficulty of Paying Living Expenses: Not hard at all   Food Insecurity: No Food Insecurity (2/6/2023)    Received from [facility]    Hunger Vital Sign     Worried About Running Out of Food in the Last Year: Never true     Ran Out of Food in the Last Year: Never true   Transportation Needs: No Transportation Needs (6/7/2021)    PRAPARE - Transportation     Lack of Transportation (Medical): No     Lack of Transportation (Non-Medical): No   Physical Activity: Inactive (7/8/2019)    Exercise Vital Sign     Days of Exercise per Week: 0 days     Minutes of Exercise per Session: 0 min   Stress: No Stress Concern Present (7/8/2019)    [employer] [place] [employer] - Occupational Stress Questionnaire     Feeling of Stress : Only a little   Social Connections: Unknown (7/8/2019)    Social Connection and Isolation Panel [NHANES]     Frequency of Communication with Friends and Family: More than three times a week     Frequency of Social Gatherings with Friends and Family: More than three times a week     Attends Zoroastrianism Services: Not on file     Active Member of  Clubs or Organizations: Not on file     Attends Club or Organization Meetings: Not on file     Marital Status:    Intimate Partner Violence: Not At Risk (7/8/2019)    Humiliation, Afraid, Rape, and Kick questionnaire     Fear of Current or Ex-Partner: No     Emotionally Abused: No     Physically Abused: No     Sexually Abused: No   Housing Stability: Not on file      Medications and Allergies:     Current Outpatient Medications   Medication Sig Dispense Refill    albuterol (ProAir HFA) 90 mcg/act inhaler Inhale 2 puffs every 6 (six) hours as needed for wheezing or shortness of breath 8.5 g 6    buPROPion (WELLBUTRIN XL) 300 mg 24 hr tablet Take 300 mg by mouth daily      Cholecalciferol (VITAMIN D3) 2000 units TABS Take 4,000 Units by mouth daily        gabapentin (NEURONTIN) 300 mg capsule TAKE 1 CAPSULE BY MOUTH TWICE A DAY AND 2 CAPSULES BY MOUTH AT BEDTIME 120 capsule 3    lamoTRIgine (LaMICtal) 100 mg tablet Take 100 mg by mouth daily      LORazepam (ATIVAN) 0.5 mg tablet Take 1 tablet by mouth 2 (two) times a day      Nerve Stimulator (Standard TENS) ISABELLA Use 3 (three) times a day as needed (pain) 1 Device 0    simvastatin (ZOCOR) 40 mg tablet take 1 tablet by mouth once daily 90 tablet 0    tiZANidine (ZANAFLEX) 4 mg tablet take 1 tablet by mouth once daily at bedtime 30 tablet 3    buPROPion (WELLBUTRIN SR) 100 mg 12 hr tablet Take 150 mg by mouth daily (Patient not taking: Reported on 2/22/2024)  0    buPROPion (WELLBUTRIN SR) 200 MG 12 hr tablet Take 200 mg by mouth daily (Patient not taking: Reported on 2/22/2024)      Multiple Vitamin (DAILY VALUE MULTIVITAMIN) TABS Take by mouth       No current facility-administered medications for this visit.     No Known Allergies   Immunizations:     Immunization History   Administered Date(s) Administered    INFLUENZA 10/25/2018    Influenza Quadrivalent Preservative Free 3 years and older IM 01/23/2018    Influenza, injectable, quadrivalent, preservative  free 0.5 mL 10/25/2018, 11/14/2020, 02/14/2023    Pneumococcal Conjugate Vaccine 20-valent (Pcv20), Polysace 02/14/2023    Td (adult), Unspecified 03/06/2007      Health Maintenance:         Topic Date Due    Breast Cancer Screening: Mammogram  10/19/2024    Colorectal Cancer Screening  07/11/2027    HIV Screening  Completed    Hepatitis C Screening  Completed         Topic Date Due    Influenza Vaccine (1) 09/01/2023    COVID-19 Vaccine (1 - 2023-24 season) Never done      Medicare Screening Tests and Risk Assessments:     Rasta is here for her Subsequent Wellness visit. Last Medicare Wellness visit information reviewed, patient interviewed and updates made to the record today.      Health Risk Assessment:   Patient rates overall health as good. Patient feels that their physical health rating is same. Patient is satisfied with their life. Eyesight was rated as slightly worse. Hearing was rated as same. Patient feels that their emotional and mental health rating is much worse. Patients states they are never, rarely angry. Patient states they are never, rarely unusually tired/fatigued. Pain experienced in the last 7 days has been none. Patient states that she has experienced no weight loss or gain in last 6 months.     Depression Screening:   PHQ-9 Score: 14      Fall Risk Screening:   In the past year, patient has experienced: no history of falling in past year      Urinary Incontinence Screening:   Patient has not leaked urine accidently in the last six months.     Home Safety:  Patient does not have trouble with stairs inside or outside of their home. Patient has working smoke alarms and has working carbon monoxide detector. Home safety hazards include: none.     Nutrition:   Current diet is Regular.     Medications:   Patient is currently taking over-the-counter supplements. OTC medications include: see medication list. Patient is able to manage medications.     Activities of Daily Living (ADLs)/Instrumental  Activities of Daily Living (IADLs):   Walk and transfer into and out of bed and chair?: Yes  Dress and groom yourself?: Yes    Bathe or shower yourself?: Yes    Feed yourself? Yes  Do your laundry/housekeeping?: Yes  Manage your money, pay your bills and track your expenses?: Yes  Make your own meals?: Yes    Do your own shopping?: Yes    Previous Hospitalizations:   Any hospitalizations or ED visits within the last 12 months?: Yes    How many hospitalizations have you had in the last year?: 1-2    Advance Care Planning:   Living will: No    Durable POA for healthcare: No    Advanced directive: No    Advanced directive counseling given: Yes    ACP document given: Yes    Patient declined ACP directive: No    End of Life Decisions reviewed with patient: Yes    Provider agrees with end of life decisions: No      Cognitive Screening:   Provider or family/friend/caregiver concerned regarding cognition?: No    PREVENTIVE SCREENINGS      Cardiovascular Screening:    General: Screening Not Indicated and History Lipid Disorder      Diabetes Screening:     General: Screening Current      Colorectal Cancer Screening:     General: Screening Current      Breast Cancer Screening:     General: Screening Current      Cervical Cancer Screening:    General: Screening Not Indicated      Osteoporosis Screening:    General: Screening Not Indicated      Abdominal Aortic Aneurysm (AAA) Screening:        General: Screening Not Indicated      Lung Cancer Screening:     General: Screening Not Indicated      Hepatitis C Screening:    General: Screening Current    Screening, Brief Intervention, and Referral to Treatment (SBIRT)    Screening      Single Item Drug Screening:  How often have you used an illegal drug (including marijuana) or a prescription medication for non-medical reasons in the past year? never    Single Item Drug Screen Score: 0  Interpretation: Negative screen for possible drug use disorder    No results found.     Physical  "Exam:     /62   Pulse 88   Temp (!) 96.8 °F (36 °C) (Tympanic)   Ht 5' 6\" (1.676 m)   Wt 69.4 kg (153 lb)   SpO2 99%   BMI 24.69 kg/m²     Physical Exam  Vitals and nursing note reviewed.   Constitutional:       General: She is not in acute distress.     Appearance: Normal appearance. She is well-developed. She is not ill-appearing, toxic-appearing or diaphoretic.   HENT:      Head: Normocephalic and atraumatic.      Right Ear: Tympanic membrane, ear canal and external ear normal. There is no impacted cerumen.      Left Ear: Tympanic membrane, ear canal and external ear normal. There is no impacted cerumen.      Nose: Nose normal. No congestion or rhinorrhea.      Mouth/Throat:      Mouth: Mucous membranes are moist.      Pharynx: Oropharynx is clear. No oropharyngeal exudate or posterior oropharyngeal erythema.   Eyes:      General:         Right eye: No discharge.         Left eye: No discharge.      Conjunctiva/sclera: Conjunctivae normal.      Pupils: Pupils are equal, round, and reactive to light.   Cardiovascular:      Rate and Rhythm: Normal rate and regular rhythm.      Pulses: Normal pulses.      Heart sounds: Normal heart sounds. No murmur heard.  Pulmonary:      Effort: Pulmonary effort is normal. No respiratory distress.      Breath sounds: Normal breath sounds. No wheezing, rhonchi or rales.   Abdominal:      General: Abdomen is flat. There is no distension.      Palpations: Abdomen is soft. There is no mass.      Tenderness: There is no abdominal tenderness. There is no guarding.      Hernia: No hernia is present.   Musculoskeletal:         General: No deformity. Normal range of motion.      Cervical back: Normal range of motion and neck supple. No rigidity.      Right lower leg: No edema.      Left lower leg: No edema.   Lymphadenopathy:      Cervical: No cervical adenopathy.   Skin:     General: Skin is warm and dry.      Findings: No rash.   Neurological:      General: No focal deficit " present.      Mental Status: She is alert and oriented to person, place, and time.      Sensory: No sensory deficit.      Motor: No weakness.      Coordination: Coordination normal.      Gait: Gait normal.   Psychiatric:         Mood and Affect: Mood normal.         Behavior: Behavior normal.         Thought Content: Thought content normal.         Judgment: Judgment normal.          Ton Negron, DO

## 2024-03-16 ENCOUNTER — APPOINTMENT (OUTPATIENT)
Dept: LAB | Facility: CLINIC | Age: 44
End: 2024-03-16
Payer: COMMERCIAL

## 2024-03-16 DIAGNOSIS — E78.00 HYPERCHOLESTEROLEMIA: ICD-10-CM

## 2024-03-16 LAB
CHOLEST SERPL-MCNC: 178 MG/DL
HDLC SERPL-MCNC: 61 MG/DL
LDLC SERPL CALC-MCNC: 99 MG/DL (ref 0–100)
NONHDLC SERPL-MCNC: 117 MG/DL
TRIGL SERPL-MCNC: 88 MG/DL
TSH SERPL DL<=0.05 MIU/L-ACNC: 1.91 UIU/ML (ref 0.45–4.5)

## 2024-03-16 PROCEDURE — 36415 COLL VENOUS BLD VENIPUNCTURE: CPT

## 2024-03-16 PROCEDURE — 80061 LIPID PANEL: CPT

## 2024-03-16 PROCEDURE — 84443 ASSAY THYROID STIM HORMONE: CPT

## 2024-03-18 DIAGNOSIS — E78.00 HYPERCHOLESTEROLEMIA: ICD-10-CM

## 2024-03-18 RX ORDER — SIMVASTATIN 40 MG
40 TABLET ORAL DAILY
Qty: 90 TABLET | Refills: 0 | Status: SHIPPED | OUTPATIENT
Start: 2024-03-18 | End: 2024-03-19

## 2024-03-19 DIAGNOSIS — E78.00 HYPERCHOLESTEROLEMIA: ICD-10-CM

## 2024-03-19 RX ORDER — SIMVASTATIN 40 MG
40 TABLET ORAL DAILY
Qty: 90 TABLET | Refills: 2 | Status: SHIPPED | OUTPATIENT
Start: 2024-03-19

## 2024-04-20 DIAGNOSIS — M79.10 MUSCLE PAIN: ICD-10-CM

## 2024-04-21 DIAGNOSIS — E55.9 VITAMIN D DEFICIENCY: Primary | ICD-10-CM

## 2024-04-21 DIAGNOSIS — R20.2 PARESTHESIA: ICD-10-CM

## 2024-04-21 DIAGNOSIS — M79.10 MUSCLE PAIN: ICD-10-CM

## 2024-04-21 RX ORDER — TIZANIDINE 4 MG/1
4 TABLET ORAL
Qty: 30 TABLET | Refills: 0 | Status: CANCELLED | OUTPATIENT
Start: 2024-04-21

## 2024-04-21 RX ORDER — GABAPENTIN 300 MG/1
CAPSULE ORAL
Qty: 120 CAPSULE | Refills: 0 | Status: CANCELLED | OUTPATIENT
Start: 2024-04-21

## 2024-04-22 RX ORDER — TIZANIDINE 4 MG/1
4 TABLET ORAL
Qty: 30 TABLET | Refills: 3 | Status: SHIPPED | OUTPATIENT
Start: 2024-04-22

## 2024-04-22 RX ORDER — CHOLECALCIFEROL (VITAMIN D3) 125 MCG
4000 CAPSULE ORAL DAILY
Qty: 30 TABLET | Refills: 6 | Status: SHIPPED | OUTPATIENT
Start: 2024-04-22

## 2024-05-21 ENCOUNTER — TELEPHONE (OUTPATIENT)
Dept: HEMATOLOGY ONCOLOGY | Facility: CLINIC | Age: 44
End: 2024-05-21

## 2024-05-21 NOTE — TELEPHONE ENCOUNTER
Appointment Change  Cancel, Reschedule, Change to Virtual      Who are you speaking with? Patient   If it is not the patient, is the caller listed on the communication consent form? N/A   Which provider is the appointment scheduled with? Dr. Montaño   When was the original appointment scheduled?    Please list date and time 6/6/24 9:20 AM   At which location is the appointment scheduled to take place? [place]   Was the appointment rescheduled?     Was the appointment changed from an in person visit to a virtual visit?    If so, please list the details of the change. No, left message for pt to call back to reschedule appt. [employer] message sent to pt.    What is the reason for the appointment change? Provider requested change due to scheduling conflict.        Was STAR transport scheduled? No   Does STAR transport need to be scheduled for the new visit (if applicable) No   Does the patient need an infusion appointment rescheduled? No   Does the patient have an upcoming infusion appointment scheduled? If so, when? No   Is the patient undergoing chemotherapy? No   For appointments cancelled with less than 24 hours:  Was the no-show policy reviewed? Yes         Rasta,     I am reaching out regarding your appointment with Dr. Montaño on  6/6/24 @ 9:20 AM .    There has been a change to the providers schedule, and your appointment has been cancelled.     Please call the [facility] at [phone] and we would be happy to assist you with rescheduling.     Thank you,   [facility]  [facility]  [phone] ([phone])

## 2024-05-23 ENCOUNTER — TELEPHONE (OUTPATIENT)
Dept: HEMATOLOGY ONCOLOGY | Facility: CLINIC | Age: 44
End: 2024-05-23

## 2024-05-23 NOTE — TELEPHONE ENCOUNTER
Appointment Change  Cancel, Reschedule, Change to Virtual      Who are you speaking with? Patient   If it is not the patient, is the caller listed on the communication consent form? N/A   Which provider is the appointment scheduled with? Dr. Montaño   When was the original appointment scheduled?    Please list date and time 6/6/24 920   At which location is the appointment scheduled to take place? [place]   Was the appointment rescheduled?     Was the appointment changed from an in person visit to a virtual visit?    If so, please list the details of the change. 7/26/24 1040   What is the reason for the appointment change? provider       Was STAR transport scheduled? N/A   Does STAR transport need to be scheduled for the new visit (if applicable) N/A   Does the patient need an infusion appointment rescheduled? N/A   Does the patient have an upcoming infusion appointment scheduled? If so, when? No   Is the patient undergoing chemotherapy? N/A   For appointments cancelled with less than 24 hours:  Was the no-show policy reviewed? N/A

## 2024-05-23 NOTE — TELEPHONE ENCOUNTER
Patient Call    Who are you speaking with? Patient    If it is not the patient, are they listed on an active communication consent form? N/A   What is the reason for this call? Pt wants to get CT done in [place]. She asked if script can be mailed to her and she will try to schedule CT in July   Does this require a call back? N/A   If a call back is required, please list best call back number N/a   If a call back is required, advise that a message will be forwarded to their care team and someone will return their call as soon as possible.   Did you relay this information to the patient? N/A

## 2024-06-09 DIAGNOSIS — E78.00 HYPERCHOLESTEROLEMIA: ICD-10-CM

## 2024-06-09 RX ORDER — SIMVASTATIN 40 MG
40 TABLET ORAL DAILY
Qty: 90 TABLET | Refills: 2 | Status: SHIPPED | OUTPATIENT
Start: 2024-06-09

## 2024-08-08 ENCOUNTER — RA CDI HCC (OUTPATIENT)
Dept: OTHER | Facility: HOSPITAL | Age: 44
End: 2024-08-08

## 2024-09-03 ENCOUNTER — TELEPHONE (OUTPATIENT)
Age: 44
End: 2024-09-03

## 2024-09-03 DIAGNOSIS — E78.00 HYPERCHOLESTEROLEMIA: ICD-10-CM

## 2024-09-03 DIAGNOSIS — R20.2 PARESTHESIA: ICD-10-CM

## 2024-09-03 DIAGNOSIS — M79.10 MUSCLE PAIN: ICD-10-CM

## 2024-09-03 RX ORDER — GABAPENTIN 300 MG/1
CAPSULE ORAL
Qty: 120 CAPSULE | Refills: 0 | Status: SHIPPED | OUTPATIENT
Start: 2024-09-03

## 2024-09-04 RX ORDER — SIMVASTATIN 40 MG
40 TABLET ORAL DAILY
Qty: 90 TABLET | Refills: 1 | Status: SHIPPED | OUTPATIENT
Start: 2024-09-04

## 2024-09-05 NOTE — TELEPHONE ENCOUNTER
30-day supply of tizanidine sent to patient's pharmacy.  This will be last refill authorization unless she is seen for follow-up visit.

## 2024-09-05 NOTE — TELEPHONE ENCOUNTER
Caller: moriah Wallace     Doctor: Cory     Reason for call: pt stated she has not been to office within the year due to the loss of her mother, pt also moved to [place], and she currently looking for a PCP, and also pain mgmt provider. Pt would like to know if a script could be written for the Zanaflex, until she is able to establish care. Pt would like a call back to discuss.     Call back#: [phone]

## 2024-09-05 NOTE — TELEPHONE ENCOUNTER
Preferred pharmacy: [facility] - [facility] - [facility] [address]

## 2024-09-14 DIAGNOSIS — R20.2 PARESTHESIA: ICD-10-CM

## 2024-09-16 RX ORDER — GABAPENTIN 300 MG/1
CAPSULE ORAL
Qty: 120 CAPSULE | Refills: 0 | Status: SHIPPED | OUTPATIENT
Start: 2024-09-16

## 2025-04-02 ENCOUNTER — TELEPHONE (OUTPATIENT)
Dept: FAMILY MEDICINE CLINIC | Facility: CLINIC | Age: 45
End: 2025-04-02

## 2025-04-02 NOTE — TELEPHONE ENCOUNTER
04/02/25 11:40 AM        The office's request has been received, reviewed, and the patient chart updated. The PCP has successfully been removed with a patient attribution note. This message will now be completed.        Thank you  Taco Spears

## (undated) DEVICE — GLOVE SRG BIOGEL 7

## (undated) DEVICE — GAUZE SPONGES,16 PLY: Brand: CURITY

## (undated) DEVICE — ABDOMINAL PAD: Brand: DERMACEA

## (undated) DEVICE — NEEDLE HYPO 22G X 1-1/2 IN

## (undated) DEVICE — ELECTRODE LAP L WIRE E-Z CLEAN 33CM -0100

## (undated) DEVICE — ENDOPATH XCEL BLADELESS TROCARS WITH STABILITY SLEEVES: Brand: ENDOPATH XCEL

## (undated) DEVICE — NEEDLE 18 G X 1 1/2

## (undated) DEVICE — SYRINGE 5ML LL

## (undated) DEVICE — ENDOPATH 5MM CURVED SCISSORS WITH MONOPOLAR CAUTERY: Brand: ENDOPATH

## (undated) DEVICE — 1820 FOAM BLOCK NEEDLE COUNTER: Brand: DEVON

## (undated) DEVICE — GLOVE INDICATOR PI UNDERGLOVE SZ 7.5 BLUE

## (undated) DEVICE — SYRINGE 50ML LL

## (undated) DEVICE — 3M™ STERI-DRAPE™ U-DRAPE 1015: Brand: STERI-DRAPE™

## (undated) DEVICE — ANTI-FOG SOLUTION WITH FOAM PAD: Brand: DEVON

## (undated) DEVICE — FLEXIBLE ADHESIVE BANDAGE,X-LARGE: Brand: CURITY

## (undated) DEVICE — ENDOPATH XCEL UNIVERSAL TROCAR STABLILITY SLEEVES: Brand: ENDOPATH XCEL

## (undated) DEVICE — SUT VICRYL 0 UR-6 27 IN J603H

## (undated) DEVICE — SYRINGE 10ML LL

## (undated) DEVICE — SYRINGE 30ML LL

## (undated) DEVICE — VAPR COOLPULSE 90 ELECTRODE 90 DEGREES SUCTION WITH INTEGRATED HANDPIECE: Brand: VAPR COOLPULSE

## (undated) DEVICE — 3M™ TEGADERM™ TRANSPARENT FILM DRESSING FRAME STYLE, 1624W, 2-3/8 IN X 2-3/4 IN (6 CM X 7 CM), 100/CT 4CT/CASE: Brand: 3M™ TEGADERM™

## (undated) DEVICE — IRRIG ENDO FLO TUBING

## (undated) DEVICE — CHLORAPREP HI-LITE 10.5ML ORANGE

## (undated) DEVICE — TUBING ARTHROSCOPIC WAVE  MAIN PUMP

## (undated) DEVICE — UTILITY MARKER,BLACK WITH LABELS: Brand: DEVON

## (undated) DEVICE — SPONGE LAP 18 X 18 IN

## (undated) DEVICE — SCD SEQUENTIAL COMPRESSION COMFORT SLEEVE MEDIUM KNEE LENGTH: Brand: KENDALL SCD

## (undated) DEVICE — 3M™ STERI-STRIP™ REINFORCED ADHESIVE SKIN CLOSURES, R1546, 1/4 IN X 4 IN (6 MM X 100 MM), 10 STRIPS/ENVELOPE: Brand: 3M™ STERI-STRIP™

## (undated) DEVICE — CHLORAPREP HI-LITE 26ML ORANGE

## (undated) DEVICE — LIGAMAX 5 MM ENDOSCOPIC MULTIPLE CLIP APPLIER: Brand: LIGAMAX

## (undated) DEVICE — TRAY EPIDURAL PERIFIX 20GA X 3.5IN TUOHY 8ML

## (undated) DEVICE — SUT MONOCRYL 4-0 PS-2 27 IN Y426H

## (undated) DEVICE — BANDAID SHEER SPOT

## (undated) DEVICE — PUDDLE VAC

## (undated) DEVICE — ARTHROSCOPY FLOOR MAT

## (undated) DEVICE — TRANSPOSAL ULTRAFLEX DUO/QUAD ULTRA CART MANIFOLD

## (undated) DEVICE — CAUTERY TIP POLISHER: Brand: DEVON

## (undated) DEVICE — INTENDED FOR TISSUE SEPARATION, AND OTHER PROCEDURES THAT REQUIRE A SHARP SURGICAL BLADE TO PUNCTURE OR CUT.: Brand: BARD-PARKER SAFETY BLADES SIZE 15, STERILE

## (undated) DEVICE — SUT ETHILON 3-0 PS-1 18 IN 1663H

## (undated) DEVICE — PREP SURGICAL PURPREP 26ML

## (undated) DEVICE — ARM SLING: Brand: DEROYAL

## (undated) DEVICE — SURGI KIT INSTRUMENT ORGANIZER

## (undated) DEVICE — BUTTON SWITCH PENCIL HOLSTER: Brand: VALLEYLAB

## (undated) DEVICE — IV EXTENSION TUBING 33 IN

## (undated) DEVICE — BURR  OVAL 4MM 13CM 8 FLUTE COOLCUT

## (undated) DEVICE — GLOVE SRG BIOGEL ORTHOPEDIC 7

## (undated) DEVICE — STRL COTTON TIP APPLCTR 6IN PK: Brand: CARDINAL HEALTH

## (undated) DEVICE — OCCLUSIVE GAUZE STRIP,3% BISMUTH TRIBROMOPHENATE IN PETROLATUM BLEND: Brand: XEROFORM

## (undated) DEVICE — SYRINGE 3ML LL

## (undated) DEVICE — GLOVE SRG BIOGEL 8

## (undated) DEVICE — TROCARS: Brand: KII® BALLOON BLUNT TIP SYSTEM

## (undated) DEVICE — ENDOPOUCH RETRIEVER SPECIMEN RETRIEVAL BAGS: Brand: ENDOPOUCH RETRIEVER

## (undated) DEVICE — NEEDLE 25G X 1 1/2

## (undated) DEVICE — ELECTRODE ELECSURG SUPER TURBOVAC 90 3.75MM X 5.4 IN

## (undated) DEVICE — GENERAL ENDOSCOPY PACK: Brand: CONVERTORS

## (undated) DEVICE — STOCKINETTE IMPERVIOUS LG

## (undated) DEVICE — PLASTIC ADHESIVE BANDAGE: Brand: CURITY

## (undated) DEVICE — [HIGH FLOW INSUFFLATOR,  DO NOT USE IF PACKAGE IS DAMAGED,  KEEP DRY,  KEEP AWAY FROM SUNLIGHT,  PROTECT FROM HEAT AND RADIOACTIVE SOURCES.]: Brand: PNEUMOSURE

## (undated) DEVICE — ENDOPATH 5 MM GRASPERS WITH RATCHET HANDLES: Brand: ENDOPATH

## (undated) DEVICE — FILTER STRAW 1.7

## (undated) DEVICE — LIGHT GLOVE GREEN

## (undated) DEVICE — DRAPE ISO IRRIGATION POUCH 1016

## (undated) DEVICE — DRY-DOC CANNULA WITH DISPOSABLE OBTURATOR, 5.0 X 85 MM: Brand: DRY-DOC

## (undated) DEVICE — GLOVE SRG BIOGEL ECLIPSE 7

## (undated) DEVICE — GAUZE SPONGES,8 PLY: Brand: CURITY

## (undated) DEVICE — TUBING SUCTION 5MM X 12 FT

## (undated) DEVICE — BLADE SHAVER EXCALIBUR 4MM 13CM COOLCUT

## (undated) DEVICE — PACK MAJOR ORTHO W/SPLITS PBDS

## (undated) DEVICE — REM POLYHESIVE ADULT PATIENT RETURN ELECTRODE: Brand: VALLEYLAB

## (undated) DEVICE — GLOVE INDICATOR PI UNDERGLOVE SZ 7 BLUE

## (undated) DEVICE — 5 MM CURVED DISSECTORS WITH MONOPOLAR CAUTERY: Brand: ENDOPATH

## (undated) DEVICE — CLEAR-TRAC THREADED CANNULA WITH                                    OBTURATOR 5 MM X 76 MM, LATEX FREE,                                    BOX OF 10: Brand: CLEAR-TRAC

## (undated) DEVICE — NEEDLE SPINAL 22G X 3.5IN  QUINCKE

## (undated) DEVICE — VIAL DECANTER